# Patient Record
Sex: FEMALE | Race: WHITE | Employment: FULL TIME | URBAN - METROPOLITAN AREA
[De-identification: names, ages, dates, MRNs, and addresses within clinical notes are randomized per-mention and may not be internally consistent; named-entity substitution may affect disease eponyms.]

---

## 2017-01-04 DIAGNOSIS — K21.9 GASTROESOPHAGEAL REFLUX DISEASE WITHOUT ESOPHAGITIS: ICD-10-CM

## 2017-01-04 RX ORDER — LORATADINE 10 MG/1
TABLET ORAL
Qty: 30 TABLET | Refills: 4 | Status: SHIPPED | OUTPATIENT
Start: 2017-01-04 | End: 2017-06-30 | Stop reason: SDUPTHER

## 2017-01-04 RX ORDER — OMEPRAZOLE 20 MG/1
CAPSULE, DELAYED RELEASE ORAL
Qty: 30 CAPSULE | Refills: 11 | Status: SHIPPED | OUTPATIENT
Start: 2017-01-04 | End: 2018-01-23 | Stop reason: SDUPTHER

## 2017-01-19 RX ORDER — METOPROLOL TARTRATE 50 MG/1
TABLET, FILM COATED ORAL
Qty: 60 TABLET | Refills: 2 | Status: SHIPPED | OUTPATIENT
Start: 2017-01-19 | End: 2017-05-12 | Stop reason: SDUPTHER

## 2017-02-24 RX ORDER — TOPIRAMATE 25 MG/1
TABLET ORAL
Qty: 60 TABLET | Refills: 0 | Status: SHIPPED | OUTPATIENT
Start: 2017-02-24 | End: 2017-04-04 | Stop reason: SDUPTHER

## 2017-03-23 ENCOUNTER — OFFICE VISIT (OUTPATIENT)
Dept: NEUROLOGY | Age: 52
End: 2017-03-23
Payer: COMMERCIAL

## 2017-03-23 DIAGNOSIS — M54.12 CERVICAL RADICULOPATHY: Primary | ICD-10-CM

## 2017-03-23 PROCEDURE — 95910 NRV CNDJ TEST 7-8 STUDIES: CPT | Performed by: PSYCHIATRY & NEUROLOGY

## 2017-03-23 PROCEDURE — 95886 MUSC TEST DONE W/N TEST COMP: CPT | Performed by: PSYCHIATRY & NEUROLOGY

## 2017-03-23 RX ORDER — LORAZEPAM 1 MG/1
1 TABLET ORAL EVERY 6 HOURS PRN
OUTPATIENT
Start: 2017-03-23

## 2017-04-03 RX ORDER — LORAZEPAM 1 MG/1
1 TABLET ORAL EVERY 8 HOURS PRN
Qty: 20 TABLET | Refills: 0 | Status: SHIPPED | OUTPATIENT
Start: 2017-04-03 | End: 2017-09-29 | Stop reason: SDUPTHER

## 2017-04-04 RX ORDER — TOPIRAMATE 25 MG/1
TABLET ORAL
Qty: 60 TABLET | Refills: 0 | Status: SHIPPED | OUTPATIENT
Start: 2017-04-04 | End: 2017-05-12 | Stop reason: SDUPTHER

## 2017-04-10 ENCOUNTER — HOSPITAL ENCOUNTER (OUTPATIENT)
Dept: MRI IMAGING | Age: 52
Discharge: HOME | End: 2017-04-10
Payer: COMMERCIAL

## 2017-04-10 DIAGNOSIS — M54.12 CERVICAL RADICULOPATHY: ICD-10-CM

## 2017-04-10 LAB — POC CREATININE: 0.5 MG/DL (ref 0.6–1.4)

## 2017-04-10 PROCEDURE — A9579 GAD-BASE MR CONTRAST NOS,1ML: HCPCS | Performed by: PSYCHIATRY & NEUROLOGY

## 2017-04-10 PROCEDURE — 72156 MRI NECK SPINE W/O & W/DYE: CPT

## 2017-04-10 PROCEDURE — 82565 ASSAY OF CREATININE: CPT

## 2017-04-10 PROCEDURE — 6360000004 HC RX CONTRAST MEDICATION: Performed by: PSYCHIATRY & NEUROLOGY

## 2017-04-10 RX ORDER — SODIUM CHLORIDE 0.9 % (FLUSH) 0.9 %
10 SYRINGE (ML) INJECTION 2 TIMES DAILY
Status: DISCONTINUED | OUTPATIENT
Start: 2017-04-10 | End: 2017-04-13 | Stop reason: HOSPADM

## 2017-04-10 RX ADMIN — GADOPENTETATE DIMEGLUMINE 13 ML: 469.01 INJECTION INTRAVENOUS at 09:20

## 2017-04-17 ENCOUNTER — OFFICE VISIT (OUTPATIENT)
Dept: ORTHOPEDIC SURGERY | Age: 52
End: 2017-04-17
Payer: COMMERCIAL

## 2017-04-17 VITALS — BODY MASS INDEX: 28.3 KG/M2 | WEIGHT: 149.91 LBS | HEIGHT: 61 IN

## 2017-04-17 DIAGNOSIS — M65.311 TRIGGER FINGER OF RIGHT THUMB: Primary | ICD-10-CM

## 2017-04-17 DIAGNOSIS — M65.331 TRIGGER MIDDLE FINGER OF RIGHT HAND: ICD-10-CM

## 2017-04-17 PROCEDURE — 99213 OFFICE O/P EST LOW 20 MIN: CPT | Performed by: STUDENT IN AN ORGANIZED HEALTH CARE EDUCATION/TRAINING PROGRAM

## 2017-04-17 ASSESSMENT — ENCOUNTER SYMPTOMS
CHEST TIGHTNESS: 0
VOMITING: 0
SHORTNESS OF BREATH: 0
CONSTIPATION: 0
DIARRHEA: 0
NAUSEA: 0

## 2017-04-27 ENCOUNTER — OFFICE VISIT (OUTPATIENT)
Dept: INTERNAL MEDICINE | Age: 52
End: 2017-04-27
Payer: COMMERCIAL

## 2017-04-27 VITALS
BODY MASS INDEX: 27.38 KG/M2 | HEIGHT: 61 IN | SYSTOLIC BLOOD PRESSURE: 118 MMHG | WEIGHT: 145 LBS | HEART RATE: 67 BPM | OXYGEN SATURATION: 93 % | DIASTOLIC BLOOD PRESSURE: 80 MMHG

## 2017-04-27 DIAGNOSIS — Z72.0 TOBACCO ABUSE: ICD-10-CM

## 2017-04-27 DIAGNOSIS — M65.30 TRIGGER FINGER, UNSPECIFIED FINGER, UNSPECIFIED LATERALITY: Primary | ICD-10-CM

## 2017-04-27 DIAGNOSIS — R73.03 PREDIABETES: ICD-10-CM

## 2017-04-27 PROCEDURE — 99213 OFFICE O/P EST LOW 20 MIN: CPT | Performed by: INTERNAL MEDICINE

## 2017-04-27 ASSESSMENT — PATIENT HEALTH QUESTIONNAIRE - PHQ9
SUM OF ALL RESPONSES TO PHQ QUESTIONS 1-9: 0
1. LITTLE INTEREST OR PLEASURE IN DOING THINGS: 0
2. FEELING DOWN, DEPRESSED OR HOPELESS: 0
SUM OF ALL RESPONSES TO PHQ9 QUESTIONS 1 & 2: 0

## 2017-05-01 ENCOUNTER — ANESTHESIA EVENT (OUTPATIENT)
Dept: OPERATING ROOM | Age: 52
End: 2017-05-01
Payer: COMMERCIAL

## 2017-05-02 ENCOUNTER — ANESTHESIA (OUTPATIENT)
Dept: OPERATING ROOM | Age: 52
End: 2017-05-02
Payer: COMMERCIAL

## 2017-05-02 ENCOUNTER — HOSPITAL ENCOUNTER (OUTPATIENT)
Age: 52
Setting detail: OUTPATIENT SURGERY
Discharge: HOME | End: 2017-05-02
Attending: ORTHOPAEDIC SURGERY | Admitting: ORTHOPAEDIC SURGERY
Payer: COMMERCIAL

## 2017-05-02 VITALS — OXYGEN SATURATION: 100 % | SYSTOLIC BLOOD PRESSURE: 123 MMHG | DIASTOLIC BLOOD PRESSURE: 82 MMHG

## 2017-05-02 VITALS
BODY MASS INDEX: 26.97 KG/M2 | SYSTOLIC BLOOD PRESSURE: 155 MMHG | DIASTOLIC BLOOD PRESSURE: 79 MMHG | OXYGEN SATURATION: 97 % | HEART RATE: 63 BPM | RESPIRATION RATE: 14 BRPM | WEIGHT: 142.86 LBS | HEIGHT: 61 IN | TEMPERATURE: 97.7 F

## 2017-05-02 LAB
GLUCOSE BLD-MCNC: 105 MG/DL (ref 74–106)
POC BUN: 15 MG/DL (ref 6–20)
POC CREATININE: 0.6 MG/DL (ref 0.6–1.4)

## 2017-05-02 PROCEDURE — 3600000013 HC SURGERY LEVEL 3 ADDTL 15MIN: Performed by: ORTHOPAEDIC SURGERY

## 2017-05-02 PROCEDURE — 2580000003 HC RX 258: Performed by: ANESTHESIOLOGY

## 2017-05-02 PROCEDURE — 6360000002 HC RX W HCPCS: Performed by: STUDENT IN AN ORGANIZED HEALTH CARE EDUCATION/TRAINING PROGRAM

## 2017-05-02 PROCEDURE — 3700000000 HC ANESTHESIA ATTENDED CARE: Performed by: ORTHOPAEDIC SURGERY

## 2017-05-02 PROCEDURE — 7100000010 HC PHASE II RECOVERY - FIRST 15 MIN: Performed by: ORTHOPAEDIC SURGERY

## 2017-05-02 PROCEDURE — 3700000001 HC ADD 15 MINUTES (ANESTHESIA): Performed by: ORTHOPAEDIC SURGERY

## 2017-05-02 PROCEDURE — 26055 INCISE FINGER TENDON SHEATH: CPT | Performed by: ORTHOPAEDIC SURGERY

## 2017-05-02 PROCEDURE — 2580000003 HC RX 258: Performed by: ORTHOPAEDIC SURGERY

## 2017-05-02 PROCEDURE — 3600000003 HC SURGERY LEVEL 3 BASE: Performed by: ORTHOPAEDIC SURGERY

## 2017-05-02 PROCEDURE — 7100000011 HC PHASE II RECOVERY - ADDTL 15 MIN: Performed by: ORTHOPAEDIC SURGERY

## 2017-05-02 PROCEDURE — 82565 ASSAY OF CREATININE: CPT

## 2017-05-02 PROCEDURE — 82947 ASSAY GLUCOSE BLOOD QUANT: CPT

## 2017-05-02 PROCEDURE — 2500000003 HC RX 250 WO HCPCS: Performed by: SPECIALIST

## 2017-05-02 PROCEDURE — 84520 ASSAY OF UREA NITROGEN: CPT

## 2017-05-02 PROCEDURE — 2500000003 HC RX 250 WO HCPCS: Performed by: ORTHOPAEDIC SURGERY

## 2017-05-02 PROCEDURE — 93005 ELECTROCARDIOGRAM TRACING: CPT

## 2017-05-02 PROCEDURE — 6360000002 HC RX W HCPCS: Performed by: SPECIALIST

## 2017-05-02 RX ORDER — MAGNESIUM HYDROXIDE 1200 MG/15ML
LIQUID ORAL CONTINUOUS PRN
Status: DISCONTINUED | OUTPATIENT
Start: 2017-05-02 | End: 2017-05-02 | Stop reason: HOSPADM

## 2017-05-02 RX ORDER — SODIUM CHLORIDE, SODIUM LACTATE, POTASSIUM CHLORIDE, CALCIUM CHLORIDE 600; 310; 30; 20 MG/100ML; MG/100ML; MG/100ML; MG/100ML
INJECTION, SOLUTION INTRAVENOUS CONTINUOUS
Status: DISCONTINUED | OUTPATIENT
Start: 2017-05-02 | End: 2017-05-02 | Stop reason: HOSPADM

## 2017-05-02 RX ORDER — FENTANYL CITRATE 50 UG/ML
INJECTION, SOLUTION INTRAMUSCULAR; INTRAVENOUS PRN
Status: DISCONTINUED | OUTPATIENT
Start: 2017-05-02 | End: 2017-05-02 | Stop reason: SDUPTHER

## 2017-05-02 RX ORDER — HYDROCODONE BITARTRATE AND ACETAMINOPHEN 5; 325 MG/1; MG/1
1-2 TABLET ORAL EVERY 6 HOURS PRN
Qty: 30 TABLET | Refills: 0 | Status: SHIPPED | OUTPATIENT
Start: 2017-05-02 | End: 2017-05-24 | Stop reason: ALTCHOICE

## 2017-05-02 RX ORDER — LIDOCAINE HYDROCHLORIDE 10 MG/ML
INJECTION, SOLUTION EPIDURAL; INFILTRATION; INTRACAUDAL; PERINEURAL PRN
Status: DISCONTINUED | OUTPATIENT
Start: 2017-05-02 | End: 2017-05-02 | Stop reason: SDUPTHER

## 2017-05-02 RX ORDER — MIDAZOLAM HYDROCHLORIDE 1 MG/ML
INJECTION INTRAMUSCULAR; INTRAVENOUS PRN
Status: DISCONTINUED | OUTPATIENT
Start: 2017-05-02 | End: 2017-05-02 | Stop reason: SDUPTHER

## 2017-05-02 RX ORDER — PROPOFOL 10 MG/ML
INJECTION, EMULSION INTRAVENOUS CONTINUOUS PRN
Status: DISCONTINUED | OUTPATIENT
Start: 2017-05-02 | End: 2017-05-02 | Stop reason: SDUPTHER

## 2017-05-02 RX ADMIN — PROPOFOL 50 MCG/KG/MIN: 10 INJECTION, EMULSION INTRAVENOUS at 13:30

## 2017-05-02 RX ADMIN — Medication 2 G: at 13:35

## 2017-05-02 RX ADMIN — MIDAZOLAM HYDROCHLORIDE 1 MG: 1 INJECTION, SOLUTION INTRAMUSCULAR; INTRAVENOUS at 13:30

## 2017-05-02 RX ADMIN — SODIUM CHLORIDE, POTASSIUM CHLORIDE, SODIUM LACTATE AND CALCIUM CHLORIDE: 600; 310; 30; 20 INJECTION, SOLUTION INTRAVENOUS at 11:39

## 2017-05-02 RX ADMIN — FENTANYL CITRATE 50 MCG: 50 INJECTION, SOLUTION INTRAMUSCULAR; INTRAVENOUS at 13:30

## 2017-05-02 RX ADMIN — LIDOCAINE HYDROCHLORIDE 30 MG: 10 INJECTION, SOLUTION EPIDURAL; INFILTRATION; INTRACAUDAL; PERINEURAL at 13:30

## 2017-05-02 RX ADMIN — MIDAZOLAM HYDROCHLORIDE 0.5 MG: 1 INJECTION, SOLUTION INTRAMUSCULAR; INTRAVENOUS at 13:35

## 2017-05-02 ASSESSMENT — PAIN SCALES - GENERAL
PAINLEVEL_OUTOF10: 0

## 2017-05-02 ASSESSMENT — PAIN DESCRIPTION - DESCRIPTORS: DESCRIPTORS: ACHING

## 2017-05-02 ASSESSMENT — PAIN - FUNCTIONAL ASSESSMENT: PAIN_FUNCTIONAL_ASSESSMENT: 0-10

## 2017-05-05 LAB
EKG ATRIAL RATE: 60 BPM
EKG P AXIS: 66 DEGREES
EKG P-R INTERVAL: 118 MS
EKG Q-T INTERVAL: 418 MS
EKG QRS DURATION: 84 MS
EKG QTC CALCULATION (BAZETT): 418 MS
EKG R AXIS: 79 DEGREES
EKG T AXIS: 70 DEGREES
EKG VENTRICULAR RATE: 60 BPM

## 2017-05-08 ENCOUNTER — OFFICE VISIT (OUTPATIENT)
Dept: NEUROLOGY | Age: 52
End: 2017-05-08
Payer: COMMERCIAL

## 2017-05-08 VITALS
DIASTOLIC BLOOD PRESSURE: 83 MMHG | HEART RATE: 79 BPM | HEIGHT: 61 IN | WEIGHT: 143.6 LBS | SYSTOLIC BLOOD PRESSURE: 138 MMHG | BODY MASS INDEX: 27.11 KG/M2

## 2017-05-08 DIAGNOSIS — R51.9 HEADACHE DISORDER: Primary | ICD-10-CM

## 2017-05-08 PROCEDURE — 99214 OFFICE O/P EST MOD 30 MIN: CPT | Performed by: PSYCHIATRY & NEUROLOGY

## 2017-05-15 ENCOUNTER — OFFICE VISIT (OUTPATIENT)
Dept: ORTHOPEDIC SURGERY | Age: 52
End: 2017-05-15

## 2017-05-15 VITALS
SYSTOLIC BLOOD PRESSURE: 166 MMHG | HEIGHT: 61 IN | HEART RATE: 79 BPM | WEIGHT: 143.52 LBS | BODY MASS INDEX: 27.1 KG/M2 | DIASTOLIC BLOOD PRESSURE: 93 MMHG

## 2017-05-15 DIAGNOSIS — M65.331 TRIGGER MIDDLE FINGER OF RIGHT HAND: ICD-10-CM

## 2017-05-15 DIAGNOSIS — M65.311 TRIGGER FINGER OF RIGHT THUMB: Primary | ICD-10-CM

## 2017-05-15 PROCEDURE — 99024 POSTOP FOLLOW-UP VISIT: CPT | Performed by: STUDENT IN AN ORGANIZED HEALTH CARE EDUCATION/TRAINING PROGRAM

## 2017-05-15 RX ORDER — METOPROLOL TARTRATE 50 MG/1
TABLET, FILM COATED ORAL
Qty: 60 TABLET | Refills: 2 | Status: SHIPPED | OUTPATIENT
Start: 2017-05-15 | End: 2017-08-29 | Stop reason: SDUPTHER

## 2017-05-15 RX ORDER — TOPIRAMATE 25 MG/1
TABLET ORAL
Qty: 60 TABLET | Refills: 3 | Status: SHIPPED | OUTPATIENT
Start: 2017-05-15 | End: 2017-09-27 | Stop reason: SDUPTHER

## 2017-05-19 ENCOUNTER — HOSPITAL ENCOUNTER (OUTPATIENT)
Dept: OCCUPATIONAL THERAPY | Age: 52
Setting detail: THERAPIES SERIES
Discharge: HOME | End: 2017-05-19
Payer: COMMERCIAL

## 2017-05-19 PROCEDURE — 97165 OT EVAL LOW COMPLEX 30 MIN: CPT

## 2017-05-19 PROCEDURE — 97140 MANUAL THERAPY 1/> REGIONS: CPT

## 2017-05-19 PROCEDURE — 97110 THERAPEUTIC EXERCISES: CPT

## 2017-05-22 ENCOUNTER — HOSPITAL ENCOUNTER (OUTPATIENT)
Dept: OCCUPATIONAL THERAPY | Age: 52
Setting detail: THERAPIES SERIES
Discharge: HOME | End: 2017-05-22
Payer: COMMERCIAL

## 2017-05-22 PROCEDURE — 97110 THERAPEUTIC EXERCISES: CPT

## 2017-05-22 PROCEDURE — 97035 APP MDLTY 1+ULTRASOUND EA 15: CPT

## 2017-05-23 ENCOUNTER — HOSPITAL ENCOUNTER (OUTPATIENT)
Dept: OCCUPATIONAL THERAPY | Age: 52
Setting detail: THERAPIES SERIES
Discharge: HOME | End: 2017-05-23
Payer: COMMERCIAL

## 2017-05-24 ENCOUNTER — OFFICE VISIT (OUTPATIENT)
Dept: ORTHOPEDIC SURGERY | Age: 52
End: 2017-05-24

## 2017-05-24 VITALS — BODY MASS INDEX: 27.1 KG/M2 | HEIGHT: 61 IN | WEIGHT: 143.52 LBS

## 2017-05-24 DIAGNOSIS — M65.311 TRIGGER FINGER OF RIGHT THUMB: Primary | ICD-10-CM

## 2017-05-24 DIAGNOSIS — M65.331 TRIGGER MIDDLE FINGER OF RIGHT HAND: ICD-10-CM

## 2017-05-24 PROCEDURE — 99024 POSTOP FOLLOW-UP VISIT: CPT | Performed by: STUDENT IN AN ORGANIZED HEALTH CARE EDUCATION/TRAINING PROGRAM

## 2017-05-25 ENCOUNTER — HOSPITAL ENCOUNTER (OUTPATIENT)
Dept: OCCUPATIONAL THERAPY | Age: 52
Setting detail: THERAPIES SERIES
Discharge: HOME | End: 2017-05-25
Payer: COMMERCIAL

## 2017-05-25 PROCEDURE — 97110 THERAPEUTIC EXERCISES: CPT

## 2017-05-25 PROCEDURE — 97035 APP MDLTY 1+ULTRASOUND EA 15: CPT

## 2017-05-30 ENCOUNTER — HOSPITAL ENCOUNTER (OUTPATIENT)
Dept: OCCUPATIONAL THERAPY | Age: 52
Setting detail: THERAPIES SERIES
Discharge: HOME | End: 2017-05-30
Payer: COMMERCIAL

## 2017-05-30 PROCEDURE — 97035 APP MDLTY 1+ULTRASOUND EA 15: CPT

## 2017-05-30 PROCEDURE — 97110 THERAPEUTIC EXERCISES: CPT

## 2017-06-01 ENCOUNTER — HOSPITAL ENCOUNTER (OUTPATIENT)
Dept: OCCUPATIONAL THERAPY | Age: 52
Setting detail: THERAPIES SERIES
Discharge: HOME | End: 2017-06-01
Payer: COMMERCIAL

## 2017-06-01 PROCEDURE — 97035 APP MDLTY 1+ULTRASOUND EA 15: CPT

## 2017-06-01 PROCEDURE — 97110 THERAPEUTIC EXERCISES: CPT

## 2017-06-02 ENCOUNTER — HOSPITAL ENCOUNTER (OUTPATIENT)
Dept: OCCUPATIONAL THERAPY | Age: 52
Setting detail: THERAPIES SERIES
Discharge: HOME | End: 2017-06-02
Payer: COMMERCIAL

## 2017-06-06 ENCOUNTER — HOSPITAL ENCOUNTER (OUTPATIENT)
Dept: OCCUPATIONAL THERAPY | Age: 52
Setting detail: THERAPIES SERIES
Discharge: HOME | End: 2017-06-06
Payer: COMMERCIAL

## 2017-06-06 PROCEDURE — 97110 THERAPEUTIC EXERCISES: CPT

## 2017-06-06 PROCEDURE — 97035 APP MDLTY 1+ULTRASOUND EA 15: CPT

## 2017-06-06 PROCEDURE — 97140 MANUAL THERAPY 1/> REGIONS: CPT

## 2017-06-12 ENCOUNTER — OFFICE VISIT (OUTPATIENT)
Dept: ORTHOPEDIC SURGERY | Age: 52
End: 2017-06-12

## 2017-06-12 DIAGNOSIS — M65.331 TRIGGER MIDDLE FINGER OF RIGHT HAND: Primary | ICD-10-CM

## 2017-06-12 PROCEDURE — 99024 POSTOP FOLLOW-UP VISIT: CPT | Performed by: STUDENT IN AN ORGANIZED HEALTH CARE EDUCATION/TRAINING PROGRAM

## 2017-06-12 RX ORDER — METHYLPREDNISOLONE 4 MG/1
TABLET ORAL
Qty: 1 KIT | Refills: 0 | Status: SHIPPED | OUTPATIENT
Start: 2017-06-12 | End: 2017-06-18

## 2017-06-12 ASSESSMENT — ENCOUNTER SYMPTOMS
WHEEZING: 0
ABDOMINAL DISTENTION: 0
SHORTNESS OF BREATH: 0

## 2017-06-29 ENCOUNTER — OFFICE VISIT (OUTPATIENT)
Dept: INTERNAL MEDICINE | Age: 52
End: 2017-06-29
Payer: COMMERCIAL

## 2017-06-29 VITALS
DIASTOLIC BLOOD PRESSURE: 90 MMHG | WEIGHT: 145 LBS | HEIGHT: 61 IN | OXYGEN SATURATION: 96 % | SYSTOLIC BLOOD PRESSURE: 150 MMHG | HEART RATE: 66 BPM | BODY MASS INDEX: 27.38 KG/M2

## 2017-06-29 DIAGNOSIS — I10 ESSENTIAL HYPERTENSION: Chronic | ICD-10-CM

## 2017-06-29 DIAGNOSIS — Z00.00 WELL ADULT HEALTH CHECK: ICD-10-CM

## 2017-06-29 DIAGNOSIS — J40 BRONCHITIS: Primary | ICD-10-CM

## 2017-06-29 DIAGNOSIS — J44.9 CHRONIC OBSTRUCTIVE PULMONARY DISEASE, UNSPECIFIED COPD TYPE (HCC): Chronic | ICD-10-CM

## 2017-06-29 LAB — HBA1C MFR BLD: 5.9 %

## 2017-06-29 PROCEDURE — 83036 HEMOGLOBIN GLYCOSYLATED A1C: CPT | Performed by: INTERNAL MEDICINE

## 2017-06-29 PROCEDURE — 99213 OFFICE O/P EST LOW 20 MIN: CPT | Performed by: INTERNAL MEDICINE

## 2017-06-29 RX ORDER — AZITHROMYCIN 250 MG/1
TABLET, FILM COATED ORAL
Qty: 1 PACKET | Refills: 0 | Status: SHIPPED | OUTPATIENT
Start: 2017-06-29 | End: 2017-07-09

## 2017-06-29 ASSESSMENT — ENCOUNTER SYMPTOMS
SHORTNESS OF BREATH: 1
ABDOMINAL DISTENTION: 0
COUGH: 1
PHOTOPHOBIA: 0
ABDOMINAL PAIN: 0
SINUS PRESSURE: 0

## 2017-07-03 RX ORDER — LORATADINE 10 MG/1
TABLET ORAL
Qty: 30 TABLET | Refills: 4 | Status: SHIPPED | OUTPATIENT
Start: 2017-07-03 | End: 2017-12-04 | Stop reason: SDUPTHER

## 2017-07-14 ENCOUNTER — TELEPHONE (OUTPATIENT)
Dept: PHARMACY | Facility: CLINIC | Age: 52
End: 2017-07-14

## 2017-07-17 ENCOUNTER — OFFICE VISIT (OUTPATIENT)
Dept: ORTHOPEDIC SURGERY | Age: 52
End: 2017-07-17

## 2017-07-17 ENCOUNTER — OFFICE VISIT (OUTPATIENT)
Dept: INTERNAL MEDICINE | Age: 52
End: 2017-07-17
Payer: COMMERCIAL

## 2017-07-17 VITALS
HEIGHT: 61 IN | HEART RATE: 74 BPM | WEIGHT: 147 LBS | DIASTOLIC BLOOD PRESSURE: 81 MMHG | OXYGEN SATURATION: 95 % | SYSTOLIC BLOOD PRESSURE: 131 MMHG | BODY MASS INDEX: 27.75 KG/M2

## 2017-07-17 VITALS — WEIGHT: 147.2 LBS | HEIGHT: 61 IN | BODY MASS INDEX: 27.79 KG/M2

## 2017-07-17 DIAGNOSIS — M65.331 TRIGGER MIDDLE FINGER OF RIGHT HAND: Primary | ICD-10-CM

## 2017-07-17 DIAGNOSIS — I10 ESSENTIAL HYPERTENSION: Primary | Chronic | ICD-10-CM

## 2017-07-17 DIAGNOSIS — Z12.39 BREAST CANCER SCREENING: ICD-10-CM

## 2017-07-17 DIAGNOSIS — Z72.0 TOBACCO ABUSE: ICD-10-CM

## 2017-07-17 DIAGNOSIS — M65.311 TRIGGER FINGER OF RIGHT THUMB: ICD-10-CM

## 2017-07-17 PROCEDURE — 99024 POSTOP FOLLOW-UP VISIT: CPT | Performed by: STUDENT IN AN ORGANIZED HEALTH CARE EDUCATION/TRAINING PROGRAM

## 2017-07-17 PROCEDURE — 99214 OFFICE O/P EST MOD 30 MIN: CPT | Performed by: INTERNAL MEDICINE

## 2017-08-01 ENCOUNTER — HOSPITAL ENCOUNTER (OUTPATIENT)
Dept: WOMENS IMAGING | Age: 52
Discharge: HOME | End: 2017-08-01
Payer: COMMERCIAL

## 2017-08-01 DIAGNOSIS — Z12.39 BREAST CANCER SCREENING: ICD-10-CM

## 2017-08-01 PROCEDURE — 77063 BREAST TOMOSYNTHESIS BI: CPT

## 2017-08-29 RX ORDER — METOPROLOL TARTRATE 50 MG/1
TABLET, FILM COATED ORAL
Qty: 60 TABLET | Refills: 2 | Status: CANCELLED | OUTPATIENT
Start: 2017-08-29

## 2017-08-30 RX ORDER — METOPROLOL TARTRATE 50 MG/1
TABLET, FILM COATED ORAL
Qty: 60 TABLET | Refills: 2 | Status: SHIPPED | OUTPATIENT
Start: 2017-08-30 | End: 2017-12-01 | Stop reason: SDUPTHER

## 2017-09-13 DIAGNOSIS — R51.9 OCCIPITAL HEADACHE: ICD-10-CM

## 2017-09-13 DIAGNOSIS — R55 NEAR SYNCOPE: ICD-10-CM

## 2017-09-27 DIAGNOSIS — R55 NEAR SYNCOPE: ICD-10-CM

## 2017-09-28 RX ORDER — TOPIRAMATE 25 MG/1
TABLET ORAL
Qty: 60 TABLET | Refills: 2 | Status: ON HOLD | OUTPATIENT
Start: 2017-09-28 | End: 2018-12-30

## 2017-09-28 RX ORDER — IPRATROPIUM/ALBUTEROL SULFATE 20-100 MCG
MIST INHALER (GRAM) INHALATION
Qty: 1 INHALER | Refills: 5 | Status: SHIPPED | OUTPATIENT
Start: 2017-09-28 | End: 2018-02-20 | Stop reason: SDUPTHER

## 2017-09-29 NOTE — TELEPHONE ENCOUNTER
Health Maintenance   Topic Date Due    Colon cancer screen colonoscopy  08/25/2017    Flu vaccine (1) 09/01/2017    Cervical cancer screen  10/20/2017    Breast cancer screen  08/01/2019    Lipid screen  04/25/2021    DTaP/Tdap/Td vaccine (2 - Td) 10/20/2024    Pneumococcal med risk  Completed    HIV screen  Completed             (applicable per patient's age: Cancer Screenings, Depression Screening, Fall Risk Screening, Immunizations)    Hemoglobin A1C (%)   Date Value   06/29/2017 5.9   12/05/2016 6.0     LDL Cholesterol (mg/dL)   Date Value   04/25/2016 123     AST (U/L)   Date Value   12/12/2016 22     ALT (U/L)   Date Value   12/12/2016 16     BUN (mg/dL)   Date Value   12/12/2016 13      (goal A1C is < 7)   (goal LDL is <100) need 30-50% reduction from baseline     BP Readings from Last 3 Encounters:   07/17/17 131/81   06/29/17 (!) 150/90   05/15/17 (!) 166/93    (goal /80)      All Future Testing planned in [facility]:  Lab Frequency Next Occurrence   POCT activated clotting time         Next Visit Date:  Future Appointments  Date Time Provider [place]   10/10/2017 8:40 AM Arturo Huerta MD [facility]   1/18/2018 8:50 AM Chinedu Ortiz MD [id number]            Patient Active Problem List:     HTN (hypertension)     COPD (chronic obstructive pulmonary disease)     Hyperlipidemia     Near syncope     Anxiety     Tobacco abuse     Vesicular rash     Rash of hands     S/P LEEP     Encounter for routine gynecological examination     Gastroesophageal reflux disease without esophagitis     Trigger finger of right thumb     Trigger finger, right middle finger     Occipital headache

## 2017-10-02 RX ORDER — LORAZEPAM 1 MG/1
1 TABLET ORAL EVERY 8 HOURS PRN
Qty: 20 TABLET | Refills: 0 | Status: SHIPPED | OUTPATIENT
Start: 2017-10-02 | End: 2019-08-15 | Stop reason: SDUPTHER

## 2017-10-02 RX ORDER — FLUOXETINE HYDROCHLORIDE 20 MG/1
20 CAPSULE ORAL DAILY
Qty: 30 CAPSULE | Refills: 6 | Status: SHIPPED | OUTPATIENT
Start: 2017-10-02 | End: 2018-07-10 | Stop reason: SDUPTHER

## 2017-11-21 ENCOUNTER — TELEPHONE (OUTPATIENT)
Dept: INTERNAL MEDICINE | Age: 52
End: 2017-11-21

## 2017-12-04 RX ORDER — METOPROLOL TARTRATE 50 MG/1
TABLET, FILM COATED ORAL
Qty: 60 TABLET | Refills: 1 | Status: SHIPPED | OUTPATIENT
Start: 2017-12-04 | End: 2018-02-20 | Stop reason: SDUPTHER

## 2017-12-04 RX ORDER — LORATADINE 10 MG/1
TABLET ORAL
Qty: 30 TABLET | Refills: 4 | Status: SHIPPED | OUTPATIENT
Start: 2017-12-04 | End: 2018-08-13 | Stop reason: ALTCHOICE

## 2017-12-04 NOTE — TELEPHONE ENCOUNTER
Health Maintenance   Topic Date Due    Colon cancer screen colonoscopy  08/25/2017    Flu vaccine (1) 09/01/2017    Cervical cancer screen  10/20/2017    Breast cancer screen  08/01/2019    Lipid screen  04/25/2021    DTaP/Tdap/Td vaccine (2 - Td) 10/20/2024    Pneumococcal med risk  Completed    HIV screen  Completed             (applicable per patient's age: Cancer Screenings, Depression Screening, Fall Risk Screening, Immunizations)    Hemoglobin A1C (%)   Date Value   06/29/2017 5.9   12/05/2016 6.0     LDL Cholesterol (mg/dL)   Date Value   04/25/2016 123     AST (U/L)   Date Value   12/12/2016 22     ALT (U/L)   Date Value   12/12/2016 16     BUN (mg/dL)   Date Value   12/12/2016 13      (goal A1C is < 7)   (goal LDL is <100) need 30-50% reduction from baseline     BP Readings from Last 3 Encounters:   07/17/17 131/81   06/29/17 (!) 150/90   05/15/17 (!) 166/93    (goal /80)      All Future Testing planned in [facility]:  Lab Frequency Next Occurrence   POCT activated clotting time         Next Visit Date:  Future Appointments  Date Time Provider Naheed Barbosaisti   12/18/2017 11:30 AM Nakia Lopez MD [facility]   1/18/2018 8:50 AM Nakia Lopez MD [id number]            Patient Active Problem List:     HTN (hypertension)     COPD (chronic obstructive pulmonary disease)     Hyperlipidemia     Near syncope     Anxiety     Tobacco abuse     Vesicular rash     Rash of hands     S/P LEEP     Encounter for routine gynecological examination     Gastroesophageal reflux disease without esophagitis     Trigger finger of right thumb     Trigger finger, right middle finger     Occipital headache

## 2017-12-10 ENCOUNTER — APPOINTMENT (OUTPATIENT)
Dept: GENERAL RADIOLOGY | Age: 52
End: 2017-12-10
Payer: COMMERCIAL

## 2017-12-10 ENCOUNTER — HOSPITAL ENCOUNTER (EMERGENCY)
Age: 52
Discharge: HOME | End: 2017-12-10
Attending: EMERGENCY MEDICINE
Payer: COMMERCIAL

## 2017-12-10 VITALS
DIASTOLIC BLOOD PRESSURE: 86 MMHG | TEMPERATURE: 97 F | RESPIRATION RATE: 18 BRPM | SYSTOLIC BLOOD PRESSURE: 133 MMHG | BODY MASS INDEX: 27.38 KG/M2 | OXYGEN SATURATION: 96 % | HEART RATE: 81 BPM | WEIGHT: 145 LBS | HEIGHT: 61 IN

## 2017-12-10 DIAGNOSIS — B34.9 VIRAL ILLNESS: Primary | ICD-10-CM

## 2017-12-10 LAB
DIRECT EXAM: NORMAL
Lab: NORMAL
SPECIMEN DESCRIPTION: NORMAL
STATUS: NORMAL

## 2017-12-10 PROCEDURE — 71020 XR CHEST STANDARD TWO VW: CPT

## 2017-12-10 PROCEDURE — 87804 INFLUENZA ASSAY W/OPTIC: CPT

## 2017-12-10 PROCEDURE — 99285 EMERGENCY DEPT VISIT HI MDM: CPT

## 2017-12-10 RX ORDER — PREDNISONE 20 MG/1
40 TABLET ORAL DAILY
Qty: 10 TABLET | Refills: 0 | Status: ON HOLD | OUTPATIENT
Start: 2017-12-10 | End: 2018-12-30

## 2017-12-10 ASSESSMENT — ENCOUNTER SYMPTOMS
ABDOMINAL PAIN: 0
COUGH: 1
SHORTNESS OF BREATH: 1
VOMITING: 0
RHINORRHEA: 1
EYE PAIN: 0
DIARRHEA: 0
CONSTIPATION: 0
NAUSEA: 0

## 2017-12-10 ASSESSMENT — PAIN SCALES - GENERAL: PAINLEVEL_OUTOF10: 4

## 2017-12-10 NOTE — ED NOTES
Pt unable to urinate, given water, flu swab sent     [address] [place] [address], RN  12/10/17 [id number]

## 2017-12-10 NOTE — ED PROVIDER NOTES
Wears dentures; and Wears glasses. has a past surgical history that includes Hysterectomy (); Tonsillectomy;  section; LEEP (); Cardiac catheterization (); and incise finger tendon sheath (Right, 2017). Social History     Social History    Marital status:      Spouse name: N/A    Number of children: N/A    Years of education: N/A     Occupational History    Not on file. Social History Main Topics    Smoking status: Current Every Day Smoker     Packs/day: 1.00     Years: 30.00     Types: Cigarettes    Smokeless tobacco: Never Used    Alcohol use 0.0 oz/week      Comment: occ    Drug use: No    Sexual activity: Not on file     Other Topics Concern    Not on file     Social History Narrative    No narrative on file       Family History   Problem Relation Age of Onset    High Blood Pressure Mother     Depression Mother     High Blood Pressure Father        Allergies:  Adhesive tape    Home Medications:  Prior to Admission medications    Medication Sig Start Date End Date Taking?  Authorizing Provider   predniSONE (DELTASONE) 20 MG tablet Take 2 tablets by mouth daily 12/10/17  Yes Fer Dowd DO   Spacer/Aero-Holding Chambers (E-Z SPACER) RAJESH 1 Device by Does not apply route daily as needed (difficulty breathing) 12/10/17  Yes Fer Dowd DO   metoprolol tartrate (LOPRESSOR) 50 MG tablet TAKE ONE TABLET BY MOUTH TWICE A DAY 17   Marcelle Lama MD   loratadine (CLARITIN) 10 MG tablet TAKE ONE TABLET BY MOUTH DAILY 17   Marcelle Lama MD   FLUoxetine (PROZAC) 20 MG capsule Take 1 capsule by mouth daily 10/2/17   Marcelle Lama MD   LORazepam (ATIVAN) 1 MG tablet Take 1 tablet by mouth every 8 hours as needed for Anxiety 10/2/17   Marcelle Lama MD   COMBIVENT RESPIMAT  MCG/ACT AERS inhaler INHALE ONE INHALATION BY MOUTH EVERY 6 HOURS 17   Marcelle Lama MD   topiramate (TOPAMAX) 25 MG tablet TAKE ONE TABLET BY MOUTH TWICE A DAY 17 Bj Villatoro MD   omeprazole (PRILOSEC) 20 MG delayed release capsule TAKE ONE CAPSULE BY MOUTH DAILY 1/4/17   Mary Tom MD   Elastic Bandages & Supports (JOBST KNEE HIGH COMPRESSION SM) MISC 1 each by Does not apply route daily as needed (swelling) 9/21/16   Mary Tom MD   albuterol sulfate HFA (VENTOLIN HFA) 108 (90 BASE) MCG/ACT inhaler Inhale 2 puffs into the lungs every 6 hours as needed for Wheezing 9/14/16   Ramona, CNP   Handicap Placard MISC by Does not apply route 4/28/16   Mary Tom MD   aspirin  MG EC tablet Take 325 mg by mouth daily Stopped 4/24/2017    Historical Provider, MD       REVIEW OF SYSTEMS    (2-9 systems for level 4, 10 or more for level 5)      Review of Systems   Constitutional: Positive for chills, fatigue and fever. HENT: Positive for congestion and rhinorrhea. Eyes: Negative for pain and visual disturbance. Respiratory: Positive for cough and shortness of breath. Cardiovascular: Negative for chest pain and palpitations. Gastrointestinal: Negative for abdominal pain, constipation, diarrhea, nausea and vomiting. Genitourinary: Negative for difficulty urinating and dysuria. Musculoskeletal: Positive for myalgias. Negative for gait problem and neck pain. Skin: Negative for rash and wound. Neurological: Negative for weakness and numbness. PHYSICAL EXAM   (up to 7 for level 4, 8 or more for level 5)      INITIAL VITALS:   /86   Pulse 81   Temp 97 °F (36.1 °C) (Oral)   Resp 18   Ht 5' 1\" (1.549 m)   Wt 145 lb (65.8 kg)   SpO2 96%   BMI 27.40 kg/m²     Physical Exam   Constitutional: She is oriented to person, place, and time. She appears well-developed and well-nourished. No distress. HENT:   Head: Normocephalic and atraumatic. Mouth/Throat: Oropharynx is clear and moist.   Eyes: Conjunctivae and EOM are normal. Pupils are equal, round, and reactive to light. Neck: Neck supple.    Cardiovascular: Normal rate, regular rhythm, normal heart sounds and intact distal pulses. Exam reveals no gallop and no friction rub. No murmur heard. Pulmonary/Chest: Effort normal and breath sounds normal. No respiratory distress. She has no wheezes. She has no rales. Abdominal: Soft. She exhibits no distension. There is no tenderness. There is no rebound and no guarding. Musculoskeletal: She exhibits no edema, tenderness or deformity. Lymphadenopathy:     She has no cervical adenopathy. Neurological: She is alert and oriented to person, place, and time. Skin: Skin is warm and dry. No rash noted. She is not diaphoretic. DIFFERENTIAL  DIAGNOSIS     PLAN (LABS / IMAGING / EKG):  Orders Placed This Encounter   Procedures    RAPID INFLUENZA A/B ANTIGENS    XR CHEST STANDARD (2 VW)       MEDICATIONS ORDERED:  Orders Placed This Encounter   Medications    predniSONE (DELTASONE) 20 MG tablet     Sig: Take 2 tablets by mouth daily     Dispense:  10 tablet     Refill:  0    Spacer/Aero-Holding Chambers (E-Z SPACER) RAJESH     Si Device by Does not apply route daily as needed (difficulty breathing)     Dispense:  1 Device     Refill:  0       DDX: Viral illness, influenza, COPD, pneumonia    DIAGNOSTIC RESULTS / EMERGENCY DEPARTMENT COURSE / MDM     LABS:  Results for orders placed or performed during the hospital encounter of 12/10/17   RAPID INFLUENZA A/B ANTIGENS   Result Value Ref Range    Specimen Description . NASOPHARYNGEAL SWAB     Special Requests NOT REPORTED     Direct Exam PRESUMPTIVE NEGATIVE for Influenza A + B antigens. Direct Exam       PCR testing to confirm this result is available upon request.  Specimen will be    Direct Exam        saved in the laboratory for 7 days. Please call [phone] if PCR testing is    Direct Exam  indicated.      Direct Exam       [facility] [id number] [place] [address] [phone]    Status FINAL 12/10/2017        IMPRESSION: Patient evaluated by myself and attending

## 2018-01-23 DIAGNOSIS — K21.9 GASTROESOPHAGEAL REFLUX DISEASE WITHOUT ESOPHAGITIS: ICD-10-CM

## 2018-01-23 RX ORDER — OMEPRAZOLE 20 MG/1
CAPSULE, DELAYED RELEASE ORAL
Qty: 30 CAPSULE | Refills: 11 | Status: SHIPPED | OUTPATIENT
Start: 2018-01-23 | End: 2019-02-21 | Stop reason: SDUPTHER

## 2018-01-23 NOTE — TELEPHONE ENCOUNTER
Next Visit Date:  No future appointments.     Health Maintenance   Topic Date Due    Colon cancer screen colonoscopy  08/25/2017    Flu vaccine (1) 09/01/2017    Cervical cancer screen  10/20/2017    A1C test (Diabetic or Prediabetic)  06/29/2018    Breast cancer screen  08/01/2019    Lipid screen  04/25/2021    DTaP/Tdap/Td vaccine (2 - Td) 10/20/2024    Pneumococcal med risk  Completed    HIV screen  Completed       Hemoglobin A1C (%)   Date Value   06/29/2017 5.9   12/05/2016 6.0             ( goal A1C is < 7)   No results found for: LABMICR  LDL Cholesterol (mg/dL)   Date Value   04/25/2016 123       (goal LDL is <100)   AST (U/L)   Date Value   12/12/2016 22     ALT (U/L)   Date Value   12/12/2016 16     BUN (mg/dL)   Date Value   12/12/2016 13     BP Readings from Last 3 Encounters:   12/10/17 133/86   07/17/17 131/81   06/29/17 (!) 150/90          (goal 120/80)    All Future Testing planned in [facility]  Lab Frequency Next Occurrence   POCT activated clotting time                 Patient Active Problem List:     HTN (hypertension)     COPD (chronic obstructive pulmonary disease)     Hyperlipidemia     Near syncope     Anxiety     Tobacco abuse     Vesicular rash     Rash of hands     S/P LEEP     Encounter for routine gynecological examination     Gastroesophageal reflux disease without esophagitis     Trigger finger of right thumb     Trigger finger, right middle finger     Occipital headache

## 2018-02-20 NOTE — TELEPHONE ENCOUNTER
From: Rima Black  Sent: 2/20/2018 5:00 PM EST  Subject: Medication Renewal Request    Rima Black would like a refill of the following medications:  COMBIVENT RESPIMAT  MCG/ACT AERS inhaler Romero Ramirez MD]  metoprolol tartrate (LOPRESSOR) 50 MG tablet Romero Ramirez MD]    Preferred pharmacy: [place] [address] [phone] - F [phone]    Comment:  I also need a refill of my Ventolin inhaler

## 2018-02-20 NOTE — TELEPHONE ENCOUNTER
E-scribe request for Combivent inhaler and metoprolol 50 MG. Please review and e-scribe if applicable.      Last Visit Date: 07/17/17  Next Visit Date:  Visit date not found    Hemoglobin A1C (%)   Date Value   06/29/2017 5.9   12/05/2016 6.0             ( goal A1C is < 7)   No results found for: LABMICR  LDL Cholesterol (mg/dL)   Date Value   04/25/2016 123       (goal LDL is <100)   AST (U/L)   Date Value   12/12/2016 22     ALT (U/L)   Date Value   12/12/2016 16     BUN (mg/dL)   Date Value   12/12/2016 13     BP Readings from Last 3 Encounters:   12/10/17 133/86   07/17/17 131/81   06/29/17 (!) 150/90          (goal 120/80)        Patient Active Problem List:     HTN (hypertension)     COPD (chronic obstructive pulmonary disease)     Hyperlipidemia     Near syncope     Anxiety     Tobacco abuse     Vesicular rash     Rash of hands     S/P LEEP     Encounter for routine gynecological examination     Gastroesophageal reflux disease without esophagitis     Trigger finger of right thumb     Trigger finger, right middle finger     Occipital headache      MA

## 2018-02-22 RX ORDER — METOPROLOL TARTRATE 50 MG/1
50 TABLET, FILM COATED ORAL 2 TIMES DAILY
Qty: 60 TABLET | Refills: 1 | Status: SHIPPED | OUTPATIENT
Start: 2018-02-22 | End: 2018-05-14 | Stop reason: SDUPTHER

## 2018-04-14 ENCOUNTER — HOSPITAL ENCOUNTER (EMERGENCY)
Age: 53
Discharge: HOME | End: 2018-04-14
Attending: EMERGENCY MEDICINE
Payer: COMMERCIAL

## 2018-04-14 ENCOUNTER — APPOINTMENT (OUTPATIENT)
Dept: GENERAL RADIOLOGY | Age: 53
End: 2018-04-14
Payer: COMMERCIAL

## 2018-04-14 VITALS
RESPIRATION RATE: 16 BRPM | DIASTOLIC BLOOD PRESSURE: 79 MMHG | SYSTOLIC BLOOD PRESSURE: 158 MMHG | WEIGHT: 145 LBS | HEART RATE: 59 BPM | BODY MASS INDEX: 27.4 KG/M2 | OXYGEN SATURATION: 96 % | TEMPERATURE: 98.7 F

## 2018-04-14 DIAGNOSIS — J06.9 ACUTE UPPER RESPIRATORY INFECTION: Primary | ICD-10-CM

## 2018-04-14 LAB
ABSOLUTE EOS #: 0.15 K/UL (ref 0–0.44)
ABSOLUTE IMMATURE GRANULOCYTE: <0.03 K/UL (ref 0–0.3)
ABSOLUTE LYMPH #: 2.15 K/UL (ref 1.1–3.7)
ABSOLUTE MONO #: 0.62 K/UL (ref 0.1–1.2)
ANION GAP SERPL CALCULATED.3IONS-SCNC: 13 MMOL/L (ref 9–17)
BASOPHILS # BLD: 1 % (ref 0–2)
BASOPHILS ABSOLUTE: 0.06 K/UL (ref 0–0.2)
BNP INTERPRETATION: ABNORMAL
BUN BLDV-MCNC: 9 MG/DL (ref 6–20)
BUN/CREAT BLD: ABNORMAL (ref 9–20)
CALCIUM SERPL-MCNC: 9.5 MG/DL (ref 8.6–10.4)
CHLORIDE BLD-SCNC: 102 MMOL/L (ref 98–107)
CO2: 25 MMOL/L (ref 20–31)
CREAT SERPL-MCNC: 0.48 MG/DL (ref 0.5–0.9)
DIFFERENTIAL TYPE: ABNORMAL
EKG ATRIAL RATE: 67 BPM
EKG P AXIS: 112 DEGREES
EKG P-R INTERVAL: 108 MS
EKG Q-T INTERVAL: 420 MS
EKG QRS DURATION: 84 MS
EKG QTC CALCULATION (BAZETT): 443 MS
EKG R AXIS: 100 DEGREES
EKG T AXIS: 104 DEGREES
EKG VENTRICULAR RATE: 67 BPM
EOSINOPHILS RELATIVE PERCENT: 2 % (ref 1–4)
GFR AFRICAN AMERICAN: >60 ML/MIN
GFR NON-AFRICAN AMERICAN: >60 ML/MIN
GFR SERPL CREATININE-BSD FRML MDRD: ABNORMAL ML/MIN/{1.73_M2}
GFR SERPL CREATININE-BSD FRML MDRD: ABNORMAL ML/MIN/{1.73_M2}
GLUCOSE BLD-MCNC: 112 MG/DL (ref 70–99)
HCT VFR BLD CALC: 44.9 % (ref 36.3–47.1)
HEMOGLOBIN: 14.3 G/DL (ref 11.9–15.1)
IMMATURE GRANULOCYTES: 0 %
LYMPHOCYTES # BLD: 27 % (ref 24–43)
MCH RBC QN AUTO: 26.5 PG (ref 25.2–33.5)
MCHC RBC AUTO-ENTMCNC: 31.8 G/DL (ref 28.4–34.8)
MCV RBC AUTO: 83.1 FL (ref 82.6–102.9)
MONOCYTES # BLD: 8 % (ref 3–12)
NRBC AUTOMATED: 0 PER 100 WBC
PDW BLD-RTO: 13.8 % (ref 11.8–14.4)
PLATELET # BLD: 241 K/UL (ref 138–453)
PLATELET ESTIMATE: ABNORMAL
PMV BLD AUTO: 10.7 FL (ref 8.1–13.5)
POC TROPONIN I: 0 NG/ML (ref 0–0.1)
POC TROPONIN I: 0.04 NG/ML (ref 0–0.1)
POC TROPONIN INTERP: NORMAL
POC TROPONIN INTERP: NORMAL
POTASSIUM SERPL-SCNC: 4.1 MMOL/L (ref 3.7–5.3)
PRO-BNP: 400 PG/ML
RBC # BLD: 5.4 M/UL (ref 3.95–5.11)
RBC # BLD: ABNORMAL 10*6/UL
SEG NEUTROPHILS: 62 % (ref 36–65)
SEGMENTED NEUTROPHILS ABSOLUTE COUNT: 4.98 K/UL (ref 1.5–8.1)
SODIUM BLD-SCNC: 140 MMOL/L (ref 135–144)
WBC # BLD: 8 K/UL (ref 3.5–11.3)
WBC # BLD: ABNORMAL 10*3/UL

## 2018-04-14 PROCEDURE — 85025 COMPLETE CBC W/AUTO DIFF WBC: CPT

## 2018-04-14 PROCEDURE — 84484 ASSAY OF TROPONIN QUANT: CPT

## 2018-04-14 PROCEDURE — 6370000000 HC RX 637 (ALT 250 FOR IP): Performed by: EMERGENCY MEDICINE

## 2018-04-14 PROCEDURE — 83880 ASSAY OF NATRIURETIC PEPTIDE: CPT

## 2018-04-14 PROCEDURE — 93005 ELECTROCARDIOGRAM TRACING: CPT

## 2018-04-14 PROCEDURE — 99285 EMERGENCY DEPT VISIT HI MDM: CPT

## 2018-04-14 PROCEDURE — 80048 BASIC METABOLIC PNL TOTAL CA: CPT

## 2018-04-14 PROCEDURE — 71046 X-RAY EXAM CHEST 2 VIEWS: CPT

## 2018-04-14 RX ORDER — ASPIRIN 81 MG/1
324 TABLET, CHEWABLE ORAL ONCE
Status: COMPLETED | OUTPATIENT
Start: 2018-04-14 | End: 2018-04-14

## 2018-04-14 RX ORDER — BENZONATATE 100 MG/1
100 CAPSULE ORAL 3 TIMES DAILY PRN
Qty: 30 CAPSULE | Refills: 0 | Status: SHIPPED | OUTPATIENT
Start: 2018-04-14 | End: 2018-04-21

## 2018-04-14 RX ORDER — LORATADINE 10 MG/1
10 TABLET ORAL DAILY
Qty: 30 TABLET | Refills: 0 | Status: SHIPPED | OUTPATIENT
Start: 2018-04-14 | End: 2018-08-13 | Stop reason: SDUPTHER

## 2018-04-14 RX ORDER — ACETAMINOPHEN 325 MG/1
650 TABLET ORAL ONCE
Status: DISCONTINUED | OUTPATIENT
Start: 2018-04-14 | End: 2018-04-14 | Stop reason: HOSPADM

## 2018-04-14 RX ORDER — METOPROLOL TARTRATE 50 MG/1
50 TABLET, FILM COATED ORAL ONCE
Status: COMPLETED | OUTPATIENT
Start: 2018-04-14 | End: 2018-04-14

## 2018-04-14 RX ADMIN — METOPROLOL TARTRATE 50 MG: 50 TABLET, FILM COATED ORAL at 06:25

## 2018-04-14 RX ADMIN — ASPIRIN 81 MG 324 MG: 81 TABLET ORAL at 06:25

## 2018-04-14 ASSESSMENT — PAIN DESCRIPTION - LOCATION: LOCATION: BACK

## 2018-04-14 ASSESSMENT — PAIN DESCRIPTION - ORIENTATION: ORIENTATION: MID

## 2018-04-14 ASSESSMENT — PAIN DESCRIPTION - DESCRIPTORS: DESCRIPTORS: ACHING

## 2018-04-14 ASSESSMENT — PAIN SCALES - GENERAL: PAINLEVEL_OUTOF10: 4

## 2018-04-25 ENCOUNTER — HOSPITAL ENCOUNTER (EMERGENCY)
Age: 53
Discharge: HOME | End: 2018-04-25
Attending: EMERGENCY MEDICINE
Payer: COMMERCIAL

## 2018-04-25 ENCOUNTER — APPOINTMENT (OUTPATIENT)
Dept: CT IMAGING | Age: 53
End: 2018-04-25
Payer: COMMERCIAL

## 2018-04-25 VITALS
OXYGEN SATURATION: 97 % | WEIGHT: 145 LBS | DIASTOLIC BLOOD PRESSURE: 74 MMHG | BODY MASS INDEX: 27.38 KG/M2 | SYSTOLIC BLOOD PRESSURE: 151 MMHG | HEART RATE: 84 BPM | RESPIRATION RATE: 14 BRPM | TEMPERATURE: 97.2 F | HEIGHT: 61 IN

## 2018-04-25 DIAGNOSIS — F07.81 POST CONCUSSION SYNDROME: Primary | ICD-10-CM

## 2018-04-25 DIAGNOSIS — R51.9 NONINTRACTABLE EPISODIC HEADACHE, UNSPECIFIED HEADACHE TYPE: ICD-10-CM

## 2018-04-25 PROCEDURE — 96374 THER/PROPH/DIAG INJ IV PUSH: CPT

## 2018-04-25 PROCEDURE — 96375 TX/PRO/DX INJ NEW DRUG ADDON: CPT

## 2018-04-25 PROCEDURE — 70450 CT HEAD/BRAIN W/O DYE: CPT

## 2018-04-25 PROCEDURE — 99284 EMERGENCY DEPT VISIT MOD MDM: CPT

## 2018-04-25 PROCEDURE — 6370000000 HC RX 637 (ALT 250 FOR IP): Performed by: EMERGENCY MEDICINE

## 2018-04-25 PROCEDURE — 6360000002 HC RX W HCPCS: Performed by: EMERGENCY MEDICINE

## 2018-04-25 PROCEDURE — 72125 CT NECK SPINE W/O DYE: CPT

## 2018-04-25 RX ORDER — METOPROLOL TARTRATE 50 MG/1
50 TABLET, FILM COATED ORAL ONCE
Status: COMPLETED | OUTPATIENT
Start: 2018-04-25 | End: 2018-04-25

## 2018-04-25 RX ORDER — DIPHENHYDRAMINE HYDROCHLORIDE 50 MG/ML
25 INJECTION INTRAMUSCULAR; INTRAVENOUS ONCE
Status: COMPLETED | OUTPATIENT
Start: 2018-04-25 | End: 2018-04-25

## 2018-04-25 RX ORDER — DEXAMETHASONE SODIUM PHOSPHATE 10 MG/ML
10 INJECTION INTRAMUSCULAR; INTRAVENOUS ONCE
Status: COMPLETED | OUTPATIENT
Start: 2018-04-25 | End: 2018-04-25

## 2018-04-25 RX ADMIN — DIPHENHYDRAMINE HYDROCHLORIDE 25 MG: 50 INJECTION, SOLUTION INTRAMUSCULAR; INTRAVENOUS at 20:39

## 2018-04-25 RX ADMIN — PROCHLORPERAZINE EDISYLATE 10 MG: 5 INJECTION INTRAMUSCULAR; INTRAVENOUS at 20:39

## 2018-04-25 RX ADMIN — DEXAMETHASONE SODIUM PHOSPHATE 10 MG: 10 INJECTION INTRAMUSCULAR; INTRAVENOUS at 20:39

## 2018-04-25 RX ADMIN — METOPROLOL TARTRATE 50 MG: 50 TABLET, FILM COATED ORAL at 20:36

## 2018-04-25 ASSESSMENT — ENCOUNTER SYMPTOMS
EYE PAIN: 0
CONSTIPATION: 0
NAUSEA: 0
COUGH: 0
VOMITING: 0
ABDOMINAL PAIN: 0
EYE REDNESS: 0
SHORTNESS OF BREATH: 0
DIARRHEA: 0

## 2018-04-25 ASSESSMENT — PAIN DESCRIPTION - DESCRIPTORS: DESCRIPTORS: ACHING

## 2018-04-25 ASSESSMENT — PAIN SCALES - GENERAL: PAINLEVEL_OUTOF10: 6

## 2018-04-25 ASSESSMENT — PAIN DESCRIPTION - PAIN TYPE: TYPE: ACUTE PAIN

## 2018-04-25 ASSESSMENT — PAIN DESCRIPTION - LOCATION: LOCATION: HEAD

## 2018-05-14 RX ORDER — METOPROLOL TARTRATE 50 MG/1
50 TABLET, FILM COATED ORAL 2 TIMES DAILY
Qty: 60 TABLET | Refills: 3 | Status: SHIPPED | OUTPATIENT
Start: 2018-05-14 | End: 2018-10-26 | Stop reason: SDUPTHER

## 2018-07-10 RX ORDER — FLUOXETINE HYDROCHLORIDE 20 MG/1
20 CAPSULE ORAL DAILY
Qty: 30 CAPSULE | Refills: 6 | Status: SHIPPED | OUTPATIENT
Start: 2018-07-10 | End: 2019-02-21 | Stop reason: SDUPTHER

## 2018-07-10 NOTE — TELEPHONE ENCOUNTER
Next Visit Date:  No future appointments.     Health Maintenance   Topic Date Due    Shingles Vaccine (1 of 2 - 2 Dose Series) 08/25/2017    Colon cancer screen colonoscopy  08/25/2017    A1C test (Diabetic or Prediabetic)  06/29/2018    Cervical cancer screen  06/27/2019 (Originally 10/20/2017)    Flu vaccine (1) 09/01/2018    Breast cancer screen  08/01/2019    Lipid screen  04/25/2021    DTaP/Tdap/Td vaccine (2 - Td) 10/20/2024    Pneumococcal med risk  Completed    HIV screen  Completed       Hemoglobin A1C (%)   Date Value   06/29/2017 5.9   12/05/2016 6.0             ( goal A1C is < 7)   No results found for: LABMICR  LDL Cholesterol (mg/dL)   Date Value   04/25/2016 123   10/20/2014 112       (goal LDL is <100)   AST (U/L)   Date Value   12/12/2016 22     ALT (U/L)   Date Value   12/12/2016 16     BUN (mg/dL)   Date Value   04/14/2018 9     BP Readings from Last 3 Encounters:   04/25/18 (!) 151/74   04/14/18 (!) 158/79   12/10/17 133/86          (goal 120/80)    All Future Testing planned in [facility]  Lab Frequency Next Occurrence   POCT activated clotting time                 Patient Active Problem List:     HTN (hypertension)     COPD (chronic obstructive pulmonary disease)     Hyperlipidemia     Near syncope     Anxiety     Tobacco abuse     Vesicular rash     Rash of hands     S/P LEEP     Encounter for routine gynecological examination     Gastroesophageal reflux disease without esophagitis     Trigger finger of right thumb     Trigger finger, right middle finger     Occipital headache

## 2018-08-13 RX ORDER — LORATADINE 10 MG/1
10 TABLET ORAL DAILY
Qty: 30 TABLET | Refills: 5 | Status: SHIPPED | OUTPATIENT
Start: 2018-08-13 | End: 2019-02-21 | Stop reason: SDUPTHER

## 2018-10-24 ENCOUNTER — HOSPITAL ENCOUNTER (EMERGENCY)
Age: 53
Discharge: HOME | End: 2018-10-24
Attending: EMERGENCY MEDICINE
Payer: COMMERCIAL

## 2018-10-24 ENCOUNTER — APPOINTMENT (OUTPATIENT)
Dept: GENERAL RADIOLOGY | Age: 53
End: 2018-10-24
Payer: COMMERCIAL

## 2018-10-24 VITALS
OXYGEN SATURATION: 97 % | HEART RATE: 73 BPM | BODY MASS INDEX: 26.45 KG/M2 | TEMPERATURE: 97.5 F | DIASTOLIC BLOOD PRESSURE: 91 MMHG | WEIGHT: 140 LBS | SYSTOLIC BLOOD PRESSURE: 177 MMHG | RESPIRATION RATE: 16 BRPM

## 2018-10-24 DIAGNOSIS — S83.91XA SPRAIN OF RIGHT KNEE, UNSPECIFIED LIGAMENT, INITIAL ENCOUNTER: Primary | ICD-10-CM

## 2018-10-24 PROCEDURE — 73562 X-RAY EXAM OF KNEE 3: CPT

## 2018-10-24 PROCEDURE — 96374 THER/PROPH/DIAG INJ IV PUSH: CPT

## 2018-10-24 PROCEDURE — 6360000002 HC RX W HCPCS: Performed by: EMERGENCY MEDICINE

## 2018-10-24 PROCEDURE — 99282 EMERGENCY DEPT VISIT SF MDM: CPT

## 2018-10-24 RX ORDER — IBUPROFEN 800 MG/1
800 TABLET ORAL EVERY 8 HOURS PRN
Qty: 30 TABLET | Refills: 0 | Status: SHIPPED | OUTPATIENT
Start: 2018-10-24 | End: 2019-04-11

## 2018-10-24 RX ORDER — ASPIRIN 81 MG/1
81 TABLET ORAL DAILY
Qty: 30 TABLET | Refills: 0 | Status: SHIPPED | OUTPATIENT
Start: 2018-10-24 | End: 2021-07-29

## 2018-10-24 RX ORDER — KETOROLAC TROMETHAMINE 30 MG/ML
30 INJECTION, SOLUTION INTRAMUSCULAR; INTRAVENOUS ONCE
Status: COMPLETED | OUTPATIENT
Start: 2018-10-24 | End: 2018-10-24

## 2018-10-24 RX ADMIN — KETOROLAC TROMETHAMINE 30 MG: 30 INJECTION, SOLUTION INTRAMUSCULAR at 20:35

## 2018-10-24 ASSESSMENT — PAIN DESCRIPTION - DESCRIPTORS: DESCRIPTORS: ACHING

## 2018-10-24 ASSESSMENT — PAIN SCALES - GENERAL: PAINLEVEL_OUTOF10: 8

## 2018-10-24 ASSESSMENT — PAIN DESCRIPTION - LOCATION: LOCATION: KNEE

## 2018-10-24 ASSESSMENT — PAIN DESCRIPTION - PAIN TYPE: TYPE: ACUTE PAIN

## 2018-10-24 ASSESSMENT — PAIN DESCRIPTION - ORIENTATION: ORIENTATION: RIGHT

## 2018-10-25 NOTE — ED PROVIDER NOTES
[facility]  ED  Emergency Department Encounter  EmergencyMedicine Resident     Pt Name:Marcia Hernadez  MRN: [id number]  [place] 1965  Date of evaluation: 10/24/18  PCP:  Maya Ayoub MD    [address] [place] [address]       Chief Complaint   Patient presents with    Knee Pain     right knee, hit right knee on tv stand two days ago, continues to hurt       HISTORY OF PRESENT ILLNESS  (Location/Symptom, Timing/Onset, Context/Setting, Quality, Duration, Modifying Factors, Severity.)      Jun Victoria is a 48 y.o. female who presents For evaluation of her right knee after hitting it on a TV stand 2 days ago. Patient reports she was walking next to her right knee against a TV stand. She did not fall down. She has been able to walk after the injury, however the pain has been worsening and it is more difficult for her to bear weight on the right leg, so she is presenting for evaluation today. She has no fevers or chills, no nausea or vomiting. She is unable to bear weight, however it is difficult secondary to the pain. She has been taking over-the-counter pain medications without much relief. She reports she has been icing the knee, and the swelling has since gone down some. No additional injuries or complaints at this time. PAST MEDICAL / SURGICAL / SOCIAL / FAMILY HISTORY      has a past medical history of Anxiety; CAD (coronary artery disease); Cardiac arrest [facility]); COPD (chronic obstructive pulmonary disease) ([facility] [address] GERD (gastroesophageal reflux disease); Headaches due to old head injury; History of blood transfusion; Hypertension; Snores; Trigger finger; Wears dentures; and Wears glasses. has a past surgical history that includes Hysterectomy (); Tonsillectomy;  section; LEEP (); Cardiac catheterization (); and pr incise finger tendon sheath (Right, 2017). Social History     Social History    Marital status:       Spouse name: N/A    Number of children: N/A  Years of education: N/A     Occupational History    Not on file. Social History Main Topics    Smoking status: Current Every Day Smoker     Packs/day: 0.50     Years: 30.00     Types: Cigarettes    Smokeless tobacco: Never Used    Alcohol use 0.0 oz/week      Comment: occ    Drug use: No    Sexual activity: Not on file     Other Topics Concern    Not on file     Social History Narrative    No narrative on file       Family History   Problem Relation Age of Onset    High Blood Pressure Mother     Depression Mother     High Blood Pressure Father        Allergies:  Adhesive tape    Home Medications:  Prior to Admission medications    Medication Sig Start Date End Date Taking?  Authorizing Provider   ibuprofen (ADVIL;MOTRIN) 800 MG tablet Take 1 tablet by mouth every 8 hours as needed for Pain 10/24/18  Yes Odin Stafford MD   aspirin EC 81 MG EC tablet Take 1 tablet by mouth daily 10/24/18  Yes Odin Stafford MD   loratadine (CLARITIN) 10 MG tablet Take 1 tablet by mouth daily 8/13/18  Yes Dori Escalante MD   FLUoxetine (PROZAC) 20 MG capsule Take 1 capsule by mouth daily 7/10/18  Yes Dori Escalante MD   metoprolol tartrate (LOPRESSOR) 50 MG tablet Take 1 tablet by mouth 2 times daily 5/14/18  Yes Dori Escalante MD   albuterol-ipratropium (COMBIVENT RESPIMAT)  MCG/ACT AERS inhaler Inhale 1 puff into the lungs every 6 hours 2/22/18  Yes Dori Escalante MD   topiramate (TOPAMAX) 25 MG tablet TAKE ONE TABLET BY MOUTH TWICE A DAY 9/28/17  Yes Rui Bethea MD   albuterol sulfate HFA (VENTOLIN HFA) 108 (90 BASE) MCG/ACT inhaler Inhale 2 puffs into the lungs every 6 hours as needed for Wheezing 9/14/16  Yes IRENE Grimes - CNP   omeprazole (PRILOSEC) 20 MG delayed release capsule TAKE ONE CAPSULE BY MOUTH DAILY 1/23/18   Dori Escalante MD   predniSONE (DELTASONE) 20 MG tablet Take 2 tablets by mouth daily 12/10/17   Ronald Dowd DO   Spacer/Aero-Holding Chambers (E-Z SPACER) RAJESH 1 HISTORY: fall, pain and swelling FINDINGS: No fracture or dislocation or effusion is identified. No fracture detected. EKG  None    All EKG's are interpreted by the Emergency Department Physician who either signs or Co-signs this chart in the absence of a cardiologist.    EMERGENCY DEPARTMENT COURSE:  Patient seen and evaluated. She is resting on the bed comfortably. No distress. Ulceration of he Stable and ready for discharge home with appropriate follow-up.r right knee she does have range of motion, slightly decreased secondary to pain. She is able to ambulate and bear weight on the right leg. There is minimal residual swelling and she is tender to palpation on the medial side. X-ray ordered the right knee. Results showing no acute findings. Patient updated on findings. Ace wrap applied to the knee patient instructed on RICE therapy. Stable and ready for discharge home with appropriate follow-up. PROCEDURES:  None    CONSULTS:  None    CRITICAL CARE:  None    FINAL IMPRESSION      1.  Sprain of right knee, unspecified ligament, initial encounter          DISPOSITION / PLAN     DISPOSITION Decision To Discharge 10/24/2018 10:08:50 PM      PATIENT REFERRED TO:  Ayan Blue MD  [address] [place] [address] [address]  [phone]    Call in 2 days  As needed, If symptoms worsen      DISCHARGE MEDICATIONS:  Discharge Medication List as of 10/24/2018 10:16 PM      START taking these medications    Details   ibuprofen (ADVIL;MOTRIN) 800 MG tablet Take 1 tablet by mouth every 8 hours as needed for Pain, Disp-30 tablet, R-0Print             Pipo Perdomo MD  Emergency Medicine Resident    (Please note that portions of thisnote were completed with a voice recognition program.  Efforts were made to edit the dictations but occasionally words are mis-transcribed.)       Pipo Perdomo MD  Resident  10/29/18 [id number]

## 2018-10-29 RX ORDER — METOPROLOL TARTRATE 50 MG/1
50 TABLET, FILM COATED ORAL 2 TIMES DAILY
Qty: 60 TABLET | Refills: 3 | Status: SHIPPED | OUTPATIENT
Start: 2018-10-29 | End: 2019-03-20 | Stop reason: SDUPTHER

## 2018-10-29 ASSESSMENT — ENCOUNTER SYMPTOMS
DIARRHEA: 0
NAUSEA: 0
ABDOMINAL PAIN: 0
EYE PAIN: 0
SORE THROAT: 0
COUGH: 0
SHORTNESS OF BREATH: 0

## 2018-11-29 ENCOUNTER — OFFICE VISIT (OUTPATIENT)
Dept: INTERNAL MEDICINE | Age: 53
End: 2018-11-29
Payer: COMMERCIAL

## 2018-11-29 VITALS
HEIGHT: 61 IN | BODY MASS INDEX: 27 KG/M2 | SYSTOLIC BLOOD PRESSURE: 166 MMHG | WEIGHT: 143 LBS | OXYGEN SATURATION: 98 % | HEART RATE: 70 BPM | DIASTOLIC BLOOD PRESSURE: 108 MMHG

## 2018-11-29 DIAGNOSIS — J44.9 CHRONIC OBSTRUCTIVE PULMONARY DISEASE, UNSPECIFIED COPD TYPE (HCC): ICD-10-CM

## 2018-11-29 DIAGNOSIS — Z23 NEED FOR PROPHYLACTIC VACCINATION AND INOCULATION AGAINST VARICELLA: ICD-10-CM

## 2018-11-29 DIAGNOSIS — R73.03 PRE-DIABETES: ICD-10-CM

## 2018-11-29 DIAGNOSIS — I10 ESSENTIAL HYPERTENSION: Chronic | ICD-10-CM

## 2018-11-29 DIAGNOSIS — E78.5 HYPERLIPIDEMIA, UNSPECIFIED HYPERLIPIDEMIA TYPE: Chronic | ICD-10-CM

## 2018-11-29 DIAGNOSIS — Z12.11 SCREENING FOR COLON CANCER: Primary | ICD-10-CM

## 2018-11-29 DIAGNOSIS — Z23 NEED FOR INFLUENZA VACCINATION: ICD-10-CM

## 2018-11-29 LAB — HBA1C MFR BLD: 6.1 %

## 2018-11-29 PROCEDURE — 4004F PT TOBACCO SCREEN RCVD TLK: CPT | Performed by: INTERNAL MEDICINE

## 2018-11-29 PROCEDURE — G8419 CALC BMI OUT NRM PARAM NOF/U: HCPCS | Performed by: INTERNAL MEDICINE

## 2018-11-29 PROCEDURE — 99214 OFFICE O/P EST MOD 30 MIN: CPT | Performed by: INTERNAL MEDICINE

## 2018-11-29 PROCEDURE — 3017F COLORECTAL CA SCREEN DOC REV: CPT | Performed by: INTERNAL MEDICINE

## 2018-11-29 PROCEDURE — 83036 HEMOGLOBIN GLYCOSYLATED A1C: CPT | Performed by: INTERNAL MEDICINE

## 2018-11-29 PROCEDURE — 90686 IIV4 VACC NO PRSV 0.5 ML IM: CPT | Performed by: INTERNAL MEDICINE

## 2018-11-29 PROCEDURE — 3023F SPIROM DOC REV: CPT | Performed by: INTERNAL MEDICINE

## 2018-11-29 PROCEDURE — G8427 DOCREV CUR MEDS BY ELIG CLIN: HCPCS | Performed by: INTERNAL MEDICINE

## 2018-11-29 PROCEDURE — 99211 OFF/OP EST MAY X REQ PHY/QHP: CPT | Performed by: INTERNAL MEDICINE

## 2018-11-29 PROCEDURE — G8926 SPIRO NO PERF OR DOC: HCPCS | Performed by: INTERNAL MEDICINE

## 2018-11-29 PROCEDURE — G8482 FLU IMMUNIZE ORDER/ADMIN: HCPCS | Performed by: INTERNAL MEDICINE

## 2018-11-29 RX ORDER — LISINOPRIL 5 MG/1
5 TABLET ORAL DAILY
Qty: 30 TABLET | Refills: 3 | Status: SHIPPED | OUTPATIENT
Start: 2018-11-29 | End: 2018-12-13

## 2018-11-29 RX ORDER — NICOTINE 21 MG/24HR
1 PATCH, TRANSDERMAL 24 HOURS TRANSDERMAL EVERY 24 HOURS
Qty: 30 PATCH | Refills: 3 | Status: SHIPPED | OUTPATIENT
Start: 2018-11-29 | End: 2019-01-09 | Stop reason: SINTOL

## 2018-11-29 ASSESSMENT — PATIENT HEALTH QUESTIONNAIRE - PHQ9
1. LITTLE INTEREST OR PLEASURE IN DOING THINGS: 0
SUM OF ALL RESPONSES TO PHQ9 QUESTIONS 1 & 2: 0
2. FEELING DOWN, DEPRESSED OR HOPELESS: 0
SUM OF ALL RESPONSES TO PHQ QUESTIONS 1-9: 0
SUM OF ALL RESPONSES TO PHQ QUESTIONS 1-9: 0

## 2018-11-29 ASSESSMENT — ENCOUNTER SYMPTOMS
GASTROINTESTINAL NEGATIVE: 1
EYES NEGATIVE: 1
COUGH: 1

## 2018-11-29 NOTE — PROGRESS NOTES
Vaccine Information Sheet, \"Influenza - Inactivated\"  given to Eloise Galindo, or parent/legal guardian of  Eloise Galindo and verbalized understanding. Patient responses:    Have you ever had a reaction to a flu vaccine? No  Are you able to eat eggs without adverse effects? Yes  Do you have any current illness? No  Have you ever had Guillian Covington Syndrome? No    Flu vaccine given per order. Please see immunization tab.

## 2018-11-29 NOTE — PATIENT INSTRUCTIONS
Follow-up appointment scheduled for 12/13/18, AVS given to patient. Printed script for Shingrix signed and given to pt    OC-Light hemoccult collection kit given to patient and procedure explained.       Lashonda Mcpherson

## 2018-11-29 NOTE — PROGRESS NOTES
[facility]   Progress Note      Chronic Disease Visit Information    BP Readings from Last 3 Encounters:   10/24/18 (!) 177/91   04/25/18 (!) 151/74   04/14/18 (!) 158/79          Hemoglobin A1C (%)   Date Value   06/29/2017 5.9   12/05/2016 6.0     LDL Cholesterol (mg/dL)   Date Value   04/25/2016 123     HDL (mg/dL)   Date Value   04/25/2016 45     BUN (mg/dL)   Date Value   04/14/2018 9     CREATININE (mg/dL)   Date Value   04/14/2018 0.48 (L)     Glucose (mg/dL)   Date Value   04/14/2018 112 (H)   03/18/2012 107 (H)            Have you changed or started any medications since your last visit including any over-the-counter medicines, vitamins, or herbal medicines? no   Are you having any side effects from any of your medications? -  no  Have you stopped taking any of your medications? Is so, why? -  yes - Topamax    Have you seen any other physician or provider since your last visit? No  Have you had any other diagnostic tests since your last visit? Yes - Records Obtained  Have you been seen in the emergency room and/or had an admission to a hospital since we last saw you? Yes - Records Obtained  Have you had your annual diabetic retinal (eye) exam? No  Have you had your routine dental cleaning in the past 6 months? no    Have you activated your [employer] account? If not, what are your barriers?  Yes     Patient Care Team:  Herberth Bhakta MD as PCP - General  Herberth Bhakta MD as PCP - S Attributed Provider         Medical History Review  Past Medical, Family, and Social History reviewed and does not contribute to the patient presenting condition    Health Maintenance   Topic Date Due    Shingles Vaccine (1 of 2 - 2 Dose Series) 08/25/2017    Colon cancer screen colonoscopy  08/25/2017    A1C test (Diabetic or Prediabetic)  06/29/2018    Flu vaccine (1) 09/01/2018    Cervical cancer screen  06/27/2019 (Originally 10/20/2017)    Breast cancer screen  COPD (chronic obstructive pulmonary disease) (HCC)     GERD (gastroesophageal reflux disease)     Headaches due to old head injury     s/p fall down steps with unconsciousness    History of blood transfusion     Hypertension     Snores     Trigger finger     right thumb and right middle finger    Wears dentures     FULL UPPER    Wears glasses      Past Surgical History:   Procedure Laterality Date    CARDIAC CATHETERIZATION      minimal cad, no stents     SECTION      X's 3    HYSTERECTOMY  2000    LEEP  1993    NC INCISE FINGER TENDON SHEATH Right 2017     TRIGGER RELEASE THUMB,  MIDDLE FINGER performed by Taras Adames DO at [address] [facility] [address] History   Substance Use Topics    Smoking status: Current Every Day Smoker     Packs/day: 0.50     Years: 30.00     Types: Cigarettes    Smokeless tobacco: Never Used    Alcohol use 0.0 oz/week      Comment: destinee Kennedy  reports that she has been smoking Cigarettes. She has a 15.00 pack-year smoking history. She has never used smokeless tobacco.    FAMILY HISTORY:      Family History   Problem Relation Age of Onset    High Blood Pressure Mother     Depression Mother     High Blood Pressure Father        REVIEW OF SYSTEMS:    Review of Systems   Constitutional: Positive for fatigue. HENT: Negative. Eyes: Negative. Respiratory: Positive for cough. Cardiovascular: Negative. Gastrointestinal: Negative. Endocrine: Negative. Genitourinary: Negative. Musculoskeletal: Negative. Skin: Negative. Allergic/Immunologic: Positive for environmental allergies. Neurological: Negative. Hematological: Bruises/bleeds easily. Psychiatric/Behavioral: Negative.         PHYSICAL EXAM:      Vitals:    18 1319   BP: (!) 166/108   Pulse: 70   SpO2:      BP Readings from Last 3 Encounters:   18 (!) 166/108   10/24/18 (!) 177/91   18 (!) 151/74

## 2018-11-30 ENCOUNTER — TELEPHONE (OUTPATIENT)
Dept: INTERNAL MEDICINE | Age: 53
End: 2018-11-30

## 2018-12-13 ENCOUNTER — NURSE ONLY (OUTPATIENT)
Dept: INTERNAL MEDICINE | Age: 53
End: 2018-12-13
Payer: COMMERCIAL

## 2018-12-13 VITALS — DIASTOLIC BLOOD PRESSURE: 90 MMHG | SYSTOLIC BLOOD PRESSURE: 162 MMHG | HEART RATE: 62 BPM

## 2018-12-13 DIAGNOSIS — J44.9 CHRONIC OBSTRUCTIVE PULMONARY DISEASE, UNSPECIFIED COPD TYPE (HCC): Chronic | ICD-10-CM

## 2018-12-13 DIAGNOSIS — I10 ESSENTIAL HYPERTENSION: Primary | Chronic | ICD-10-CM

## 2018-12-13 PROCEDURE — 99211 OFF/OP EST MAY X REQ PHY/QHP: CPT | Performed by: INTERNAL MEDICINE

## 2018-12-13 RX ORDER — LISINOPRIL 10 MG/1
10 TABLET ORAL DAILY
Qty: 30 TABLET | Refills: 1 | Status: SHIPPED | OUTPATIENT
Start: 2018-12-13 | End: 2018-12-18 | Stop reason: ALTCHOICE

## 2018-12-18 ENCOUNTER — NURSE ONLY (OUTPATIENT)
Dept: INTERNAL MEDICINE | Age: 53
End: 2018-12-18
Payer: COMMERCIAL

## 2018-12-18 VITALS — HEART RATE: 69 BPM | DIASTOLIC BLOOD PRESSURE: 99 MMHG | SYSTOLIC BLOOD PRESSURE: 168 MMHG

## 2018-12-18 DIAGNOSIS — I10 ESSENTIAL HYPERTENSION: Primary | Chronic | ICD-10-CM

## 2018-12-18 PROCEDURE — 99211 OFF/OP EST MAY X REQ PHY/QHP: CPT | Performed by: INTERNAL MEDICINE

## 2018-12-18 RX ORDER — LISINOPRIL AND HYDROCHLOROTHIAZIDE 12.5; 1 MG/1; MG/1
1 TABLET ORAL DAILY
Qty: 90 TABLET | Refills: 1 | Status: SHIPPED | OUTPATIENT
Start: 2018-12-18 | End: 2019-06-29 | Stop reason: SDUPTHER

## 2018-12-18 NOTE — PROGRESS NOTES
S: pt presents at clinic today for blood pressure check. O: pt oriented times 4 , Skin warm and dry. Patient currently taking lisinopril 10 mg daily and lopressor 50 mg bid. Admits to compliance. Took morning medications around 9:00 am.    A:  Vitals as charted. Discussed with Dr. Mihaela Hernandez.  Patient to start lisinopril/HCTZ 10/12.5 mg daily. Continue lopressor 50 mg bid and discontinue lisinporil 10 mg.  Recheck in one week. P:  pt return to clinic 12/27/18 for blood pressure check.

## 2018-12-20 ENCOUNTER — HOSPITAL ENCOUNTER (OUTPATIENT)
Age: 53
Setting detail: SPECIMEN
Discharge: HOME | End: 2018-12-20
Payer: COMMERCIAL

## 2018-12-21 ENCOUNTER — TELEPHONE (OUTPATIENT)
Dept: INTERNAL MEDICINE | Age: 53
End: 2018-12-21

## 2018-12-21 DIAGNOSIS — Z12.11 SCREENING FOR COLON CANCER: ICD-10-CM

## 2018-12-21 DIAGNOSIS — R19.5 HEME POSITIVE STOOL: Primary | ICD-10-CM

## 2018-12-21 LAB
CONTROL: ABNORMAL
HEMOCCULT STL QL: POSITIVE

## 2018-12-24 NOTE — PROGRESS NOTES
Pc from pt stating she received phone call from this office, pt was informed of positive Ifit and given GI information.

## 2018-12-26 NOTE — TELEPHONE ENCOUNTER
PC to patient---She was contacted on 12/24/18 and is scheduled with [facility] on 1/29/19

## 2018-12-30 ENCOUNTER — APPOINTMENT (OUTPATIENT)
Dept: GENERAL RADIOLOGY | Age: 53
End: 2018-12-30
Payer: COMMERCIAL

## 2018-12-30 ENCOUNTER — HOSPITAL ENCOUNTER (OUTPATIENT)
Age: 53
Setting detail: OBSERVATION
Discharge: HOME | End: 2018-12-31
Attending: EMERGENCY MEDICINE | Admitting: EMERGENCY MEDICINE
Payer: COMMERCIAL

## 2018-12-30 DIAGNOSIS — R07.9 CHEST PAIN, UNSPECIFIED TYPE: Primary | ICD-10-CM

## 2018-12-30 LAB
ABSOLUTE EOS #: 0.11 K/UL (ref 0–0.44)
ABSOLUTE IMMATURE GRANULOCYTE: 0.03 K/UL (ref 0–0.3)
ABSOLUTE LYMPH #: 2.98 K/UL (ref 1.1–3.7)
ABSOLUTE MONO #: 0.66 K/UL (ref 0.1–1.2)
ANION GAP SERPL CALCULATED.3IONS-SCNC: 16 MMOL/L (ref 9–17)
BASOPHILS # BLD: 1 % (ref 0–2)
BASOPHILS ABSOLUTE: 0.08 K/UL (ref 0–0.2)
BUN BLDV-MCNC: 14 MG/DL (ref 6–20)
BUN/CREAT BLD: ABNORMAL (ref 9–20)
CALCIUM SERPL-MCNC: 9.6 MG/DL (ref 8.6–10.4)
CHLORIDE BLD-SCNC: 100 MMOL/L (ref 98–107)
CO2: 28 MMOL/L (ref 20–31)
CREAT SERPL-MCNC: 0.59 MG/DL (ref 0.5–0.9)
DIFFERENTIAL TYPE: ABNORMAL
EOSINOPHILS RELATIVE PERCENT: 1 % (ref 1–4)
GFR AFRICAN AMERICAN: >60 ML/MIN
GFR NON-AFRICAN AMERICAN: >60 ML/MIN
GFR SERPL CREATININE-BSD FRML MDRD: ABNORMAL ML/MIN/{1.73_M2}
GFR SERPL CREATININE-BSD FRML MDRD: ABNORMAL ML/MIN/{1.73_M2}
GLUCOSE BLD-MCNC: 136 MG/DL (ref 70–99)
HCT VFR BLD CALC: 49 % (ref 36.3–47.1)
HEMOGLOBIN: 16.2 G/DL (ref 11.9–15.1)
IMMATURE GRANULOCYTES: 0 %
LYMPHOCYTES # BLD: 30 % (ref 24–43)
MCH RBC QN AUTO: 27.6 PG (ref 25.2–33.5)
MCHC RBC AUTO-ENTMCNC: 33.1 G/DL (ref 28.4–34.8)
MCV RBC AUTO: 83.5 FL (ref 82.6–102.9)
MONOCYTES # BLD: 7 % (ref 3–12)
NRBC AUTOMATED: 0 PER 100 WBC
PDW BLD-RTO: 13.5 % (ref 11.8–14.4)
PLATELET # BLD: 310 K/UL (ref 138–453)
PLATELET ESTIMATE: ABNORMAL
PMV BLD AUTO: 10.1 FL (ref 8.1–13.5)
POTASSIUM SERPL-SCNC: 4.3 MMOL/L (ref 3.7–5.3)
RBC # BLD: 5.87 M/UL (ref 3.95–5.11)
RBC # BLD: ABNORMAL 10*6/UL
SEG NEUTROPHILS: 61 % (ref 36–65)
SEGMENTED NEUTROPHILS ABSOLUTE COUNT: 6.07 K/UL (ref 1.5–8.1)
SODIUM BLD-SCNC: 144 MMOL/L (ref 135–144)
TROPONIN INTERP: NORMAL
TROPONIN T: NORMAL NG/ML
TROPONIN, HIGH SENSITIVITY: <6 NG/L (ref 0–14)
WBC # BLD: 9.9 K/UL (ref 3.5–11.3)
WBC # BLD: ABNORMAL 10*3/UL

## 2018-12-30 PROCEDURE — G0378 HOSPITAL OBSERVATION PER HR: HCPCS

## 2018-12-30 PROCEDURE — 93005 ELECTROCARDIOGRAM TRACING: CPT

## 2018-12-30 PROCEDURE — 84484 ASSAY OF TROPONIN QUANT: CPT

## 2018-12-30 PROCEDURE — 94640 AIRWAY INHALATION TREATMENT: CPT

## 2018-12-30 PROCEDURE — 71046 X-RAY EXAM CHEST 2 VIEWS: CPT

## 2018-12-30 PROCEDURE — 85025 COMPLETE CBC W/AUTO DIFF WBC: CPT

## 2018-12-30 PROCEDURE — 99285 EMERGENCY DEPT VISIT HI MDM: CPT

## 2018-12-30 PROCEDURE — 6370000000 HC RX 637 (ALT 250 FOR IP): Performed by: EMERGENCY MEDICINE

## 2018-12-30 PROCEDURE — 80048 BASIC METABOLIC PNL TOTAL CA: CPT

## 2018-12-30 PROCEDURE — 2580000003 HC RX 258: Performed by: EMERGENCY MEDICINE

## 2018-12-30 RX ORDER — FLUOXETINE HYDROCHLORIDE 20 MG/1
20 CAPSULE ORAL DAILY
Status: DISCONTINUED | OUTPATIENT
Start: 2018-12-30 | End: 2018-12-30

## 2018-12-30 RX ORDER — ALBUTEROL SULFATE 90 UG/1
2 AEROSOL, METERED RESPIRATORY (INHALATION) EVERY 6 HOURS PRN
Status: DISCONTINUED | OUTPATIENT
Start: 2018-12-30 | End: 2018-12-31 | Stop reason: HOSPADM

## 2018-12-30 RX ORDER — METOPROLOL TARTRATE 50 MG/1
50 TABLET, FILM COATED ORAL 2 TIMES DAILY
Status: DISCONTINUED | OUTPATIENT
Start: 2018-12-30 | End: 2018-12-31 | Stop reason: HOSPADM

## 2018-12-30 RX ORDER — SODIUM CHLORIDE 0.9 % (FLUSH) 0.9 %
10 SYRINGE (ML) INJECTION EVERY 12 HOURS SCHEDULED
Status: DISCONTINUED | OUTPATIENT
Start: 2018-12-30 | End: 2018-12-31 | Stop reason: HOSPADM

## 2018-12-30 RX ORDER — ACETAMINOPHEN 325 MG/1
650 TABLET ORAL EVERY 4 HOURS PRN
Status: DISCONTINUED | OUTPATIENT
Start: 2018-12-30 | End: 2018-12-31 | Stop reason: HOSPADM

## 2018-12-30 RX ORDER — LISINOPRIL AND HYDROCHLOROTHIAZIDE 12.5; 1 MG/1; MG/1
1 TABLET ORAL DAILY
Status: DISCONTINUED | OUTPATIENT
Start: 2018-12-30 | End: 2018-12-31 | Stop reason: HOSPADM

## 2018-12-30 RX ORDER — SODIUM CHLORIDE 0.9 % (FLUSH) 0.9 %
10 SYRINGE (ML) INJECTION PRN
Status: DISCONTINUED | OUTPATIENT
Start: 2018-12-30 | End: 2018-12-31 | Stop reason: HOSPADM

## 2018-12-30 RX ORDER — ASPIRIN 81 MG/1
243 TABLET, CHEWABLE ORAL ONCE
Status: COMPLETED | OUTPATIENT
Start: 2018-12-30 | End: 2018-12-30

## 2018-12-30 RX ORDER — ONDANSETRON 4 MG/1
4 TABLET, ORALLY DISINTEGRATING ORAL EVERY 8 HOURS PRN
Status: DISCONTINUED | OUTPATIENT
Start: 2018-12-30 | End: 2018-12-31 | Stop reason: HOSPADM

## 2018-12-30 RX ORDER — FLUOXETINE HYDROCHLORIDE 20 MG/1
20 CAPSULE ORAL NIGHTLY
Status: DISCONTINUED | OUTPATIENT
Start: 2018-12-30 | End: 2018-12-31 | Stop reason: HOSPADM

## 2018-12-30 RX ORDER — IBUPROFEN 800 MG/1
800 TABLET ORAL EVERY 8 HOURS PRN
Status: DISCONTINUED | OUTPATIENT
Start: 2018-12-30 | End: 2018-12-31 | Stop reason: HOSPADM

## 2018-12-30 RX ORDER — NICOTINE 21 MG/24HR
1 PATCH, TRANSDERMAL 24 HOURS TRANSDERMAL EVERY 24 HOURS
Status: DISCONTINUED | OUTPATIENT
Start: 2018-12-30 | End: 2018-12-31 | Stop reason: HOSPADM

## 2018-12-30 RX ADMIN — ASPIRIN 81 MG 243 MG: 81 TABLET ORAL at 13:26

## 2018-12-30 RX ADMIN — ALBUTEROL SULFATE 2 PUFF: 90 AEROSOL, METERED RESPIRATORY (INHALATION) at 22:56

## 2018-12-30 RX ADMIN — Medication 10 ML: at 21:50

## 2018-12-30 RX ADMIN — FLUOXETINE HYDROCHLORIDE 20 MG: 20 CAPSULE ORAL at 21:50

## 2018-12-30 RX ADMIN — IBUPROFEN 800 MG: 800 TABLET ORAL at 17:22

## 2018-12-30 ASSESSMENT — PAIN SCALES - GENERAL
PAINLEVEL_OUTOF10: 6
PAINLEVEL_OUTOF10: 8
PAINLEVEL_OUTOF10: 5
PAINLEVEL_OUTOF10: 6

## 2018-12-30 ASSESSMENT — PAIN DESCRIPTION - LOCATION
LOCATION: CHEST

## 2018-12-30 ASSESSMENT — PAIN DESCRIPTION - DESCRIPTORS
DESCRIPTORS: ACHING
DESCRIPTORS: ACHING

## 2018-12-30 ASSESSMENT — PAIN DESCRIPTION - FREQUENCY: FREQUENCY: CONTINUOUS

## 2018-12-30 ASSESSMENT — PAIN DESCRIPTION - ORIENTATION
ORIENTATION: LEFT;MID
ORIENTATION: LEFT

## 2018-12-31 VITALS
HEIGHT: 61 IN | HEART RATE: 63 BPM | OXYGEN SATURATION: 92 % | DIASTOLIC BLOOD PRESSURE: 74 MMHG | RESPIRATION RATE: 16 BRPM | SYSTOLIC BLOOD PRESSURE: 133 MMHG | WEIGHT: 141.3 LBS | BODY MASS INDEX: 26.68 KG/M2 | TEMPERATURE: 98.5 F

## 2018-12-31 LAB
EKG ATRIAL RATE: 67 BPM
EKG ATRIAL RATE: 67 BPM
EKG ATRIAL RATE: 76 BPM
EKG P AXIS: 69 DEGREES
EKG P AXIS: 72 DEGREES
EKG P AXIS: 73 DEGREES
EKG P-R INTERVAL: 112 MS
EKG P-R INTERVAL: 120 MS
EKG P-R INTERVAL: 122 MS
EKG Q-T INTERVAL: 394 MS
EKG Q-T INTERVAL: 396 MS
EKG Q-T INTERVAL: 416 MS
EKG QRS DURATION: 80 MS
EKG QRS DURATION: 82 MS
EKG QRS DURATION: 84 MS
EKG QTC CALCULATION (BAZETT): 418 MS
EKG QTC CALCULATION (BAZETT): 439 MS
EKG QTC CALCULATION (BAZETT): 443 MS
EKG R AXIS: 79 DEGREES
EKG R AXIS: 79 DEGREES
EKG R AXIS: 83 DEGREES
EKG T AXIS: 66 DEGREES
EKG T AXIS: 72 DEGREES
EKG T AXIS: 74 DEGREES
EKG VENTRICULAR RATE: 67 BPM
EKG VENTRICULAR RATE: 67 BPM
EKG VENTRICULAR RATE: 76 BPM
TROPONIN INTERP: NORMAL
TROPONIN T: NORMAL NG/ML
TROPONIN, HIGH SENSITIVITY: <6 NG/L (ref 0–14)

## 2018-12-31 PROCEDURE — 6370000000 HC RX 637 (ALT 250 FOR IP): Performed by: EMERGENCY MEDICINE

## 2018-12-31 PROCEDURE — 36415 COLL VENOUS BLD VENIPUNCTURE: CPT

## 2018-12-31 PROCEDURE — G0378 HOSPITAL OBSERVATION PER HR: HCPCS

## 2018-12-31 PROCEDURE — 2580000003 HC RX 258: Performed by: EMERGENCY MEDICINE

## 2018-12-31 PROCEDURE — 93005 ELECTROCARDIOGRAM TRACING: CPT

## 2018-12-31 PROCEDURE — 84484 ASSAY OF TROPONIN QUANT: CPT

## 2018-12-31 RX ORDER — BENZONATATE 100 MG/1
100 CAPSULE ORAL 3 TIMES DAILY PRN
Qty: 30 CAPSULE | Refills: 0 | Status: ON HOLD | OUTPATIENT
Start: 2018-12-31 | End: 2019-01-04 | Stop reason: HOSPADM

## 2018-12-31 RX ADMIN — METOPROLOL TARTRATE 50 MG: 50 TABLET, FILM COATED ORAL at 09:03

## 2018-12-31 RX ADMIN — Medication 10 ML: at 09:03

## 2018-12-31 RX ADMIN — ACETAMINOPHEN 650 MG: 325 TABLET ORAL at 03:28

## 2018-12-31 RX ADMIN — LISINOPRIL AND HYDROCHLOROTHIAZIDE 1 TABLET: 12.5; 1 TABLET ORAL at 09:03

## 2018-12-31 ASSESSMENT — PAIN SCALES - GENERAL: PAINLEVEL_OUTOF10: 5

## 2019-01-02 ENCOUNTER — TELEPHONE (OUTPATIENT)
Dept: INTERNAL MEDICINE | Age: 54
End: 2019-01-02

## 2019-01-03 ENCOUNTER — HOSPITAL ENCOUNTER (OUTPATIENT)
Age: 54
Setting detail: OBSERVATION
Discharge: HOME | End: 2019-01-04
Attending: EMERGENCY MEDICINE | Admitting: EMERGENCY MEDICINE
Payer: COMMERCIAL

## 2019-01-03 ENCOUNTER — APPOINTMENT (OUTPATIENT)
Dept: GENERAL RADIOLOGY | Age: 54
End: 2019-01-03
Payer: COMMERCIAL

## 2019-01-03 ENCOUNTER — TELEPHONE (OUTPATIENT)
Dept: INTERNAL MEDICINE | Age: 54
End: 2019-01-03

## 2019-01-03 DIAGNOSIS — J44.9 CHRONIC OBSTRUCTIVE PULMONARY DISEASE, UNSPECIFIED COPD TYPE (HCC): Primary | Chronic | ICD-10-CM

## 2019-01-03 DIAGNOSIS — J44.1 COPD WITH ACUTE EXACERBATION (HCC): ICD-10-CM

## 2019-01-03 DIAGNOSIS — R07.9 CHEST PAIN, UNSPECIFIED TYPE: Primary | ICD-10-CM

## 2019-01-03 LAB
ABSOLUTE EOS #: 0.09 K/UL (ref 0–0.44)
ABSOLUTE IMMATURE GRANULOCYTE: 0.04 K/UL (ref 0–0.3)
ABSOLUTE LYMPH #: 2.71 K/UL (ref 1.1–3.7)
ABSOLUTE MONO #: 0.59 K/UL (ref 0.1–1.2)
ANION GAP SERPL CALCULATED.3IONS-SCNC: 17 MMOL/L (ref 9–17)
BASOPHILS # BLD: 1 % (ref 0–2)
BASOPHILS ABSOLUTE: 0.08 K/UL (ref 0–0.2)
BUN BLDV-MCNC: 15 MG/DL (ref 6–20)
BUN/CREAT BLD: ABNORMAL (ref 9–20)
CALCIUM SERPL-MCNC: 9.5 MG/DL (ref 8.6–10.4)
CHLORIDE BLD-SCNC: 99 MMOL/L (ref 98–107)
CO2: 22 MMOL/L (ref 20–31)
CREAT SERPL-MCNC: 0.63 MG/DL (ref 0.5–0.9)
DIFFERENTIAL TYPE: ABNORMAL
EOSINOPHILS RELATIVE PERCENT: 1 % (ref 1–4)
GFR AFRICAN AMERICAN: >60 ML/MIN
GFR NON-AFRICAN AMERICAN: >60 ML/MIN
GFR SERPL CREATININE-BSD FRML MDRD: ABNORMAL ML/MIN/{1.73_M2}
GFR SERPL CREATININE-BSD FRML MDRD: ABNORMAL ML/MIN/{1.73_M2}
GLUCOSE BLD-MCNC: 159 MG/DL (ref 70–99)
HCT VFR BLD CALC: 49.7 % (ref 36.3–47.1)
HEMOGLOBIN: 16.1 G/DL (ref 11.9–15.1)
IMMATURE GRANULOCYTES: 1 %
LYMPHOCYTES # BLD: 31 % (ref 24–43)
MCH RBC QN AUTO: 27 PG (ref 25.2–33.5)
MCHC RBC AUTO-ENTMCNC: 32.4 G/DL (ref 28.4–34.8)
MCV RBC AUTO: 83.4 FL (ref 82.6–102.9)
MONOCYTES # BLD: 7 % (ref 3–12)
NRBC AUTOMATED: 0 PER 100 WBC
PDW BLD-RTO: 13.5 % (ref 11.8–14.4)
PLATELET # BLD: 334 K/UL (ref 138–453)
PLATELET ESTIMATE: ABNORMAL
PMV BLD AUTO: 11.8 FL (ref 8.1–13.5)
POTASSIUM SERPL-SCNC: 3.9 MMOL/L (ref 3.7–5.3)
RBC # BLD: 5.96 M/UL (ref 3.95–5.11)
RBC # BLD: ABNORMAL 10*6/UL
SEG NEUTROPHILS: 59 % (ref 36–65)
SEGMENTED NEUTROPHILS ABSOLUTE COUNT: 5.15 K/UL (ref 1.5–8.1)
SODIUM BLD-SCNC: 138 MMOL/L (ref 135–144)
TROPONIN INTERP: NORMAL
TROPONIN T: NORMAL NG/ML
TROPONIN, HIGH SENSITIVITY: <6 NG/L (ref 0–14)
WBC # BLD: 8.7 K/UL (ref 3.5–11.3)
WBC # BLD: ABNORMAL 10*3/UL

## 2019-01-03 PROCEDURE — 94640 AIRWAY INHALATION TREATMENT: CPT

## 2019-01-03 PROCEDURE — 93005 ELECTROCARDIOGRAM TRACING: CPT

## 2019-01-03 PROCEDURE — G0378 HOSPITAL OBSERVATION PER HR: HCPCS

## 2019-01-03 PROCEDURE — 2580000003 HC RX 258: Performed by: EMERGENCY MEDICINE

## 2019-01-03 PROCEDURE — 85025 COMPLETE CBC W/AUTO DIFF WBC: CPT

## 2019-01-03 PROCEDURE — 6370000000 HC RX 637 (ALT 250 FOR IP): Performed by: STUDENT IN AN ORGANIZED HEALTH CARE EDUCATION/TRAINING PROGRAM

## 2019-01-03 PROCEDURE — 71046 X-RAY EXAM CHEST 2 VIEWS: CPT

## 2019-01-03 PROCEDURE — 6370000000 HC RX 637 (ALT 250 FOR IP): Performed by: EMERGENCY MEDICINE

## 2019-01-03 PROCEDURE — 36415 COLL VENOUS BLD VENIPUNCTURE: CPT

## 2019-01-03 PROCEDURE — 80048 BASIC METABOLIC PNL TOTAL CA: CPT

## 2019-01-03 PROCEDURE — 84484 ASSAY OF TROPONIN QUANT: CPT

## 2019-01-03 PROCEDURE — 99285 EMERGENCY DEPT VISIT HI MDM: CPT

## 2019-01-03 RX ORDER — FLUOXETINE HYDROCHLORIDE 20 MG/1
20 CAPSULE ORAL DAILY
Status: DISCONTINUED | OUTPATIENT
Start: 2019-01-03 | End: 2019-01-04 | Stop reason: HOSPADM

## 2019-01-03 RX ORDER — PANTOPRAZOLE SODIUM 40 MG/1
40 TABLET, DELAYED RELEASE ORAL
Status: DISCONTINUED | OUTPATIENT
Start: 2019-01-04 | End: 2019-01-04 | Stop reason: HOSPADM

## 2019-01-03 RX ORDER — LORAZEPAM 1 MG/1
1 TABLET ORAL EVERY 8 HOURS PRN
Status: DISCONTINUED | OUTPATIENT
Start: 2019-01-03 | End: 2019-01-04 | Stop reason: HOSPADM

## 2019-01-03 RX ORDER — SODIUM CHLORIDE 0.9 % (FLUSH) 0.9 %
10 SYRINGE (ML) INJECTION EVERY 12 HOURS SCHEDULED
Status: DISCONTINUED | OUTPATIENT
Start: 2019-01-03 | End: 2019-01-04 | Stop reason: HOSPADM

## 2019-01-03 RX ORDER — ASPIRIN 81 MG/1
81 TABLET ORAL DAILY
Status: DISCONTINUED | OUTPATIENT
Start: 2019-01-03 | End: 2019-01-04 | Stop reason: HOSPADM

## 2019-01-03 RX ORDER — CETIRIZINE HYDROCHLORIDE 10 MG/1
5 TABLET ORAL DAILY
Status: DISCONTINUED | OUTPATIENT
Start: 2019-01-03 | End: 2019-01-04 | Stop reason: HOSPADM

## 2019-01-03 RX ORDER — NICOTINE 21 MG/24HR
1 PATCH, TRANSDERMAL 24 HOURS TRANSDERMAL EVERY 24 HOURS
Status: DISCONTINUED | OUTPATIENT
Start: 2019-01-03 | End: 2019-01-04 | Stop reason: HOSPADM

## 2019-01-03 RX ORDER — SODIUM CHLORIDE 0.9 % (FLUSH) 0.9 %
10 SYRINGE (ML) INJECTION PRN
Status: DISCONTINUED | OUTPATIENT
Start: 2019-01-03 | End: 2019-01-04 | Stop reason: HOSPADM

## 2019-01-03 RX ORDER — LISINOPRIL AND HYDROCHLOROTHIAZIDE 12.5; 1 MG/1; MG/1
1 TABLET ORAL DAILY
Status: DISCONTINUED | OUTPATIENT
Start: 2019-01-03 | End: 2019-01-04 | Stop reason: HOSPADM

## 2019-01-03 RX ORDER — HYDROCODONE BITARTRATE AND ACETAMINOPHEN 5; 325 MG/1; MG/1
1 TABLET ORAL ONCE
Status: COMPLETED | OUTPATIENT
Start: 2019-01-03 | End: 2019-01-03

## 2019-01-03 RX ORDER — FLUTICASONE PROPIONATE 110 UG/1
2 AEROSOL, METERED RESPIRATORY (INHALATION) 2 TIMES DAILY
Qty: 1 INHALER | Refills: 3 | Status: ON HOLD | OUTPATIENT
Start: 2019-01-03 | End: 2019-01-04 | Stop reason: HOSPADM

## 2019-01-03 RX ORDER — BENZONATATE 100 MG/1
100 CAPSULE ORAL 3 TIMES DAILY PRN
Status: DISCONTINUED | OUTPATIENT
Start: 2019-01-03 | End: 2019-01-04 | Stop reason: HOSPADM

## 2019-01-03 RX ORDER — METOPROLOL TARTRATE 50 MG/1
50 TABLET, FILM COATED ORAL 2 TIMES DAILY
Status: DISCONTINUED | OUTPATIENT
Start: 2019-01-03 | End: 2019-01-04 | Stop reason: HOSPADM

## 2019-01-03 RX ORDER — ALBUTEROL SULFATE 90 UG/1
2 AEROSOL, METERED RESPIRATORY (INHALATION) EVERY 6 HOURS PRN
Status: DISCONTINUED | OUTPATIENT
Start: 2019-01-03 | End: 2019-01-04 | Stop reason: HOSPADM

## 2019-01-03 RX ORDER — FLUTICASONE PROPIONATE 110 UG/1
2 AEROSOL, METERED RESPIRATORY (INHALATION) 2 TIMES DAILY
Status: DISCONTINUED | OUTPATIENT
Start: 2019-01-03 | End: 2019-01-04 | Stop reason: HOSPADM

## 2019-01-03 RX ORDER — HYDROCODONE BITARTRATE AND ACETAMINOPHEN 5; 325 MG/1; MG/1
2 TABLET ORAL ONCE
Status: COMPLETED | OUTPATIENT
Start: 2019-01-03 | End: 2019-01-03

## 2019-01-03 RX ORDER — ACETAMINOPHEN 325 MG/1
650 TABLET ORAL EVERY 4 HOURS PRN
Status: DISCONTINUED | OUTPATIENT
Start: 2019-01-03 | End: 2019-01-04 | Stop reason: HOSPADM

## 2019-01-03 RX ADMIN — Medication 10 ML: at 21:04

## 2019-01-03 RX ADMIN — BENZONATATE 100 MG: 100 CAPSULE ORAL at 18:14

## 2019-01-03 RX ADMIN — HYDROCODONE BITARTRATE AND ACETAMINOPHEN 1 TABLET: 5; 325 TABLET ORAL at 21:26

## 2019-01-03 RX ADMIN — FLUTICASONE PROPIONATE 2 PUFF: 110 AEROSOL, METERED RESPIRATORY (INHALATION) at 21:20

## 2019-01-03 RX ADMIN — FLUOXETINE HYDROCHLORIDE 20 MG: 20 CAPSULE ORAL at 21:03

## 2019-01-03 RX ADMIN — HYDROCODONE BITARTRATE AND ACETAMINOPHEN 2 TABLET: 5; 325 TABLET ORAL at 17:11

## 2019-01-03 ASSESSMENT — PAIN DESCRIPTION - PAIN TYPE
TYPE: ACUTE PAIN

## 2019-01-03 ASSESSMENT — PAIN SCALES - GENERAL
PAINLEVEL_OUTOF10: 8
PAINLEVEL_OUTOF10: 7
PAINLEVEL_OUTOF10: 6
PAINLEVEL_OUTOF10: 7
PAINLEVEL_OUTOF10: 4
PAINLEVEL_OUTOF10: 8

## 2019-01-03 ASSESSMENT — ENCOUNTER SYMPTOMS
GASTROINTESTINAL NEGATIVE: 1
ALLERGIC/IMMUNOLOGIC NEGATIVE: 1
SHORTNESS OF BREATH: 1
COUGH: 1
EYES NEGATIVE: 1

## 2019-01-03 ASSESSMENT — PAIN DESCRIPTION - PROGRESSION
CLINICAL_PROGRESSION: NOT CHANGED
CLINICAL_PROGRESSION: NOT CHANGED

## 2019-01-03 ASSESSMENT — PAIN DESCRIPTION - FREQUENCY
FREQUENCY: CONTINUOUS
FREQUENCY: CONTINUOUS

## 2019-01-03 ASSESSMENT — PAIN DESCRIPTION - DESCRIPTORS
DESCRIPTORS: CONSTANT
DESCRIPTORS: ACHING;CONSTANT;DISCOMFORT

## 2019-01-03 ASSESSMENT — PAIN DESCRIPTION - ONSET
ONSET: ON-GOING
ONSET: ON-GOING

## 2019-01-03 ASSESSMENT — PAIN DESCRIPTION - LOCATION
LOCATION: CHEST;BACK;BREAST
LOCATION: CHEST

## 2019-01-03 ASSESSMENT — PAIN DESCRIPTION - ORIENTATION: ORIENTATION: LEFT

## 2019-01-04 VITALS
TEMPERATURE: 97.3 F | HEART RATE: 65 BPM | WEIGHT: 138.7 LBS | OXYGEN SATURATION: 96 % | BODY MASS INDEX: 23.11 KG/M2 | SYSTOLIC BLOOD PRESSURE: 122 MMHG | DIASTOLIC BLOOD PRESSURE: 73 MMHG | RESPIRATION RATE: 16 BRPM | HEIGHT: 65 IN

## 2019-01-04 LAB
EKG ATRIAL RATE: 63 BPM
EKG ATRIAL RATE: 74 BPM
EKG P AXIS: 69 DEGREES
EKG P AXIS: 70 DEGREES
EKG P-R INTERVAL: 120 MS
EKG P-R INTERVAL: 128 MS
EKG Q-T INTERVAL: 370 MS
EKG Q-T INTERVAL: 398 MS
EKG QRS DURATION: 78 MS
EKG QRS DURATION: 84 MS
EKG QTC CALCULATION (BAZETT): 407 MS
EKG QTC CALCULATION (BAZETT): 410 MS
EKG R AXIS: 76 DEGREES
EKG R AXIS: 77 DEGREES
EKG T AXIS: 37 DEGREES
EKG T AXIS: 72 DEGREES
EKG VENTRICULAR RATE: 63 BPM
EKG VENTRICULAR RATE: 74 BPM
LV EF: 58 %
LVEF MODALITY: NORMAL
TROPONIN INTERP: NORMAL
TROPONIN T: NORMAL NG/ML
TROPONIN, HIGH SENSITIVITY: <6 NG/L (ref 0–14)

## 2019-01-04 PROCEDURE — 6370000000 HC RX 637 (ALT 250 FOR IP): Performed by: STUDENT IN AN ORGANIZED HEALTH CARE EDUCATION/TRAINING PROGRAM

## 2019-01-04 PROCEDURE — 6370000000 HC RX 637 (ALT 250 FOR IP): Performed by: EMERGENCY MEDICINE

## 2019-01-04 PROCEDURE — G0378 HOSPITAL OBSERVATION PER HR: HCPCS

## 2019-01-04 PROCEDURE — 2580000003 HC RX 258: Performed by: EMERGENCY MEDICINE

## 2019-01-04 PROCEDURE — 93306 TTE W/DOPPLER COMPLETE: CPT

## 2019-01-04 PROCEDURE — 84484 ASSAY OF TROPONIN QUANT: CPT

## 2019-01-04 PROCEDURE — 94640 AIRWAY INHALATION TREATMENT: CPT

## 2019-01-04 PROCEDURE — 93005 ELECTROCARDIOGRAM TRACING: CPT

## 2019-01-04 PROCEDURE — 36415 COLL VENOUS BLD VENIPUNCTURE: CPT

## 2019-01-04 RX ORDER — AZITHROMYCIN 250 MG/1
500 TABLET, FILM COATED ORAL ONCE
Status: COMPLETED | OUTPATIENT
Start: 2019-01-04 | End: 2019-01-04

## 2019-01-04 RX ORDER — PREDNISONE 10 MG/1
TABLET ORAL
Qty: 20 TABLET | Refills: 0 | Status: SHIPPED | OUTPATIENT
Start: 2019-01-05 | End: 2019-01-09

## 2019-01-04 RX ORDER — HYDROCODONE BITARTRATE AND ACETAMINOPHEN 5; 325 MG/1; MG/1
1 TABLET ORAL ONCE
Status: COMPLETED | OUTPATIENT
Start: 2019-01-04 | End: 2019-01-04

## 2019-01-04 RX ORDER — AZITHROMYCIN 250 MG/1
250 TABLET, FILM COATED ORAL DAILY
Qty: 4 TABLET | Refills: 0 | Status: SHIPPED | OUTPATIENT
Start: 2019-01-05 | End: 2019-01-09

## 2019-01-04 RX ORDER — FLUTICASONE PROPIONATE 110 UG/1
2 AEROSOL, METERED RESPIRATORY (INHALATION) 2 TIMES DAILY
Qty: 1 INHALER | Refills: 3 | Status: ON HOLD | OUTPATIENT
Start: 2019-01-04 | End: 2019-09-18

## 2019-01-04 RX ORDER — GUAIFENESIN AND CODEINE PHOSPHATE 100; 10 MG/5ML; MG/5ML
5 SOLUTION ORAL 2 TIMES DAILY PRN
Qty: 118 ML | Refills: 0 | Status: SHIPPED | OUTPATIENT
Start: 2019-01-04 | End: 2019-01-11

## 2019-01-04 RX ORDER — BENZONATATE 100 MG/1
100 CAPSULE ORAL 3 TIMES DAILY PRN
Qty: 30 CAPSULE | Refills: 0 | Status: SHIPPED | OUTPATIENT
Start: 2019-01-04 | End: 2019-01-11

## 2019-01-04 RX ORDER — HYDROCODONE BITARTRATE AND ACETAMINOPHEN 5; 325 MG/1; MG/1
1 TABLET ORAL EVERY 6 HOURS PRN
Status: DISCONTINUED | OUTPATIENT
Start: 2019-01-04 | End: 2019-01-04 | Stop reason: HOSPADM

## 2019-01-04 RX ADMIN — CETIRIZINE HYDROCHLORIDE 5 MG: 10 TABLET ORAL at 12:06

## 2019-01-04 RX ADMIN — Medication 10 ML: at 12:08

## 2019-01-04 RX ADMIN — PANTOPRAZOLE SODIUM 40 MG: 40 TABLET, DELAYED RELEASE ORAL at 06:37

## 2019-01-04 RX ADMIN — HYDROCODONE BITARTRATE AND ACETAMINOPHEN 1 TABLET: 5; 325 TABLET ORAL at 07:11

## 2019-01-04 RX ADMIN — TIOTROPIUM BROMIDE 18 MCG: 18 CAPSULE ORAL; RESPIRATORY (INHALATION) at 08:44

## 2019-01-04 RX ADMIN — PREDNISONE 60 MG: 50 TABLET ORAL at 13:38

## 2019-01-04 RX ADMIN — BENZONATATE 100 MG: 100 CAPSULE ORAL at 01:26

## 2019-01-04 RX ADMIN — ASPIRIN 81 MG: 81 TABLET ORAL at 12:07

## 2019-01-04 RX ADMIN — HYDROCODONE BITARTRATE AND ACETAMINOPHEN 1 TABLET: 5; 325 TABLET ORAL at 12:07

## 2019-01-04 RX ADMIN — LISINOPRIL AND HYDROCHLOROTHIAZIDE 1 TABLET: 12.5; 1 TABLET ORAL at 12:07

## 2019-01-04 RX ADMIN — BENZONATATE 100 MG: 100 CAPSULE ORAL at 08:34

## 2019-01-04 RX ADMIN — AZITHROMYCIN 500 MG: 250 TABLET, FILM COATED ORAL at 13:35

## 2019-01-04 RX ADMIN — METOPROLOL TARTRATE 50 MG: 50 TABLET, FILM COATED ORAL at 12:07

## 2019-01-04 RX ADMIN — HYDROCODONE BITARTRATE AND ACETAMINOPHEN 1 TABLET: 5; 325 TABLET ORAL at 01:26

## 2019-01-04 RX ADMIN — FLUTICASONE PROPIONATE 2 PUFF: 110 AEROSOL, METERED RESPIRATORY (INHALATION) at 08:44

## 2019-01-04 ASSESSMENT — PAIN SCALES - GENERAL
PAINLEVEL_OUTOF10: 6
PAINLEVEL_OUTOF10: 4
PAINLEVEL_OUTOF10: 9
PAINLEVEL_OUTOF10: 7
PAINLEVEL_OUTOF10: 9

## 2019-01-09 ENCOUNTER — OFFICE VISIT (OUTPATIENT)
Dept: INTERNAL MEDICINE | Age: 54
End: 2019-01-09
Payer: COMMERCIAL

## 2019-01-09 VITALS
BODY MASS INDEX: 23.63 KG/M2 | HEART RATE: 84 BPM | SYSTOLIC BLOOD PRESSURE: 169 MMHG | DIASTOLIC BLOOD PRESSURE: 88 MMHG | WEIGHT: 142 LBS

## 2019-01-09 DIAGNOSIS — Z72.0 TOBACCO ABUSE: ICD-10-CM

## 2019-01-09 DIAGNOSIS — I10 ESSENTIAL HYPERTENSION: Chronic | ICD-10-CM

## 2019-01-09 DIAGNOSIS — R07.89 CHEST PAIN, MUSCULOSKELETAL: Primary | ICD-10-CM

## 2019-01-09 DIAGNOSIS — J44.9 CHRONIC OBSTRUCTIVE PULMONARY DISEASE, UNSPECIFIED COPD TYPE (HCC): Chronic | ICD-10-CM

## 2019-01-09 PROCEDURE — 1111F DSCHRG MED/CURRENT MED MERGE: CPT | Performed by: STUDENT IN AN ORGANIZED HEALTH CARE EDUCATION/TRAINING PROGRAM

## 2019-01-09 PROCEDURE — 99211 OFF/OP EST MAY X REQ PHY/QHP: CPT | Performed by: INTERNAL MEDICINE

## 2019-01-09 PROCEDURE — 99495 TRANSJ CARE MGMT MOD F2F 14D: CPT | Performed by: STUDENT IN AN ORGANIZED HEALTH CARE EDUCATION/TRAINING PROGRAM

## 2019-01-09 RX ORDER — IBUPROFEN 400 MG/1
400 TABLET ORAL EVERY 8 HOURS PRN
Qty: 20 TABLET | Refills: 0 | Status: SHIPPED | OUTPATIENT
Start: 2019-01-09 | End: 2019-01-13

## 2019-01-09 RX ORDER — LIDOCAINE 50 MG/G
1 PATCH TOPICAL DAILY
Qty: 10 PATCH | Refills: 0 | Status: CANCELLED | OUTPATIENT
Start: 2019-01-09 | End: 2019-02-08

## 2019-01-09 RX ORDER — LIDOCAINE 50 MG/G
OINTMENT TOPICAL
Qty: 1 TUBE | Refills: 0 | Status: SHIPPED | OUTPATIENT
Start: 2019-01-09 | End: 2019-07-25 | Stop reason: ALTCHOICE

## 2019-01-29 ENCOUNTER — OFFICE VISIT (OUTPATIENT)
Dept: GASTROENTEROLOGY | Age: 54
End: 2019-01-29
Payer: COMMERCIAL

## 2019-01-29 VITALS
HEART RATE: 77 BPM | BODY MASS INDEX: 23.53 KG/M2 | SYSTOLIC BLOOD PRESSURE: 133 MMHG | WEIGHT: 141.4 LBS | DIASTOLIC BLOOD PRESSURE: 85 MMHG

## 2019-01-29 DIAGNOSIS — R19.5 POSITIVE FIT (FECAL IMMUNOCHEMICAL TEST): Primary | ICD-10-CM

## 2019-01-29 PROCEDURE — 99203 OFFICE O/P NEW LOW 30 MIN: CPT | Performed by: INTERNAL MEDICINE

## 2019-01-29 ASSESSMENT — ENCOUNTER SYMPTOMS
HEMATOCHEZIA: 1
CONSTIPATION: 1
BLOOD IN STOOL: 1
VOMITING: 0
ABDOMINAL DISTENTION: 1
RECTAL PAIN: 0
ANAL BLEEDING: 0
ABDOMINAL PAIN: 0
ALLERGIC/IMMUNOLOGIC NEGATIVE: 1
EYES NEGATIVE: 1
RESPIRATORY NEGATIVE: 1
DIARRHEA: 1
NAUSEA: 1

## 2019-01-31 ENCOUNTER — TELEPHONE (OUTPATIENT)
Dept: GASTROENTEROLOGY | Age: 54
End: 2019-01-31

## 2019-02-05 ENCOUNTER — TELEPHONE (OUTPATIENT)
Dept: GASTROENTEROLOGY | Age: 54
End: 2019-02-05

## 2019-02-21 ENCOUNTER — TELEPHONE (OUTPATIENT)
Dept: GASTROENTEROLOGY | Age: 54
End: 2019-02-21

## 2019-02-21 DIAGNOSIS — K21.9 GASTROESOPHAGEAL REFLUX DISEASE WITHOUT ESOPHAGITIS: ICD-10-CM

## 2019-02-22 RX ORDER — LORATADINE 10 MG/1
TABLET ORAL
Qty: 30 TABLET | Refills: 4 | Status: SHIPPED | OUTPATIENT
Start: 2019-02-22 | End: 2019-08-03 | Stop reason: SDUPTHER

## 2019-02-22 RX ORDER — FLUOXETINE HYDROCHLORIDE 20 MG/1
CAPSULE ORAL
Qty: 30 CAPSULE | Refills: 5 | Status: SHIPPED | OUTPATIENT
Start: 2019-02-22 | End: 2019-08-15

## 2019-02-22 RX ORDER — OMEPRAZOLE 20 MG/1
CAPSULE, DELAYED RELEASE ORAL
Qty: 30 CAPSULE | Refills: 10 | Status: SHIPPED | OUTPATIENT
Start: 2019-02-22 | End: 2019-08-15

## 2019-02-25 ENCOUNTER — ANESTHESIA EVENT (OUTPATIENT)
Dept: OPERATING ROOM | Age: 54
End: 2019-02-25
Payer: COMMERCIAL

## 2019-02-26 ENCOUNTER — HOSPITAL ENCOUNTER (OUTPATIENT)
Age: 54
Setting detail: OUTPATIENT SURGERY
Discharge: HOME | End: 2019-02-26
Attending: INTERNAL MEDICINE | Admitting: INTERNAL MEDICINE
Payer: COMMERCIAL

## 2019-02-26 ENCOUNTER — ANESTHESIA (OUTPATIENT)
Dept: OPERATING ROOM | Age: 54
End: 2019-02-26
Payer: COMMERCIAL

## 2019-02-26 VITALS
BODY MASS INDEX: 25.85 KG/M2 | OXYGEN SATURATION: 96 % | SYSTOLIC BLOOD PRESSURE: 116 MMHG | HEIGHT: 61 IN | DIASTOLIC BLOOD PRESSURE: 60 MMHG | HEART RATE: 65 BPM | TEMPERATURE: 97.3 F | RESPIRATION RATE: 15 BRPM | WEIGHT: 136.91 LBS

## 2019-02-26 VITALS
OXYGEN SATURATION: 98 % | SYSTOLIC BLOOD PRESSURE: 100 MMHG | RESPIRATION RATE: 18 BRPM | DIASTOLIC BLOOD PRESSURE: 59 MMHG

## 2019-02-26 PROCEDURE — 6360000002 HC RX W HCPCS: Performed by: NURSE ANESTHETIST, CERTIFIED REGISTERED

## 2019-02-26 PROCEDURE — 3700000000 HC ANESTHESIA ATTENDED CARE: Performed by: INTERNAL MEDICINE

## 2019-02-26 PROCEDURE — 7100000011 HC PHASE II RECOVERY - ADDTL 15 MIN: Performed by: INTERNAL MEDICINE

## 2019-02-26 PROCEDURE — 3700000001 HC ADD 15 MINUTES (ANESTHESIA): Performed by: INTERNAL MEDICINE

## 2019-02-26 PROCEDURE — C1773 RET DEV, INSERTABLE: HCPCS | Performed by: INTERNAL MEDICINE

## 2019-02-26 PROCEDURE — 45384 COLONOSCOPY W/LESION REMOVAL: CPT | Performed by: INTERNAL MEDICINE

## 2019-02-26 PROCEDURE — 2709999900 HC NON-CHARGEABLE SUPPLY: Performed by: INTERNAL MEDICINE

## 2019-02-26 PROCEDURE — 3609010700 HC COLONOSCOPY POLYPECTOMY REMOVAL SNARE/STOMA: Performed by: INTERNAL MEDICINE

## 2019-02-26 PROCEDURE — 7100000010 HC PHASE II RECOVERY - FIRST 15 MIN: Performed by: INTERNAL MEDICINE

## 2019-02-26 PROCEDURE — 2580000003 HC RX 258: Performed by: ANESTHESIOLOGY

## 2019-02-26 PROCEDURE — 2500000003 HC RX 250 WO HCPCS: Performed by: NURSE ANESTHETIST, CERTIFIED REGISTERED

## 2019-02-26 PROCEDURE — 88305 TISSUE EXAM BY PATHOLOGIST: CPT

## 2019-02-26 RX ORDER — SODIUM CHLORIDE 0.9 % (FLUSH) 0.9 %
10 SYRINGE (ML) INJECTION PRN
Status: DISCONTINUED | OUTPATIENT
Start: 2019-02-26 | End: 2019-02-26 | Stop reason: HOSPADM

## 2019-02-26 RX ORDER — FENTANYL CITRATE 50 UG/ML
25 INJECTION, SOLUTION INTRAMUSCULAR; INTRAVENOUS EVERY 5 MIN PRN
Status: DISCONTINUED | OUTPATIENT
Start: 2019-02-26 | End: 2019-02-26 | Stop reason: HOSPADM

## 2019-02-26 RX ORDER — FENTANYL CITRATE 50 UG/ML
50 INJECTION, SOLUTION INTRAMUSCULAR; INTRAVENOUS EVERY 5 MIN PRN
Status: DISCONTINUED | OUTPATIENT
Start: 2019-02-26 | End: 2019-02-26 | Stop reason: HOSPADM

## 2019-02-26 RX ORDER — PROPOFOL 10 MG/ML
INJECTION, EMULSION INTRAVENOUS PRN
Status: DISCONTINUED | OUTPATIENT
Start: 2019-02-26 | End: 2019-02-26 | Stop reason: SDUPTHER

## 2019-02-26 RX ORDER — LIDOCAINE HYDROCHLORIDE 10 MG/ML
INJECTION, SOLUTION EPIDURAL; INFILTRATION; INTRACAUDAL; PERINEURAL PRN
Status: DISCONTINUED | OUTPATIENT
Start: 2019-02-26 | End: 2019-02-26 | Stop reason: SDUPTHER

## 2019-02-26 RX ORDER — SODIUM CHLORIDE, SODIUM LACTATE, POTASSIUM CHLORIDE, CALCIUM CHLORIDE 600; 310; 30; 20 MG/100ML; MG/100ML; MG/100ML; MG/100ML
INJECTION, SOLUTION INTRAVENOUS CONTINUOUS
Status: DISCONTINUED | OUTPATIENT
Start: 2019-02-26 | End: 2019-02-26 | Stop reason: HOSPADM

## 2019-02-26 RX ORDER — SODIUM CHLORIDE 0.9 % (FLUSH) 0.9 %
10 SYRINGE (ML) INJECTION EVERY 12 HOURS SCHEDULED
Status: DISCONTINUED | OUTPATIENT
Start: 2019-02-26 | End: 2019-02-26 | Stop reason: HOSPADM

## 2019-02-26 RX ORDER — LIDOCAINE HYDROCHLORIDE 10 MG/ML
1 INJECTION, SOLUTION EPIDURAL; INFILTRATION; INTRACAUDAL; PERINEURAL
Status: DISCONTINUED | OUTPATIENT
Start: 2019-02-26 | End: 2019-02-26 | Stop reason: HOSPADM

## 2019-02-26 RX ORDER — SODIUM CHLORIDE 9 MG/ML
INJECTION, SOLUTION INTRAVENOUS CONTINUOUS
Status: DISCONTINUED | OUTPATIENT
Start: 2019-02-26 | End: 2019-02-26

## 2019-02-26 RX ORDER — ONDANSETRON 2 MG/ML
4 INJECTION INTRAMUSCULAR; INTRAVENOUS
Status: DISCONTINUED | OUTPATIENT
Start: 2019-02-26 | End: 2019-02-26 | Stop reason: HOSPADM

## 2019-02-26 RX ADMIN — PROPOFOL 20 MG: 10 INJECTION, EMULSION INTRAVENOUS at 12:49

## 2019-02-26 RX ADMIN — PROPOFOL 20 MG: 10 INJECTION, EMULSION INTRAVENOUS at 12:55

## 2019-02-26 RX ADMIN — PROPOFOL 20 MG: 10 INJECTION, EMULSION INTRAVENOUS at 12:45

## 2019-02-26 RX ADMIN — PROPOFOL 30 MG: 10 INJECTION, EMULSION INTRAVENOUS at 12:27

## 2019-02-26 RX ADMIN — PROPOFOL 20 MG: 10 INJECTION, EMULSION INTRAVENOUS at 12:53

## 2019-02-26 RX ADMIN — PROPOFOL 20 MG: 10 INJECTION, EMULSION INTRAVENOUS at 12:51

## 2019-02-26 RX ADMIN — SODIUM CHLORIDE, POTASSIUM CHLORIDE, SODIUM LACTATE AND CALCIUM CHLORIDE: 600; 310; 30; 20 INJECTION, SOLUTION INTRAVENOUS at 12:23

## 2019-02-26 RX ADMIN — SODIUM CHLORIDE, POTASSIUM CHLORIDE, SODIUM LACTATE AND CALCIUM CHLORIDE: 600; 310; 30; 20 INJECTION, SOLUTION INTRAVENOUS at 13:00

## 2019-02-26 RX ADMIN — PROPOFOL 40 MG: 10 INJECTION, EMULSION INTRAVENOUS at 12:33

## 2019-02-26 RX ADMIN — PROPOFOL 20 MG: 10 INJECTION, EMULSION INTRAVENOUS at 12:31

## 2019-02-26 RX ADMIN — PROPOFOL 20 MG: 10 INJECTION, EMULSION INTRAVENOUS at 12:39

## 2019-02-26 RX ADMIN — PROPOFOL 40 MG: 10 INJECTION, EMULSION INTRAVENOUS at 12:37

## 2019-02-26 RX ADMIN — PROPOFOL 20 MG: 10 INJECTION, EMULSION INTRAVENOUS at 12:59

## 2019-02-26 RX ADMIN — PROPOFOL 20 MG: 10 INJECTION, EMULSION INTRAVENOUS at 12:41

## 2019-02-26 RX ADMIN — PROPOFOL 20 MG: 10 INJECTION, EMULSION INTRAVENOUS at 12:43

## 2019-02-26 RX ADMIN — PROPOFOL 50 MG: 10 INJECTION, EMULSION INTRAVENOUS at 12:26

## 2019-02-26 RX ADMIN — PROPOFOL 30 MG: 10 INJECTION, EMULSION INTRAVENOUS at 12:47

## 2019-02-26 RX ADMIN — SODIUM CHLORIDE, POTASSIUM CHLORIDE, SODIUM LACTATE AND CALCIUM CHLORIDE: 600; 310; 30; 20 INJECTION, SOLUTION INTRAVENOUS at 08:58

## 2019-02-26 RX ADMIN — PROPOFOL 20 MG: 10 INJECTION, EMULSION INTRAVENOUS at 12:57

## 2019-02-26 RX ADMIN — PROPOFOL 20 MG: 10 INJECTION, EMULSION INTRAVENOUS at 12:29

## 2019-02-26 RX ADMIN — LIDOCAINE HYDROCHLORIDE 40 MG: 10 INJECTION, SOLUTION EPIDURAL; INFILTRATION; INTRACAUDAL; PERINEURAL at 12:23

## 2019-02-26 RX ADMIN — PROPOFOL 20 MG: 10 INJECTION, EMULSION INTRAVENOUS at 12:35

## 2019-02-26 ASSESSMENT — PULMONARY FUNCTION TESTS
PIF_VALUE: 1

## 2019-02-26 ASSESSMENT — PAIN SCALES - GENERAL
PAINLEVEL_OUTOF10: 0

## 2019-02-26 ASSESSMENT — PAIN - FUNCTIONAL ASSESSMENT: PAIN_FUNCTIONAL_ASSESSMENT: 0-10

## 2019-02-26 ASSESSMENT — LIFESTYLE VARIABLES: SMOKING_STATUS: 1

## 2019-02-28 LAB — SURGICAL PATHOLOGY REPORT: NORMAL

## 2019-03-25 ENCOUNTER — TELEPHONE (OUTPATIENT)
Dept: GASTROENTEROLOGY | Age: 54
End: 2019-03-25

## 2019-03-26 ENCOUNTER — OFFICE VISIT (OUTPATIENT)
Dept: GASTROENTEROLOGY | Age: 54
End: 2019-03-26
Payer: COMMERCIAL

## 2019-03-26 VITALS
DIASTOLIC BLOOD PRESSURE: 82 MMHG | WEIGHT: 139.6 LBS | BODY MASS INDEX: 26.38 KG/M2 | HEART RATE: 109 BPM | SYSTOLIC BLOOD PRESSURE: 138 MMHG

## 2019-03-26 DIAGNOSIS — D36.9 ADENOMATOUS POLYPS: Primary | ICD-10-CM

## 2019-03-26 PROCEDURE — 99213 OFFICE O/P EST LOW 20 MIN: CPT | Performed by: INTERNAL MEDICINE

## 2019-03-26 RX ORDER — METOPROLOL TARTRATE 50 MG/1
50 TABLET, FILM COATED ORAL 2 TIMES DAILY
Qty: 60 TABLET | Refills: 3 | Status: SHIPPED | OUTPATIENT
Start: 2019-03-26 | End: 2019-03-26 | Stop reason: ALTCHOICE

## 2019-03-26 RX ORDER — AMOXICILLIN 250 MG
2 CAPSULE ORAL DAILY PRN
Qty: 30 TABLET | Refills: 1 | Status: SHIPPED | OUTPATIENT
Start: 2019-03-26 | End: 2019-07-25 | Stop reason: ALTCHOICE

## 2019-03-26 RX ORDER — METOPROLOL TARTRATE 50 MG/1
50 TABLET, FILM COATED ORAL 2 TIMES DAILY
Qty: 60 TABLET | Refills: 3 | Status: SHIPPED | OUTPATIENT
Start: 2019-03-26 | End: 2019-06-29 | Stop reason: SDUPTHER

## 2019-03-26 RX ORDER — METOPROLOL TARTRATE 50 MG/1
TABLET, FILM COATED ORAL
Qty: 60 TABLET | Refills: 2 | Status: SHIPPED | OUTPATIENT
Start: 2019-03-26 | End: 2019-03-26 | Stop reason: ALTCHOICE

## 2019-03-26 ASSESSMENT — ENCOUNTER SYMPTOMS
ABDOMINAL PAIN: 0
SORE THROAT: 0
BACK PAIN: 0
CONSTIPATION: 1
DIARRHEA: 0
TROUBLE SWALLOWING: 0
CHOKING: 0
BLOOD IN STOOL: 0
RECTAL PAIN: 0
WHEEZING: 0
ABDOMINAL DISTENTION: 0
SINUS PRESSURE: 0
VOICE CHANGE: 0
COUGH: 1
ANAL BLEEDING: 0
VOMITING: 0
NAUSEA: 0

## 2019-04-11 ENCOUNTER — HOSPITAL ENCOUNTER (EMERGENCY)
Age: 54
Discharge: HOME | End: 2019-04-11
Attending: EMERGENCY MEDICINE
Payer: COMMERCIAL

## 2019-04-11 ENCOUNTER — APPOINTMENT (OUTPATIENT)
Dept: GENERAL RADIOLOGY | Age: 54
End: 2019-04-11
Payer: COMMERCIAL

## 2019-04-11 ENCOUNTER — APPOINTMENT (OUTPATIENT)
Dept: CT IMAGING | Age: 54
End: 2019-04-11
Payer: COMMERCIAL

## 2019-04-11 VITALS
SYSTOLIC BLOOD PRESSURE: 118 MMHG | DIASTOLIC BLOOD PRESSURE: 71 MMHG | BODY MASS INDEX: 25.3 KG/M2 | TEMPERATURE: 98.6 F | WEIGHT: 134 LBS | HEART RATE: 77 BPM | OXYGEN SATURATION: 99 % | HEIGHT: 61 IN | RESPIRATION RATE: 19 BRPM

## 2019-04-11 DIAGNOSIS — K52.9 COLITIS: Primary | ICD-10-CM

## 2019-04-11 LAB
-: NORMAL
ABSOLUTE EOS #: 0.12 K/UL (ref 0–0.44)
ABSOLUTE IMMATURE GRANULOCYTE: 0.04 K/UL (ref 0–0.3)
ABSOLUTE LYMPH #: 3.08 K/UL (ref 1.1–3.7)
ABSOLUTE MONO #: 1.22 K/UL (ref 0.1–1.2)
ALBUMIN SERPL-MCNC: 4.4 G/DL (ref 3.5–5.2)
ALBUMIN/GLOBULIN RATIO: 1.4 (ref 1–2.5)
ALP BLD-CCNC: 117 U/L (ref 35–104)
ALT SERPL-CCNC: 14 U/L (ref 5–33)
AMORPHOUS: NORMAL
ANION GAP SERPL CALCULATED.3IONS-SCNC: 12 MMOL/L (ref 9–17)
AST SERPL-CCNC: 20 U/L
BACTERIA: NORMAL
BASOPHILS # BLD: 0 % (ref 0–2)
BASOPHILS ABSOLUTE: 0.06 K/UL (ref 0–0.2)
BILIRUB SERPL-MCNC: 0.18 MG/DL (ref 0.3–1.2)
BILIRUBIN DIRECT: <0.08 MG/DL
BILIRUBIN URINE: NEGATIVE
BILIRUBIN, INDIRECT: ABNORMAL MG/DL (ref 0–1)
BUN BLDV-MCNC: 21 MG/DL (ref 6–20)
BUN/CREAT BLD: ABNORMAL (ref 9–20)
CALCIUM SERPL-MCNC: 9.3 MG/DL (ref 8.6–10.4)
CASTS UA: NORMAL /LPF (ref 0–8)
CHLORIDE BLD-SCNC: 100 MMOL/L (ref 98–107)
CO2: 27 MMOL/L (ref 20–31)
COLOR: YELLOW
COMMENT UA: ABNORMAL
CREAT SERPL-MCNC: 0.87 MG/DL (ref 0.5–0.9)
CRYSTALS, UA: NORMAL /HPF
DIFFERENTIAL TYPE: ABNORMAL
EOSINOPHILS RELATIVE PERCENT: 1 % (ref 1–4)
EPITHELIAL CELLS UA: NORMAL /HPF (ref 0–5)
GFR AFRICAN AMERICAN: >60 ML/MIN
GFR NON-AFRICAN AMERICAN: >60 ML/MIN
GFR SERPL CREATININE-BSD FRML MDRD: ABNORMAL ML/MIN/{1.73_M2}
GFR SERPL CREATININE-BSD FRML MDRD: ABNORMAL ML/MIN/{1.73_M2}
GLOBULIN: ABNORMAL G/DL (ref 1.5–3.8)
GLUCOSE BLD-MCNC: 103 MG/DL (ref 70–99)
GLUCOSE URINE: NEGATIVE
HCT VFR BLD CALC: 45.9 % (ref 36.3–47.1)
HEMOGLOBIN: 14.7 G/DL (ref 11.9–15.1)
IMMATURE GRANULOCYTES: 0 %
KETONES, URINE: ABNORMAL
LEUKOCYTE ESTERASE, URINE: ABNORMAL
LIPASE: 86 U/L (ref 13–60)
LYMPHOCYTES # BLD: 21 % (ref 24–43)
MCH RBC QN AUTO: 27.4 PG (ref 25.2–33.5)
MCHC RBC AUTO-ENTMCNC: 32 G/DL (ref 28.4–34.8)
MCV RBC AUTO: 85.6 FL (ref 82.6–102.9)
MONOCYTES # BLD: 9 % (ref 3–12)
MUCUS: NORMAL
NITRITE, URINE: NEGATIVE
NRBC AUTOMATED: 0 PER 100 WBC
OTHER OBSERVATIONS UA: NORMAL
PDW BLD-RTO: 14 % (ref 11.8–14.4)
PH UA: 5 (ref 5–8)
PLATELET # BLD: 294 K/UL (ref 138–453)
PLATELET ESTIMATE: ABNORMAL
PMV BLD AUTO: 10.7 FL (ref 8.1–13.5)
POTASSIUM SERPL-SCNC: 4 MMOL/L (ref 3.7–5.3)
PROTEIN UA: NEGATIVE
RBC # BLD: 5.36 M/UL (ref 3.95–5.11)
RBC # BLD: ABNORMAL 10*6/UL
RBC UA: NORMAL /HPF (ref 0–4)
RENAL EPITHELIAL, UA: NORMAL /HPF
SEG NEUTROPHILS: 69 % (ref 36–65)
SEGMENTED NEUTROPHILS ABSOLUTE COUNT: 9.85 K/UL (ref 1.5–8.1)
SODIUM BLD-SCNC: 139 MMOL/L (ref 135–144)
SPECIFIC GRAVITY UA: 1.02 (ref 1–1.03)
TOTAL PROTEIN: 7.5 G/DL (ref 6.4–8.3)
TRICHOMONAS: NORMAL
TURBIDITY: CLEAR
URINE HGB: NEGATIVE
UROBILINOGEN, URINE: NORMAL
WBC # BLD: 14.4 K/UL (ref 3.5–11.3)
WBC # BLD: ABNORMAL 10*3/UL
WBC UA: NORMAL /HPF (ref 0–5)
YEAST: NORMAL

## 2019-04-11 PROCEDURE — 85025 COMPLETE CBC W/AUTO DIFF WBC: CPT

## 2019-04-11 PROCEDURE — 73030 X-RAY EXAM OF SHOULDER: CPT

## 2019-04-11 PROCEDURE — 74177 CT ABD & PELVIS W/CONTRAST: CPT

## 2019-04-11 PROCEDURE — 80076 HEPATIC FUNCTION PANEL: CPT

## 2019-04-11 PROCEDURE — 87086 URINE CULTURE/COLONY COUNT: CPT

## 2019-04-11 PROCEDURE — 81001 URINALYSIS AUTO W/SCOPE: CPT

## 2019-04-11 PROCEDURE — 96374 THER/PROPH/DIAG INJ IV PUSH: CPT

## 2019-04-11 PROCEDURE — 6360000002 HC RX W HCPCS: Performed by: EMERGENCY MEDICINE

## 2019-04-11 PROCEDURE — 80048 BASIC METABOLIC PNL TOTAL CA: CPT

## 2019-04-11 PROCEDURE — 99284 EMERGENCY DEPT VISIT MOD MDM: CPT

## 2019-04-11 PROCEDURE — 6360000004 HC RX CONTRAST MEDICATION: Performed by: EMERGENCY MEDICINE

## 2019-04-11 PROCEDURE — 83690 ASSAY OF LIPASE: CPT

## 2019-04-11 RX ORDER — KETOROLAC TROMETHAMINE 10 MG/1
10 TABLET, FILM COATED ORAL EVERY 6 HOURS PRN
Qty: 30 TABLET | Refills: 0 | Status: SHIPPED | OUTPATIENT
Start: 2019-04-11 | End: 2019-07-25 | Stop reason: ALTCHOICE

## 2019-04-11 RX ORDER — METRONIDAZOLE 500 MG/1
500 TABLET ORAL 2 TIMES DAILY
Qty: 28 TABLET | Refills: 0 | Status: SHIPPED | OUTPATIENT
Start: 2019-04-11 | End: 2019-04-25

## 2019-04-11 RX ORDER — CIPROFLOXACIN 500 MG/1
500 TABLET, FILM COATED ORAL 2 TIMES DAILY
Qty: 28 TABLET | Refills: 0 | Status: SHIPPED | OUTPATIENT
Start: 2019-04-11 | End: 2019-04-25

## 2019-04-11 RX ORDER — KETOROLAC TROMETHAMINE 15 MG/ML
15 INJECTION, SOLUTION INTRAMUSCULAR; INTRAVENOUS ONCE
Status: COMPLETED | OUTPATIENT
Start: 2019-04-11 | End: 2019-04-11

## 2019-04-11 RX ORDER — DICYCLOMINE HYDROCHLORIDE 10 MG/1
10 CAPSULE ORAL EVERY 6 HOURS PRN
Qty: 20 CAPSULE | Refills: 0 | Status: SHIPPED | OUTPATIENT
Start: 2019-04-11 | End: 2019-07-25 | Stop reason: ALTCHOICE

## 2019-04-11 RX ADMIN — KETOROLAC TROMETHAMINE 15 MG: 15 INJECTION, SOLUTION INTRAMUSCULAR; INTRAVENOUS at 17:55

## 2019-04-11 RX ADMIN — IOHEXOL 75 ML: 350 INJECTION, SOLUTION INTRAVENOUS at 17:22

## 2019-04-11 ASSESSMENT — PAIN SCALES - GENERAL
PAINLEVEL_OUTOF10: 9
PAINLEVEL_OUTOF10: 5
PAINLEVEL_OUTOF10: 10

## 2019-04-11 ASSESSMENT — PAIN DESCRIPTION - FREQUENCY: FREQUENCY: CONTINUOUS

## 2019-04-11 ASSESSMENT — PAIN DESCRIPTION - LOCATION: LOCATION: ABDOMEN

## 2019-04-11 ASSESSMENT — PAIN DESCRIPTION - PAIN TYPE: TYPE: ACUTE PAIN

## 2019-04-11 ASSESSMENT — ENCOUNTER SYMPTOMS
SHORTNESS OF BREATH: 0
NAUSEA: 0
ABDOMINAL PAIN: 1
RHINORRHEA: 0
VOMITING: 0

## 2019-04-11 ASSESSMENT — PAIN DESCRIPTION - ORIENTATION: ORIENTATION: MID;RIGHT

## 2019-04-11 ASSESSMENT — PAIN DESCRIPTION - DESCRIPTORS: DESCRIPTORS: SHOOTING;SHARP

## 2019-04-11 ASSESSMENT — PAIN DESCRIPTION - ONSET: ONSET: GRADUAL

## 2019-04-11 ASSESSMENT — PAIN DESCRIPTION - PROGRESSION: CLINICAL_PROGRESSION: NOT CHANGED

## 2019-04-11 NOTE — ED TRIAGE NOTES
Pt arrived to ed via self, pt walked to bed. C/O: abdominal pain, pt states it started yesterday and has progressively gotten worse and worse, pt states it started when she was siting down, pt states no N/V/D. Pain is located at the right lower quadrant.

## 2019-04-11 NOTE — ED PROVIDER NOTES
[facility] ED  Emergency Department Encounter  Emergency Medicine Resident     Pt Name: Kitty Malone  MRN: [id number]  [place] 1965  Date ofevaluation: 19  PCP:  Raul Donnelly MD    CHIEF COMPLAINT       Chief Complaint   Patient presents with    Abdominal Pain     since last night       HISTORY OF PRESENT ILLNESS  (Location/Symptom, Timing/Onset, Context/Setting, Quality, Duration, Modifying Factors, Severity, Associated signs/symptoms)     Kitty Malone is a 48 y.o. female who presents with complaints of right lower quadrant abdominal pain and cramping that has been intermittent for the past few days. Denies any nausea or vomiting associated with her symptoms. Denies any vaginal complaints associated with her symptoms. No changes in urination or bowel movement. 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 Patient wanted to make sure she didn't have an appendicitis or anything acute or a possible kidney stone. Denies any history of kidney stone. Does have history of CAD, COPD, high blood pressure. PAST MEDICAL / SURGICAL / SOCIAL / FAMILY HISTORY      has a past medical history of Anxiety, CAD (coronary artery disease), Cardiac arrest ([facility] [address] COPD (chronic obstructive pulmonary disease) ([facility] [address] GERD (gastroesophageal reflux disease), Headaches due to old head injury, History of blood transfusion, Hypertension, Snores, Trigger finger, Wears dentures, and Wears glasses. has a past surgical history that includes Hysterectomy (); Tonsillectomy;  section; LEEP (); Cardiac catheterization (); pr incise finger tendon sheath (Right, 2017); and Colonoscopy (2019). Social History     Socioeconomic History    Marital status:       Spouse name: Not on file    Number of children: Not on file    Years of education: Not on file    Highest education level: Not on file Occupational History    Not on file   Social Needs    Financial resource strain: Not on file    Food insecurity:     Worry: Not on file     Inability: Not on file    Transportation needs:     Medical: Not on file     Non-medical: Not on file   Tobacco Use    Smoking status: Current Every Day Smoker     Packs/day: 0.50     Years: 30.00     Pack years: 15.00     Types: Cigarettes    Smokeless tobacco: Never Used   Substance and Sexual Activity    Alcohol use: Yes     Alcohol/week: 0.0 oz     Comment: occ    Drug use: No    Sexual activity: Not on file   Lifestyle    Physical activity:     Days per week: Not on file     Minutes per session: Not on file    Stress: Not on file   Relationships    Social connections:     Talks on phone: Not on file     Gets together: Not on file     Attends Scientologist service: Not on file     Active member of club or organization: Not on file     Attends meetings of clubs or organizations: Not on file     Relationship status: Not on file    Intimate partner violence:     Fear of current or ex partner: Not on file     Emotionally abused: Not on file     Physically abused: Not on file     Forced sexual activity: Not on file   Other Topics Concern    Not on file   Social History Narrative    Not on file       Family History   Problem Relation Age of Onset    High Blood Pressure Mother     Depression Mother     High Blood Pressure Father        Allergies:  Adhesive tape    Home Medications:  Prior to Admission medications    Medication Sig Start Date End Date Taking? Authorizing Provider   ciprofloxacin (CIPRO) 500 MG tablet Take 1 tablet by mouth 2 times daily for 14 days 4/11/19 4/25/19 Yes Hamzah Akers MD   metroNIDAZOLE (FLAGYL) 500 MG tablet Take 1 tablet by mouth 2 times daily for 14 days Take with Food. Do NOT drink alcohol.  4/11/19 4/25/19 Yes Hamzah Akers MD   ketorolac (TORADOL) 10 MG tablet Take 1 tablet by mouth every 6 hours as needed for Pain 4/11/19 Yes oJb Lugo MD   dicyclomine (BENTYL) 10 MG capsule Take 1 capsule by mouth every 6 hours as needed (cramps) 4/11/19  Yes Job Lugo MD   metoprolol tartrate (LOPRESSOR) 50 MG tablet Take 1 tablet by mouth 2 times daily 3/26/19   Sangeetha Nixon MD   senna-docusate (PERICOLACE) 8.6-50 MG per tablet Take 2 tablets by mouth daily as needed for Constipation 3/26/19   Chapin Randle MD   omeprazole (PRILOSEC) 20 MG delayed release capsule TAKE ONE CAPSULE BY MOUTH DAILY 2/22/19   Sangeetha Nixon MD   loratadine (CLARITIN) 10 MG tablet TAKE ONE TABLET BY MOUTH DAILY 2/22/19   Sangeetha Nixon MD   FLUoxetine (PROZAC) 20 MG capsule TAKE ONE CAPSULE BY MOUTH DAILY 2/22/19   Sangeetha Nixon MD   ibuprofen (ADVIL;MOTRIN) 400 MG tablet Take 1 tablet by mouth every 8 hours as needed for Pain 1/9/19 1/13/19  Rao Oglesby MD   lidocaine (XYLOCAINE) 5 % ointment Apply topically as needed.  1/9/19   Rao Oglesby MD   nicotine polacrilex (NICORETTE) 2 MG gum Take 1 each by mouth every 2 hours as needed for Smoking cessation 1/9/19   Rao Oglesby MD   tiotropium (Carolynne Croissant) 18 MCG inhalation capsule Inhale 1 capsule into the lungs daily 1/5/19   Lin Montiel DO   fluticasone (FLOVENT HFA) 110 MCG/ACT inhaler Inhale 2 puffs into the lungs 2 times daily 1/4/19   Lin Montiel DO   lisinopril-hydrochlorothiazide (PRINZIDE;ZESTORETIC) 10-12.5 MG per tablet Take 1 tablet by mouth daily 12/18/18   Sangeetha Nixon MD   aspirin EC 81 MG EC tablet Take 1 tablet by mouth daily 10/24/18   Femi Mckee MD   Spacer/Aero-Holding Chambers (E-Z SPACER) RAJESH 1 Device by Does not apply route daily as needed (difficulty breathing) 12/10/17   Fer Dowd,    LORazepam (ATIVAN) 1 MG tablet Take 1 tablet by mouth every 8 hours as needed for Anxiety 10/2/17   Sangeetha Nixon MD   albuterol sulfate HFA (VENTOLIN HFA) 108 (90 BASE) MCG/ACT inhaler Inhale 2 puffs into the lungs every 6 hours as needed for Lymph # 3.08 1.10 - 3.70 k/uL    Absolute Mono # 1.22 (H) 0.10 - 1.20 k/uL    Absolute Eos # 0.12 0.00 - 0.44 k/uL    Basophils # 0.06 0.00 - 0.20 k/uL    Absolute Immature Granulocyte 0.04 0.00 - 0.30 k/uL    WBC Morphology NOT REPORTED     RBC Morphology NOT REPORTED     Platelet Estimate NOT REPORTED    Basic Metabolic Panel   Result Value Ref Range    Glucose 103 (H) 70 - 99 mg/dL    BUN 21 (H) 6 - 20 mg/dL    CREATININE 0.87 0.50 - 0.90 mg/dL    Bun/Cre Ratio NOT REPORTED 9 - 20    Calcium 9.3 8.6 - 10.4 mg/dL    Sodium 139 135 - 144 mmol/L    Potassium 4.0 3.7 - 5.3 mmol/L    Chloride 100 98 - 107 mmol/L    CO2 27 20 - 31 mmol/L    Anion Gap 12 9 - 17 mmol/L    GFR Non-African American >60 >60 mL/min    GFR African American >60 >60 mL/min    GFR Comment          GFR Staging NOT REPORTED    Hepatic Function Panel   Result Value Ref Range    Alb 4.4 3.5 - 5.2 g/dL    Alkaline Phosphatase 117 (H) 35 - 104 U/L    ALT 14 5 - 33 U/L    AST 20 <32 U/L    Total Bilirubin 0.18 (L) 0.3 - 1.2 mg/dL    Bilirubin, Direct <0.08 <0.31 mg/dL    Bilirubin, Indirect CANNOT BE CALCULATED 0.00 - 1.00 mg/dL    Total Protein 7.5 6.4 - 8.3 g/dL    Globulin NOT REPORTED 1.5 - 3.8 g/dL    Albumin/Globulin Ratio 1.4 1.0 - 2.5   Lipase   Result Value Ref Range    Lipase 86 (H) 13 - 60 U/L   Urinalysis Reflex to Culture   Result Value Ref Range    Color, UA YELLOW YELLOW    Turbidity UA CLEAR CLEAR    Glucose, Ur NEGATIVE NEGATIVE    Bilirubin Urine NEGATIVE NEGATIVE    Ketones, Urine TRACE (A) NEGATIVE    Specific Gravity, UA 1.023 1.005 - 1.030    Urine Hgb NEGATIVE NEGATIVE    pH, UA 5.0 5.0 - 8.0    Protein, UA NEGATIVE NEGATIVE    Urobilinogen, Urine Normal Normal    Nitrite, Urine NEGATIVE NEGATIVE    Leukocyte Esterase, Urine TRACE (A) NEGATIVE    Urinalysis Comments NOT REPORTED    Microscopic Urinalysis   Result Value Ref Range    -          WBC, UA 2 TO 5 0 - 5 /HPF    RBC, UA 0 TO 2 0 - 4 /HPF    Casts UA  0 - 8 /LPF     0 TO 2 HYALINE Reference range defined for non-centrifuged specimen. Crystals UA NOT REPORTED None /HPF    Epithelial Cells UA 2 TO 5 0 - 5 /HPF    Renal Epithelial, Urine NOT REPORTED 0 /HPF    Bacteria, UA NOT REPORTED None    Mucus, UA NOT REPORTED None    Trichomonas, UA NOT REPORTED None    Amorphous, UA NOT REPORTED None    Other Observations UA NOT REPORTED NOT REQ. Yeast, UA NOT REPORTED None       RADIOLOGY:  Ct Abdomen Pelvis W Iv Contrast Additional Contrast? None    Result Date: 4/11/2019  EXAMINATION: CT OF THE ABDOMEN AND PELVIS WITH CONTRAST 4/11/2019 5:13 pm TECHNIQUE: CT of the abdomen and pelvis was performed with the administration of intravenous contrast. Multiplanar reformatted images are provided for review. Dose modulation, iterative reconstruction, and/or weight based adjustment of the mA/kV was utilized to reduce the radiation dose to as low as reasonably achievable. COMPARISON: None. HISTORY: ORDERING SYSTEM PROVIDED HISTORY: kian PITTMAN abd pain TECHNOLOGIST PROVIDED HISTORY: FINDINGS: Lower Chest: Lung bases are clear. Organs: No liver, splenic or pancreatic lesion is noted. No gallstones are noted. Slight fullness of the inferior left adrenal gland is noted without discrete lesion. No hydronephrosis or renal mass noted. GI/Bowel: Wall thickening in the cecum and proximal ascending colon is noted with inflammatory change in the adjacent fat. Minimal multifocal small-bowel distention is noted. There is no pneumatosis Pelvis: Incomplete distention of the sigmoid colon is noted. Wall thickening in the cecum and proximal ascending colon is noted with stranding in the adjacent fat. Appendix is normal.  Small lymph nodes near the cecum are noted. A few fluid-filled mildly distended small bowel loops in the pelvis are noted. Multiple small pelvic lymph nodes are noted without enlargement Peritoneum/Retroperitoneum: Vascular calcifications are noted. There is no aneurysm of the aorta. Several small lymph nodes are noted. There is no pathologic enlargement. Bones/Soft Tissues: No destructive bone lesions are noted. Canal detail     Wall thickening and inflammatory change are noted involving the proximal ascending colon and superior margin of the cecum. This is likely due to colitis. No acute abnormality otherwise     Xr Shoulder Left (min 2 Views)    Result Date: 4/11/2019  EXAMINATION: 3 XRAY VIEWS OF THE LEFT SHOULDER 4/11/2019 5:36 pm COMPARISON: None. HISTORY: ORDERING SYSTEM PROVIDED HISTORY: pain s/p shoulder strain TECHNOLOGIST PROVIDED HISTORY: pain s/p shoulder strain FINDINGS: There is no acute fracture or dislocation of the left shoulder. There is question of high-riding of the left humeral head, may be related to rotator cuff injury. The left glenohumeral joint and left AC joint are otherwise intact. The visualized left hemithorax is clear. The soft tissue is within normal limits. No radiopaque foreign body. No acute fracture or dislocation of the left shoulder. Question of high-riding of the left humeral head, may be related to rotator cuff injury. EMERGENCY DEPARTMENT COURSE:    MDM: Patient found to have colitis. Stated that she started feeling onset of fever yesterday. Advised to start antibiotics if symptoms do not improve. Discharged on anti-inflammatories. Advise follow-up with primary care physician within a week. Advised return if symptoms worsen. We'll also give follow-up for acute on chronic left shoulder pain. Patient is agreeable discharge plan. PROCEDURES:  None    CONSULTS:  None    FINAL IMPRESSION      1.  Colitis          DISPOSITION / PLAN     DISPOSITION  discharged      PATIENT REFERRED TO:  [facility]  [address] [address]  [phone]    If symptoms worsen    Phylicia Ferro MD  [address] [place] [address] 909 [phone]    Schedule an appointment as soon as possible for a visit in 1 day  For after emergency department follow up. Maurice Dobbins MD  [address] [place], [place] [address] [address]  [phone]    Schedule an appointment as soon as possible for a visit in 1 day  With orthopedic surgeon for follow up of shoulder. DISCHARGE MEDICATIONS:  Discharge Medication List as of 4/11/2019  6:25 PM      START taking these medications    Details   ciprofloxacin (CIPRO) 500 MG tablet Take 1 tablet by mouth 2 times daily for 14 days, Disp-28 tablet, R-0Print      metroNIDAZOLE (FLAGYL) 500 MG tablet Take 1 tablet by mouth 2 times daily for 14 days Take with Food.  Do NOT drink alcohol., Disp-28 tablet, R-0Print      ketorolac (TORADOL) 10 MG tablet Take 1 tablet by mouth every 6 hours as needed for Pain, Disp-30 tablet, R-0Print             Cristy Broussard MD  Emergency Medicine Resident  [facility]    (Please note that portions of this note were completed with a voice recognition program.  Efforts were made to edit thedictations but occasionally words are mis-transcribed.)       Cristy Broussard MD  Resident  04/11/19 [id number]

## 2019-04-11 NOTE — ED NOTES
Discharge to home. Med given for shoulder pain and unchanged at an 9.   Scripts to patient for bentyl, cipro, flagyl and toradol     Tania Curling, RN  04/11/19 [id number]

## 2019-04-12 ENCOUNTER — CARE COORDINATION (OUTPATIENT)
Dept: CARE COORDINATION | Age: 54
End: 2019-04-12

## 2019-04-12 LAB
CULTURE: NORMAL
Lab: NORMAL
SPECIMEN DESCRIPTION: NORMAL

## 2019-04-12 NOTE — CARE COORDINATION
Ambulatory Care Coordination ED Follow up Call       Reason for ED Visit:  colitis  Care Management Risk Score: CMRS 6  How are you feeling? :     not changed  Patient Reports the following:  Pt states she has abdominal pain. She is taking pain medication, and was instructed by ER staff to take antibiotic if she was to get a fever and or diarrhea. Pt was advised to follow up with PCP if not better. Contact [facility] regarding any worsening symptoms from above. Did you call your PCP prior to going to the ED? No          Post Discharge Status:  What health concerns since you left the Emergency Room?  n/a    Do you have wounds that you are caring for at home? No    Do you have a follow up appt scheduled? no     Review of Instructions:                                 Do you have any questions regarding your discharge instructions?:  No  Medications:    What questions do you have about your medications? No  Are you taking your medications as directed? If not - why? Yes   Can you afford your medications? yes  ADLS:  Do you need assistance of any kind at home? N/A   What assistance is needed?  n/a      FU appts/Provider:    No future appointments. Health Maintenance Due   Topic Date Due    Shingles Vaccine (1 of 2) 08/25/2017     Patient advised to contact PCP office to have HM items/records faxed to PCP Office directly?   N/A

## 2019-04-12 NOTE — ED PROVIDER NOTES
[facility]  Emergency Department  Faculty Attestation     I performed a history and physical examination of the patient and discussed management with the resident. I reviewed the residents note and agree with the documented findings and plan of care. Any areas of disagreement are noted on the chart. I was personally present for the key portions of any procedures. I have documented in the chart those procedures where I was not present during the key portions. I have reviewed the emergency nurses triage note. I agree with the chief complaint, past medical history, past surgical history, allergies, medications, social and family history as documented unless otherwise noted below. For Physician Assistant/ Nurse Practitioner cases/documentation I have personally evaluated this patient and have completed at least one if not all key elements of the E/M (history, physical exam, and MDM). Additional findings are as noted.       Primary Care Physician:  Itzel Carmona MD    Screenings:  [unfilled]    CHIEF COMPLAINT       Chief Complaint   Patient presents with    Abdominal Pain     since last night       RECENT VITALS:   Temp: 98.6 °F (37 °C),  Pulse: 77, Resp: 19, BP: 118/71    LABS:  Labs Reviewed   CBC WITH AUTO DIFFERENTIAL - Abnormal; Notable for the following components:       Result Value    WBC 14.4 (*)     RBC 5.36 (*)     Seg Neutrophils 69 (*)     Lymphocytes 21 (*)     Segs Absolute 9.85 (*)     Absolute Mono # 1.22 (*)     All other components within normal limits   BASIC METABOLIC PANEL - Abnormal; Notable for the following components:    Glucose 103 (*)     BUN 21 (*)     All other components within normal limits   HEPATIC FUNCTION PANEL - Abnormal; Notable for the following components:    Alkaline Phosphatase 117 (*)     Total Bilirubin 0.18 (*)     All other components within normal limits   LIPASE - Abnormal; Notable for the following components:    Lipase 86 (*) All other components within normal limits   URINE RT REFLEX TO CULTURE - Abnormal; Notable for the following components:    Ketones, Urine TRACE (*)     Leukocyte Esterase, Urine TRACE (*)     All other components within normal limits   URINE CULTURE CLEAN CATCH   MICROSCOPIC URINALYSIS       Radiology  XR SHOULDER LEFT (MIN 2 VIEWS)   Final Result   No acute fracture or dislocation of the left shoulder. Question of high-riding of the left humeral head, may be related to rotator   cuff injury. CT ABDOMEN PELVIS W IV CONTRAST Additional Contrast? None   Final Result   Wall thickening and inflammatory change are noted involving the proximal   ascending colon and superior margin of the cecum. This is likely due to   colitis. No acute abnormality otherwise             Attending Physician Additional  Notes    RLQ pain, low grade fever few days, no nausea, anorexia. No UTI symptoms   Had colonoscopy with polyp biopsy last month, no travel or antibiotics, no IBD. Afebrile, negative SIRS vitals, RLQ tender and guarding. WBC elevated. CT shows colitis. Plan is cipro, flagyl, bentyl, tylenol, early followup. Jay Abdullahi.  Nichole Estrada MD, [facility]  Attending Emergency  Physician                Agnieszka Perez MD  04/11/19 2036

## 2019-06-29 DIAGNOSIS — I10 ESSENTIAL HYPERTENSION: Chronic | ICD-10-CM

## 2019-07-03 RX ORDER — LORAZEPAM 1 MG/1
1 TABLET ORAL EVERY 8 HOURS PRN
Qty: 20 TABLET | Refills: 0 | OUTPATIENT
Start: 2019-07-03

## 2019-07-03 RX ORDER — METOPROLOL TARTRATE 50 MG/1
50 TABLET, FILM COATED ORAL 2 TIMES DAILY
Qty: 60 TABLET | Refills: 3 | Status: SHIPPED | OUTPATIENT
Start: 2019-07-03 | End: 2020-01-07 | Stop reason: SDUPTHER

## 2019-07-03 RX ORDER — METOPROLOL TARTRATE 50 MG/1
50 TABLET, FILM COATED ORAL 2 TIMES DAILY
Qty: 60 TABLET | Refills: 3 | Status: SHIPPED | OUTPATIENT
Start: 2019-07-03 | End: 2019-07-03 | Stop reason: ALTCHOICE

## 2019-07-03 RX ORDER — LISINOPRIL AND HYDROCHLOROTHIAZIDE 12.5; 1 MG/1; MG/1
TABLET ORAL
Qty: 30 TABLET | Refills: 0 | Status: SHIPPED | OUTPATIENT
Start: 2019-07-03 | End: 2019-07-03

## 2019-07-03 RX ORDER — LISINOPRIL AND HYDROCHLOROTHIAZIDE 12.5; 1 MG/1; MG/1
1 TABLET ORAL DAILY
Qty: 90 TABLET | Refills: 1 | Status: SHIPPED | OUTPATIENT
Start: 2019-07-03 | End: 2020-01-06 | Stop reason: SDUPTHER

## 2019-07-25 ENCOUNTER — OFFICE VISIT (OUTPATIENT)
Dept: INTERNAL MEDICINE | Age: 54
End: 2019-07-25
Payer: COMMERCIAL

## 2019-07-25 ENCOUNTER — HOSPITAL ENCOUNTER (OUTPATIENT)
Age: 54
Setting detail: SPECIMEN
Discharge: HOME | End: 2019-07-25
Payer: COMMERCIAL

## 2019-07-25 VITALS
WEIGHT: 134 LBS | DIASTOLIC BLOOD PRESSURE: 72 MMHG | SYSTOLIC BLOOD PRESSURE: 109 MMHG | BODY MASS INDEX: 25.32 KG/M2 | HEART RATE: 67 BPM

## 2019-07-25 DIAGNOSIS — R20.2 NUMBNESS AND TINGLING IN BOTH HANDS: ICD-10-CM

## 2019-07-25 DIAGNOSIS — M79.641 PAIN IN BOTH HANDS: ICD-10-CM

## 2019-07-25 DIAGNOSIS — M79.642 PAIN IN BOTH HANDS: ICD-10-CM

## 2019-07-25 DIAGNOSIS — R73.02 IGT (IMPAIRED GLUCOSE TOLERANCE): ICD-10-CM

## 2019-07-25 DIAGNOSIS — R20.0 NUMBNESS AND TINGLING IN BOTH HANDS: ICD-10-CM

## 2019-07-25 DIAGNOSIS — M79.642 PAIN IN BOTH HANDS: Primary | ICD-10-CM

## 2019-07-25 DIAGNOSIS — M79.641 PAIN IN BOTH HANDS: Primary | ICD-10-CM

## 2019-07-25 LAB
ABSOLUTE EOS #: 0.15 K/UL (ref 0–0.44)
ABSOLUTE IMMATURE GRANULOCYTE: 0.03 K/UL (ref 0–0.3)
ABSOLUTE LYMPH #: 2.84 K/UL (ref 1.1–3.7)
ABSOLUTE MONO #: 0.78 K/UL (ref 0.1–1.2)
BASOPHILS # BLD: 1 % (ref 0–2)
BASOPHILS ABSOLUTE: 0.07 K/UL (ref 0–0.2)
DIFFERENTIAL TYPE: ABNORMAL
EOSINOPHILS RELATIVE PERCENT: 2 % (ref 1–4)
ESTIMATED AVERAGE GLUCOSE: 137 MG/DL
FOLATE: 6.6 NG/ML
HBA1C MFR BLD: 6.4 % (ref 4–6)
HCT VFR BLD CALC: 47.7 % (ref 36.3–47.1)
HEMOGLOBIN: 15.2 G/DL (ref 11.9–15.1)
IMMATURE GRANULOCYTES: 0 %
LYMPHOCYTES # BLD: 33 % (ref 24–43)
MCH RBC QN AUTO: 26.8 PG (ref 25.2–33.5)
MCHC RBC AUTO-ENTMCNC: 31.9 G/DL (ref 28.4–34.8)
MCV RBC AUTO: 84 FL (ref 82.6–102.9)
MONOCYTES # BLD: 9 % (ref 3–12)
NRBC AUTOMATED: 0 PER 100 WBC
PDW BLD-RTO: 13.7 % (ref 11.8–14.4)
PLATELET # BLD: 345 K/UL (ref 138–453)
PLATELET ESTIMATE: ABNORMAL
PMV BLD AUTO: 10.9 FL (ref 8.1–13.5)
RBC # BLD: 5.68 M/UL (ref 3.95–5.11)
RBC # BLD: ABNORMAL 10*6/UL
RHEUMATOID FACTOR: <10 IU/ML
SEG NEUTROPHILS: 55 % (ref 36–65)
SEGMENTED NEUTROPHILS ABSOLUTE COUNT: 4.88 K/UL (ref 1.5–8.1)
TSH SERPL DL<=0.05 MIU/L-ACNC: 4.28 MIU/L (ref 0.3–5)
VITAMIN B-12: 525 PG/ML (ref 232–1245)
WBC # BLD: 8.8 K/UL (ref 3.5–11.3)
WBC # BLD: ABNORMAL 10*3/UL

## 2019-07-25 PROCEDURE — 83036 HEMOGLOBIN GLYCOSYLATED A1C: CPT

## 2019-07-25 PROCEDURE — 82607 VITAMIN B-12: CPT

## 2019-07-25 PROCEDURE — 84443 ASSAY THYROID STIM HORMONE: CPT

## 2019-07-25 PROCEDURE — 36415 COLL VENOUS BLD VENIPUNCTURE: CPT

## 2019-07-25 PROCEDURE — 85025 COMPLETE CBC W/AUTO DIFF WBC: CPT

## 2019-07-25 PROCEDURE — 99213 OFFICE O/P EST LOW 20 MIN: CPT | Performed by: INTERNAL MEDICINE

## 2019-07-25 PROCEDURE — 86431 RHEUMATOID FACTOR QUANT: CPT

## 2019-07-25 PROCEDURE — 82746 ASSAY OF FOLIC ACID SERUM: CPT

## 2019-07-25 PROCEDURE — 99211 OFF/OP EST MAY X REQ PHY/QHP: CPT | Performed by: INTERNAL MEDICINE

## 2019-07-25 RX ORDER — GABAPENTIN 100 MG/1
100 CAPSULE ORAL 2 TIMES DAILY
Qty: 60 CAPSULE | Refills: 1 | Status: SHIPPED | OUTPATIENT
Start: 2019-07-25 | End: 2019-07-25 | Stop reason: SDUPTHER

## 2019-07-25 RX ORDER — GABAPENTIN 100 MG/1
100 CAPSULE ORAL 2 TIMES DAILY
Qty: 60 CAPSULE | Refills: 1 | Status: ON HOLD | OUTPATIENT
Start: 2019-07-25 | End: 2019-09-18 | Stop reason: SINTOL

## 2019-07-25 RX ORDER — MELOXICAM 15 MG/1
15 TABLET ORAL DAILY
Qty: 30 TABLET | Refills: 3 | Status: SHIPPED | OUTPATIENT
Start: 2019-07-25 | End: 2019-08-15

## 2019-07-25 RX ORDER — MELOXICAM 15 MG/1
15 TABLET ORAL DAILY
Qty: 30 TABLET | Refills: 3 | Status: SHIPPED | OUTPATIENT
Start: 2019-07-25 | End: 2019-07-25 | Stop reason: SDUPTHER

## 2019-07-25 ASSESSMENT — ENCOUNTER SYMPTOMS
COLOR CHANGE: 0
WHEEZING: 0
SHORTNESS OF BREATH: 0
COUGH: 0
PHOTOPHOBIA: 0

## 2019-07-25 NOTE — LETTER
[address]  [address] [address]  Phone: [phone]  Fax: [phone]    Petrona Dyer MD        July 25, 2019     Patient: Iliana Barr   YOB: 1965   Date of Visit: 7/25/2019       To Whom it May Concern:    Iliana Barr was seen in my clinic on 7/25/2019. She may return to work on 07/25/2019. She missed work yesterday. If you have any questions or concerns, please don't hesitate to call.     Sincerely,         Petrona Dyer MD

## 2019-08-05 RX ORDER — LORATADINE 10 MG/1
TABLET ORAL
Qty: 30 TABLET | Refills: 5 | Status: SHIPPED | OUTPATIENT
Start: 2019-08-05 | End: 2019-11-27

## 2019-08-05 NOTE — TELEPHONE ENCOUNTER
Refill request for Loratidine. If appropriate please send medication(s) to patients pharmacy.     Next appt: 01/27/2020    Health Maintenance   Topic Date Due    Shingles Vaccine (1 of 2) 08/25/2017    Cervical cancer screen  10/20/2017    Breast cancer screen  08/01/2019    Flu vaccine (1) 09/01/2019    Potassium monitoring  04/11/2020    Creatinine monitoring  04/11/2020    A1C test (Diabetic or Prediabetic)  07/25/2020    Lipid screen  04/25/2021    DTaP/Tdap/Td vaccine (2 - Td) 10/20/2024    Colon cancer screen colonoscopy  02/26/2029    Pneumococcal 0-64 years Vaccine  Completed    HIV screen  Completed       Hemoglobin A1C (%)   Date Value   07/25/2019 6.4 (H)   11/29/2018 6.1   06/29/2017 5.9             ( goal A1C is < 7)   No results found for: LABMICR  LDL Cholesterol (mg/dL)   Date Value   04/25/2016 123       (goal LDL is <100)   AST (U/L)   Date Value   04/11/2019 20     ALT (U/L)   Date Value   04/11/2019 14     BUN (mg/dL)   Date Value   04/11/2019 21 (H)     BP Readings from Last 3 Encounters:   07/25/19 109/72   04/11/19 118/71   03/26/19 138/82          (goal 120/80)          Patient Active Problem List:     HTN (hypertension)     COPD (chronic obstructive pulmonary disease)     Hyperlipidemia     Near syncope     Anxiety     Tobacco abuse     Vesicular rash     Rash of hands     S/P LEEP     Gastroesophageal reflux disease without esophagitis     Trigger finger of right thumb     Trigger finger, right middle finger     Occipital headache     Pre-diabetes     Chest pain     Positive FIT (fecal immunochemical test)

## 2019-08-13 ENCOUNTER — PATIENT MESSAGE (OUTPATIENT)
Dept: INTERNAL MEDICINE | Age: 54
End: 2019-08-13

## 2019-08-14 NOTE — TELEPHONE ENCOUNTER
PC from pt . Patient requesting appt . Patient scheduled for 08/15/2019- with Dr Terrance Contreras.      Health Maintenance   Topic Date Due    Shingles Vaccine (1 of 2) 08/25/2017    Cervical cancer screen  10/20/2017    Breast cancer screen  08/01/2019    Flu vaccine (1) 09/01/2019    Potassium monitoring  04/11/2020    Creatinine monitoring  04/11/2020    A1C test (Diabetic or Prediabetic)  07/25/2020    Lipid screen  04/25/2021    DTaP/Tdap/Td vaccine (2 - Td) 10/20/2024    Colon cancer screen colonoscopy  02/26/2029    Pneumococcal 0-64 years Vaccine  Completed    HIV screen  Completed             (applicable per patient's age: Cancer Screenings, Depression Screening, Fall Risk Screening, Immunizations)    Hemoglobin A1C (%)   Date Value   07/25/2019 6.4 (H)   11/29/2018 6.1   06/29/2017 5.9     LDL Cholesterol (mg/dL)   Date Value   04/25/2016 123     AST (U/L)   Date Value   04/11/2019 20     ALT (U/L)   Date Value   04/11/2019 14     BUN (mg/dL)   Date Value   04/11/2019 21 (H)      (goal A1C is < 7)   (goal LDL is <100) need 30-50% reduction from baseline     BP Readings from Last 3 Encounters:   07/25/19 109/72   04/11/19 118/71   03/26/19 138/82    (goal /80)      All Future Testing planned in [facility]:  Lab Frequency Next Occurrence   EMG Once 09/30/2019   POCT activated clotting time         Next Visit Date:  Future Appointments   Date Time Provider Naheed Patel   8/15/2019 11:20 AM Sal Sexton MD [facility]   1/27/2020 11:00 AM Gillina Vázquez MD [facility]            Patient Active Problem List:     HTN (hypertension)     COPD (chronic obstructive pulmonary disease)     Hyperlipidemia     Near syncope     Anxiety     Tobacco abuse     Vesicular rash     Rash of hands     S/P LEEP     Gastroesophageal reflux disease without esophagitis     Trigger finger of right thumb     Trigger finger, right middle finger     Occipital headache     Pre-diabetes     Chest pain     Positive FIT

## 2019-08-15 ENCOUNTER — OFFICE VISIT (OUTPATIENT)
Dept: INTERNAL MEDICINE | Age: 54
End: 2019-08-15
Payer: COMMERCIAL

## 2019-08-15 VITALS
HEIGHT: 61 IN | WEIGHT: 135.4 LBS | BODY MASS INDEX: 25.57 KG/M2 | DIASTOLIC BLOOD PRESSURE: 73 MMHG | HEART RATE: 66 BPM | SYSTOLIC BLOOD PRESSURE: 124 MMHG

## 2019-08-15 DIAGNOSIS — F32.2 CURRENT SEVERE EPISODE OF MAJOR DEPRESSIVE DISORDER WITHOUT PSYCHOTIC FEATURES WITHOUT PRIOR EPISODE (HCC): ICD-10-CM

## 2019-08-15 DIAGNOSIS — Z12.31 ENCOUNTER FOR SCREENING MAMMOGRAM FOR BREAST CANCER: Primary | ICD-10-CM

## 2019-08-15 PROCEDURE — 99211 OFF/OP EST MAY X REQ PHY/QHP: CPT | Performed by: INTERNAL MEDICINE

## 2019-08-15 PROCEDURE — G0444 DEPRESSION SCREEN ANNUAL: HCPCS | Performed by: INTERNAL MEDICINE

## 2019-08-15 PROCEDURE — 99214 OFFICE O/P EST MOD 30 MIN: CPT | Performed by: INTERNAL MEDICINE

## 2019-08-15 RX ORDER — FLUOXETINE HYDROCHLORIDE 20 MG/1
CAPSULE ORAL
Qty: 30 CAPSULE | Refills: 5 | Status: SHIPPED | OUTPATIENT
Start: 2019-08-15 | End: 2019-09-04 | Stop reason: SDUPTHER

## 2019-08-15 RX ORDER — LORAZEPAM 1 MG/1
1 TABLET ORAL EVERY 8 HOURS PRN
Qty: 40 TABLET | Refills: 0 | Status: SHIPPED | OUTPATIENT
Start: 2019-08-15 | End: 2019-09-14

## 2019-08-15 ASSESSMENT — PATIENT HEALTH QUESTIONNAIRE - PHQ9
7. TROUBLE CONCENTRATING ON THINGS, SUCH AS READING THE NEWSPAPER OR WATCHING TELEVISION: 3
3. TROUBLE FALLING OR STAYING ASLEEP: 3
SUM OF ALL RESPONSES TO PHQ QUESTIONS 1-9: 23
1. LITTLE INTEREST OR PLEASURE IN DOING THINGS: 3
8. MOVING OR SPEAKING SO SLOWLY THAT OTHER PEOPLE COULD HAVE NOTICED. OR THE OPPOSITE, BEING SO FIGETY OR RESTLESS THAT YOU HAVE BEEN MOVING AROUND A LOT MORE THAN USUAL: 2
SUM OF ALL RESPONSES TO PHQ9 QUESTIONS 1 & 2: 6
4. FEELING TIRED OR HAVING LITTLE ENERGY: 3
5. POOR APPETITE OR OVEREATING: 3
10. IF YOU CHECKED OFF ANY PROBLEMS, HOW DIFFICULT HAVE THESE PROBLEMS MADE IT FOR YOU TO DO YOUR WORK, TAKE CARE OF THINGS AT HOME, OR GET ALONG WITH OTHER PEOPLE: 2
9. THOUGHTS THAT YOU WOULD BE BETTER OFF DEAD, OR OF HURTING YOURSELF: 0
SUM OF ALL RESPONSES TO PHQ QUESTIONS 1-9: 23
2. FEELING DOWN, DEPRESSED OR HOPELESS: 3
6. FEELING BAD ABOUT YOURSELF - OR THAT YOU ARE A FAILURE OR HAVE LET YOURSELF OR YOUR FAMILY DOWN: 3

## 2019-08-15 NOTE — PROGRESS NOTES
day for 1 week then increase to 40 mg oral bid. Dispense: 30 capsule; Refill: 5  - Rachel Pascual Ed.D., Psychology, [place]    Psych referral     behavioral therapy         Follow up Instructions:    1. No follow-ups on file. 2. Reviewed prior labs and health maintenance. 3. Discussed use, benefit, and side effects of prescribed medications. Barriers to medication compliance addressed. All patient questions answered. Pt voiced understanding.      4. Patient given educational materials - see patient instructions      Quyen Hancock MD  Attending Physician, [address] [place] [address], Internal Medicine Residency Program  [address] [place] [address]  8/15/2019, 4:02 PM

## 2019-09-04 DIAGNOSIS — F32.2 CURRENT SEVERE EPISODE OF MAJOR DEPRESSIVE DISORDER WITHOUT PSYCHOTIC FEATURES WITHOUT PRIOR EPISODE (HCC): ICD-10-CM

## 2019-09-06 NOTE — TELEPHONE ENCOUNTER
Refill request for Fluoxetine. If appropriate please send medication(s) to patients pharmacy.     Next appt: 01/27/2020    Health Maintenance   Topic Date Due    Shingles Vaccine (1 of 2) 08/25/2017    Cervical cancer screen  10/20/2017    Breast cancer screen  08/01/2019    Flu vaccine (1) 09/01/2019    Potassium monitoring  04/11/2020    Creatinine monitoring  04/11/2020    A1C test (Diabetic or Prediabetic)  07/25/2020    Lipid screen  04/25/2021    DTaP/Tdap/Td vaccine (2 - Td) 10/20/2024    Colon cancer screen colonoscopy  02/26/2029    Pneumococcal 0-64 years Vaccine  Completed    HIV screen  Completed       Hemoglobin A1C (%)   Date Value   07/25/2019 6.4 (H)   11/29/2018 6.1   06/29/2017 5.9             ( goal A1C is < 7)   No results found for: LABMICR  LDL Cholesterol (mg/dL)   Date Value   04/25/2016 123       (goal LDL is <100)   AST (U/L)   Date Value   04/11/2019 20     ALT (U/L)   Date Value   04/11/2019 14     BUN (mg/dL)   Date Value   04/11/2019 21 (H)     BP Readings from Last 3 Encounters:   08/15/19 124/73   07/25/19 109/72   04/11/19 118/71          (goal 120/80)          Patient Active Problem List:     HTN (hypertension)     COPD (chronic obstructive pulmonary disease)     Hyperlipidemia     Near syncope     Anxiety     Tobacco abuse     Vesicular rash     Rash of hands     S/P LEEP     Gastroesophageal reflux disease without esophagitis     Trigger finger of right thumb     Trigger finger, right middle finger     Occipital headache     Pre-diabetes     Chest pain     Positive FIT (fecal immunochemical test)

## 2019-09-09 RX ORDER — FLUOXETINE HYDROCHLORIDE 20 MG/1
CAPSULE ORAL
Qty: 30 CAPSULE | Refills: 5 | Status: SHIPPED | OUTPATIENT
Start: 2019-09-09 | End: 2019-11-07 | Stop reason: SDUPTHER

## 2019-09-18 ENCOUNTER — APPOINTMENT (OUTPATIENT)
Dept: ULTRASOUND IMAGING | Age: 54
DRG: 418 | End: 2019-09-18
Payer: COMMERCIAL

## 2019-09-18 ENCOUNTER — HOSPITAL ENCOUNTER (INPATIENT)
Age: 54
LOS: 6 days | Discharge: HOME | DRG: 418 | End: 2019-09-24
Attending: EMERGENCY MEDICINE | Admitting: EMERGENCY MEDICINE
Payer: COMMERCIAL

## 2019-09-18 ENCOUNTER — APPOINTMENT (OUTPATIENT)
Dept: CT IMAGING | Age: 54
DRG: 418 | End: 2019-09-18
Payer: COMMERCIAL

## 2019-09-18 DIAGNOSIS — G89.18 POST-OP PAIN: ICD-10-CM

## 2019-09-18 DIAGNOSIS — K85.90 ACUTE PANCREATITIS WITHOUT INFECTION OR NECROSIS, UNSPECIFIED PANCREATITIS TYPE: Primary | ICD-10-CM

## 2019-09-18 LAB
-: NORMAL
ABSOLUTE EOS #: 0.13 K/UL (ref 0–0.44)
ABSOLUTE IMMATURE GRANULOCYTE: 0.05 K/UL (ref 0–0.3)
ABSOLUTE LYMPH #: 3.55 K/UL (ref 1.1–3.7)
ABSOLUTE MONO #: 0.86 K/UL (ref 0.1–1.2)
ALBUMIN SERPL-MCNC: 4.4 G/DL (ref 3.5–5.2)
ALBUMIN/GLOBULIN RATIO: 1.3 (ref 1–2.5)
ALP BLD-CCNC: 117 U/L (ref 35–104)
ALT SERPL-CCNC: 19 U/L (ref 5–33)
AMORPHOUS: NORMAL
ANION GAP SERPL CALCULATED.3IONS-SCNC: 17 MMOL/L (ref 9–17)
AST SERPL-CCNC: 23 U/L
BACTERIA: NORMAL
BASOPHILS # BLD: 1 % (ref 0–2)
BASOPHILS ABSOLUTE: 0.07 K/UL (ref 0–0.2)
BILIRUB SERPL-MCNC: 0.24 MG/DL (ref 0.3–1.2)
BILIRUBIN DIRECT: <0.08 MG/DL
BILIRUBIN URINE: NEGATIVE
BILIRUBIN, INDIRECT: ABNORMAL MG/DL (ref 0–1)
BUN BLDV-MCNC: 29 MG/DL (ref 6–20)
BUN/CREAT BLD: ABNORMAL (ref 9–20)
CALCIUM SERPL-MCNC: 9.4 MG/DL (ref 8.6–10.4)
CASTS UA: NORMAL /LPF (ref 0–8)
CHLORIDE BLD-SCNC: 99 MMOL/L (ref 98–107)
CO2: 22 MMOL/L (ref 20–31)
COLOR: YELLOW
COMMENT UA: ABNORMAL
CREAT SERPL-MCNC: 1.37 MG/DL (ref 0.5–0.9)
CRYSTALS, UA: NORMAL /HPF
DIFFERENTIAL TYPE: ABNORMAL
EOSINOPHILS RELATIVE PERCENT: 1 % (ref 1–4)
EPITHELIAL CELLS UA: NORMAL /HPF (ref 0–5)
GFR AFRICAN AMERICAN: 49 ML/MIN
GFR NON-AFRICAN AMERICAN: 40 ML/MIN
GFR SERPL CREATININE-BSD FRML MDRD: ABNORMAL ML/MIN/{1.73_M2}
GFR SERPL CREATININE-BSD FRML MDRD: ABNORMAL ML/MIN/{1.73_M2}
GLOBULIN: ABNORMAL G/DL (ref 1.5–3.8)
GLUCOSE BLD-MCNC: 113 MG/DL (ref 70–99)
GLUCOSE URINE: NEGATIVE
HCT VFR BLD CALC: 40.1 % (ref 36.3–47.1)
HCT VFR BLD CALC: 46.2 % (ref 36.3–47.1)
HEMOGLOBIN: 13.2 G/DL (ref 11.9–15.1)
HEMOGLOBIN: 15.2 G/DL (ref 11.9–15.1)
IMMATURE GRANULOCYTES: 0 %
KETONES, URINE: NEGATIVE
LEUKOCYTE ESTERASE, URINE: ABNORMAL
LIPASE: 190 U/L (ref 13–60)
LYMPHOCYTES # BLD: 28 % (ref 24–43)
MCH RBC QN AUTO: 27.1 PG (ref 25.2–33.5)
MCHC RBC AUTO-ENTMCNC: 32.9 G/DL (ref 28.4–34.8)
MCV RBC AUTO: 82.5 FL (ref 82.6–102.9)
MONOCYTES # BLD: 7 % (ref 3–12)
MUCUS: NORMAL
NITRITE, URINE: NEGATIVE
NRBC AUTOMATED: 0 PER 100 WBC
OTHER OBSERVATIONS UA: NORMAL
PDW BLD-RTO: 13.5 % (ref 11.8–14.4)
PH UA: 5 (ref 5–8)
PLATELET # BLD: 321 K/UL (ref 138–453)
PLATELET ESTIMATE: ABNORMAL
PMV BLD AUTO: 10.7 FL (ref 8.1–13.5)
POTASSIUM SERPL-SCNC: 3.9 MMOL/L (ref 3.7–5.3)
PROTEIN UA: NEGATIVE
RBC # BLD: 5.6 M/UL (ref 3.95–5.11)
RBC # BLD: ABNORMAL 10*6/UL
RBC UA: NORMAL /HPF (ref 0–4)
RENAL EPITHELIAL, UA: NORMAL /HPF
SEG NEUTROPHILS: 63 % (ref 36–65)
SEGMENTED NEUTROPHILS ABSOLUTE COUNT: 7.84 K/UL (ref 1.5–8.1)
SODIUM BLD-SCNC: 138 MMOL/L (ref 135–144)
SPECIFIC GRAVITY UA: 1.01 (ref 1–1.03)
TOTAL PROTEIN: 7.9 G/DL (ref 6.4–8.3)
TRICHOMONAS: NORMAL
TURBIDITY: CLEAR
URINE HGB: NEGATIVE
UROBILINOGEN, URINE: NORMAL
WBC # BLD: 12.5 K/UL (ref 3.5–11.3)
WBC # BLD: ABNORMAL 10*3/UL
WBC UA: NORMAL /HPF (ref 0–5)
YEAST: NORMAL

## 2019-09-18 PROCEDURE — 83690 ASSAY OF LIPASE: CPT

## 2019-09-18 PROCEDURE — 6360000002 HC RX W HCPCS: Performed by: EMERGENCY MEDICINE

## 2019-09-18 PROCEDURE — 2580000003 HC RX 258: Performed by: STUDENT IN AN ORGANIZED HEALTH CARE EDUCATION/TRAINING PROGRAM

## 2019-09-18 PROCEDURE — 2580000003 HC RX 258: Performed by: EMERGENCY MEDICINE

## 2019-09-18 PROCEDURE — 74177 CT ABD & PELVIS W/CONTRAST: CPT

## 2019-09-18 PROCEDURE — 6370000000 HC RX 637 (ALT 250 FOR IP): Performed by: EMERGENCY MEDICINE

## 2019-09-18 PROCEDURE — 96375 TX/PRO/DX INJ NEW DRUG ADDON: CPT

## 2019-09-18 PROCEDURE — 85014 HEMATOCRIT: CPT

## 2019-09-18 PROCEDURE — 80076 HEPATIC FUNCTION PANEL: CPT

## 2019-09-18 PROCEDURE — 99285 EMERGENCY DEPT VISIT HI MDM: CPT

## 2019-09-18 PROCEDURE — 85018 HEMOGLOBIN: CPT

## 2019-09-18 PROCEDURE — 96374 THER/PROPH/DIAG INJ IV PUSH: CPT

## 2019-09-18 PROCEDURE — 36415 COLL VENOUS BLD VENIPUNCTURE: CPT

## 2019-09-18 PROCEDURE — 85025 COMPLETE CBC W/AUTO DIFF WBC: CPT

## 2019-09-18 PROCEDURE — 76705 ECHO EXAM OF ABDOMEN: CPT

## 2019-09-18 PROCEDURE — 1200000000 HC SEMI PRIVATE

## 2019-09-18 PROCEDURE — 81001 URINALYSIS AUTO W/SCOPE: CPT

## 2019-09-18 PROCEDURE — 6360000004 HC RX CONTRAST MEDICATION: Performed by: EMERGENCY MEDICINE

## 2019-09-18 PROCEDURE — 80048 BASIC METABOLIC PNL TOTAL CA: CPT

## 2019-09-18 RX ORDER — ONDANSETRON 2 MG/ML
4 INJECTION INTRAMUSCULAR; INTRAVENOUS EVERY 6 HOURS PRN
Status: DISCONTINUED | OUTPATIENT
Start: 2019-09-18 | End: 2019-09-24 | Stop reason: HOSPADM

## 2019-09-18 RX ORDER — FAMOTIDINE 20 MG/1
20 TABLET, FILM COATED ORAL DAILY
Status: DISCONTINUED | OUTPATIENT
Start: 2019-09-18 | End: 2019-09-24 | Stop reason: HOSPADM

## 2019-09-18 RX ORDER — MORPHINE SULFATE 2 MG/ML
2 INJECTION, SOLUTION INTRAMUSCULAR; INTRAVENOUS
Status: DISCONTINUED | OUTPATIENT
Start: 2019-09-18 | End: 2019-09-24

## 2019-09-18 RX ORDER — ONDANSETRON 2 MG/ML
4 INJECTION INTRAMUSCULAR; INTRAVENOUS ONCE
Status: COMPLETED | OUTPATIENT
Start: 2019-09-18 | End: 2019-09-18

## 2019-09-18 RX ORDER — SODIUM CHLORIDE 9 MG/ML
INJECTION, SOLUTION INTRAVENOUS CONTINUOUS
Status: DISCONTINUED | OUTPATIENT
Start: 2019-09-18 | End: 2019-09-24 | Stop reason: HOSPADM

## 2019-09-18 RX ORDER — ASPIRIN 81 MG/1
81 TABLET ORAL DAILY
Status: DISCONTINUED | OUTPATIENT
Start: 2019-09-18 | End: 2019-09-24 | Stop reason: HOSPADM

## 2019-09-18 RX ORDER — SODIUM CHLORIDE 9 MG/ML
INJECTION, SOLUTION INTRAVENOUS CONTINUOUS
Status: DISCONTINUED | OUTPATIENT
Start: 2019-09-18 | End: 2019-09-18

## 2019-09-18 RX ORDER — MORPHINE SULFATE 4 MG/ML
4 INJECTION, SOLUTION INTRAMUSCULAR; INTRAVENOUS ONCE
Status: COMPLETED | OUTPATIENT
Start: 2019-09-18 | End: 2019-09-18

## 2019-09-18 RX ORDER — LISINOPRIL AND HYDROCHLOROTHIAZIDE 12.5; 1 MG/1; MG/1
1 TABLET ORAL DAILY
Status: DISCONTINUED | OUTPATIENT
Start: 2019-09-18 | End: 2019-09-24 | Stop reason: HOSPADM

## 2019-09-18 RX ORDER — SODIUM CHLORIDE 0.9 % (FLUSH) 0.9 %
10 SYRINGE (ML) INJECTION PRN
Status: DISCONTINUED | OUTPATIENT
Start: 2019-09-18 | End: 2019-09-24 | Stop reason: HOSPADM

## 2019-09-18 RX ORDER — SODIUM CHLORIDE 0.9 % (FLUSH) 0.9 %
10 SYRINGE (ML) INJECTION EVERY 12 HOURS SCHEDULED
Status: DISCONTINUED | OUTPATIENT
Start: 2019-09-18 | End: 2019-09-24 | Stop reason: HOSPADM

## 2019-09-18 RX ORDER — 0.9 % SODIUM CHLORIDE 0.9 %
1000 INTRAVENOUS SOLUTION INTRAVENOUS ONCE
Status: COMPLETED | OUTPATIENT
Start: 2019-09-18 | End: 2019-09-18

## 2019-09-18 RX ORDER — FLUOXETINE HYDROCHLORIDE 20 MG/1
20 CAPSULE ORAL DAILY
Status: DISCONTINUED | OUTPATIENT
Start: 2019-09-18 | End: 2019-09-24 | Stop reason: HOSPADM

## 2019-09-18 RX ORDER — METOPROLOL TARTRATE 50 MG/1
50 TABLET, FILM COATED ORAL 2 TIMES DAILY
Status: DISCONTINUED | OUTPATIENT
Start: 2019-09-18 | End: 2019-09-24 | Stop reason: HOSPADM

## 2019-09-18 RX ORDER — CETIRIZINE HYDROCHLORIDE 10 MG/1
5 TABLET ORAL DAILY
Status: DISCONTINUED | OUTPATIENT
Start: 2019-09-18 | End: 2019-09-24 | Stop reason: HOSPADM

## 2019-09-18 RX ADMIN — SODIUM CHLORIDE 1000 ML: 9 INJECTION, SOLUTION INTRAVENOUS at 09:11

## 2019-09-18 RX ADMIN — IOVERSOL 75 ML: 741 INJECTION INTRA-ARTERIAL; INTRAVENOUS at 10:22

## 2019-09-18 RX ADMIN — ONDANSETRON 4 MG: 2 INJECTION INTRAMUSCULAR; INTRAVENOUS at 10:58

## 2019-09-18 RX ADMIN — MORPHINE SULFATE 2 MG: 2 INJECTION, SOLUTION INTRAMUSCULAR; INTRAVENOUS at 16:10

## 2019-09-18 RX ADMIN — FLUOXETINE HYDROCHLORIDE 20 MG: 20 CAPSULE ORAL at 17:49

## 2019-09-18 RX ADMIN — ONDANSETRON 4 MG: 2 INJECTION INTRAMUSCULAR; INTRAVENOUS at 09:11

## 2019-09-18 RX ADMIN — FAMOTIDINE 20 MG: 20 TABLET, FILM COATED ORAL at 17:51

## 2019-09-18 RX ADMIN — MORPHINE SULFATE 2 MG: 2 INJECTION, SOLUTION INTRAMUSCULAR; INTRAVENOUS at 12:48

## 2019-09-18 RX ADMIN — LISINOPRIL AND HYDROCHLOROTHIAZIDE 1 TABLET: 12.5; 1 TABLET ORAL at 17:48

## 2019-09-18 RX ADMIN — MORPHINE SULFATE 4 MG: 4 INJECTION INTRAVENOUS at 10:58

## 2019-09-18 RX ADMIN — ONDANSETRON 4 MG: 2 INJECTION INTRAMUSCULAR; INTRAVENOUS at 13:06

## 2019-09-18 RX ADMIN — SODIUM CHLORIDE: 9 INJECTION, SOLUTION INTRAVENOUS at 18:24

## 2019-09-18 RX ADMIN — Medication 81 MG: at 17:49

## 2019-09-18 RX ADMIN — MORPHINE SULFATE 2 MG: 2 INJECTION, SOLUTION INTRAMUSCULAR; INTRAVENOUS at 19:46

## 2019-09-18 RX ADMIN — MORPHINE SULFATE 2 MG: 2 INJECTION, SOLUTION INTRAMUSCULAR; INTRAVENOUS at 23:19

## 2019-09-18 RX ADMIN — MORPHINE SULFATE 4 MG: 4 INJECTION INTRAVENOUS at 09:11

## 2019-09-18 RX ADMIN — SODIUM CHLORIDE 1000 ML: 9 INJECTION, SOLUTION INTRAVENOUS at 20:36

## 2019-09-18 ASSESSMENT — ENCOUNTER SYMPTOMS
ABDOMINAL PAIN: 1
RHINORRHEA: 0
EYE PAIN: 0
SHORTNESS OF BREATH: 0
BACK PAIN: 0
COUGH: 0
VOMITING: 1
COLOR CHANGE: 0
EYE DISCHARGE: 0
NAUSEA: 1
SORE THROAT: 0
DIARRHEA: 0

## 2019-09-18 ASSESSMENT — PAIN DESCRIPTION - ORIENTATION
ORIENTATION: UPPER;MID
ORIENTATION: UPPER;MID

## 2019-09-18 ASSESSMENT — PAIN SCALES - GENERAL
PAINLEVEL_OUTOF10: 3
PAINLEVEL_OUTOF10: 4
PAINLEVEL_OUTOF10: 5
PAINLEVEL_OUTOF10: 4
PAINLEVEL_OUTOF10: 5

## 2019-09-18 ASSESSMENT — PAIN DESCRIPTION - DESCRIPTORS
DESCRIPTORS: BURNING;ACHING
DESCRIPTORS: BURNING

## 2019-09-18 ASSESSMENT — PAIN DESCRIPTION - PAIN TYPE: TYPE: ACUTE PAIN

## 2019-09-18 ASSESSMENT — PAIN DESCRIPTION - FREQUENCY
FREQUENCY: CONTINUOUS
FREQUENCY: CONTINUOUS

## 2019-09-18 ASSESSMENT — PAIN DESCRIPTION - LOCATION
LOCATION: ABDOMEN
LOCATION: ABDOMEN

## 2019-09-18 NOTE — H&P
No rash     (PQRS) Advance directives on face sheet per hospital policy. No change unless specifically mentioned in chart    PAST MEDICAL HISTORY    has a past medical history of Anxiety, CAD (coronary artery disease), Cardiac arrest ([facility] [address] COPD (chronic obstructive pulmonary disease) ([facility] [address] GERD (gastroesophageal reflux disease), Headaches due to old head injury, History of blood transfusion, Hypertension, Snores, Trigger finger, Wears dentures, and Wears glasses. I have reviewed the past medical history with the patient and it is  pertinent to this complaint. SURGICAL HISTORY      has a past surgical history that includes Hysterectomy (); Tonsillectomy;  section; LEEP (); Cardiac catheterization (); pr incise finger tendon sheath (Right, 2017); and Colonoscopy (2019). I have reviewed and agree with Surgical History entered and it is  pertinent to this complaint. CURRENT MEDICATIONS       aspirin EC tablet 81 mg Daily   FLUoxetine (PROZAC) capsule 20 mg Daily   lisinopril-hydrochlorothiazide (PRINZIDE;ZESTORETIC) 10-12.5 MG per tablet 1 tablet Daily   cetirizine (ZYRTEC) tablet 5 mg Daily   metoprolol tartrate (LOPRESSOR) tablet 50 mg BID   sodium chloride flush 0.9 % injection 10 mL 2 times per day   sodium chloride flush 0.9 % injection 10 mL PRN   magnesium hydroxide (MILK OF MAGNESIA) 400 MG/5ML suspension 30 mL Daily PRN   ondansetron (ZOFRAN) injection 4 mg Q6H PRN   morphine (PF) injection 2 mg Q2H PRN   [START ON 2019] influenza quadrivalent split vaccine (FLUZONE;FLUARIX;FLULAVAL;AFLURIA) injection 0.5 mL Once   famotidine (PEPCID) tablet 20 mg Daily   0.9 % sodium chloride infusion Continuous       All medication charted and reviewed. ALLERGIES     is allergic to adhesive tape. FAMILY HISTORY     She indicated that her mother is . She indicated that her father is alive.      family history includes Depression in her mother; High Blood Pressure in her father and mother. The patient denies any pertinent family history. I have reviewed and agree with the family history entered. I have reviewed the Family History and it is not significant to the case    SOCIAL HISTORY      reports that she has been smoking cigarettes. She has a 15.00 pack-year smoking history. She has never used smokeless tobacco. She reports that she drinks alcohol. She reports that she does not use drugs. I have reviewed and agree with all Social.  There are no  concerns for substance abuse/use. PHYSICAL EXAM     INITIAL VITALS:  height is 5' 1\" (1.549 m) and weight is 133 lb 12.8 oz (60.7 kg). Her oral temperature is 98.2 °F (36.8 °C). Her blood pressure is 111/67 and her pulse is 67. Her respiration is 15 and oxygen saturation is 90%. CONSTITUTIONAL: AOx4, no apparent distress, patient more comfortable on arrival to the observation unit after analgesia being given in the ED   HEAD: normocephalic, atraumatic   EYES: PERRLA, EOMI    ENT: moist mucous membranes, uvula midline   NECK: supple, symmetric   BACK: symmetric   LUNGS: clear to auscultation bilaterally   CARDIOVASCULAR: regular rate and rhythm, no murmurs, rubs or gallops   ABDOMEN:  Mild tenderness. Nonsurgical exam.   NEUROLOGIC:  MAEx4, no focal sensory or motor deficits   MUSCULOSKELETAL: no clubbing, cyanosis or edema   SKIN: no rash or wounds       DIFFERENTIAL DIAGNOSIS/MDM:   Acute pancreatitis secondary to gallbladder dysfunction resulting from mass and gallbladder versus sludge versus gallstone. Ultrasound has been performed.   MRCP recommended as a result of ultrasound findings of mass on exam.    DIAGNOSTIC RESULTS   RADIOLOGY:   I directly visualized the following  images and reviewed the radiologist interpretations:    Ct Abdomen Pelvis W Iv Contrast    Result Date: 9/18/2019  EXAMINATION: CT OF THE ABDOMEN AND PELVIS WITH CONTRAST 9/18/2019 10:09 am TECHNIQUE: CT of the abdomen and pelvis was performed with the administration of intravenous contrast. Multiplanar reformatted images are provided for review. Dose modulation, iterative reconstruction, and/or weight based adjustment of the mA/kV was utilized to reduce the radiation dose to as low as reasonably achievable. COMPARISON: April 11, 2019 HISTORY: ORDERING SYSTEM PROVIDED HISTORY: abd pain TECHNOLOGIST PROVIDED HISTORY: IV Only Contrast Reason for Exam: abdomen pain FINDINGS: Lower Chest: No acute findings. Organs: Focal fat deposition along the falciform ligament. The gallbladder, pancreas, spleen, adrenals and kidneys reveal no acute findings. GI/Bowel: There is no bowel dilatation or wall thickening identified. Previously seen wall thickening in the ascending colon has resolved. Normal appendix. Pelvis: No acute abnormality. The uterus and ovaries are not visualized. Peritoneum/Retroperitoneum: No free air or free fluid. The aorta is normal in caliber. The visceral branches are patent. Calcified atheromatous plaque is present. No lymphadenopathy. Bones/Soft Tissues: No acute abnormality identified. No acute abnormality identified. Normal appendix. Us Gallbladder Ruq    Result Date: 9/18/2019  EXAMINATION: RIGHT UPPER QUADRANT ULTRASOUND 9/18/2019 2:58 pm COMPARISON: None. HISTORY: ORDERING SYSTEM PROVIDED HISTORY: pancreatitis FINDINGS: LIVER: Liver is of coarse echotexture suggesting fatty infiltration without focal masses or ductal dilatation. Normal hepatopetal flow in the portal vein is noted. BILIARY SYSTEM:  Gallbladder contains a nonmobile, echogenic focus contiguous with the gallbladder wall with increased vascular flow of concern for gallbladder neoplasia. Clear-cut differentiation between this mass and the gallbladder wall cannot be discerned although some shadowing is noted. Layering out of sludge is noted in the gallbladder. No positive sonographic Nena Good sign is noted.   Other mobile echogenicities within the gallbladder are Abnormal; Notable for the following components:    Leukocyte Esterase, Urine TRACE (*)     All other components within normal limits   MICROSCOPIC URINALYSIS         DU IMPRESSION / PLAN      Marcia Matthews is a 48 y.o. female who presents with acute onset of abdominal pain secondary to pancreatitis secondary to mass found near gallbladder with plan for MRCP to be done tomorrow to further delineate cause of mass. · Acute pancreatitis secondary to gallbladder dysfunction. Gallbladder dysfunction was further evaluated with an ultrasound which showed possible mass. See report above. Due to this a malignancy was suspected. MRCP has been ordered for tomorrow. Further evaluation after MRCP either by surgery or gastroenterology. · Continue home medications and pain control  · Monitor vitals, labs, and imaging  · DISPO: pending consults and clinical improvement    CONSULTS:    None    PROCEDURES:  Not indicated        PATIENT REFERRED TO:    Tawanna Lopez MD  [address] [place] [id number] [facility], [facility] [address] [facility] [id number] [phone]            --  Rhunette Nissen, MD   Emergency Medicine Attending    This dictation was generated by voice recognition computer software. Although all attempts are made to edit the dictation for accuracy, there may be errors in the transcription that are not intended.

## 2019-09-18 NOTE — ED NOTES
Pt presented to ed via self c/o upper abd pain starting at 2am while at work. Pt is A&Ox4 with even non labored breaths. Pt ambulated to room with steady gait. Pt states the pain started out of nowhere. Pt states nausea but denies vomiting. Pt reports normal BM yesterday.       Clari Balderas RN  09/18/19 [id number]

## 2019-09-18 NOTE — ED PROVIDER NOTES
[address]  ED  eMERGENCY dEPARTMENT eNCOUnter      Pt Name: Nupur Rowell  MRN: [id number]  [place] 1965  Date of evaluation: 9/18/19      CHIEF COMPLAINT       Chief Complaint   Patient presents with    Abdominal Pain     diffused upper abd pain since 2 am, denies vomiting, nausea on arrival          Britney Lynn is a 48 y.o. female who presents with epigastric abdominal pain, nausea and vomiting that started around 2:00 this morning. She says that she was at work when the pain started. She says that since this started she has not had an appetite. She denies fever, chills, chest pain, shortness of breath, diarrhea, constipation, dysuria, vaginal bleeding or discharge. Location/Symptom: abd pain, n/v  Timing/Onset: several hours ago  Context/Setting: no similar hx  Quality: achy  Duration: several hours  Modifying Factors: none  Severity: moderate      REVIEW OF SYSTEMS       Review of Systems   Constitutional: Positive for appetite change and chills. Negative for fever. HENT: Negative for ear pain, rhinorrhea and sore throat. Eyes: Negative for pain and discharge. Respiratory: Negative for cough and shortness of breath. Cardiovascular: Negative for chest pain and leg swelling. Gastrointestinal: Positive for abdominal pain, nausea and vomiting. Negative for diarrhea. Genitourinary: Negative for dysuria, flank pain, frequency, vaginal bleeding and vaginal discharge. Musculoskeletal: Negative for back pain, myalgias and neck pain. Skin: Negative for color change and rash. Neurological: Negative for dizziness and headaches. Psychiatric/Behavioral: Negative for suicidal ideas. The patient is not nervous/anxious.         PAST MEDICAL HISTORY    has a past medical history of Anxiety, CAD (coronary artery disease), Cardiac arrest ([facility] [address] COPD (chronic obstructive pulmonary disease) ([facility] [address] GERD (gastroesophageal reflux disease), Headaches due smokeless tobacco. She reports that she drinks alcohol. She reports that she does not use drugs. PHYSICAL EXAM     INITIAL VITALS:  height is 5' 1\" (1.549 m) and weight is 132 lb (59.9 kg). Her oral temperature is 97.2 °F (36.2 °C). Her blood pressure is 149/88 (abnormal) and her pulse is 71. Her respiration is 17 and oxygen saturation is 97%. Physical Exam   Constitutional: She is oriented to person, place, and time. She appears well-developed and well-nourished. Patient is lying in the bed and appears mildly uncomfortable   HENT:   Head: Normocephalic and atraumatic. Eyes: Conjunctivae and EOM are normal.   Cardiovascular: Normal rate, regular rhythm and normal heart sounds. Pulmonary/Chest: Effort normal and breath sounds normal. No respiratory distress. Abdominal: Soft. Bowel sounds are normal. She exhibits no distension. There is tenderness (Moderate epigastric tenderness). There is no rebound and no guarding. Musculoskeletal: She exhibits no edema or tenderness. Neurological: She is alert and oriented to person, place, and time. Skin: Skin is warm and dry. Psychiatric: She has a normal mood and affect. Her behavior is normal. Judgment and thought content normal.   Nursing note and vitals reviewed. DIFFERENTIAL DIAGNOSIS/ MDM:     We will get labs and treat patient's pain and nausea and reassess. Lipase is elevated and patient continues to have pain. Will obtain a CT scan of the abdomen. CT is unremarkable. Will plan to admit patient to the ETU for continued pain and nausea control and reassessment in the morning.     DIAGNOSTIC RESULTS     EKG: All EKG's are interpreted by the Emergency Department Physician who either signs or Co-signs this chart in the absence of a cardiologist.    Not clinically indicated    RADIOLOGY:   I directly visualized the following  images and reviewed the radiologist interpretations:   Ct Abdomen Pelvis W Iv Contrast    Result Date: 9/18/2019  EXAMINATION: CT OF THE ABDOMEN AND PELVIS WITH CONTRAST 9/18/2019 10:09 am TECHNIQUE: CT of the abdomen and pelvis was performed with the administration of intravenous contrast. Multiplanar reformatted images are provided for review. Dose modulation, iterative reconstruction, and/or weight based adjustment of the mA/kV was utilized to reduce the radiation dose to as low as reasonably achievable. COMPARISON: April 11, 2019 HISTORY: ORDERING SYSTEM PROVIDED HISTORY: abd pain TECHNOLOGIST PROVIDED HISTORY: IV Only Contrast Reason for Exam: abdomen pain FINDINGS: Lower Chest: No acute findings. Organs: Focal fat deposition along the falciform ligament. The gallbladder, pancreas, spleen, adrenals and kidneys reveal no acute findings. GI/Bowel: There is no bowel dilatation or wall thickening identified. Previously seen wall thickening in the ascending colon has resolved. Normal appendix. Pelvis: No acute abnormality. The uterus and ovaries are not visualized. Peritoneum/Retroperitoneum: No free air or free fluid. The aorta is normal in caliber. The visceral branches are patent. Calcified atheromatous plaque is present. No lymphadenopathy. Bones/Soft Tissues: No acute abnormality identified. No acute abnormality identified. Normal appendix.        ED BEDSIDE ULTRASOUND:   Not clinically indicated    LABS:  Labs Reviewed   BASIC METABOLIC PANEL - Abnormal; Notable for the following components:       Result Value    Glucose 113 (*)     BUN 29 (*)     CREATININE 1.37 (*)     GFR Non- 40 (*)     GFR  49 (*)     All other components within normal limits   CBC WITH AUTO DIFFERENTIAL - Abnormal; Notable for the following components:    WBC 12.5 (*)     RBC 5.60 (*)     Hemoglobin 15.2 (*)     MCV 82.5 (*)     All other components within normal limits   HEPATIC FUNCTION PANEL - Abnormal; Notable for the following components:    Alkaline Phosphatase 117 (*)     Total Bilirubin 0.24 (*)     All other components within normal limits   LIPASE - Abnormal; Notable for the following components:    Lipase 190 (*)     All other components within normal limits   URINALYSIS - Abnormal; Notable for the following components:    Leukocyte Esterase, Urine TRACE (*)     All other components within normal limits   MICROSCOPIC URINALYSIS       Evaded lipase and white blood cell count    EMERGENCY DEPARTMENT COURSE:   Vitals:    Vitals:    09/18/19 0850 09/18/19 0854 09/18/19 0901   BP:  (!) 149/88    Pulse: 71     Resp: 17     Temp: 97.2 °F (36.2 °C)     TempSrc: Oral     SpO2: 98% 96% 97%   Weight: 132 lb (59.9 kg)     Height: 5' 1\" (1.549 m)       -------------------------  BP: (!) 149/88, Temp: 97.2 °F (36.2 °C), Pulse: 71, Resp: 17    Within normal limits      FINAL IMPRESSION      1.  Acute pancreatitis without infection or necrosis, unspecified pancreatitis type          DISPOSITION/PLAN     Admit to ETU    PATIENT REFERRED TO:  Suzan Sim MD  [address] [facility] [address]  [address] [facility], [address] [facility] [address] [facility] [address] [place] [address] [phone]            DISCHARGE MEDICATIONS:  New Prescriptions    No medications on file       (Please note that portions of this note were completed with a voice recognition program.  Efforts were made to edit the dictations but occasionally words are mis-transcribed.)    Andi Keller MD  Attending Emergency Physician                            Andi Keller MD  09/18/19 1597

## 2019-09-19 ENCOUNTER — APPOINTMENT (OUTPATIENT)
Dept: MRI IMAGING | Age: 54
DRG: 418 | End: 2019-09-19
Payer: COMMERCIAL

## 2019-09-19 LAB
ANION GAP SERPL CALCULATED.3IONS-SCNC: 9 MMOL/L (ref 9–17)
BUN BLDV-MCNC: 15 MG/DL (ref 6–20)
BUN/CREAT BLD: ABNORMAL (ref 9–20)
CALCIUM SERPL-MCNC: 8 MG/DL (ref 8.6–10.4)
CHLORIDE BLD-SCNC: 108 MMOL/L (ref 98–107)
CO2: 23 MMOL/L (ref 20–31)
CREAT SERPL-MCNC: 0.77 MG/DL (ref 0.5–0.9)
GFR AFRICAN AMERICAN: >60 ML/MIN
GFR NON-AFRICAN AMERICAN: >60 ML/MIN
GFR SERPL CREATININE-BSD FRML MDRD: ABNORMAL ML/MIN/{1.73_M2}
GFR SERPL CREATININE-BSD FRML MDRD: ABNORMAL ML/MIN/{1.73_M2}
GLUCOSE BLD-MCNC: 88 MG/DL (ref 70–99)
POTASSIUM SERPL-SCNC: 4.1 MMOL/L (ref 3.7–5.3)
SODIUM BLD-SCNC: 140 MMOL/L (ref 135–144)

## 2019-09-19 PROCEDURE — 94640 AIRWAY INHALATION TREATMENT: CPT

## 2019-09-19 PROCEDURE — 6360000002 HC RX W HCPCS: Performed by: EMERGENCY MEDICINE

## 2019-09-19 PROCEDURE — A9576 INJ PROHANCE MULTIPACK: HCPCS | Performed by: STUDENT IN AN ORGANIZED HEALTH CARE EDUCATION/TRAINING PROGRAM

## 2019-09-19 PROCEDURE — 36415 COLL VENOUS BLD VENIPUNCTURE: CPT

## 2019-09-19 PROCEDURE — 1200000000 HC SEMI PRIVATE

## 2019-09-19 PROCEDURE — 80048 BASIC METABOLIC PNL TOTAL CA: CPT

## 2019-09-19 PROCEDURE — 2580000003 HC RX 258: Performed by: STUDENT IN AN ORGANIZED HEALTH CARE EDUCATION/TRAINING PROGRAM

## 2019-09-19 PROCEDURE — 74183 MRI ABD W/O CNTR FLWD CNTR: CPT

## 2019-09-19 PROCEDURE — 6360000004 HC RX CONTRAST MEDICATION: Performed by: STUDENT IN AN ORGANIZED HEALTH CARE EDUCATION/TRAINING PROGRAM

## 2019-09-19 PROCEDURE — 94664 DEMO&/EVAL PT USE INHALER: CPT

## 2019-09-19 RX ORDER — 0.9 % SODIUM CHLORIDE 0.9 %
1000 INTRAVENOUS SOLUTION INTRAVENOUS ONCE
Status: COMPLETED | OUTPATIENT
Start: 2019-09-19 | End: 2019-09-19

## 2019-09-19 RX ORDER — ALBUTEROL SULFATE 2.5 MG/3ML
2.5 SOLUTION RESPIRATORY (INHALATION)
Status: DISCONTINUED | OUTPATIENT
Start: 2019-09-19 | End: 2019-09-24 | Stop reason: HOSPADM

## 2019-09-19 RX ORDER — ACETAMINOPHEN 325 MG/1
650 TABLET ORAL EVERY 6 HOURS PRN
Status: DISCONTINUED | OUTPATIENT
Start: 2019-09-19 | End: 2019-09-24 | Stop reason: HOSPADM

## 2019-09-19 RX ORDER — SODIUM CHLORIDE 0.9 % (FLUSH) 0.9 %
10 SYRINGE (ML) INJECTION PRN
Status: DISCONTINUED | OUTPATIENT
Start: 2019-09-19 | End: 2019-09-24 | Stop reason: HOSPADM

## 2019-09-19 RX ORDER — ALBUTEROL SULFATE 2.5 MG/3ML
2.5 SOLUTION RESPIRATORY (INHALATION) 3 TIMES DAILY
Status: DISCONTINUED | OUTPATIENT
Start: 2019-09-19 | End: 2019-09-23

## 2019-09-19 RX ORDER — 0.9 % SODIUM CHLORIDE 0.9 %
20 INTRAVENOUS SOLUTION INTRAVENOUS ONCE
Status: DISCONTINUED | OUTPATIENT
Start: 2019-09-19 | End: 2019-09-24 | Stop reason: HOSPADM

## 2019-09-19 RX ADMIN — SODIUM CHLORIDE 1000 ML: 9 INJECTION, SOLUTION INTRAVENOUS at 09:14

## 2019-09-19 RX ADMIN — MORPHINE SULFATE 2 MG: 2 INJECTION, SOLUTION INTRAMUSCULAR; INTRAVENOUS at 21:24

## 2019-09-19 RX ADMIN — ALBUTEROL SULFATE 2.5 MG: 2.5 SOLUTION RESPIRATORY (INHALATION) at 13:47

## 2019-09-19 RX ADMIN — MORPHINE SULFATE 2 MG: 2 INJECTION, SOLUTION INTRAMUSCULAR; INTRAVENOUS at 23:31

## 2019-09-19 RX ADMIN — GADOTERIDOL 12 ML: 279.3 INJECTION, SOLUTION INTRAVENOUS at 19:19

## 2019-09-19 RX ADMIN — SODIUM CHLORIDE: 9 INJECTION, SOLUTION INTRAVENOUS at 04:46

## 2019-09-19 ASSESSMENT — PAIN SCALES - GENERAL
PAINLEVEL_OUTOF10: 5
PAINLEVEL_OUTOF10: 5
PAINLEVEL_OUTOF10: 10

## 2019-09-19 NOTE — PROGRESS NOTES
Patient transferred to the unit from [facility]. Blood pressure 86/46 and heart rate 59 upon arrival. Patient asymptomatic. EVERARDO Lugo Served primary which was emergency medicine and got ahold of Dr. Alena Hunt. He then ordered a fluid bolus and told RN to call a Rapid Response which was done. Patient's blood pressure after bolus was complete is 121/98. Will continue to monitor.

## 2019-09-19 NOTE — PROGRESS NOTES
than 20 []  Less than 20 []  20-25 []  Greater than 25 []  Greater than 25   Peak flow % of Pred or PB []  NA   []  Greater than 90%  []  81-90% []  71-80% []  Less than or equal to 70%  or unable to perform []  Unable due to Respiratory Distress   Dyspnea re []  Patient Baseline []  No SOB [x]  No SOB []  SOB on exertion []  SOB min activity []  At rest/acute   e FEV% Predicted       []  NA []  Above 69%  []  Unable []  Above 60-69%  [x]  Unable []  Above 50-59%  []  Unable []  Above 35-49%  []  Unable []  Less than 35%  []  Unable                 []  Hyperinflation Assessment  Score 1 2 3   CXR and Breath Sounds   []  Clear []  No atelectasis  Basilar aeration []  Atelectasis or absent basilar breath sounds   Incentive Spirometry Volume  (Per IBW)   []  Greater than or equal to 15ml/Kg []  less than 15ml/Kg []  less than 15ml/Kg   Surgery within last 2 weeks []  None or general   []  Abdominal or thoracic surgery  []  Abdominal or thoracic   Chronic Pulmonary Historyre []  No []  Yes []  Yes     []  Secretion Management Assessment  Score 1 2 3   Bilateral Breath Sounds   []  Occasional Rhonchi []  Scattered Rhonchi []  Course Rhonchi and/or poor aeration   Sputum    []  Small amount of thin secretions []  Moderate amount of viscous secretions []  Copius, Viscious Yellow/ Secretions   CXR as reported by physician []  clear  []  Unavailable []  Infiltrates and/or consolidation  []  Unavailable []  Mucus Plugging and or lobar consolidation  []  Unavailable   Cough []  Strong, productive cough []  Weak productive cough []  No cough or weak non-productive cough   Marcia Wiley  11:55 AM                            FEMALE                                  MALE                            FEV1 Predicted Normal Values                        FEV1 Predicted Normal Values          Age                                     Height in Feet and Inches       Age                                     Height in Feet and Inches       4' 11\" 5' 1\" 5' 3\" 5' 5\" 5' 7\" 5' 9\" 5' 11\" 6' 1\"  4' 11\" 5' 1\" 5' 3\" 5' 5\" 5' 7\" 5' 9\" 5' 11\" 6' 1\"   42 - 45 2.49 2.66 2.84 3.03 3.22 3.42 3.62 3.83 42 - 45 2.82 3.03 3.26 3.49 3.72 3.96 4.22 4.47   46 - 49 2.40 2.57 2.76 2.94 3.14 3.33 3.54 3.75 46 - 49 2.70 2.92 3.14 3.37 3.61 3.85 4.10 4.36   50 - 53 2.31 2.48 2.66 2.85 3.04 3.24 3.45 3.66 50 - 53 2.58 2.80 3.02 3.25 3.49 3.73 3.98 4.24   54 - 57 2.21 2.38 2.57 2.75 2.95 3.14 3.35 3.56 54 - 57 2.46 2.67 2.89 3.12 3.36 3.60 3.85 4.11   58 - 61 2.10 2.28 2.46 2.65 2.84 3.04 3.24 3.45 58 - 61 2.32 2.54 2.76 2.99 3.23 3.47 3.72 3.98   62 - 65 1.99 2.17 2.35 2.54 2.73 2.93 3.13 3.34 62 - 65 2.19 2.40 2.62 2.85 3.09 3.33 3.58 3.84   66 - 69 1.88 2.05 2.23 2.42 2.61 2.81 3.02 3.23 66 - 69 2.04 2.26 2.48 2.71 2.95 3.19 3.44 3.70   70+ 1.82 1.99 2.17 2.36 2.55 2.75 2.95 3.16 70+ 1.97 2.19 2.41 2.64 2.87 3.12 3.37 3.62             Predicted Peak Expiratory Flow Rate                                       Height (in)  Female       Height (in) Male           Age 64 62 64 63 57 71 78 74 Age            00 013 706 826 176 643 313 591 954  12 76 55 18 53 36 67 96 66   25 337 352 366 381 396 411 426 441 25 447 476 [phone] [phone]   30 329 344 359 374 389 404 419 434 30 437 466 494 523 552 [phone] 667   35 322 337 351 366 381 396 411 426 35 426 455 484 512 541 [phone]   40 314 329 344 359 374 389 404 419 40 416 445 473 [phone] 647   45 307 322 336 351 366 381 396 411 45 405 434 463 491 520 549 577 606 636   50 299 314 329 344 359 374 389 404 50 395 424 452 481 510 538 567 596 625   55 292 307 321 336 351 366 381 396 55 384 413 442 470 499 528 556 585 615   60 284 299 314 329 344 359 374 389 60 374 403 431 460 489 517 546 575 605   65 277 292 306 321 336 351 366 381 65 363 392 421 449 478 507 535 564 594   70 269 284 299 314 329 344 359 374 70 353 382 410 439 468 [phone] 583   75 261 274 289 305 319 334 348 364 75 344 372 400 429 458 [phone]

## 2019-09-19 NOTE — PROGRESS NOTES
CDU Daily Progress Note  Attending Physician       Pt Name: Maryann Carvajal  MRN: [id number]  [place] 1965  Date of evaluation: 9/19/19    I performed a history and physical examination of the patient and discussed management with the resident. I reviewed the residents note and agree with the documented findings and plan of care. Any areas of disagreement are noted on the chart. I was personally present for the key portions of any procedures. I have documented in the chart those procedures where I was not present during the key portions. I have reviewed the emergency nurses triage note. I agree with the chief complaint, past medical history, past surgical history, allergies, medications, social and family history as documented unless otherwise noted below. Documentation of the HPI, Physical Exam and Medical Decision Making performed by medical students or scribes is based on my personal performance of the HPI, PE and MDM. For Physician Assistant/ Nurse Practitioner cases/documentation I have personally evaluated this patient and have completed at least one if not all key elements of the E/M (history, physical exam, and MDM). Additional findings are as noted. The Family History, Social History and Review of Systems are unchanged from the previous day. No significant events overnight. Not diabetic. Patient smokes 0.5 ppd of cigarettes per day for 30 years. Smoking cessation counseling for 3 minutes. Discussed the effects of smoking in regards to . I explained how this negatively effects their condition, will contribute to increased recovery time, and possible lead to further health problems in the future. MRCP today.     Jorge Alonso MD  Attending Physician  Critical Decision Unit

## 2019-09-20 LAB
ABSOLUTE EOS #: 0.15 K/UL (ref 0–0.44)
ABSOLUTE IMMATURE GRANULOCYTE: 0.03 K/UL (ref 0–0.3)
ABSOLUTE LYMPH #: 2.51 K/UL (ref 1.1–3.7)
ABSOLUTE MONO #: 0.51 K/UL (ref 0.1–1.2)
ALBUMIN SERPL-MCNC: 3 G/DL (ref 3.5–5.2)
ALBUMIN/GLOBULIN RATIO: 1.3 (ref 1–2.5)
ALP BLD-CCNC: 84 U/L (ref 35–104)
ALT SERPL-CCNC: 15 U/L (ref 5–33)
AMYLASE: 110 U/L (ref 28–100)
AST SERPL-CCNC: 18 U/L
BASOPHILS # BLD: 0 % (ref 0–2)
BASOPHILS ABSOLUTE: 0.03 K/UL (ref 0–0.2)
BILIRUB SERPL-MCNC: 0.17 MG/DL (ref 0.3–1.2)
BILIRUBIN DIRECT: <0.08 MG/DL
BILIRUBIN, INDIRECT: ABNORMAL MG/DL (ref 0–1)
DIFFERENTIAL TYPE: ABNORMAL
EOSINOPHILS RELATIVE PERCENT: 2 % (ref 1–4)
GLOBULIN: ABNORMAL G/DL (ref 1.5–3.8)
HCT VFR BLD CALC: 37.1 % (ref 36.3–47.1)
HEMOGLOBIN: 11.2 G/DL (ref 11.9–15.1)
IMMATURE GRANULOCYTES: 0 %
LIPASE: 265 U/L (ref 13–60)
LYMPHOCYTES # BLD: 37 % (ref 24–43)
MCH RBC QN AUTO: 27.1 PG (ref 25.2–33.5)
MCHC RBC AUTO-ENTMCNC: 30.2 G/DL (ref 28.4–34.8)
MCV RBC AUTO: 89.6 FL (ref 82.6–102.9)
MONOCYTES # BLD: 8 % (ref 3–12)
NRBC AUTOMATED: 0 PER 100 WBC
PDW BLD-RTO: 14.2 % (ref 11.8–14.4)
PLATELET # BLD: 200 K/UL (ref 138–453)
PLATELET ESTIMATE: ABNORMAL
PMV BLD AUTO: 10.3 FL (ref 8.1–13.5)
RBC # BLD: 4.14 M/UL (ref 3.95–5.11)
RBC # BLD: ABNORMAL 10*6/UL
SEG NEUTROPHILS: 52 % (ref 36–65)
SEGMENTED NEUTROPHILS ABSOLUTE COUNT: 3.51 K/UL (ref 1.5–8.1)
TOTAL PROTEIN: 5.4 G/DL (ref 6.4–8.3)
WBC # BLD: 6.7 K/UL (ref 3.5–11.3)
WBC # BLD: ABNORMAL 10*3/UL

## 2019-09-20 PROCEDURE — 6370000000 HC RX 637 (ALT 250 FOR IP): Performed by: EMERGENCY MEDICINE

## 2019-09-20 PROCEDURE — 85025 COMPLETE CBC W/AUTO DIFF WBC: CPT

## 2019-09-20 PROCEDURE — 83690 ASSAY OF LIPASE: CPT

## 2019-09-20 PROCEDURE — 82150 ASSAY OF AMYLASE: CPT

## 2019-09-20 PROCEDURE — 6360000002 HC RX W HCPCS: Performed by: EMERGENCY MEDICINE

## 2019-09-20 PROCEDURE — 1200000000 HC SEMI PRIVATE

## 2019-09-20 PROCEDURE — 36415 COLL VENOUS BLD VENIPUNCTURE: CPT

## 2019-09-20 PROCEDURE — 80076 HEPATIC FUNCTION PANEL: CPT

## 2019-09-20 RX ADMIN — MORPHINE SULFATE 2 MG: 2 INJECTION, SOLUTION INTRAMUSCULAR; INTRAVENOUS at 13:07

## 2019-09-20 RX ADMIN — FAMOTIDINE 20 MG: 20 TABLET, FILM COATED ORAL at 09:13

## 2019-09-20 RX ADMIN — CETIRIZINE HYDROCHLORIDE 5 MG: 10 TABLET ORAL at 09:13

## 2019-09-20 RX ADMIN — ONDANSETRON 4 MG: 2 INJECTION INTRAMUSCULAR; INTRAVENOUS at 19:48

## 2019-09-20 RX ADMIN — MORPHINE SULFATE 2 MG: 2 INJECTION, SOLUTION INTRAMUSCULAR; INTRAVENOUS at 03:58

## 2019-09-20 RX ADMIN — MORPHINE SULFATE 2 MG: 2 INJECTION, SOLUTION INTRAMUSCULAR; INTRAVENOUS at 09:13

## 2019-09-20 RX ADMIN — MORPHINE SULFATE 2 MG: 2 INJECTION, SOLUTION INTRAMUSCULAR; INTRAVENOUS at 21:58

## 2019-09-20 RX ADMIN — MORPHINE SULFATE 2 MG: 2 INJECTION, SOLUTION INTRAMUSCULAR; INTRAVENOUS at 19:28

## 2019-09-20 RX ADMIN — FLUOXETINE HYDROCHLORIDE 20 MG: 20 CAPSULE ORAL at 09:12

## 2019-09-20 ASSESSMENT — PAIN SCALES - GENERAL
PAINLEVEL_OUTOF10: 4
PAINLEVEL_OUTOF10: 6
PAINLEVEL_OUTOF10: 7
PAINLEVEL_OUTOF10: 7
PAINLEVEL_OUTOF10: 5
PAINLEVEL_OUTOF10: 8
PAINLEVEL_OUTOF10: 3
PAINLEVEL_OUTOF10: 8
PAINLEVEL_OUTOF10: 6

## 2019-09-20 ASSESSMENT — PAIN DESCRIPTION - DESCRIPTORS: DESCRIPTORS: ACHING

## 2019-09-20 ASSESSMENT — PAIN DESCRIPTION - ORIENTATION: ORIENTATION: MID

## 2019-09-20 ASSESSMENT — PAIN DESCRIPTION - LOCATION: LOCATION: BACK;ABDOMEN

## 2019-09-20 ASSESSMENT — PAIN DESCRIPTION - FREQUENCY: FREQUENCY: CONTINUOUS

## 2019-09-20 ASSESSMENT — PAIN DESCRIPTION - PAIN TYPE: TYPE: ACUTE PAIN

## 2019-09-20 NOTE — CONSULTS
General Surgery   Consultation        PATIENT NAME: Peggi Fleischer   YOB: 1965    ADMISSION DATE: 9/18/2019  8:43 AM     Admitting Provider: William Domingo    Consulted Physician: Kel Canada     TODAY'S DATE: 9/20/2019    Consult Regarding:  Acute cholecystitis    HISTORY OF PRESENT ILLNESS:  The patient is a 48 y.o. female  who presents with abdominal pain. Patient was admitted and being treated for pancreatitis. Acute pancreatitis was thought to be due to cholecystitis, as pancreas did not appear inflamed on initial CT, but lipase was elevated. A gallbladder ultrasound showed sludge and a nonmobile, shadowing mass with increased vascularity that was concerning for neoplasia. An MRCP done yesterday 9/19 did not show a mass, but did show gallbladder wall thickening up to 6mm, and T1 hyperintense material that was seen as likely being sludge. Patient today is complaining of epigastric abdominal pain, and back pain in the thoracic region right of midline, near the right costovertebral angle, however patient is not complaining of any urinary problems. Patient has not had a bowel movement in a couple of days, but also has not had much to eat. Her last few bowel movements have been greasy. She denies fevers, nausea, vomiting. Patient states she bruises easily, but does not have any cancer history. PMH includes hypertension, COPD. Past Medical History:        Diagnosis Date    Anxiety     CAD (coronary artery disease) 04/29/2013    Cath 04/29/13: Minimal CAD.  No stents    Cardiac arrest ([facility] [address]     cardiac arrest at age 25 months/s/p fall from bed with unconscious episode    COPD (chronic obstructive pulmonary disease) (HCC)     GERD (gastroesophageal reflux disease)     Headaches due to old head injury 2010    s/p fall down steps with unconsciousness    History of blood transfusion     Hypertension     Snores     Trigger finger     right thumb and right middle finger    Wears dentures     FULL Tobacco Use    Smoking status: Current Every Day Smoker     Packs/day: 0.50     Years: 30.00     Pack years: 15.00     Types: Cigarettes    Smokeless tobacco: Never Used   Substance and Sexual Activity    Alcohol use: Yes     Alcohol/week: 0.0 standard drinks     Comment: occ    Drug use: No    Sexual activity: Not on file   Lifestyle    Physical activity:     Days per week: Not on file     Minutes per session: Not on file    Stress: Not on file   Relationships    Social connections:     Talks on phone: Not on file     Gets together: Not on file     Attends Sabianism service: Not on file     Active member of club or organization: Not on file     Attends meetings of clubs or organizations: Not on file     Relationship status: Not on file    Intimate partner violence:     Fear of current or ex partner: Not on file     Emotionally abused: Not on file     Physically abused: Not on file     Forced sexual activity: Not on file   Other Topics Concern    Not on file   Social History Narrative    Not on file       Family History:       Problem Relation Age of Onset    High Blood Pressure Mother     Depression Mother     High Blood Pressure Father        REVIEW OF SYSTEMS:    CONSTITUTIONAL:  No recent weight gain/loss. Energy level normal for pt. HEENT:  negative  CARDIOVASCULAR:  No chest pain  GASTROINTESTINAL:  positive for abdominal pain  GENITOURINARY:  No dysuria  HEMATOLOGIC/LYMPHATIC:  positive for easy bruising  ENDOCRINE: negative  Review of systems negative unless above. PHYSICAL EXAM:    VITALS:  /62   Pulse 60   Temp 98 °F (36.7 °C) (Oral)   Resp 17   Ht 5' 1\" (1.549 m)   Wt 132 lb 15 oz (60.3 kg)   SpO2 98%   BMI 25.12 kg/m²   INTAKE/OUTPUT: .     Intake/Output Summary (Last 24 hours) at 9/20/2019 0815  Last data filed at 9/19/2019 1720  Gross per 24 hour   Intake 4034 ml   Output --   Net 4034 ml       CONSTITUTIONAL:  awake, alert, not distressed and normal weight  ENT:

## 2019-09-21 LAB — LIPASE: 223 U/L (ref 13–60)

## 2019-09-21 PROCEDURE — 6370000000 HC RX 637 (ALT 250 FOR IP): Performed by: EMERGENCY MEDICINE

## 2019-09-21 PROCEDURE — 6370000000 HC RX 637 (ALT 250 FOR IP): Performed by: STUDENT IN AN ORGANIZED HEALTH CARE EDUCATION/TRAINING PROGRAM

## 2019-09-21 PROCEDURE — 1200000000 HC SEMI PRIVATE

## 2019-09-21 PROCEDURE — 94640 AIRWAY INHALATION TREATMENT: CPT

## 2019-09-21 PROCEDURE — 36415 COLL VENOUS BLD VENIPUNCTURE: CPT

## 2019-09-21 PROCEDURE — 2580000003 HC RX 258: Performed by: STUDENT IN AN ORGANIZED HEALTH CARE EDUCATION/TRAINING PROGRAM

## 2019-09-21 PROCEDURE — 6360000002 HC RX W HCPCS: Performed by: EMERGENCY MEDICINE

## 2019-09-21 PROCEDURE — 83690 ASSAY OF LIPASE: CPT

## 2019-09-21 RX ORDER — SENNA PLUS 8.6 MG/1
1 TABLET ORAL DAILY PRN
Status: DISCONTINUED | OUTPATIENT
Start: 2019-09-21 | End: 2019-09-24 | Stop reason: HOSPADM

## 2019-09-21 RX ORDER — DOCUSATE SODIUM 100 MG/1
100 CAPSULE, LIQUID FILLED ORAL DAILY PRN
Status: DISCONTINUED | OUTPATIENT
Start: 2019-09-21 | End: 2019-09-24 | Stop reason: HOSPADM

## 2019-09-21 RX ORDER — BUTALBITAL, ACETAMINOPHEN AND CAFFEINE 50; 325; 40 MG/1; MG/1; MG/1
1 TABLET ORAL EVERY 4 HOURS PRN
Status: DISCONTINUED | OUTPATIENT
Start: 2019-09-21 | End: 2019-09-24 | Stop reason: HOSPADM

## 2019-09-21 RX ADMIN — ALBUTEROL SULFATE 2.5 MG: 2.5 SOLUTION RESPIRATORY (INHALATION) at 21:06

## 2019-09-21 RX ADMIN — MORPHINE SULFATE 2 MG: 2 INJECTION, SOLUTION INTRAMUSCULAR; INTRAVENOUS at 20:32

## 2019-09-21 RX ADMIN — MORPHINE SULFATE 2 MG: 2 INJECTION, SOLUTION INTRAMUSCULAR; INTRAVENOUS at 23:58

## 2019-09-21 RX ADMIN — FAMOTIDINE 20 MG: 20 TABLET, FILM COATED ORAL at 07:43

## 2019-09-21 RX ADMIN — SODIUM CHLORIDE: 9 INJECTION, SOLUTION INTRAVENOUS at 23:58

## 2019-09-21 RX ADMIN — CETIRIZINE HYDROCHLORIDE 5 MG: 10 TABLET ORAL at 07:43

## 2019-09-21 RX ADMIN — MORPHINE SULFATE 2 MG: 2 INJECTION, SOLUTION INTRAMUSCULAR; INTRAVENOUS at 12:03

## 2019-09-21 RX ADMIN — BUTALBITAL, ACETAMINOPHEN AND CAFFEINE 1 TABLET: 50; 325; 40 TABLET ORAL at 17:28

## 2019-09-21 RX ADMIN — MORPHINE SULFATE 2 MG: 2 INJECTION, SOLUTION INTRAMUSCULAR; INTRAVENOUS at 03:17

## 2019-09-21 RX ADMIN — FLUOXETINE HYDROCHLORIDE 20 MG: 20 CAPSULE ORAL at 07:44

## 2019-09-21 RX ADMIN — MORPHINE SULFATE 2 MG: 2 INJECTION, SOLUTION INTRAMUSCULAR; INTRAVENOUS at 07:44

## 2019-09-21 RX ADMIN — MORPHINE SULFATE 2 MG: 2 INJECTION, SOLUTION INTRAMUSCULAR; INTRAVENOUS at 15:41

## 2019-09-21 RX ADMIN — DOCUSATE SODIUM 100 MG: 100 CAPSULE, LIQUID FILLED ORAL at 03:17

## 2019-09-21 RX ADMIN — Medication 81 MG: at 07:43

## 2019-09-21 RX ADMIN — MORPHINE SULFATE 2 MG: 2 INJECTION, SOLUTION INTRAMUSCULAR; INTRAVENOUS at 00:46

## 2019-09-21 ASSESSMENT — PAIN SCALES - GENERAL
PAINLEVEL_OUTOF10: 5
PAINLEVEL_OUTOF10: 6
PAINLEVEL_OUTOF10: 4
PAINLEVEL_OUTOF10: 6
PAINLEVEL_OUTOF10: 7
PAINLEVEL_OUTOF10: 3
PAINLEVEL_OUTOF10: 7
PAINLEVEL_OUTOF10: 5
PAINLEVEL_OUTOF10: 7
PAINLEVEL_OUTOF10: 7
PAINLEVEL_OUTOF10: 2
PAINLEVEL_OUTOF10: 8
PAINLEVEL_OUTOF10: 8

## 2019-09-21 ASSESSMENT — PAIN DESCRIPTION - ORIENTATION
ORIENTATION: MID

## 2019-09-21 ASSESSMENT — PAIN DESCRIPTION - LOCATION
LOCATION: ABDOMEN

## 2019-09-21 ASSESSMENT — PAIN DESCRIPTION - PAIN TYPE
TYPE: ACUTE PAIN

## 2019-09-21 NOTE — PROGRESS NOTES
Surgery Progress Note            PATIENT NAME: Daniel Aguilar     TODAY'S DATE: 9/21/2019, 8:03 AM    SUBJECTIVE:    Pt seen and examined. The patient continues to have epigastric abdominal pain. She attempted a regular diet yesterday and experienced nausea. Pancreatic enzymes show she still has active pancreatitis. OBJECTIVE:   VITALS:  BP 97/61   Pulse 66   Temp 98.1 °F (36.7 °C) (Oral)   Resp 16   Ht 5' 1\" (1.549 m)   Wt 133 lb 2.5 oz (60.4 kg)   SpO2 95%   BMI 25.16 kg/m²      INTAKE/OUTPUT:      Intake/Output Summary (Last 24 hours) at 9/21/2019 0803  Last data filed at 9/20/2019 1600  Gross per 24 hour   Intake 840 ml   Output --   Net 840 ml       PHYSICAL EXAM  General Appearance:  awake, alert, oriented, in no acute distress  Lungs:  Normal expansion. Clear to auscultation. No rales, rhonchi, or wheezing. Heart:  Heart regular rate and rhythm  Abdomen: Soft, nondistended, epigastric tenderness with voluntary guarding, no peritoneal signs  Extremities: Extremities warm to touch, pink, with no edema. Musculoskeletal:  Range of motion normal in hips, knees, shoulders, and spine.          Data:  CBC with Differential:    Lab Results   Component Value Date    WBC 6.7 09/20/2019    RBC 4.14 09/20/2019    RBC 5.06 03/18/2012    HGB 11.2 09/20/2019    HCT 37.1 09/20/2019     09/20/2019     03/18/2012    MCV 89.6 09/20/2019    MCH 27.1 09/20/2019    MCHC 30.2 09/20/2019    RDW 14.2 09/20/2019    LYMPHOPCT 37 09/20/2019    MONOPCT 8 09/20/2019    BASOPCT 0 09/20/2019    MONOSABS 0.51 09/20/2019    LYMPHSABS 2.51 09/20/2019    EOSABS 0.15 09/20/2019    BASOSABS 0.03 09/20/2019    DIFFTYPE NOT REPORTED 09/20/2019     BMP:    Lab Results   Component Value Date     09/19/2019    K 4.1 09/19/2019     09/19/2019    CO2 23 09/19/2019    BUN 15 09/19/2019    LABALBU 3.0 09/20/2019    CREATININE 0.77 09/19/2019    CALCIUM 8.0 09/19/2019    GFRAA >60 09/19/2019    LABGLOM >60 09/19/2019    GLUCOSE 88 09/19/2019    GLUCOSE 107 03/18/2012     Hepatic Function Panel:    Lab Results   Component Value Date    ALKPHOS 84 09/20/2019    ALT 15 09/20/2019    AST 18 09/20/2019    PROT 5.4 09/20/2019    BILITOT 0.17 09/20/2019    BILIDIR <0.08 09/20/2019    IBILI CANNOT BE CALCULATED 09/20/2019    LABALBU 3.0 09/20/2019       Radiology Review:      ASSESSMENT   1. Gallstone pancreatitis    Plan  1. The patient continues to have epigastric abdominal pain which demonstrates she is still having active inflammation of her pancreas. 2. Recommend medical management until the resolution of her pancreatitis  3. When her pancreatitis has completely resolved we will proceed with cholecystectomy. We initially planned on that being tomorrow, Sunday, but due to the patient continuing to have abdominal pain we feel it will likely be delayed until she has complete resolution of her symptoms. Electronically signed by Mleissa Aguilar DO  on 9/21/2019 at 8:03 AM     I have reviewed the resident's note and I either performed the key elements of the medical history and physical exam or was present with the resident when the key elements of the medical history and physical exam were performed. I have discussed the findings, established the care plan and recommendations with Resident.     Electronically signed by Jessy Gifford IV, DO on 9/21/19 at 1:15 PM

## 2019-09-21 NOTE — PLAN OF CARE
Problem: Pain:  Goal: Pain level will decrease  Description  Pain level will decrease  9/21/2019 1549 by Ariane Fagan RN  Outcome: Ongoing  Pain assessment ongoing. Patient's pain being managed with medication. Problem: Pain:  Goal: Control of acute pain  Description  Control of acute pain  9/21/2019 1549 by Ariane Fagan RN  Outcome: Ongoing     Problem: Pain:  Goal: Control of chronic pain  Description  Control of chronic pain  9/21/2019 1549 by Ariane Fagan RN  Outcome: Ongoing     Problem: Falls - Risk of:  Goal: Will remain free from falls  Description  Will remain free from falls  9/21/2019 1549 by Ariane Fagan RN  Outcome: Ongoing  Patient remained free of falls. Bed in lowest position, non skid socks on, call light within reach and used appropriately, room decluttered.        Problem: Falls - Risk of:  Goal: Absence of physical injury  Description  Absence of physical injury  9/21/2019 1549 by Ariane Fagan RN  Outcome: Ongoing

## 2019-09-22 LAB
ABSOLUTE EOS #: 0.14 K/UL (ref 0–0.44)
ABSOLUTE IMMATURE GRANULOCYTE: <0.03 K/UL (ref 0–0.3)
ABSOLUTE LYMPH #: 2.18 K/UL (ref 1.1–3.7)
ABSOLUTE MONO #: 0.48 K/UL (ref 0.1–1.2)
ALBUMIN SERPL-MCNC: 2.9 G/DL (ref 3.5–5.2)
ALBUMIN/GLOBULIN RATIO: 1.3 (ref 1–2.5)
ALP BLD-CCNC: 78 U/L (ref 35–104)
ALT SERPL-CCNC: 12 U/L (ref 5–33)
AMYLASE: 49 U/L (ref 28–100)
AST SERPL-CCNC: 15 U/L
BASOPHILS # BLD: 1 % (ref 0–2)
BASOPHILS ABSOLUTE: 0.03 K/UL (ref 0–0.2)
BILIRUB SERPL-MCNC: 0.23 MG/DL (ref 0.3–1.2)
BILIRUBIN DIRECT: <0.08 MG/DL
BILIRUBIN, INDIRECT: ABNORMAL MG/DL (ref 0–1)
DIFFERENTIAL TYPE: ABNORMAL
EOSINOPHILS RELATIVE PERCENT: 2 % (ref 1–4)
GLOBULIN: ABNORMAL G/DL (ref 1.5–3.8)
HCT VFR BLD CALC: 34.7 % (ref 36.3–47.1)
HEMOGLOBIN: 10.6 G/DL (ref 11.9–15.1)
IMMATURE GRANULOCYTES: 0 %
LIPASE: 61 U/L (ref 13–60)
LYMPHOCYTES # BLD: 34 % (ref 24–43)
MCH RBC QN AUTO: 26.5 PG (ref 25.2–33.5)
MCHC RBC AUTO-ENTMCNC: 30.5 G/DL (ref 28.4–34.8)
MCV RBC AUTO: 86.8 FL (ref 82.6–102.9)
MONOCYTES # BLD: 8 % (ref 3–12)
NRBC AUTOMATED: 0 PER 100 WBC
PDW BLD-RTO: 14.1 % (ref 11.8–14.4)
PLATELET # BLD: 212 K/UL (ref 138–453)
PLATELET ESTIMATE: ABNORMAL
PMV BLD AUTO: 10.3 FL (ref 8.1–13.5)
RBC # BLD: 4 M/UL (ref 3.95–5.11)
RBC # BLD: ABNORMAL 10*6/UL
SEG NEUTROPHILS: 55 % (ref 36–65)
SEGMENTED NEUTROPHILS ABSOLUTE COUNT: 3.52 K/UL (ref 1.5–8.1)
TOTAL PROTEIN: 5.2 G/DL (ref 6.4–8.3)
WBC # BLD: 6.4 K/UL (ref 3.5–11.3)
WBC # BLD: ABNORMAL 10*3/UL

## 2019-09-22 PROCEDURE — 6360000002 HC RX W HCPCS: Performed by: EMERGENCY MEDICINE

## 2019-09-22 PROCEDURE — 6370000000 HC RX 637 (ALT 250 FOR IP): Performed by: EMERGENCY MEDICINE

## 2019-09-22 PROCEDURE — 80076 HEPATIC FUNCTION PANEL: CPT

## 2019-09-22 PROCEDURE — 2580000003 HC RX 258: Performed by: STUDENT IN AN ORGANIZED HEALTH CARE EDUCATION/TRAINING PROGRAM

## 2019-09-22 PROCEDURE — 85025 COMPLETE CBC W/AUTO DIFF WBC: CPT

## 2019-09-22 PROCEDURE — 83690 ASSAY OF LIPASE: CPT

## 2019-09-22 PROCEDURE — 82150 ASSAY OF AMYLASE: CPT

## 2019-09-22 PROCEDURE — 1200000000 HC SEMI PRIVATE

## 2019-09-22 PROCEDURE — 36415 COLL VENOUS BLD VENIPUNCTURE: CPT

## 2019-09-22 PROCEDURE — 6370000000 HC RX 637 (ALT 250 FOR IP): Performed by: STUDENT IN AN ORGANIZED HEALTH CARE EDUCATION/TRAINING PROGRAM

## 2019-09-22 RX ADMIN — MORPHINE SULFATE 2 MG: 2 INJECTION, SOLUTION INTRAMUSCULAR; INTRAVENOUS at 14:35

## 2019-09-22 RX ADMIN — MORPHINE SULFATE 2 MG: 2 INJECTION, SOLUTION INTRAMUSCULAR; INTRAVENOUS at 18:02

## 2019-09-22 RX ADMIN — SODIUM CHLORIDE: 9 INJECTION, SOLUTION INTRAVENOUS at 22:23

## 2019-09-22 RX ADMIN — FLUOXETINE HYDROCHLORIDE 20 MG: 20 CAPSULE ORAL at 09:47

## 2019-09-22 RX ADMIN — CETIRIZINE HYDROCHLORIDE 5 MG: 10 TABLET ORAL at 09:47

## 2019-09-22 RX ADMIN — MORPHINE SULFATE 2 MG: 2 INJECTION, SOLUTION INTRAMUSCULAR; INTRAVENOUS at 22:21

## 2019-09-22 RX ADMIN — BUTALBITAL, ACETAMINOPHEN AND CAFFEINE 1 TABLET: 50; 325; 40 TABLET ORAL at 11:20

## 2019-09-22 RX ADMIN — MORPHINE SULFATE 2 MG: 2 INJECTION, SOLUTION INTRAMUSCULAR; INTRAVENOUS at 12:30

## 2019-09-22 RX ADMIN — MORPHINE SULFATE 2 MG: 2 INJECTION, SOLUTION INTRAMUSCULAR; INTRAVENOUS at 03:02

## 2019-09-22 RX ADMIN — MORPHINE SULFATE 2 MG: 2 INJECTION, SOLUTION INTRAMUSCULAR; INTRAVENOUS at 05:52

## 2019-09-22 RX ADMIN — MORPHINE SULFATE 2 MG: 2 INJECTION, SOLUTION INTRAMUSCULAR; INTRAVENOUS at 09:47

## 2019-09-22 ASSESSMENT — PAIN DESCRIPTION - LOCATION
LOCATION: ABDOMEN

## 2019-09-22 ASSESSMENT — PAIN DESCRIPTION - ORIENTATION
ORIENTATION: MID

## 2019-09-22 ASSESSMENT — PAIN SCALES - GENERAL
PAINLEVEL_OUTOF10: 4
PAINLEVEL_OUTOF10: 4
PAINLEVEL_OUTOF10: 7
PAINLEVEL_OUTOF10: 5
PAINLEVEL_OUTOF10: 7
PAINLEVEL_OUTOF10: 5
PAINLEVEL_OUTOF10: 6
PAINLEVEL_OUTOF10: 2
PAINLEVEL_OUTOF10: 4
PAINLEVEL_OUTOF10: 5
PAINLEVEL_OUTOF10: 5
PAINLEVEL_OUTOF10: 4
PAINLEVEL_OUTOF10: 5
PAINLEVEL_OUTOF10: 7
PAINLEVEL_OUTOF10: 4
PAINLEVEL_OUTOF10: 4
PAINLEVEL_OUTOF10: 3
PAINLEVEL_OUTOF10: 4

## 2019-09-22 ASSESSMENT — PAIN DESCRIPTION - PAIN TYPE
TYPE: ACUTE PAIN

## 2019-09-22 ASSESSMENT — PAIN DESCRIPTION - DESCRIPTORS: DESCRIPTORS: ACHING

## 2019-09-22 ASSESSMENT — PAIN DESCRIPTION - FREQUENCY: FREQUENCY: CONTINUOUS

## 2019-09-22 NOTE — PROGRESS NOTES
OBS/CDU   RESIDENT NOTE      Patients PCP is:  Mahendra Steiner MD        SUBJECTIVE      Patient was tentatively scheduled for the OR today for cholecystectomy, however patient had episodes of increased abdominal pain and nausea yesterday, according to surgery latest note they plan to delay cholecystectomy until symptoms have resolved of pancreatitis, lipase trending down, currently 223 from 265. Patient states feeling much better today, tolerating p.o. intake denies any nausea vomiting. PHYSICAL EXAM      General: NAD, AO X 3  Heent: EMOI, PERRL  Neck: SUPPLE, NO JVD  Cardiovascular: RRR, S1S2  Pulmonary: CTAB, NO SOB  Abdomen: SOFT, mild tender to palpation, most noticeably in the epigastric region, no guarding. Extremities: +2/4 PULSES DISTAL, NO SWELLING  Neuro / Psych: NO NUMBNESS OR TINGLING, MENTATION AT BASELINE    PERTINENT TEST /EXAMS      I have reviewed all available laboratory results.     MEDICATIONS CURRENT     docusate sodium (COLACE) capsule 100 mg Daily PRN   senna (SENOKOT) tablet 8.6 mg Daily PRN   butalbital-acetaminophen-caffeine (FIORICET, ESGIC) per tablet 1 tablet Q4H PRN   albuterol (PROVENTIL) nebulizer solution 2.5 mg As Directed RT PRN   acetaminophen (TYLENOL) tablet 650 mg Q6H PRN   albuterol (PROVENTIL) nebulizer solution 2.5 mg TID   sodium chloride flush 0.9 % injection 10 mL PRN   0.9 % sodium chloride bolus Once   aspirin EC tablet 81 mg Daily   FLUoxetine (PROZAC) capsule 20 mg Daily   [Held by provider] lisinopril-hydrochlorothiazide (PRINZIDE;ZESTORETIC) 10-12.5 MG per tablet 1 tablet Daily   cetirizine (ZYRTEC) tablet 5 mg Daily   [Held by provider] metoprolol tartrate (LOPRESSOR) tablet 50 mg BID   sodium chloride flush 0.9 % injection 10 mL 2 times per day   sodium chloride flush 0.9 % injection 10 mL PRN   magnesium hydroxide (MILK OF MAGNESIA) 400 MG/5ML suspension 30 mL Daily PRN   ondansetron (ZOFRAN) injection 4 mg Q6H PRN   morphine (PF) injection 2 mg Q2H PRN

## 2019-09-22 NOTE — PROGRESS NOTES
[facility]  CDU / OBSERVATION eNCOUnter  Attending NOte       I performed a history and physical examination of the patient and discussed management with the resident. I reviewed the residents note and agree with the documented findings and plan of care. Any areas of disagreement are noted on the chart. I was personally present for the key portions of any procedures. I have documented in the chart those procedures where I was not present during the key portions. I have reviewed the nurses notes. I agree with the chief complaint, past medical history, past surgical history, allergies, medications, social and family history as documented unless otherwise noted below. The Family history, social history, and ROS are effectively unchanged since admission unless noted elsewhere in the chart. To OR tomorrow for cholecystectomy. Patient without medical complaint today.     Danisha Starr MD  Attending Emergency  Physician

## 2019-09-23 ENCOUNTER — ANESTHESIA (OUTPATIENT)
Dept: OPERATING ROOM | Age: 54
DRG: 418 | End: 2019-09-23
Payer: COMMERCIAL

## 2019-09-23 ENCOUNTER — ANESTHESIA EVENT (OUTPATIENT)
Dept: OPERATING ROOM | Age: 54
DRG: 418 | End: 2019-09-23
Payer: COMMERCIAL

## 2019-09-23 VITALS
SYSTOLIC BLOOD PRESSURE: 153 MMHG | TEMPERATURE: 97 F | DIASTOLIC BLOOD PRESSURE: 87 MMHG | OXYGEN SATURATION: 97 % | RESPIRATION RATE: 9 BRPM

## 2019-09-23 PROCEDURE — 0FT44ZZ RESECTION OF GALLBLADDER, PERCUTANEOUS ENDOSCOPIC APPROACH: ICD-10-PCS | Performed by: SURGERY

## 2019-09-23 PROCEDURE — 2500000003 HC RX 250 WO HCPCS: Performed by: STUDENT IN AN ORGANIZED HEALTH CARE EDUCATION/TRAINING PROGRAM

## 2019-09-23 PROCEDURE — 6360000002 HC RX W HCPCS: Performed by: STUDENT IN AN ORGANIZED HEALTH CARE EDUCATION/TRAINING PROGRAM

## 2019-09-23 PROCEDURE — 3600000019 HC SURGERY ROBOT ADDTL 15MIN: Performed by: SURGERY

## 2019-09-23 PROCEDURE — 3700000000 HC ANESTHESIA ATTENDED CARE: Performed by: SURGERY

## 2019-09-23 PROCEDURE — 6360000002 HC RX W HCPCS: Performed by: EMERGENCY MEDICINE

## 2019-09-23 PROCEDURE — 7100000001 HC PACU RECOVERY - ADDTL 15 MIN: Performed by: SURGERY

## 2019-09-23 PROCEDURE — 6360000002 HC RX W HCPCS: Performed by: ANESTHESIOLOGY

## 2019-09-23 PROCEDURE — 6360000002 HC RX W HCPCS

## 2019-09-23 PROCEDURE — 6370000000 HC RX 637 (ALT 250 FOR IP): Performed by: NURSE ANESTHETIST, CERTIFIED REGISTERED

## 2019-09-23 PROCEDURE — 6370000000 HC RX 637 (ALT 250 FOR IP): Performed by: STUDENT IN AN ORGANIZED HEALTH CARE EDUCATION/TRAINING PROGRAM

## 2019-09-23 PROCEDURE — 2580000003 HC RX 258: Performed by: STUDENT IN AN ORGANIZED HEALTH CARE EDUCATION/TRAINING PROGRAM

## 2019-09-23 PROCEDURE — 6360000002 HC RX W HCPCS: Performed by: NURSE ANESTHETIST, CERTIFIED REGISTERED

## 2019-09-23 PROCEDURE — 2500000003 HC RX 250 WO HCPCS: Performed by: NURSE ANESTHETIST, CERTIFIED REGISTERED

## 2019-09-23 PROCEDURE — 0DNW4ZZ RELEASE PERITONEUM, PERCUTANEOUS ENDOSCOPIC APPROACH: ICD-10-PCS | Performed by: SURGERY

## 2019-09-23 PROCEDURE — 2709999900 HC NON-CHARGEABLE SUPPLY: Performed by: SURGERY

## 2019-09-23 PROCEDURE — 3600000009 HC SURGERY ROBOT BASE: Performed by: SURGERY

## 2019-09-23 PROCEDURE — 64488 TAP BLOCK BI INJECTION: CPT | Performed by: ANESTHESIOLOGY

## 2019-09-23 PROCEDURE — 7100000000 HC PACU RECOVERY - FIRST 15 MIN: Performed by: SURGERY

## 2019-09-23 PROCEDURE — 2780000010 HC IMPLANT OTHER: Performed by: SURGERY

## 2019-09-23 PROCEDURE — 2500000003 HC RX 250 WO HCPCS: Performed by: ANESTHESIOLOGY

## 2019-09-23 PROCEDURE — 1200000000 HC SEMI PRIVATE

## 2019-09-23 PROCEDURE — 8E0W4CZ ROBOTIC ASSISTED PROCEDURE OF TRUNK REGION, PERCUTANEOUS ENDOSCOPIC APPROACH: ICD-10-PCS | Performed by: SURGERY

## 2019-09-23 PROCEDURE — S2900 ROBOTIC SURGICAL SYSTEM: HCPCS | Performed by: SURGERY

## 2019-09-23 PROCEDURE — 2580000003 HC RX 258: Performed by: SURGERY

## 2019-09-23 PROCEDURE — 2720000010 HC SURG SUPPLY STERILE: Performed by: SURGERY

## 2019-09-23 PROCEDURE — 3700000001 HC ADD 15 MINUTES (ANESTHESIA): Performed by: SURGERY

## 2019-09-23 PROCEDURE — 88304 TISSUE EXAM BY PATHOLOGIST: CPT

## 2019-09-23 DEVICE — Z DUP USE 2641840 CLIP INT L POLYMER LOK LIG HEM O LOK: Type: IMPLANTABLE DEVICE | Status: FUNCTIONAL

## 2019-09-23 RX ORDER — LIDOCAINE HYDROCHLORIDE 10 MG/ML
INJECTION, SOLUTION EPIDURAL; INFILTRATION; INTRACAUDAL; PERINEURAL PRN
Status: DISCONTINUED | OUTPATIENT
Start: 2019-09-23 | End: 2019-09-23 | Stop reason: SDUPTHER

## 2019-09-23 RX ORDER — FENTANYL CITRATE 50 UG/ML
INJECTION, SOLUTION INTRAMUSCULAR; INTRAVENOUS PRN
Status: DISCONTINUED | OUTPATIENT
Start: 2019-09-23 | End: 2019-09-23 | Stop reason: SDUPTHER

## 2019-09-23 RX ORDER — ONDANSETRON 2 MG/ML
INJECTION INTRAMUSCULAR; INTRAVENOUS PRN
Status: DISCONTINUED | OUTPATIENT
Start: 2019-09-23 | End: 2019-09-23 | Stop reason: SDUPTHER

## 2019-09-23 RX ORDER — ONDANSETRON 4 MG/1
4 TABLET, ORALLY DISINTEGRATING ORAL EVERY 8 HOURS PRN
Qty: 30 TABLET | Refills: 0 | Status: SHIPPED | OUTPATIENT
Start: 2019-09-23 | End: 2019-10-03

## 2019-09-23 RX ORDER — BUPIVACAINE HYDROCHLORIDE 5 MG/ML
INJECTION, SOLUTION EPIDURAL; INTRACAUDAL
Status: COMPLETED | OUTPATIENT
Start: 2019-09-23 | End: 2019-09-23

## 2019-09-23 RX ORDER — FENTANYL CITRATE 50 UG/ML
INJECTION, SOLUTION INTRAMUSCULAR; INTRAVENOUS
Status: COMPLETED
Start: 2019-09-23 | End: 2019-09-23

## 2019-09-23 RX ORDER — MIDAZOLAM HYDROCHLORIDE 1 MG/ML
INJECTION INTRAMUSCULAR; INTRAVENOUS
Status: COMPLETED
Start: 2019-09-23 | End: 2019-09-23

## 2019-09-23 RX ORDER — FENTANYL CITRATE 50 UG/ML
100 INJECTION, SOLUTION INTRAMUSCULAR; INTRAVENOUS ONCE
Status: COMPLETED | OUTPATIENT
Start: 2019-09-23 | End: 2019-09-23

## 2019-09-23 RX ORDER — PROPOFOL 10 MG/ML
INJECTION, EMULSION INTRAVENOUS PRN
Status: DISCONTINUED | OUTPATIENT
Start: 2019-09-23 | End: 2019-09-23 | Stop reason: SDUPTHER

## 2019-09-23 RX ORDER — KETOROLAC TROMETHAMINE 30 MG/ML
INJECTION, SOLUTION INTRAMUSCULAR; INTRAVENOUS PRN
Status: DISCONTINUED | OUTPATIENT
Start: 2019-09-23 | End: 2019-09-23 | Stop reason: SDUPTHER

## 2019-09-23 RX ORDER — PSEUDOEPHEDRINE HCL 30 MG
100 TABLET ORAL DAILY
Qty: 30 CAPSULE | Refills: 0 | Status: SHIPPED | OUTPATIENT
Start: 2019-09-23 | End: 2019-10-23

## 2019-09-23 RX ORDER — NEOSTIGMINE METHYLSULFATE 5 MG/5 ML
SYRINGE (ML) INTRAVENOUS PRN
Status: DISCONTINUED | OUTPATIENT
Start: 2019-09-23 | End: 2019-09-23 | Stop reason: SDUPTHER

## 2019-09-23 RX ORDER — OXYCODONE HYDROCHLORIDE 5 MG/1
5 TABLET ORAL EVERY 6 HOURS PRN
Qty: 28 TABLET | Refills: 0 | Status: SHIPPED | OUTPATIENT
Start: 2019-09-23 | End: 2019-09-30

## 2019-09-23 RX ORDER — GLYCOPYRROLATE 1 MG/5 ML
SYRINGE (ML) INTRAVENOUS PRN
Status: DISCONTINUED | OUTPATIENT
Start: 2019-09-23 | End: 2019-09-23 | Stop reason: SDUPTHER

## 2019-09-23 RX ORDER — BUPIVACAINE HYDROCHLORIDE 5 MG/ML
30 INJECTION, SOLUTION EPIDURAL; INTRACAUDAL ONCE
Status: COMPLETED | OUTPATIENT
Start: 2019-09-23 | End: 2019-09-23

## 2019-09-23 RX ORDER — INDOCYANINE GREEN AND WATER 25 MG
5 KIT INJECTION
Status: DISCONTINUED | OUTPATIENT
Start: 2019-09-23 | End: 2019-09-23

## 2019-09-23 RX ORDER — ALBUTEROL SULFATE 2.5 MG/3ML
2.5 SOLUTION RESPIRATORY (INHALATION) EVERY 6 HOURS PRN
Status: DISCONTINUED | OUTPATIENT
Start: 2019-09-23 | End: 2019-09-24 | Stop reason: HOSPADM

## 2019-09-23 RX ORDER — MAGNESIUM HYDROXIDE 1200 MG/15ML
LIQUID ORAL CONTINUOUS PRN
Status: COMPLETED | OUTPATIENT
Start: 2019-09-23 | End: 2019-09-23

## 2019-09-23 RX ORDER — ROCURONIUM BROMIDE 10 MG/ML
INJECTION, SOLUTION INTRAVENOUS PRN
Status: DISCONTINUED | OUTPATIENT
Start: 2019-09-23 | End: 2019-09-23 | Stop reason: SDUPTHER

## 2019-09-23 RX ORDER — MIDAZOLAM HYDROCHLORIDE 1 MG/ML
2 INJECTION INTRAMUSCULAR; INTRAVENOUS ONCE
Status: COMPLETED | OUTPATIENT
Start: 2019-09-23 | End: 2019-09-23

## 2019-09-23 RX ORDER — ALBUTEROL SULFATE 90 UG/1
AEROSOL, METERED RESPIRATORY (INHALATION) PRN
Status: DISCONTINUED | OUTPATIENT
Start: 2019-09-23 | End: 2019-09-23 | Stop reason: SDUPTHER

## 2019-09-23 RX ADMIN — FENTANYL CITRATE 100 MCG: 50 INJECTION, SOLUTION INTRAMUSCULAR; INTRAVENOUS at 10:43

## 2019-09-23 RX ADMIN — MORPHINE SULFATE 2 MG: 2 INJECTION, SOLUTION INTRAMUSCULAR; INTRAVENOUS at 06:07

## 2019-09-23 RX ADMIN — HYDROMORPHONE HYDROCHLORIDE 0.5 MG: 1 INJECTION, SOLUTION INTRAMUSCULAR; INTRAVENOUS; SUBCUTANEOUS at 13:50

## 2019-09-23 RX ADMIN — ALBUTEROL SULFATE 3 PUFF: 90 AEROSOL, METERED RESPIRATORY (INHALATION) at 13:35

## 2019-09-23 RX ADMIN — BUTALBITAL, ACETAMINOPHEN AND CAFFEINE 1 TABLET: 50; 325; 40 TABLET ORAL at 23:01

## 2019-09-23 RX ADMIN — INDOCYANINE GREEN AND WATER 5 MG: KIT at 10:54

## 2019-09-23 RX ADMIN — LIDOCAINE HYDROCHLORIDE 50 MG: 10 INJECTION, SOLUTION EPIDURAL; INFILTRATION; INTRACAUDAL; PERINEURAL at 11:55

## 2019-09-23 RX ADMIN — PROPOFOL 150 MG: 10 INJECTION, EMULSION INTRAVENOUS at 11:55

## 2019-09-23 RX ADMIN — MORPHINE SULFATE 2 MG: 2 INJECTION, SOLUTION INTRAMUSCULAR; INTRAVENOUS at 20:04

## 2019-09-23 RX ADMIN — MIDAZOLAM HYDROCHLORIDE 2 MG: 1 INJECTION, SOLUTION INTRAMUSCULAR; INTRAVENOUS at 10:43

## 2019-09-23 RX ADMIN — MORPHINE SULFATE 2 MG: 2 INJECTION, SOLUTION INTRAMUSCULAR; INTRAVENOUS at 22:01

## 2019-09-23 RX ADMIN — FENTANYL CITRATE 100 MCG: 50 INJECTION INTRAMUSCULAR; INTRAVENOUS at 11:55

## 2019-09-23 RX ADMIN — PHENYLEPHRINE HYDROCHLORIDE 100 MCG: 10 INJECTION INTRAVENOUS at 12:38

## 2019-09-23 RX ADMIN — ONDANSETRON 4 MG: 2 INJECTION, SOLUTION INTRAMUSCULAR; INTRAVENOUS at 13:24

## 2019-09-23 RX ADMIN — MORPHINE SULFATE 2 MG: 2 INJECTION, SOLUTION INTRAMUSCULAR; INTRAVENOUS at 18:04

## 2019-09-23 RX ADMIN — Medication 40 ML: at 10:47

## 2019-09-23 RX ADMIN — Medication 0.6 MG: at 13:36

## 2019-09-23 RX ADMIN — ALBUTEROL SULFATE 2.5 MG: 2.5 SOLUTION RESPIRATORY (INHALATION) at 08:25

## 2019-09-23 RX ADMIN — MORPHINE SULFATE 2 MG: 2 INJECTION, SOLUTION INTRAMUSCULAR; INTRAVENOUS at 03:24

## 2019-09-23 RX ADMIN — Medication 2 G: at 12:10

## 2019-09-23 RX ADMIN — ROCURONIUM BROMIDE 50 MG: 10 INJECTION INTRAVENOUS at 11:55

## 2019-09-23 RX ADMIN — BUPIVACAINE HYDROCHLORIDE 40 ML: 5 INJECTION, SOLUTION EPIDURAL; INTRACAUDAL; PERINEURAL at 10:48

## 2019-09-23 RX ADMIN — MORPHINE SULFATE 2 MG: 2 INJECTION, SOLUTION INTRAMUSCULAR; INTRAVENOUS at 14:50

## 2019-09-23 RX ADMIN — FENTANYL CITRATE 100 MCG: 50 INJECTION INTRAMUSCULAR; INTRAVENOUS at 10:43

## 2019-09-23 RX ADMIN — KETOROLAC TROMETHAMINE 30 MG: 30 INJECTION, SOLUTION INTRAMUSCULAR; INTRAVENOUS at 13:24

## 2019-09-23 RX ADMIN — PHENYLEPHRINE HYDROCHLORIDE 100 MCG: 10 INJECTION INTRAVENOUS at 12:15

## 2019-09-23 RX ADMIN — MIDAZOLAM HYDROCHLORIDE 2 MG: 1 INJECTION INTRAMUSCULAR; INTRAVENOUS at 10:43

## 2019-09-23 RX ADMIN — SODIUM CHLORIDE: 9 INJECTION, SOLUTION INTRAVENOUS at 13:07

## 2019-09-23 RX ADMIN — Medication 3 MG: at 13:36

## 2019-09-23 RX ADMIN — HYDROMORPHONE HYDROCHLORIDE 0.5 MG: 1 INJECTION, SOLUTION INTRAMUSCULAR; INTRAVENOUS; SUBCUTANEOUS at 13:55

## 2019-09-23 ASSESSMENT — PULMONARY FUNCTION TESTS
PIF_VALUE: 25
PIF_VALUE: 19
PIF_VALUE: 14
PIF_VALUE: 26
PIF_VALUE: 25
PIF_VALUE: 27
PIF_VALUE: 26
PIF_VALUE: 23
PIF_VALUE: 24
PIF_VALUE: 22
PIF_VALUE: 23
PIF_VALUE: 26
PIF_VALUE: 0
PIF_VALUE: 25
PIF_VALUE: 33
PIF_VALUE: 23
PIF_VALUE: 22
PIF_VALUE: 25
PIF_VALUE: 26
PIF_VALUE: 34
PIF_VALUE: 0
PIF_VALUE: 26
PIF_VALUE: 20
PIF_VALUE: 29
PIF_VALUE: 25
PIF_VALUE: 27
PIF_VALUE: 24
PIF_VALUE: 25
PIF_VALUE: 25
PIF_VALUE: 33
PIF_VALUE: 20
PIF_VALUE: 21
PIF_VALUE: 30
PIF_VALUE: 25
PIF_VALUE: 21
PIF_VALUE: 2
PIF_VALUE: 32
PIF_VALUE: 27
PIF_VALUE: 23
PIF_VALUE: 20
PIF_VALUE: 26
PIF_VALUE: 23
PIF_VALUE: 26
PIF_VALUE: 25
PIF_VALUE: 2
PIF_VALUE: 32
PIF_VALUE: 32
PIF_VALUE: 28
PIF_VALUE: 25
PIF_VALUE: 26
PIF_VALUE: 26
PIF_VALUE: 25
PIF_VALUE: 25
PIF_VALUE: 24
PIF_VALUE: 25
PIF_VALUE: 26
PIF_VALUE: 5
PIF_VALUE: 26
PIF_VALUE: 20
PIF_VALUE: 32
PIF_VALUE: 26
PIF_VALUE: 21
PIF_VALUE: 25
PIF_VALUE: 32
PIF_VALUE: 25
PIF_VALUE: 35
PIF_VALUE: 20
PIF_VALUE: 20
PIF_VALUE: 25
PIF_VALUE: 25
PIF_VALUE: 26
PIF_VALUE: 0
PIF_VALUE: 25
PIF_VALUE: 25
PIF_VALUE: 23
PIF_VALUE: 22
PIF_VALUE: 33
PIF_VALUE: 25
PIF_VALUE: 26
PIF_VALUE: 25
PIF_VALUE: 21
PIF_VALUE: 27
PIF_VALUE: 28
PIF_VALUE: 23
PIF_VALUE: 27
PIF_VALUE: 20
PIF_VALUE: 25
PIF_VALUE: 25
PIF_VALUE: 3
PIF_VALUE: 25
PIF_VALUE: 23
PIF_VALUE: 20
PIF_VALUE: 23
PIF_VALUE: 24
PIF_VALUE: 3
PIF_VALUE: 3
PIF_VALUE: 25
PIF_VALUE: 25
PIF_VALUE: 26
PIF_VALUE: 3
PIF_VALUE: 25
PIF_VALUE: 25
PIF_VALUE: 1
PIF_VALUE: 19
PIF_VALUE: 25
PIF_VALUE: 28
PIF_VALUE: 25
PIF_VALUE: 21
PIF_VALUE: 26
PIF_VALUE: 25
PIF_VALUE: 27

## 2019-09-23 ASSESSMENT — PAIN DESCRIPTION - PAIN TYPE
TYPE: SURGICAL PAIN
TYPE: SURGICAL PAIN
TYPE: ACUTE PAIN

## 2019-09-23 ASSESSMENT — PAIN SCALES - GENERAL
PAINLEVEL_OUTOF10: 0
PAINLEVEL_OUTOF10: 5
PAINLEVEL_OUTOF10: 9
PAINLEVEL_OUTOF10: 3
PAINLEVEL_OUTOF10: 0
PAINLEVEL_OUTOF10: 3
PAINLEVEL_OUTOF10: 6
PAINLEVEL_OUTOF10: 4
PAINLEVEL_OUTOF10: 9
PAINLEVEL_OUTOF10: 9
PAINLEVEL_OUTOF10: 6
PAINLEVEL_OUTOF10: 9
PAINLEVEL_OUTOF10: 8
PAINLEVEL_OUTOF10: 5
PAINLEVEL_OUTOF10: 8
PAINLEVEL_OUTOF10: 5

## 2019-09-23 ASSESSMENT — PAIN DESCRIPTION - LOCATION
LOCATION: HEAD
LOCATION: ABDOMEN

## 2019-09-23 ASSESSMENT — PAIN - FUNCTIONAL ASSESSMENT: PAIN_FUNCTIONAL_ASSESSMENT: 0-10

## 2019-09-23 NOTE — PROGRESS NOTES
Surgery Progress Note            PATIENT NAME: Kwasi Madera     TODAY'S DATE: 9/23/2019, 7:56 AM    SUBJECTIVE:    Pt seen and examined. Clinically improving. Abd tenderness improved. Panc enzymes normal.     OBJECTIVE:   VITALS:  BP (!) 154/86   Pulse 65   Temp 98.1 °F (36.7 °C) (Oral)   Resp 18   Ht 5' 1\" (1.549 m)   Wt 133 lb 2.5 oz (60.4 kg)   SpO2 96%   BMI 25.16 kg/m²      INTAKE/OUTPUT:      Intake/Output Summary (Last 24 hours) at 9/23/2019 0756  Last data filed at 9/23/2019 0601  Gross per 24 hour   Intake 2883 ml   Output --   Net 2883 ml       PHYSICAL EXAM  General Appearance:  awake, alert, oriented, in no acute distress  Lungs:  Normal expansion. Clear to auscultation. No rales, rhonchi, or wheezing. Heart:  Heart regular rate and rhythm  Abdomen: Soft, nondistended, improved epigastric tenderness without guarding, no peritoneal signs  Extremities: Extremities warm to touch, pink, with no edema. Musculoskeletal:  Range of motion normal in hips, knees, shoulders, and spine.          Data:  CBC with Differential:    Lab Results   Component Value Date    WBC 6.4 09/22/2019    RBC 4.00 09/22/2019    RBC 5.06 03/18/2012    HGB 10.6 09/22/2019    HCT 34.7 09/22/2019     09/22/2019     03/18/2012    MCV 86.8 09/22/2019    MCH 26.5 09/22/2019    MCHC 30.5 09/22/2019    RDW 14.1 09/22/2019    LYMPHOPCT 34 09/22/2019    MONOPCT 8 09/22/2019    BASOPCT 1 09/22/2019    MONOSABS 0.48 09/22/2019    LYMPHSABS 2.18 09/22/2019    EOSABS 0.14 09/22/2019    BASOSABS 0.03 09/22/2019    DIFFTYPE NOT REPORTED 09/22/2019     BMP:    Lab Results   Component Value Date     09/19/2019    K 4.1 09/19/2019     09/19/2019    CO2 23 09/19/2019    BUN 15 09/19/2019    LABALBU 2.9 09/22/2019    CREATININE 0.77 09/19/2019    CALCIUM 8.0 09/19/2019    GFRAA >60 09/19/2019    LABGLOM >60 09/19/2019    GLUCOSE 88 09/19/2019    GLUCOSE 107 03/18/2012     Hepatic Function Panel:    Lab Results Component Value Date    ALKPHOS 78 09/22/2019    ALT 12 09/22/2019    AST 15 09/22/2019    PROT 5.2 09/22/2019    BILITOT 0.23 09/22/2019    BILIDIR <0.08 09/22/2019    IBILI CANNOT BE CALCULATED 09/22/2019    LABALBU 2.9 09/22/2019       Radiology Review:      ASSESSMENT   1. Gallstone pancreatitis    Plan  1. OR today for lap kerry  2. Consent obtained  3. Will order pre op ICG. Please don't give until pre op. Electronically signed by Melissa Aguilar DO  on 9/23/2019 at 7:56 AM     I have reviewed the resident's note and I either performed the key elements of the medical history and physical exam or was present with the resident when the key elements of the medical history and physical exam were performed. I have discussed the findings, established the care plan and recommendations with Resident.     Electronically signed by Ellin Gottron, DO on 9/24/19 at 2:02 PM

## 2019-09-23 NOTE — PROGRESS NOTES
[facility], [facility] Assessment complete. Pancreatitis, unspecified pancreatitis type [K85.90]  Pancreatitis, unspecified pancreatitis type [K85.90] . Vitals:    09/23/19 0810   BP: (!) 170/84   Pulse: 65   Resp: 16   Temp: 98.2 °F (36.8 °C)   SpO2: 93%   . Patients home meds are   Prior to Admission medications    Medication Sig Start Date End Date Taking?  Authorizing Provider   FLUoxetine (PROZAC) 20 MG capsule TAKE ONE CAPSULE BY MOUTH DAILY  Patient taking differently: Take 20 mg by mouth 2 times daily TAKE ONE CAPSULE BY MOUTH DAILY 9/9/19  Yes Raul Hernandez MD   loratadine (CLARITIN) 10 MG tablet TAKE ONE TABLET BY MOUTH DAILY 8/5/19  Yes Raul Hernandez MD   lisinopril-hydrochlorothiazide (PRINZIDE;ZESTORETIC) 10-12.5 MG per tablet Take 1 tablet by mouth daily 7/3/19  Yes Raul Hernandez MD   metoprolol tartrate (LOPRESSOR) 50 MG tablet Take 1 tablet by mouth 2 times daily 7/3/19  Yes Raul Hernandez MD   aspirin EC 81 MG EC tablet Take 1 tablet by mouth daily 10/24/18  Yes Malena Kasper MD   albuterol sulfate HFA (VENTOLIN HFA) 108 (90 BASE) MCG/ACT inhaler Inhale 2 puffs into the lungs every 6 hours as needed for Wheezing 9/14/16  Yes IRENE Abdi - CNP   Elastic Bandages & Supports (WRIST SPLINT/COCK-UP/RIGHT M) MISC Use daily 7/25/19   Jailene Villanueva MD   Elastic Bandages & Supports (WRIST SPLINT/ELASTIC LEFT MED) MISC Use daily 7/25/19   Jailene Villanueva MD   .  Recent Surgical History: None = 0     Assessment     Peak Flow (asthma only)    Predicted: na  Personal Best: na  PEF na  % Predicted na  Peak Flow : not applicable = 0    ZLX3/SVV    FEV1 Predicted na      FEV1 na    FEV1 % Predicted na  FVC na  IS volume na  IBW na    RR 16  Breath Sounds: clear      · Bronchodilator assessment at level  0  · Hyperinflation assessment at level   · Secretion Management assessment at level    ·   · []    Bronchodilator Assessment  BRONCHODILATOR ASSESSMENT SCORE  Score 0 1 2 3 4 5   Breath

## 2019-09-23 NOTE — OP NOTE
the abdomen was inspected to identify any injuries during placement of the umbilicus port. No injuries were noted. The 8 mm port was then placed in the right midclavicular line under direct observation. Laparoscopic lysis of adhesions were then completed due to the extensive nature of the patient's intra-abdominal adhesions. The adhesions were taken down around the umbilical area where the umbilical port would be placed. Once there was adequate lysis of adhesions to open up the area for the umbilical port a 12 mm port was placed just superior to the umbilicus under direct observation. The patient was placed in reverse Trendelenburg position with the right side up. The [employer] machine was then docked and a camera placed without complication. A monopolar hook was then placed in the right arm and a fenestrated bipolar grasper was placed in the left arm. The gallbladder was distended with an edematous wall. Filmy adhesions between the gallbladder and the omentum were lysed carefully with electrocautery. The gallbladder was very large and edematous and was difficult to grasp. An aspiration needle was placed through the right sided assistance port into the gallbladder and 50 mL of bile was aspirated from the gallbladder. This made the gallbladder soft enough to be able to grasp it. The fundus of the gallbladder was grasped and directed towards the patient's right shoulder. The infundibulum of the gallbladder was grasped with a bipolar grasper. The gallbladder was positioned so that the cystic duct was at a right angle to the common bile duct in order to expose Calot's triangle. The gallbladder was edematous and the patient had a chronic cholecystitis. There were multiple adhesions surrounding the gallbladder which were all taken down with meticulous dissection using hook cautery. The peritoneum between the gallbladder and the liver was taken down by electrocautery to further mobilize the triangle.

## 2019-09-23 NOTE — ANESTHESIA PRE PROCEDURE
Department of Anesthesiology  Preprocedure Note       Name:  Riya Truong   Age:  48 y.o.  :  1965                                          MRN:  [id number]         Date:  2019      Surgeon: Brianda Domingo):  Jacques Gifford IV, DO    Procedure: *ADD-ON JACQUI Haile LAPAROSCOPIC ROBOTIC (N/A )    Medications prior to admission:   Prior to Admission medications    Medication Sig Start Date End Date Taking?  Authorizing Provider   FLUoxetine (PROZAC) 20 MG capsule TAKE ONE CAPSULE BY MOUTH DAILY  Patient taking differently: Take 20 mg by mouth 2 times daily TAKE ONE CAPSULE BY MOUTH DAILY 19  Yes Sheba Peace MD   loratadine (CLARITIN) 10 MG tablet TAKE ONE TABLET BY MOUTH DAILY 19  Yes Sheba Peace MD   lisinopril-hydrochlorothiazide (PRINZIDE;ZESTORETIC) 10-12.5 MG per tablet Take 1 tablet by mouth daily 7/3/19  Yes Sheba Peace MD   metoprolol tartrate (LOPRESSOR) 50 MG tablet Take 1 tablet by mouth 2 times daily 7/3/19  Yes Sheba Peace MD   aspirin EC 81 MG EC tablet Take 1 tablet by mouth daily 10/24/18  Yes Lakisha Porter MD   albuterol sulfate HFA (VENTOLIN HFA) 108 (90 BASE) MCG/ACT inhaler Inhale 2 puffs into the lungs every 6 hours as needed for Wheezing 16  Yes IRENE Ceballos - CNP   Elastic Bandages & Supports (WRIST SPLINT/COCK-UP/RIGHT M) MISC Use daily 19   Nakia Mondragon MD   Elastic Bandages & Supports (WRIST SPLINT/ELASTIC LEFT MED) MISC Use daily 19   Nakia Mondragon MD       Current medications:    Current Facility-Administered Medications   Medication Dose Route Frequency Provider Last Rate Last Dose    [MAR Hold] indocyanine green (IC-GREEN) syringe 5 mg  5 mg Intravenous 27 Min Pre-Op Misael Carrion.,         [facility] ceFAZolin (ANCEF) 2 g in dextrose 5 % 50 mL IVPB  2 g Intravenous On Call to [address] [place] [address]., DO        [MAR Hold] docusate sodium (COLACE) capsule 100 mg  100 mg Oral Daily PRN Nestor Moscoso DO

## 2019-09-23 NOTE — PROGRESS NOTES
[id number] Dr Kelsie Keller to the bedside, time out performed, Pt monitored, 02, Piyush Tap nerve block completed using Bupivacaine, 0.5% 20 ml to each side,  pt tolerated procedure well,  Site CDI, (see charting) Versed Given: 2 mg  Fentanyl  100 mcg, pt denies co pain or discomfort, family back to the bedside, vss.

## 2019-09-23 NOTE — ANESTHESIA PROCEDURE NOTES
Peripheral Block    Patient location during procedure: pre-op  Start time: 9/23/2019 10:43 AM  End time: 9/23/2019 10:47 AM  Staffing  Anesthesiologist: Renetta Higuera MD  Performed: anesthesiologist   Preanesthetic Checklist  Completed: patient identified, site marked, surgical consent, pre-op evaluation, timeout performed, IV checked, risks and benefits discussed, monitors and equipment checked, anesthesia consent given, oxygen available and patient being monitored  Peripheral Block  Patient position: supine  Prep: ChloraPrep  Patient monitoring: cardiac monitor, continuous pulse ox, frequent blood pressure checks and IV access  Block type: TAP  Laterality: bilateral  Injection technique: single-shot  Procedures: ultrasound guided  Local infiltration: lidocaine  Infiltration strength: 1 %  Dose: 3 mL  Provider prep: mask and sterile gloves  Local infiltration: lidocaine  Needle  Needle gauge: 21 G  Needle length: 10 cm  Needle localization: ultrasound guidance  Test dose: negative  Assessment  Injection assessment: negative aspiration for heme, no paresthesia on injection and local visualized surrounding nerve on ultrasound  Paresthesia pain: none  Slow fractionated injection: yes  Hemodynamics: stable  Additional Notes  U/S [id number].  (1) Under ultrasound guidance, a 21 gauge needle was inserted and placed in close proximity to the TAP nerves.  (2) Ultrasound was also used to visualize the spread of the anesthetic in close proximity to the nerve being blocked. (3) The nerve appeared anatomically normal, and (4 there were no apparent abnormal pathological findings on the image that were readily visible and related to the nerve being blocked. (5) A permanent ultrasound image was saved in the patient's record.           Reason for block: post-op pain management and at surgeon's request

## 2019-09-24 VITALS
BODY MASS INDEX: 25.14 KG/M2 | SYSTOLIC BLOOD PRESSURE: 132 MMHG | RESPIRATION RATE: 17 BRPM | TEMPERATURE: 97.8 F | OXYGEN SATURATION: 96 % | HEIGHT: 61 IN | DIASTOLIC BLOOD PRESSURE: 73 MMHG | HEART RATE: 68 BPM | WEIGHT: 133.16 LBS

## 2019-09-24 LAB
ABSOLUTE EOS #: 0.28 K/UL (ref 0–0.44)
ABSOLUTE IMMATURE GRANULOCYTE: <0.03 K/UL (ref 0–0.3)
ABSOLUTE LYMPH #: 1.79 K/UL (ref 1.1–3.7)
ABSOLUTE MONO #: 0.6 K/UL (ref 0.1–1.2)
ALBUMIN SERPL-MCNC: 3.3 G/DL (ref 3.5–5.2)
ALBUMIN/GLOBULIN RATIO: 1.2 (ref 1–2.5)
ALP BLD-CCNC: 101 U/L (ref 35–104)
ALT SERPL-CCNC: 19 U/L (ref 5–33)
ANION GAP SERPL CALCULATED.3IONS-SCNC: 9 MMOL/L (ref 9–17)
AST SERPL-CCNC: 35 U/L
BASOPHILS # BLD: 0 % (ref 0–2)
BASOPHILS ABSOLUTE: 0.03 K/UL (ref 0–0.2)
BILIRUB SERPL-MCNC: 0.28 MG/DL (ref 0.3–1.2)
BILIRUBIN DIRECT: 0.08 MG/DL
BILIRUBIN, INDIRECT: 0.2 MG/DL (ref 0–1)
BUN BLDV-MCNC: 4 MG/DL (ref 6–20)
BUN/CREAT BLD: ABNORMAL (ref 9–20)
CALCIUM SERPL-MCNC: 8.4 MG/DL (ref 8.6–10.4)
CHLORIDE BLD-SCNC: 108 MMOL/L (ref 98–107)
CO2: 23 MMOL/L (ref 20–31)
CREAT SERPL-MCNC: 0.66 MG/DL (ref 0.5–0.9)
DIFFERENTIAL TYPE: ABNORMAL
EOSINOPHILS RELATIVE PERCENT: 4 % (ref 1–4)
GFR AFRICAN AMERICAN: >60 ML/MIN
GFR NON-AFRICAN AMERICAN: >60 ML/MIN
GFR SERPL CREATININE-BSD FRML MDRD: ABNORMAL ML/MIN/{1.73_M2}
GFR SERPL CREATININE-BSD FRML MDRD: ABNORMAL ML/MIN/{1.73_M2}
GLOBULIN: ABNORMAL G/DL (ref 1.5–3.8)
GLUCOSE BLD-MCNC: 96 MG/DL (ref 70–99)
HCT VFR BLD CALC: 37 % (ref 36.3–47.1)
HEMOGLOBIN: 11.8 G/DL (ref 11.9–15.1)
IMMATURE GRANULOCYTES: 0 %
LIPASE: 42 U/L (ref 13–60)
LYMPHOCYTES # BLD: 22 % (ref 24–43)
MCH RBC QN AUTO: 27.5 PG (ref 25.2–33.5)
MCHC RBC AUTO-ENTMCNC: 31.9 G/DL (ref 28.4–34.8)
MCV RBC AUTO: 86.2 FL (ref 82.6–102.9)
MONOCYTES # BLD: 8 % (ref 3–12)
NRBC AUTOMATED: 0 PER 100 WBC
PDW BLD-RTO: 14.1 % (ref 11.8–14.4)
PLATELET # BLD: 224 K/UL (ref 138–453)
PLATELET ESTIMATE: ABNORMAL
PMV BLD AUTO: 10.3 FL (ref 8.1–13.5)
POTASSIUM SERPL-SCNC: 4.4 MMOL/L (ref 3.7–5.3)
RBC # BLD: 4.29 M/UL (ref 3.95–5.11)
RBC # BLD: ABNORMAL 10*6/UL
SEG NEUTROPHILS: 66 % (ref 36–65)
SEGMENTED NEUTROPHILS ABSOLUTE COUNT: 5.29 K/UL (ref 1.5–8.1)
SODIUM BLD-SCNC: 140 MMOL/L (ref 135–144)
SURGICAL PATHOLOGY REPORT: NORMAL
TOTAL PROTEIN: 6.1 G/DL (ref 6.4–8.3)
WBC # BLD: 8 K/UL (ref 3.5–11.3)
WBC # BLD: ABNORMAL 10*3/UL

## 2019-09-24 PROCEDURE — 6370000000 HC RX 637 (ALT 250 FOR IP): Performed by: GENERAL PRACTICE

## 2019-09-24 PROCEDURE — 80048 BASIC METABOLIC PNL TOTAL CA: CPT

## 2019-09-24 PROCEDURE — 6360000002 HC RX W HCPCS: Performed by: STUDENT IN AN ORGANIZED HEALTH CARE EDUCATION/TRAINING PROGRAM

## 2019-09-24 PROCEDURE — 83690 ASSAY OF LIPASE: CPT

## 2019-09-24 PROCEDURE — 2700000000 HC OXYGEN THERAPY PER DAY

## 2019-09-24 PROCEDURE — 6370000000 HC RX 637 (ALT 250 FOR IP): Performed by: EMERGENCY MEDICINE

## 2019-09-24 PROCEDURE — 36415 COLL VENOUS BLD VENIPUNCTURE: CPT

## 2019-09-24 PROCEDURE — 80076 HEPATIC FUNCTION PANEL: CPT

## 2019-09-24 PROCEDURE — 94761 N-INVAS EAR/PLS OXIMETRY MLT: CPT

## 2019-09-24 PROCEDURE — 6370000000 HC RX 637 (ALT 250 FOR IP): Performed by: STUDENT IN AN ORGANIZED HEALTH CARE EDUCATION/TRAINING PROGRAM

## 2019-09-24 PROCEDURE — 85025 COMPLETE CBC W/AUTO DIFF WBC: CPT

## 2019-09-24 PROCEDURE — 94618 PULMONARY STRESS TESTING: CPT

## 2019-09-24 RX ORDER — HYDROCODONE BITARTRATE AND ACETAMINOPHEN 5; 325 MG/1; MG/1
2 TABLET ORAL EVERY 4 HOURS PRN
Status: DISCONTINUED | OUTPATIENT
Start: 2019-09-24 | End: 2019-09-24 | Stop reason: HOSPADM

## 2019-09-24 RX ORDER — HYDRALAZINE HYDROCHLORIDE 20 MG/ML
5 INJECTION INTRAMUSCULAR; INTRAVENOUS ONCE
Status: COMPLETED | OUTPATIENT
Start: 2019-09-24 | End: 2019-09-24

## 2019-09-24 RX ORDER — HYDROCODONE BITARTRATE AND ACETAMINOPHEN 5; 325 MG/1; MG/1
1 TABLET ORAL EVERY 4 HOURS PRN
Status: DISCONTINUED | OUTPATIENT
Start: 2019-09-24 | End: 2019-09-24 | Stop reason: HOSPADM

## 2019-09-24 RX ADMIN — HYDRALAZINE HYDROCHLORIDE 5 MG: 20 INJECTION INTRAMUSCULAR; INTRAVENOUS at 05:44

## 2019-09-24 RX ADMIN — SENNOSIDES 8.6 MG: 8.6 TABLET, FILM COATED ORAL at 08:02

## 2019-09-24 RX ADMIN — FLUOXETINE HYDROCHLORIDE 20 MG: 20 CAPSULE ORAL at 08:02

## 2019-09-24 RX ADMIN — HYDROCODONE BITARTRATE AND ACETAMINOPHEN 2 TABLET: 5; 325 TABLET ORAL at 08:02

## 2019-09-24 RX ADMIN — BUTALBITAL, ACETAMINOPHEN AND CAFFEINE 1 TABLET: 50; 325; 40 TABLET ORAL at 05:02

## 2019-09-24 RX ADMIN — HYDROCODONE BITARTRATE AND ACETAMINOPHEN 2 TABLET: 5; 325 TABLET ORAL at 12:05

## 2019-09-24 RX ADMIN — HYDROCODONE BITARTRATE AND ACETAMINOPHEN 2 TABLET: 5; 325 TABLET ORAL at 16:20

## 2019-09-24 RX ADMIN — MORPHINE SULFATE 2 MG: 2 INJECTION, SOLUTION INTRAMUSCULAR; INTRAVENOUS at 02:16

## 2019-09-24 RX ADMIN — MORPHINE SULFATE 2 MG: 2 INJECTION, SOLUTION INTRAMUSCULAR; INTRAVENOUS at 06:24

## 2019-09-24 RX ADMIN — MORPHINE SULFATE 2 MG: 2 INJECTION, SOLUTION INTRAMUSCULAR; INTRAVENOUS at 00:20

## 2019-09-24 RX ADMIN — CETIRIZINE HYDROCHLORIDE 5 MG: 10 TABLET ORAL at 08:02

## 2019-09-24 RX ADMIN — FAMOTIDINE 20 MG: 20 TABLET, FILM COATED ORAL at 08:49

## 2019-09-24 RX ADMIN — MORPHINE SULFATE 2 MG: 2 INJECTION, SOLUTION INTRAMUSCULAR; INTRAVENOUS at 04:26

## 2019-09-24 RX ADMIN — Medication 81 MG: at 08:02

## 2019-09-24 RX ADMIN — LISINOPRIL AND HYDROCHLOROTHIAZIDE 1 TABLET: 12.5; 1 TABLET ORAL at 08:50

## 2019-09-24 RX ADMIN — METOPROLOL TARTRATE 50 MG: 50 TABLET, FILM COATED ORAL at 08:49

## 2019-09-24 ASSESSMENT — PAIN DESCRIPTION - PAIN TYPE: TYPE: ACUTE PAIN

## 2019-09-24 ASSESSMENT — PAIN SCALES - GENERAL
PAINLEVEL_OUTOF10: 8
PAINLEVEL_OUTOF10: 10
PAINLEVEL_OUTOF10: 8
PAINLEVEL_OUTOF10: 8
PAINLEVEL_OUTOF10: 5
PAINLEVEL_OUTOF10: 9
PAINLEVEL_OUTOF10: 8

## 2019-09-24 ASSESSMENT — PAIN DESCRIPTION - LOCATION: LOCATION: ABDOMEN

## 2019-09-24 ASSESSMENT — PAIN DESCRIPTION - ORIENTATION: ORIENTATION: MID

## 2019-09-24 NOTE — CARE COORDINATION
Transitional planning. Spoke with pt and she will be going home with daughter at least for the first week. Denies needing any home care or otherwise. Face to face, home O2 order, MAR and facesheet faxed to [place].  Left message with Karrie Holman    [address] Karrie oHlman here for DME order and other paperwork, including Home O2 eval. Will bring O2 to bedside

## 2019-09-24 NOTE — DISCHARGE SUMMARY
REPORTED 9 - 20    Calcium 8.0 (L) 8.6 - 10.4 mg/dL    Sodium 140 135 - 144 mmol/L    Potassium 4.1 3.7 - 5.3 mmol/L    Chloride 108 (H) 98 - 107 mmol/L    CO2 23 20 - 31 mmol/L    Anion Gap 9 9 - 17 mmol/L    GFR Non-African American >60 >60 mL/min    GFR African American >60 >60 mL/min    GFR Comment          GFR Staging NOT REPORTED    HEMOGLOBIN AND HEMATOCRIT, BLOOD   Result Value Ref Range    Hemoglobin 13.2 11.9 - 15.1 g/dL    Hematocrit 40.1 36.3 - 47.1 %   CBC WITH AUTO DIFFERENTIAL   Result Value Ref Range    WBC 6.7 3.5 - 11.3 k/uL    RBC 4.14 3.95 - 5.11 m/uL    Hemoglobin 11.2 (L) 11.9 - 15.1 g/dL    Hematocrit 37.1 36.3 - 47.1 %    MCV 89.6 82.6 - 102.9 fL    MCH 27.1 25.2 - 33.5 pg    MCHC 30.2 28.4 - 34.8 g/dL    RDW 14.2 11.8 - 14.4 %    Platelets 065 442 - 391 k/uL    MPV 10.3 8.1 - 13.5 fL    NRBC Automated 0.0 0.0 per 100 WBC    Differential Type NOT REPORTED     WBC Morphology NOT REPORTED     RBC Morphology NOT REPORTED     Platelet Estimate NOT REPORTED     Seg Neutrophils 52 36 - 65 %    Lymphocytes 37 24 - 43 %    Monocytes 8 3 - 12 %    Eosinophils % 2 1 - 4 %    Basophils 0 0 - 2 %    Immature Granulocytes 0 0 %    Segs Absolute 3.51 1.50 - 8.10 k/uL    Absolute Lymph # 2.51 1.10 - 3.70 k/uL    Absolute Mono # 0.51 0.10 - 1.20 k/uL    Absolute Eos # 0.15 0.00 - 0.44 k/uL    Basophils Absolute 0.03 0.00 - 0.20 k/uL    Absolute Immature Granulocyte 0.03 0.00 - 0.30 k/uL   HEPATIC FUNCTION PANEL   Result Value Ref Range    Alb 3.0 (L) 3.5 - 5.2 g/dL    Alkaline Phosphatase 84 35 - 104 U/L    ALT 15 5 - 33 U/L    AST 18 <32 U/L    Total Bilirubin 0.17 (L) 0.3 - 1.2 mg/dL    Bilirubin, Direct <0.08 <0.31 mg/dL    Bilirubin, Indirect CANNOT BE CALCULATED 0.00 - 1.00 mg/dL    Total Protein 5.4 (L) 6.4 - 8.3 g/dL    Globulin NOT REPORTED 1.5 - 3.8 g/dL    Albumin/Globulin Ratio 1.3 1.0 - 2.5   AMYLASE   Result Value Ref Range    Amylase 110 (H) 28 - 100 U/L   Lipase   Result Value Ref Range

## 2019-09-24 NOTE — PROGRESS NOTES
flush 0.9 % injection 10 mL 2 times per day   sodium chloride flush 0.9 % injection 10 mL PRN   magnesium hydroxide (MILK OF MAGNESIA) 400 MG/5ML suspension 30 mL Daily PRN   ondansetron (ZOFRAN) injection 4 mg Q6H PRN   morphine (PF) injection 2 mg Q2H PRN   influenza quadrivalent split vaccine (FLUZONE;FLUARIX;FLULAVAL;AFLURIA) injection 0.5 mL Once   famotidine (PEPCID) tablet 20 mg Daily   0.9 % sodium chloride infusion Continuous       All medication charted and reviewed. CONSULTS      IP CONSULT TO GENERAL SURGERY    ASSESSMENT/PLAN       Deep Haddad is a 48 y.o. female who presents with     1.) Acute pancreatitis secondary to cholelithiasis /cholecystitis  · Right upper quadrant abdominal ultrasound and MRCP suggestive of cholecystitis. Patient had cholecystectomy completed on 9/23/2019 IV Zofran, p.o. Tylenol/p.o. Norco, and IV morphine for symptom management       2.) Acute asymptomatic hypotension secondary to suspected fluid shift  · Now resolved.      Plan for discharge today  · Continue home medications and pain control  · Monitor vitals, labs, and imaging  · DISPO: pending consults and clinical improvement    --  Janine Reyes,   Emergency Medicine Resident Physician     This dictation was generated by voice recognition computer software. Although all attempts are made to edit the dictation for accuracy, there may be errors in the transcription that are not intended.

## 2019-09-24 NOTE — PROGRESS NOTES
Home Oxygen Evaluation    Home Oxygen Evaluation completed. Patient is on 2 liters per minute via [place]. Resting SpO2 = 97%  Resting SpO2 on room air = 96%    SpO2 on room air with exercise = 84%  SpO2 on oxygen as above with exercise = 90%    Nocturnal Oximetry with patient on room air is recommended is SpO2 is between 89% and 95% (requires additional order).     DRU MYLES  12:55 PM

## 2019-10-02 ENCOUNTER — OFFICE VISIT (OUTPATIENT)
Dept: SURGERY | Age: 54
End: 2019-10-02
Payer: COMMERCIAL

## 2019-10-02 VITALS
HEART RATE: 67 BPM | HEIGHT: 61 IN | SYSTOLIC BLOOD PRESSURE: 150 MMHG | TEMPERATURE: 96.1 F | WEIGHT: 129 LBS | BODY MASS INDEX: 24.35 KG/M2 | DIASTOLIC BLOOD PRESSURE: 77 MMHG

## 2019-10-02 DIAGNOSIS — Z48.89 ENCOUNTER FOR POSTOPERATIVE CARE: Primary | ICD-10-CM

## 2019-10-02 PROCEDURE — 99212 OFFICE O/P EST SF 10 MIN: CPT | Performed by: SURGERY

## 2019-10-11 ENCOUNTER — TELEPHONE (OUTPATIENT)
Dept: GASTROENTEROLOGY | Age: 54
End: 2019-10-11

## 2019-10-22 ENCOUNTER — HOSPITAL ENCOUNTER (OUTPATIENT)
Dept: WOMENS IMAGING | Age: 54
Discharge: HOME | End: 2019-10-24
Payer: COMMERCIAL

## 2019-10-22 DIAGNOSIS — Z12.31 ENCOUNTER FOR SCREENING MAMMOGRAM FOR BREAST CANCER: ICD-10-CM

## 2019-10-22 PROCEDURE — 77063 BREAST TOMOSYNTHESIS BI: CPT

## 2019-10-23 ENCOUNTER — TELEPHONE (OUTPATIENT)
Dept: SURGERY | Age: 54
End: 2019-10-23

## 2019-11-06 ENCOUNTER — OFFICE VISIT (OUTPATIENT)
Dept: SURGERY | Age: 54
End: 2019-11-06
Payer: COMMERCIAL

## 2019-11-06 VITALS
HEART RATE: 78 BPM | WEIGHT: 133.4 LBS | SYSTOLIC BLOOD PRESSURE: 121 MMHG | DIASTOLIC BLOOD PRESSURE: 77 MMHG | BODY MASS INDEX: 25.19 KG/M2 | HEIGHT: 61 IN

## 2019-11-06 DIAGNOSIS — Z48.89 ENCOUNTER FOR POSTOPERATIVE CARE: Primary | ICD-10-CM

## 2019-11-06 PROCEDURE — 99024 POSTOP FOLLOW-UP VISIT: CPT | Performed by: SURGERY

## 2019-11-07 ENCOUNTER — OFFICE VISIT (OUTPATIENT)
Dept: INTERNAL MEDICINE | Age: 54
End: 2019-11-07
Payer: COMMERCIAL

## 2019-11-07 VITALS
HEIGHT: 61 IN | DIASTOLIC BLOOD PRESSURE: 70 MMHG | SYSTOLIC BLOOD PRESSURE: 100 MMHG | HEART RATE: 74 BPM | BODY MASS INDEX: 24.88 KG/M2 | WEIGHT: 131.8 LBS

## 2019-11-07 DIAGNOSIS — Z23 NEEDS FLU SHOT: Primary | ICD-10-CM

## 2019-11-07 DIAGNOSIS — Z00.00 ENCOUNTER FOR PREVENTIVE HEALTH EXAMINATION: ICD-10-CM

## 2019-11-07 DIAGNOSIS — F32.2 CURRENT SEVERE EPISODE OF MAJOR DEPRESSIVE DISORDER WITHOUT PSYCHOTIC FEATURES WITHOUT PRIOR EPISODE (HCC): ICD-10-CM

## 2019-11-07 DIAGNOSIS — Z13.1 ENCOUNTER FOR SCREENING EXAMINATION FOR IMPAIRED GLUCOSE REGULATION AND DIABETES MELLITUS: ICD-10-CM

## 2019-11-07 DIAGNOSIS — Z12.31 ENCOUNTER FOR SCREENING MAMMOGRAM FOR BREAST CANCER: ICD-10-CM

## 2019-11-07 PROCEDURE — 99211 OFF/OP EST MAY X REQ PHY/QHP: CPT | Performed by: INTERNAL MEDICINE

## 2019-11-07 PROCEDURE — 99213 OFFICE O/P EST LOW 20 MIN: CPT | Performed by: INTERNAL MEDICINE

## 2019-11-07 PROCEDURE — 90688 IIV4 VACCINE SPLT 0.5 ML IM: CPT | Performed by: INTERNAL MEDICINE

## 2019-11-07 RX ORDER — FLUOXETINE HYDROCHLORIDE 20 MG/1
CAPSULE ORAL
Qty: 60 CAPSULE | Refills: 11 | Status: SHIPPED | OUTPATIENT
Start: 2019-11-07 | End: 2020-11-24

## 2019-11-27 ENCOUNTER — HOSPITAL ENCOUNTER (OUTPATIENT)
Age: 54
Setting detail: SPECIMEN
Discharge: HOME | End: 2019-11-27
Payer: COMMERCIAL

## 2019-11-27 ENCOUNTER — OFFICE VISIT (OUTPATIENT)
Dept: OBGYN | Age: 54
End: 2019-11-27
Payer: COMMERCIAL

## 2019-11-27 VITALS
HEIGHT: 61 IN | HEART RATE: 69 BPM | SYSTOLIC BLOOD PRESSURE: 94 MMHG | BODY MASS INDEX: 24.59 KG/M2 | WEIGHT: 130.25 LBS | DIASTOLIC BLOOD PRESSURE: 69 MMHG

## 2019-11-27 DIAGNOSIS — Z01.419 WOMEN'S ANNUAL ROUTINE GYNECOLOGICAL EXAMINATION: Primary | ICD-10-CM

## 2019-11-27 DIAGNOSIS — Z00.00 ENCOUNTER FOR PREVENTIVE HEALTH EXAMINATION: ICD-10-CM

## 2019-11-27 DIAGNOSIS — Z01.419 WOMEN'S ANNUAL ROUTINE GYNECOLOGICAL EXAMINATION: ICD-10-CM

## 2019-11-27 DIAGNOSIS — Z13.1 ENCOUNTER FOR SCREENING EXAMINATION FOR IMPAIRED GLUCOSE REGULATION AND DIABETES MELLITUS: ICD-10-CM

## 2019-11-27 LAB
ANION GAP SERPL CALCULATED.3IONS-SCNC: 16 MMOL/L (ref 9–17)
BUN BLDV-MCNC: 20 MG/DL (ref 6–20)
BUN/CREAT BLD: NORMAL (ref 9–20)
CALCIUM SERPL-MCNC: 9.4 MG/DL (ref 8.6–10.4)
CHLORIDE BLD-SCNC: 100 MMOL/L (ref 98–107)
CO2: 25 MMOL/L (ref 20–31)
CREAT SERPL-MCNC: 0.82 MG/DL (ref 0.5–0.9)
DIRECT EXAM: NORMAL
ESTIMATED AVERAGE GLUCOSE: 126 MG/DL
GFR AFRICAN AMERICAN: >60 ML/MIN
GFR NON-AFRICAN AMERICAN: >60 ML/MIN
GFR SERPL CREATININE-BSD FRML MDRD: NORMAL ML/MIN/{1.73_M2}
GFR SERPL CREATININE-BSD FRML MDRD: NORMAL ML/MIN/{1.73_M2}
GLUCOSE BLD-MCNC: 92 MG/DL (ref 70–99)
HBA1C MFR BLD: 6 % (ref 4–6)
Lab: NORMAL
POTASSIUM SERPL-SCNC: 3.9 MMOL/L (ref 3.7–5.3)
SODIUM BLD-SCNC: 141 MMOL/L (ref 135–144)
SPECIMEN DESCRIPTION: NORMAL

## 2019-11-27 PROCEDURE — 83036 HEMOGLOBIN GLYCOSYLATED A1C: CPT

## 2019-11-27 PROCEDURE — 36415 COLL VENOUS BLD VENIPUNCTURE: CPT

## 2019-11-27 PROCEDURE — 80048 BASIC METABOLIC PNL TOTAL CA: CPT

## 2019-11-27 PROCEDURE — 99386 PREV VISIT NEW AGE 40-64: CPT | Performed by: OBSTETRICS & GYNECOLOGY

## 2019-11-29 LAB
C TRACH DNA GENITAL QL NAA+PROBE: NEGATIVE
N. GONORRHOEAE DNA: NEGATIVE
SPECIMEN DESCRIPTION: NORMAL

## 2020-01-06 NOTE — TELEPHONE ENCOUNTER
Request for Lisinopril-HCTZ.       Next Visit Date:  Future Appointments   Date Time Provider Naheed Trejoi   3/19/2020 11:00 AM Eveline Dubose MD [facility] IM [address] Maintenance   Topic Date Due    Shingles Vaccine (1 of 2) 08/25/2017    A1C test (Diabetic or Prediabetic)  11/27/2020    Potassium monitoring  11/27/2020    Creatinine monitoring  11/27/2020    Lipid screen  04/25/2021    Breast cancer screen  10/22/2021    DTaP/Tdap/Td vaccine (2 - Td) 10/20/2024    Colon cancer screen colonoscopy  02/26/2029    Flu vaccine  Completed    Pneumococcal 0-64 years Vaccine  Completed    HIV screen  Completed       Hemoglobin A1C (%)   Date Value   11/27/2019 6.0   07/25/2019 6.4 (H)   11/29/2018 6.1             ( goal A1C is < 7)   No results found for: LABMICR  LDL Cholesterol (mg/dL)   Date Value   04/25/2016 123       (goal LDL is <100)   AST (U/L)   Date Value   09/24/2019 35 (H)     ALT (U/L)   Date Value   09/24/2019 19     BUN (mg/dL)   Date Value   11/27/2019 20     BP Readings from Last 3 Encounters:   11/27/19 94/69   11/07/19 100/70   11/06/19 121/77          (goal 120/80)    All Future Testing planned in [facility]  Lab Frequency Next Occurrence   EMG Once 01/07/2020   POCT activated clotting time           Patient Active Problem List:     HTN (hypertension)     COPD (chronic obstructive pulmonary disease)     Hyperlipidemia     Near syncope     Anxiety     Tobacco abuse     Vesicular rash     Rash of hands     S/P LEEP     Gastroesophageal reflux disease without esophagitis     Trigger finger of right thumb     Trigger finger, right middle finger     Occipital headache     Pre-diabetes     Chest pain     Positive FIT (fecal immunochemical test)     Pancreatitis, unspecified pancreatitis type

## 2020-01-07 RX ORDER — METOPROLOL TARTRATE 50 MG/1
50 TABLET, FILM COATED ORAL 2 TIMES DAILY
Qty: 60 TABLET | Refills: 11 | Status: SHIPPED | OUTPATIENT
Start: 2020-01-07 | End: 2021-03-24

## 2020-01-07 RX ORDER — LISINOPRIL AND HYDROCHLOROTHIAZIDE 12.5; 1 MG/1; MG/1
1 TABLET ORAL DAILY
Qty: 90 TABLET | Refills: 4 | Status: SHIPPED | OUTPATIENT
Start: 2020-01-07 | End: 2021-03-24

## 2020-01-13 ENCOUNTER — APPOINTMENT (OUTPATIENT)
Dept: GENERAL RADIOLOGY | Age: 55
End: 2020-01-13
Payer: COMMERCIAL

## 2020-01-13 ENCOUNTER — TELEPHONE (OUTPATIENT)
Dept: INTERNAL MEDICINE | Age: 55
End: 2020-01-13

## 2020-01-13 ENCOUNTER — HOSPITAL ENCOUNTER (EMERGENCY)
Age: 55
Discharge: HOME | End: 2020-01-14
Attending: EMERGENCY MEDICINE
Payer: COMMERCIAL

## 2020-01-13 VITALS
OXYGEN SATURATION: 97 % | RESPIRATION RATE: 16 BRPM | TEMPERATURE: 97.8 F | WEIGHT: 128 LBS | BODY MASS INDEX: 24.17 KG/M2 | HEART RATE: 72 BPM | HEIGHT: 61 IN | SYSTOLIC BLOOD PRESSURE: 129 MMHG | DIASTOLIC BLOOD PRESSURE: 84 MMHG

## 2020-01-13 LAB
DIRECT EXAM: NORMAL
Lab: NORMAL
SPECIMEN DESCRIPTION: NORMAL

## 2020-01-13 PROCEDURE — 93005 ELECTROCARDIOGRAM TRACING: CPT | Performed by: EMERGENCY MEDICINE

## 2020-01-13 PROCEDURE — 99283 EMERGENCY DEPT VISIT LOW MDM: CPT

## 2020-01-13 PROCEDURE — 96372 THER/PROPH/DIAG INJ SC/IM: CPT

## 2020-01-13 PROCEDURE — 71046 X-RAY EXAM CHEST 2 VIEWS: CPT

## 2020-01-13 PROCEDURE — 87804 INFLUENZA ASSAY W/OPTIC: CPT

## 2020-01-13 PROCEDURE — 94640 AIRWAY INHALATION TREATMENT: CPT

## 2020-01-13 PROCEDURE — 6370000000 HC RX 637 (ALT 250 FOR IP): Performed by: EMERGENCY MEDICINE

## 2020-01-13 PROCEDURE — 6360000002 HC RX W HCPCS: Performed by: EMERGENCY MEDICINE

## 2020-01-13 RX ORDER — PREDNISONE 20 MG/1
60 TABLET ORAL ONCE
Status: COMPLETED | OUTPATIENT
Start: 2020-01-13 | End: 2020-01-13

## 2020-01-13 RX ORDER — KETOROLAC TROMETHAMINE 30 MG/ML
30 INJECTION, SOLUTION INTRAMUSCULAR; INTRAVENOUS ONCE
Status: COMPLETED | OUTPATIENT
Start: 2020-01-13 | End: 2020-01-13

## 2020-01-13 RX ORDER — IPRATROPIUM BROMIDE AND ALBUTEROL SULFATE 2.5; .5 MG/3ML; MG/3ML
1 SOLUTION RESPIRATORY (INHALATION) ONCE
Status: COMPLETED | OUTPATIENT
Start: 2020-01-13 | End: 2020-01-13

## 2020-01-13 RX ORDER — KETOROLAC TROMETHAMINE 30 MG/ML
30 INJECTION, SOLUTION INTRAMUSCULAR; INTRAVENOUS ONCE
Status: DISCONTINUED | OUTPATIENT
Start: 2020-01-13 | End: 2020-01-13

## 2020-01-13 RX ADMIN — KETOROLAC TROMETHAMINE 30 MG: 30 INJECTION, SOLUTION INTRAMUSCULAR at 23:37

## 2020-01-13 RX ADMIN — PREDNISONE 60 MG: 20 TABLET ORAL at 23:37

## 2020-01-13 RX ADMIN — IPRATROPIUM BROMIDE AND ALBUTEROL SULFATE 1 AMPULE: .5; 3 SOLUTION RESPIRATORY (INHALATION) at 23:22

## 2020-01-13 ASSESSMENT — PAIN SCALES - GENERAL
PAINLEVEL_OUTOF10: 4
PAINLEVEL_OUTOF10: 4

## 2020-01-13 ASSESSMENT — ENCOUNTER SYMPTOMS
SHORTNESS OF BREATH: 1
COUGH: 1
VOMITING: 0
ABDOMINAL PAIN: 0
BACK PAIN: 0
DIARRHEA: 0

## 2020-01-13 NOTE — LETTER
[address] [place] [address] Drive [address]  Phone: [phone]  Fax: [phone]    Reji Cedillo MD        January 13, 2020    [address] [address]      Dear Sapphire Nunez: We are sending this letter because your PCP ordered EMG for you to have done at your last visit here and they have not yet been completed. If you can please come to our office on the 2nd floor to  your orders to have them compelted. If you do not have a follow-up appointment scheduled you can either contact the office to make an appointment with us or you can make one when you come in to pick-up your orders. If you have any questions or concerns, please don't hesitate to call.     Sincerely,        Reji Cedillo MD

## 2020-01-14 LAB
EKG ATRIAL RATE: 69 BPM
EKG P AXIS: 72 DEGREES
EKG P-R INTERVAL: 114 MS
EKG Q-T INTERVAL: 402 MS
EKG QRS DURATION: 80 MS
EKG QTC CALCULATION (BAZETT): 430 MS
EKG R AXIS: 82 DEGREES
EKG T AXIS: 71 DEGREES
EKG VENTRICULAR RATE: 69 BPM

## 2020-01-14 RX ORDER — PREDNISONE 20 MG/1
20 TABLET ORAL DAILY
Qty: 5 TABLET | Refills: 0 | Status: SHIPPED | OUTPATIENT
Start: 2020-01-14 | End: 2020-01-19

## 2020-01-14 RX ORDER — ALBUTEROL SULFATE 90 UG/1
2 AEROSOL, METERED RESPIRATORY (INHALATION) 4 TIMES DAILY PRN
Qty: 1 INHALER | Refills: 0 | Status: SHIPPED | OUTPATIENT
Start: 2020-01-14 | End: 2021-07-29

## 2020-01-14 NOTE — ED PROVIDER NOTES
EMERGENCY DEPARTMENT ENCOUNTER    Pt Name: Dale Burnett  MRN: [id number]  [place] 1965  Date of evaluation: 1/13/20  CHIEF COMPLAINT       Chief Complaint   Patient presents with    Pharyngitis    Cough    Headache     HISTORY OF PRESENT ILLNESS   HPI     This is a 28-year-old female with a history of hypertension and COPD active smoker who comes in today with cough this is been going on for the past 2 days she endorses sore throat intermittent congestion and rhinorrhea as well as headache. The patient tried Coricidin cold and flu but did not have any improvement of her symptoms. The patient endorses ear pain as well as a scratchy throat. She states that she has midsternal chest soreness from the coughing but no shortness of breath no abdominal pain nausea vomiting diarrhea. States that she works at [employer] and everyone at work is sick. REVIEW OF SYSTEMS     Review of Systems   Constitutional: Negative for fever. HENT: Positive for congestion. Respiratory: Positive for cough and shortness of breath. Cardiovascular: Positive for chest pain. Gastrointestinal: Negative for abdominal pain, diarrhea and vomiting. Genitourinary: Negative for dysuria. Musculoskeletal: Negative for back pain. Skin: Negative for rash. Neurological: Positive for headaches. All other systems reviewed and are negative. PASTMEDICAL HISTORY     Past Medical History:   Diagnosis Date    Anxiety     CAD (coronary artery disease) 04/29/2013    Cath 04/29/13: Minimal CAD.  No stents    Cardiac arrest ([facility] [address]     cardiac arrest at age 25 months/s/p fall from bed with unconscious episode    COPD (chronic obstructive pulmonary disease) (HCC)     GERD (gastroesophageal reflux disease)     Headaches due to old head injury 2010    s/p fall down steps with unconsciousness    History of blood transfusion     Hypertension     Snores     Trigger finger     right thumb and right middle finger    Wears dentures FULL UPPER    Wears glasses      SURGICAL HISTORY       Past Surgical History:   Procedure Laterality Date    CARDIAC CATHETERIZATION      minimal cad, no stents     SECTION      X's 3    CHOLECYSTECTOMY  2019    robotic laprascopic    CHOLECYSTECTOMY, LAPAROSCOPIC N/A 2019    *ADD-ON JACQUI Forbes Said LAPAROSCOPIC ROBOTIC performed by Noni Gifford IV, DO at [facility]  2019    with polypectomy     LEEP      DC INCISE FINGER TENDON SHEATH Right 2017     TRIGGER RELEASE THUMB,  MIDDLE FINGER performed by Elsy Lozano DO at [facility]    TONSILLECTOMY       CURRENT MEDICATIONS       Previous Medications    ASPIRIN EC 81 MG EC TABLET    Take 1 tablet by mouth daily    FLUOXETINE (PROZAC) 20 MG CAPSULE    TAKE ONE CAPSULE BY MOUTH twice a day    LISINOPRIL-HYDROCHLOROTHIAZIDE (PRINZIDE;ZESTORETIC) 10-12.5 MG PER TABLET    Take 1 tablet by mouth daily    METOPROLOL TARTRATE (LOPRESSOR) 50 MG TABLET    Take 1 tablet by mouth 2 times daily     ALLERGIES     is allergic to adhesive tape. FAMILY HISTORY     She indicated that her mother is . She indicated that her father is alive. SOCIAL HISTORY       Social History     Tobacco Use    Smoking status: Current Every Day Smoker     Packs/day: 0.50     Years: 30.00     Pack years: 15.00     Types: Cigarettes    Smokeless tobacco: Never Used   Substance Use Topics    Alcohol use: Yes     Alcohol/week: 0.0 standard drinks     Comment: occ    Drug use: No     PHYSICAL EXAM     INITIAL VITALS: /84   Pulse 72   Temp 97.8 °F (36.6 °C) (Oral)   Resp 16   Ht 5' 1\" (1.549 m)   Wt 128 lb (58.1 kg)   SpO2 97%   BMI 24.19 kg/m²    Physical Exam  Vitals signs and nursing note reviewed. Constitutional:       Appearance: She is well-developed.       Comments: Nontoxic in appearing but appears that she does not feel well   HENT:      Head: Normocephalic and atraumatic. Right Ear: Tympanic membrane normal.      Left Ear: Tympanic membrane normal.      Nose: Congestion present. Eyes:      Conjunctiva/sclera: Conjunctivae normal.   Neck:      Musculoskeletal: Neck supple. Cardiovascular:      Rate and Rhythm: Normal rate and regular rhythm. Heart sounds: No murmur. No friction rub. Pulmonary:      Comments: Clear lungs however bronchospastic cough no tachypnea  Abdominal:      Palpations: Abdomen is soft. Tenderness: There is no tenderness. Musculoskeletal:      Comments: No lower extremity swelling   Skin:     General: Skin is warm and dry. Capillary Refill: Capillary refill takes less than 2 seconds. Neurological:      Mental Status: She is alert. DIAGNOSTIC RESULTS   RADIOLOGY:All plain film, CT, MRI, and formal ultrasound images (except ED bedside ultrasound) are read by the radiologist, see reports below, unless otherwisenoted in MDM or here. XR CHEST STANDARD (2 VW)   Final Result   No acute abnormality. LABS: All lab results were reviewed by myself, and all abnormals are listed below. Labs Reviewed   RAPID INFLUENZA A/B ANTIGENS       EMERGENCY DEPARTMENTCOURSE:   Differential diagnosis includes influenza, reactive airway disease, pneumonia, viral syndrome. 12:06 AM  Influenza is negative. Chest x-ray is negative for pneumonia or pneumothorax. The patient feels better after Toradol and albuterol breathing treatment. Suspect viral syndrome leading to reactive airway disease exacerbation the patient will be discharged with albuterol inhaler and prednisone. EKG: All EKG's are interpreted by the Emergency Department Physician who either signs or Co-signs this chart in the absence of a cardiologist.  Normal sinus rhythm with a rate of 69 bpm no prolonged intervals T wave inversion in V1 V2 and V3 similar to previous EKG    Vitals:    Vitals:    01/13/20 2253 01/13/20 2324   BP: 129/84    Pulse: 72    Resp: 18 16   Temp: 97.8 °F (36.6 °C)    TempSrc: Oral    SpO2: 96% 97%   Weight: 128 lb (58.1 kg)    Height: 5' 1\" (1.549 m)        The patient was given the following medications while in the emergency department:  Orders Placed This Encounter   Medications    DISCONTD: ketorolac (TORADOL) injection 30 mg    predniSONE (DELTASONE) tablet 60 mg    ipratropium-albuterol (DUONEB) nebulizer solution 1 ampule    ketorolac (TORADOL) injection 30 mg    predniSONE (DELTASONE) 20 MG tablet     Sig: Take 1 tablet by mouth daily for 5 days     Dispense:  5 tablet     Refill:  0    albuterol sulfate  (90 Base) MCG/ACT inhaler     Sig: Inhale 2 puffs into the lungs 4 times daily as needed for Wheezing     Dispense:  1 Inhaler     Refill:  0         FINAL IMPRESSION      1.  Cough         DISPOSITION/PLAN   DISPOSITION Decision To Discharge 01/14/2020 12:06:25 AM      PATIENT REFERRED TO:  Sujatha Cooper MD  [address] [place] [address]  [phone]    Schedule an appointment as soon as possible for a visit       DISCHARGE MEDICATIONS:  New Prescriptions    ALBUTEROL SULFATE  (90 BASE) MCG/ACT INHALER    Inhale 2 puffs into the lungs 4 times daily as needed for Wheezing    PREDNISONE (DELTASONE) 20 MG TABLET    Take 1 tablet by mouth daily for 5 days     Ольга Fierro MD  Attending Emergency Physician    This charting supersedes any ED resident or staff charting and was written using speech recognition Carlie Ferro MD  01/14/20 5717

## 2020-01-27 ENCOUNTER — OFFICE VISIT (OUTPATIENT)
Dept: INTERNAL MEDICINE | Age: 55
End: 2020-01-27
Payer: COMMERCIAL

## 2020-01-27 VITALS
HEIGHT: 61 IN | DIASTOLIC BLOOD PRESSURE: 80 MMHG | BODY MASS INDEX: 24.55 KG/M2 | SYSTOLIC BLOOD PRESSURE: 137 MMHG | WEIGHT: 130 LBS | OXYGEN SATURATION: 96 % | HEART RATE: 73 BPM

## 2020-01-27 PROCEDURE — 99214 OFFICE O/P EST MOD 30 MIN: CPT | Performed by: INTERNAL MEDICINE

## 2020-01-27 PROCEDURE — 99211 OFF/OP EST MAY X REQ PHY/QHP: CPT | Performed by: INTERNAL MEDICINE

## 2020-01-27 ASSESSMENT — PATIENT HEALTH QUESTIONNAIRE - PHQ9
2. FEELING DOWN, DEPRESSED OR HOPELESS: 0
SUM OF ALL RESPONSES TO PHQ9 QUESTIONS 1 & 2: 0
SUM OF ALL RESPONSES TO PHQ QUESTIONS 1-9: 0
SUM OF ALL RESPONSES TO PHQ QUESTIONS 1-9: 0
1. LITTLE INTEREST OR PLEASURE IN DOING THINGS: 0

## 2020-01-27 NOTE — PROGRESS NOTES
[facility]      Date of Patient's Visit: 2020    Progress note    Patient Care Team:  Dontrell Henriquez MD as PCP - Ashlee Zamarripa MD as PCP - [facility] Empaneled Provider      CHIEF COMPLAINT  Chief Complaint   Patient presents with    COPD       SUBJECTIVE  Marcia Roman is a 48 y.o. female who presents for follow up for depression  She was seen 2 months ago with acute severe depression crisis. She was prescribed fluoxetine 20 mg and psychology referral .   [employer] to get 2 days off from work her health got complicated with an acute episode of kerry cystitis secondary to gallstones. Is postop cholecystectomy   1 month ago   She is back to work but still working night shifts   Currently feels mild depression   She gets sat and  off   She smokes half pack / day cig.          ROS  All other review of systems negative, except for those noted. Review of Systems    Past Medical History:   Diagnosis Date    Anxiety     CAD (coronary artery disease) 2013    Cath 13: Minimal CAD.  No stents    Cardiac arrest ([facility] [address]     cardiac arrest at age 25 months/s/p fall from bed with unconscious episode    COPD (chronic obstructive pulmonary disease) (HCC)     GERD (gastroesophageal reflux disease)     Headaches due to old head injury     s/p fall down steps with unconsciousness    History of blood transfusion     Hypertension     Snores     Trigger finger     right thumb and right middle finger    Wears dentures     FULL UPPER    Wears glasses        Past Surgical History:   Procedure Laterality Date    CARDIAC CATHETERIZATION      minimal cad, no stents     SECTION      X's 3    CHOLECYSTECTOMY  2019    robotic laprascopic    CHOLECYSTECTOMY, LAPAROSCOPIC N/A 2019    *ADD-ON JACQUI Ya LAPAROSCOPIC ROBOTIC performed by Araseli Gifford IV, DO at [address] [place] [address]  2019    with polypectomy     LEEP  1993    MI INCISE FINGER TENDON SHEATH Right 5/2/2017     TRIGGER RELEASE THUMB,  MIDDLE FINGER performed by Bee Bruno DO at [facility]    TONSILLECTOMY         Family History   Problem Relation Age of Onset    High Blood Pressure Mother     Depression Mother     High Blood Pressure Father        Social History     Socioeconomic History    Marital status:      Spouse name: None    Number of children: None    Years of education: None    Highest education level: None   Occupational History    None   Social Needs    Financial resource strain: None    Food insecurity:     Worry: None     Inability: None    Transportation needs:     Medical: None     Non-medical: None   Tobacco Use    Smoking status: Current Every Day Smoker     Packs/day: 0.50     Years: 30.00     Pack years: 15.00     Types: Cigarettes    Smokeless tobacco: Never Used   Substance and Sexual Activity    Alcohol use:  Yes     Alcohol/week: 0.0 standard drinks     Comment: occ    Drug use: No    Sexual activity: None   Lifestyle    Physical activity:     Days per week: None     Minutes per session: None    Stress: None   Relationships    Social connections:     Talks on phone: None     Gets together: None     Attends Denominational service: None     Active member of club or organization: None     Attends meetings of clubs or organizations: None     Relationship status: None    Intimate partner violence:     Fear of current or ex partner: None     Emotionally abused: None     Physically abused: None     Forced sexual activity: None   Other Topics Concern    None   Social History Narrative    None       Current Outpatient Medications   Medication Sig Dispense Refill    albuterol sulfate  (90 Base) MCG/ACT inhaler Inhale 2 puffs into the lungs 4 times daily as needed for Wheezing 1 Inhaler 0    lisinopril-hydrochlorothiazide (PRINZIDE;ZESTORETIC) 10-12.5 MG per tablet Take 1 tablet by mouth daily 90 tablet 4    metoprolol tartrate (LOPRESSOR) 50 MG tablet Take 1 tablet by mouth 2 times daily 60 tablet 11    FLUoxetine (PROZAC) 20 MG capsule TAKE ONE CAPSULE BY MOUTH twice a day 60 capsule 11    aspirin EC 81 MG EC tablet Take 1 tablet by mouth daily 30 tablet 0     No current facility-administered medications for this visit. Allergies   Allergen Reactions    Adhesive Tape Rash       PHYSICAL EXAM:   Vital Signs:   /80 (Site: Right Upper Arm, Position: Sitting, Cuff Size: Medium Adult)   Pulse 73   Ht 5' 1\" (1.549 m)   Wt 130 lb (59 kg)   SpO2 96%   BMI 24.56 kg/m²     BP Readings from Last 3 Encounters:   01/27/20 137/80   01/13/20 129/84   11/27/19 94/69        Wt Readings from Last 3 Encounters:   01/27/20 130 lb (59 kg)   01/13/20 128 lb (58.1 kg)   11/27/19 130 lb 4 oz (59.1 kg)       Physical Exam  HENT:      Right Ear: Tympanic membrane normal.      Nose: Nose normal.      Mouth/Throat:      Mouth: Mucous membranes are moist.   Eyes:      Pupils: Pupils are equal, round, and reactive to light. Cardiovascular:      Rate and Rhythm: Normal rate and regular rhythm. Pulses: Normal pulses. Heart sounds: Normal heart sounds. Pulmonary:      Effort: Pulmonary effort is normal.   Abdominal:      General: Abdomen is flat. Musculoskeletal: Normal range of motion. Skin:     General: Skin is warm and dry. Capillary Refill: Capillary refill takes less than 2 seconds. Neurological:      General: No focal deficit present. Mental Status: She is alert and oriented to person, place, and time. Psychiatric:         Mood and Affect: Mood normal.         Behavior: Behavior normal.         Body mass index is 24.56 kg/m².     RESULTS    Lab Findings    CBC:   Lab Results   Component Value Date    WBC 8.0 09/24/2019    HGB 11.8 09/24/2019     09/24/2019     03/18/2012     BMP:   Lab Results   Component Value Date

## 2020-01-27 NOTE — PROGRESS NOTES
Visit Information    Have you changed or started any medications since your last visit including any over-the-counter medicines, vitamins, or herbal medicines? no   Have you stopped taking any of your medications? Is so, why? -  no  Are you having any side effects from any of your medications? - no    Have you seen any other physician or provider since your last visit? yes - er   Have you had any other diagnostic tests since your last visit?  no   Have you been seen in the emergency room and/or had an admission in a hospital since we last saw you?  yes - er   Have you had your routine dental cleaning in the past 6 months?  no     Do you have an active MyChart account? If no, what is the barrier?   No:     Patient Care Team:  Aimee Ormond, MD as PCP - General  Aimee Ormond, MD as PCP - [facility]    Medical History Review  Past Medical, Family, and Social History reviewed and does not contribute to the patient presenting condition    Health Maintenance   Topic Date Due    Shingles Vaccine (1 of 2) 08/25/2017    A1C test (Diabetic or Prediabetic)  11/27/2020    Potassium monitoring  11/27/2020    Creatinine monitoring  11/27/2020    Lipid screen  04/25/2021    Breast cancer screen  10/22/2021    DTaP/Tdap/Td vaccine (2 - Td) 10/20/2024    Colon cancer screen colonoscopy  02/26/2029    Flu vaccine  Completed    Pneumococcal 0-64 years Vaccine  Completed    HIV screen  Completed

## 2020-03-09 ENCOUNTER — APPOINTMENT (OUTPATIENT)
Dept: GENERAL RADIOLOGY | Age: 55
End: 2020-03-09
Payer: COMMERCIAL

## 2020-03-09 ENCOUNTER — HOSPITAL ENCOUNTER (OUTPATIENT)
Age: 55
Setting detail: OBSERVATION
Discharge: HOME | End: 2020-03-10
Attending: EMERGENCY MEDICINE | Admitting: EMERGENCY MEDICINE
Payer: COMMERCIAL

## 2020-03-09 LAB
ABSOLUTE EOS #: 0.18 K/UL (ref 0–0.44)
ABSOLUTE IMMATURE GRANULOCYTE: 0.04 K/UL (ref 0–0.3)
ABSOLUTE LYMPH #: 3.6 K/UL (ref 1.1–3.7)
ABSOLUTE MONO #: 0.9 K/UL (ref 0.1–1.2)
ALBUMIN SERPL-MCNC: 3.9 G/DL (ref 3.5–5.2)
ALBUMIN/GLOBULIN RATIO: 1.2 (ref 1–2.5)
ALP BLD-CCNC: 110 U/L (ref 35–104)
ALT SERPL-CCNC: 26 U/L (ref 5–33)
ANION GAP SERPL CALCULATED.3IONS-SCNC: 13 MMOL/L (ref 9–17)
AST SERPL-CCNC: 22 U/L
BASOPHILS # BLD: 1 % (ref 0–2)
BASOPHILS ABSOLUTE: 0.09 K/UL (ref 0–0.2)
BILIRUB SERPL-MCNC: 0.16 MG/DL (ref 0.3–1.2)
BUN BLDV-MCNC: 17 MG/DL (ref 6–20)
BUN/CREAT BLD: ABNORMAL (ref 9–20)
CALCIUM SERPL-MCNC: 9.1 MG/DL (ref 8.6–10.4)
CHLORIDE BLD-SCNC: 101 MMOL/L (ref 98–107)
CO2: 24 MMOL/L (ref 20–31)
CREAT SERPL-MCNC: 0.8 MG/DL (ref 0.5–0.9)
DIFFERENTIAL TYPE: ABNORMAL
EOSINOPHILS RELATIVE PERCENT: 2 % (ref 1–4)
GFR AFRICAN AMERICAN: >60 ML/MIN
GFR NON-AFRICAN AMERICAN: >60 ML/MIN
GFR SERPL CREATININE-BSD FRML MDRD: ABNORMAL ML/MIN/{1.73_M2}
GFR SERPL CREATININE-BSD FRML MDRD: ABNORMAL ML/MIN/{1.73_M2}
GLUCOSE BLD-MCNC: 89 MG/DL (ref 70–99)
HCT VFR BLD CALC: 43.5 % (ref 36.3–47.1)
HEMOGLOBIN: 14.1 G/DL (ref 11.9–15.1)
IMMATURE GRANULOCYTES: 0 %
LYMPHOCYTES # BLD: 33 % (ref 24–43)
MCH RBC QN AUTO: 27.5 PG (ref 25.2–33.5)
MCHC RBC AUTO-ENTMCNC: 32.4 G/DL (ref 28.4–34.8)
MCV RBC AUTO: 84.8 FL (ref 82.6–102.9)
MONOCYTES # BLD: 8 % (ref 3–12)
NRBC AUTOMATED: 0 PER 100 WBC
PDW BLD-RTO: 14 % (ref 11.8–14.4)
PLATELET # BLD: 310 K/UL (ref 138–453)
PLATELET ESTIMATE: ABNORMAL
PMV BLD AUTO: 10.8 FL (ref 8.1–13.5)
POTASSIUM SERPL-SCNC: 3.8 MMOL/L (ref 3.7–5.3)
RBC # BLD: 5.13 M/UL (ref 3.95–5.11)
RBC # BLD: ABNORMAL 10*6/UL
SEG NEUTROPHILS: 56 % (ref 36–65)
SEGMENTED NEUTROPHILS ABSOLUTE COUNT: 6.07 K/UL (ref 1.5–8.1)
SODIUM BLD-SCNC: 138 MMOL/L (ref 135–144)
TOTAL PROTEIN: 7.1 G/DL (ref 6.4–8.3)
TROPONIN INTERP: NORMAL
TROPONIN INTERP: NORMAL
TROPONIN T: NORMAL NG/ML
TROPONIN T: NORMAL NG/ML
TROPONIN, HIGH SENSITIVITY: <6 NG/L (ref 0–14)
TROPONIN, HIGH SENSITIVITY: <6 NG/L (ref 0–14)
WBC # BLD: 10.9 K/UL (ref 3.5–11.3)
WBC # BLD: ABNORMAL 10*3/UL

## 2020-03-09 PROCEDURE — 93005 ELECTROCARDIOGRAM TRACING: CPT | Performed by: PHYSICIAN ASSISTANT

## 2020-03-09 PROCEDURE — 6360000002 HC RX W HCPCS

## 2020-03-09 PROCEDURE — 71046 X-RAY EXAM CHEST 2 VIEWS: CPT

## 2020-03-09 PROCEDURE — 85025 COMPLETE CBC W/AUTO DIFF WBC: CPT

## 2020-03-09 PROCEDURE — G0378 HOSPITAL OBSERVATION PER HR: HCPCS

## 2020-03-09 PROCEDURE — 80053 COMPREHEN METABOLIC PANEL: CPT

## 2020-03-09 PROCEDURE — 94640 AIRWAY INHALATION TREATMENT: CPT

## 2020-03-09 PROCEDURE — 6370000000 HC RX 637 (ALT 250 FOR IP): Performed by: PHYSICIAN ASSISTANT

## 2020-03-09 PROCEDURE — 84484 ASSAY OF TROPONIN QUANT: CPT

## 2020-03-09 PROCEDURE — 6360000002 HC RX W HCPCS: Performed by: PHYSICIAN ASSISTANT

## 2020-03-09 PROCEDURE — 99285 EMERGENCY DEPT VISIT HI MDM: CPT

## 2020-03-09 PROCEDURE — 96374 THER/PROPH/DIAG INJ IV PUSH: CPT

## 2020-03-09 RX ORDER — ONDANSETRON 2 MG/ML
INJECTION INTRAMUSCULAR; INTRAVENOUS
Status: COMPLETED
Start: 2020-03-09 | End: 2020-03-09

## 2020-03-09 RX ORDER — ONDANSETRON 2 MG/ML
4 INJECTION INTRAMUSCULAR; INTRAVENOUS ONCE
Status: COMPLETED | OUTPATIENT
Start: 2020-03-09 | End: 2020-03-09

## 2020-03-09 RX ORDER — FLUOXETINE HYDROCHLORIDE 20 MG/1
20 CAPSULE ORAL 2 TIMES DAILY
Status: DISCONTINUED | OUTPATIENT
Start: 2020-03-09 | End: 2020-03-10 | Stop reason: HOSPADM

## 2020-03-09 RX ORDER — LISINOPRIL AND HYDROCHLOROTHIAZIDE 12.5; 1 MG/1; MG/1
1 TABLET ORAL DAILY
Status: DISCONTINUED | OUTPATIENT
Start: 2020-03-10 | End: 2020-03-10 | Stop reason: HOSPADM

## 2020-03-09 RX ORDER — PREDNISONE 20 MG/1
60 TABLET ORAL ONCE
Status: COMPLETED | OUTPATIENT
Start: 2020-03-09 | End: 2020-03-09

## 2020-03-09 RX ORDER — ACETAMINOPHEN 325 MG/1
650 TABLET ORAL EVERY 4 HOURS PRN
Status: DISCONTINUED | OUTPATIENT
Start: 2020-03-09 | End: 2020-03-10 | Stop reason: HOSPADM

## 2020-03-09 RX ORDER — ALBUTEROL SULFATE 90 UG/1
2 AEROSOL, METERED RESPIRATORY (INHALATION) 4 TIMES DAILY PRN
Status: DISCONTINUED | OUTPATIENT
Start: 2020-03-09 | End: 2020-03-10 | Stop reason: HOSPADM

## 2020-03-09 RX ORDER — ONDANSETRON 2 MG/ML
4 INJECTION INTRAMUSCULAR; INTRAVENOUS EVERY 8 HOURS PRN
Status: DISCONTINUED | OUTPATIENT
Start: 2020-03-09 | End: 2020-03-10 | Stop reason: HOSPADM

## 2020-03-09 RX ORDER — SODIUM CHLORIDE 0.9 % (FLUSH) 0.9 %
10 SYRINGE (ML) INJECTION EVERY 12 HOURS SCHEDULED
Status: DISCONTINUED | OUTPATIENT
Start: 2020-03-09 | End: 2020-03-10 | Stop reason: HOSPADM

## 2020-03-09 RX ORDER — METOPROLOL TARTRATE 50 MG/1
50 TABLET, FILM COATED ORAL 2 TIMES DAILY
Status: DISCONTINUED | OUTPATIENT
Start: 2020-03-09 | End: 2020-03-10 | Stop reason: HOSPADM

## 2020-03-09 RX ORDER — ALBUTEROL SULFATE 2.5 MG/3ML
2.5 SOLUTION RESPIRATORY (INHALATION)
Status: DISCONTINUED | OUTPATIENT
Start: 2020-03-09 | End: 2020-03-10 | Stop reason: HOSPADM

## 2020-03-09 RX ORDER — AZITHROMYCIN 250 MG/1
500 TABLET, FILM COATED ORAL ONCE
Status: COMPLETED | OUTPATIENT
Start: 2020-03-09 | End: 2020-03-09

## 2020-03-09 RX ORDER — SODIUM CHLORIDE 0.9 % (FLUSH) 0.9 %
10 SYRINGE (ML) INJECTION PRN
Status: DISCONTINUED | OUTPATIENT
Start: 2020-03-09 | End: 2020-03-10 | Stop reason: HOSPADM

## 2020-03-09 RX ORDER — ASPIRIN 81 MG/1
243 TABLET, CHEWABLE ORAL ONCE
Status: DISCONTINUED | OUTPATIENT
Start: 2020-03-09 | End: 2020-03-10 | Stop reason: HOSPADM

## 2020-03-09 RX ORDER — ASPIRIN 81 MG/1
81 TABLET ORAL DAILY
Status: DISCONTINUED | OUTPATIENT
Start: 2020-03-10 | End: 2020-03-10 | Stop reason: HOSPADM

## 2020-03-09 RX ADMIN — ONDANSETRON 4 MG: 2 INJECTION, SOLUTION INTRAMUSCULAR; INTRAVENOUS at 21:37

## 2020-03-09 RX ADMIN — AZITHROMYCIN 500 MG: 250 TABLET, FILM COATED ORAL at 22:34

## 2020-03-09 RX ADMIN — ALBUTEROL SULFATE 2.5 MG: 2.5 SOLUTION RESPIRATORY (INHALATION) at 23:04

## 2020-03-09 RX ADMIN — ONDANSETRON 4 MG: 2 INJECTION INTRAMUSCULAR; INTRAVENOUS at 21:37

## 2020-03-09 RX ADMIN — PREDNISONE 60 MG: 20 TABLET ORAL at 22:34

## 2020-03-09 RX ADMIN — IPRATROPIUM BROMIDE 0.5 MG: 0.5 SOLUTION RESPIRATORY (INHALATION) at 23:04

## 2020-03-09 ASSESSMENT — PAIN DESCRIPTION - LOCATION: LOCATION: CHEST;BACK

## 2020-03-09 ASSESSMENT — ENCOUNTER SYMPTOMS
BACK PAIN: 0
COLOR CHANGE: 0
ABDOMINAL PAIN: 0
NAUSEA: 0
DIARRHEA: 0
COUGH: 1
VOMITING: 0
SORE THROAT: 0
SHORTNESS OF BREATH: 1

## 2020-03-09 ASSESSMENT — PAIN DESCRIPTION - ORIENTATION: ORIENTATION: MID

## 2020-03-09 ASSESSMENT — PAIN DESCRIPTION - DESCRIPTORS: DESCRIPTORS: SHARP;STABBING

## 2020-03-09 ASSESSMENT — PAIN DESCRIPTION - FREQUENCY: FREQUENCY: CONTINUOUS

## 2020-03-09 ASSESSMENT — PAIN SCALES - GENERAL
PAINLEVEL_OUTOF10: 7
PAINLEVEL_OUTOF10: 7

## 2020-03-09 ASSESSMENT — PAIN DESCRIPTION - PAIN TYPE: TYPE: ACUTE PAIN

## 2020-03-10 VITALS
TEMPERATURE: 97.7 F | DIASTOLIC BLOOD PRESSURE: 76 MMHG | BODY MASS INDEX: 24.35 KG/M2 | HEIGHT: 61 IN | HEART RATE: 68 BPM | OXYGEN SATURATION: 93 % | SYSTOLIC BLOOD PRESSURE: 120 MMHG | WEIGHT: 129 LBS | RESPIRATION RATE: 18 BRPM

## 2020-03-10 LAB
EKG ATRIAL RATE: 65 BPM
EKG ATRIAL RATE: 72 BPM
EKG P AXIS: 70 DEGREES
EKG P AXIS: 71 DEGREES
EKG P-R INTERVAL: 114 MS
EKG P-R INTERVAL: 120 MS
EKG Q-T INTERVAL: 422 MS
EKG Q-T INTERVAL: 426 MS
EKG QRS DURATION: 84 MS
EKG QRS DURATION: 90 MS
EKG QTC CALCULATION (BAZETT): 443 MS
EKG QTC CALCULATION (BAZETT): 462 MS
EKG R AXIS: 80 DEGREES
EKG R AXIS: 82 DEGREES
EKG T AXIS: 64 DEGREES
EKG T AXIS: 71 DEGREES
EKG VENTRICULAR RATE: 65 BPM
EKG VENTRICULAR RATE: 72 BPM
LIPASE: 58 U/L (ref 13–60)
TROPONIN INTERP: NORMAL
TROPONIN T: NORMAL NG/ML
TROPONIN, HIGH SENSITIVITY: <6 NG/L (ref 0–14)

## 2020-03-10 PROCEDURE — 2580000003 HC RX 258: Performed by: PHYSICIAN ASSISTANT

## 2020-03-10 PROCEDURE — 83690 ASSAY OF LIPASE: CPT

## 2020-03-10 PROCEDURE — G0378 HOSPITAL OBSERVATION PER HR: HCPCS

## 2020-03-10 PROCEDURE — 93005 ELECTROCARDIOGRAM TRACING: CPT | Performed by: EMERGENCY MEDICINE

## 2020-03-10 PROCEDURE — 84484 ASSAY OF TROPONIN QUANT: CPT

## 2020-03-10 PROCEDURE — 36415 COLL VENOUS BLD VENIPUNCTURE: CPT

## 2020-03-10 PROCEDURE — 6370000000 HC RX 637 (ALT 250 FOR IP): Performed by: PHYSICIAN ASSISTANT

## 2020-03-10 RX ORDER — PREDNISONE 50 MG/1
50 TABLET ORAL DAILY
Qty: 4 TABLET | Refills: 0 | Status: SHIPPED | OUTPATIENT
Start: 2020-03-10 | End: 2020-03-19

## 2020-03-10 RX ORDER — AZITHROMYCIN 250 MG/1
250 TABLET, FILM COATED ORAL SEE ADMIN INSTRUCTIONS
Qty: 6 TABLET | Refills: 0 | Status: SHIPPED | OUTPATIENT
Start: 2020-03-10 | End: 2020-03-15

## 2020-03-10 RX ORDER — ONDANSETRON 4 MG/1
4 TABLET, ORALLY DISINTEGRATING ORAL EVERY 8 HOURS PRN
Qty: 20 TABLET | Refills: 0 | Status: SHIPPED | OUTPATIENT
Start: 2020-03-10 | End: 2021-01-21

## 2020-03-10 RX ADMIN — ACETAMINOPHEN 650 MG: 325 TABLET ORAL at 00:04

## 2020-03-10 RX ADMIN — SODIUM CHLORIDE, PRESERVATIVE FREE 10 ML: 5 INJECTION INTRAVENOUS at 01:11

## 2020-03-10 RX ADMIN — FLUOXETINE HYDROCHLORIDE 20 MG: 20 CAPSULE ORAL at 00:10

## 2020-03-10 RX ADMIN — METOPROLOL TARTRATE 50 MG: 50 TABLET, FILM COATED ORAL at 00:10

## 2020-03-10 ASSESSMENT — PAIN DESCRIPTION - ONSET: ONSET: ON-GOING

## 2020-03-10 ASSESSMENT — PAIN DESCRIPTION - ORIENTATION: ORIENTATION: MID

## 2020-03-10 ASSESSMENT — PAIN SCALES - GENERAL
PAINLEVEL_OUTOF10: 5
PAINLEVEL_OUTOF10: 7

## 2020-03-10 ASSESSMENT — PAIN - FUNCTIONAL ASSESSMENT: PAIN_FUNCTIONAL_ASSESSMENT: ACTIVITIES ARE NOT PREVENTED

## 2020-03-10 ASSESSMENT — PAIN DESCRIPTION - DESCRIPTORS: DESCRIPTORS: ACHING

## 2020-03-10 ASSESSMENT — PAIN DESCRIPTION - PAIN TYPE: TYPE: ACUTE PAIN

## 2020-03-10 ASSESSMENT — PAIN DESCRIPTION - PROGRESSION: CLINICAL_PROGRESSION: NOT CHANGED

## 2020-03-10 ASSESSMENT — PAIN DESCRIPTION - LOCATION: LOCATION: ABDOMEN

## 2020-03-10 ASSESSMENT — PAIN DESCRIPTION - FREQUENCY: FREQUENCY: INTERMITTENT

## 2020-03-10 NOTE — H&P
[address] [facility]  CDU / OBSERVATION ENCOUNTER  RESIDENT NOTE     Pt Name: Francois Monte  MRN: [id number]  [place] 1965  Date of evaluation: 3/10/20  Patient's PCP is :  Kamar Heller MD    [address] [place] [address]       Chief Complaint   Patient presents with    Chest Pain     midsternal through upper back midshoulder blade pain starting with sudden onset         Britney 55 is a 48 y.o. female who presents with acute midsternal chest pain and productive cough with purulent sputum. Patient reports chest pain began yesterday suddenly while sitting down at home. She also reports that for the past 3 days she has had a cough with increasing sputum production.       Denies any associated syncope, headache, fever, chills, abdominal pain, nausea, vomiting, diarrhea, bloody stools, bloody emesis, confusion, palpitations, diaphoresis, back pain    Location/Symptom: Chest pain and productive cough  Timing/Onset: Acute, chest pain over past 1 day, cough over 3 days  Provocation: Unknown  Quality: Pressure-like chest pain, productive cough  Radiation: Chest pain with some radiation to the back  Severity: Moderate  Timing/Duration: Chest pain over 1 day, cough over 3 days    Modifying Factors: Cough worse at night, no specific aggravating or alleviating factors of chest pain    REVIEW OF SYSTEMS       General ROS - No fevers, No malaise  Psychological ROS - No Headache, No suicidal thoughts  Ophthalmic ROS - No discharge, No changes in vision  ENT ROS -  No sore throat, No rhinorrhea,   Respiratory ROS -reports productive cough, No shortness of breath, or wheezing  Cardiovascular ROS -reports some chest pain, no dyspnea on exertion  Gastrointestinal ROS - No abdominal pain, change in bowel habits, or black or bloody stools  Genito-Urinary ROS - No dysuria, trouble voiding, or hematuria  Musculoskeletal ROS - No myalgias, No arthalgias  Neurological ROS - No focal weakness or loss of sensation  Dermatological ROS - No lesions, No rash    (PQRS) Advance directives on face sheet per hospital policy. No change unless specifically mentioned in chart    PAST MEDICAL HISTORY    has a past medical history of Anxiety, CAD (coronary artery disease), Cardiac arrest ([facility] [address] COPD (chronic obstructive pulmonary disease) ([facility] [address] GERD (gastroesophageal reflux disease), Headaches due to old head injury, History of blood transfusion, Hypertension, Snores, Trigger finger, Wears dentures, and Wears glasses. I have reviewed the past medical history with the patient and it is not pertinent to this complaint. SURGICAL HISTORY      has a past surgical history that includes Tonsillectomy;  section; LEEP (); Cardiac catheterization (); pr incise finger tendon sheath (Right, 2017); Colonoscopy (2019); Cholecystectomy (2019); Cholecystectomy, laparoscopic (N/A, 2019); and alexandrea and bso (cervix removed) (). I have reviewed and agree with Surgical History entered    CURRENT MEDICATIONS     aspirin chewable tablet 243 mg, Once  albuterol sulfate  (90 Base) MCG/ACT inhaler 2 puff, 4x Daily PRN  aspirin EC tablet 81 mg, Daily  lisinopril-hydroCHLOROthiazide (PRINZIDE;ZESTORETIC) 10-12.5 MG per tablet 1 tablet, Daily  metoprolol tartrate (LOPRESSOR) tablet 50 mg, BID  FLUoxetine (PROZAC) capsule 20 mg, BID  sodium chloride flush 0.9 % injection 10 mL, 2 times per day  sodium chloride flush 0.9 % injection 10 mL, PRN  acetaminophen (TYLENOL) tablet 650 mg, Q4H PRN  ondansetron (ZOFRAN) injection 4 mg, Q8H PRN  albuterol (PROVENTIL) nebulizer solution 2.5 mg, As Directed RT PRN  ipratropium (ATROVENT) 0.02 % nebulizer solution 0.5 mg, Q6H PRN        All medication charted and reviewed. ALLERGIES     is allergic to adhesive tape. FAMILY HISTORY     She indicated that her mother is . She indicated that her father is alive.      family history includes Depression in her mother; High Blood Pressure in her father and mother. The patient denies any pertinent family history. I have reviewed and agree with the family history entered. I have reviewed the Family History and it is not significant to the case    SOCIAL HISTORY      reports that she has been smoking cigarettes. She has a 15.00 pack-year smoking history. She has never used smokeless tobacco. She reports current alcohol use. She reports that she does not use drugs. I have reviewed and agree with all Social.    PHYSICAL EXAM     INITIAL VITALS:  height is 5' 1\" (1.549 m) and weight is 129 lb (58.5 kg). Her oral temperature is 97.7 °F (36.5 °C). Her blood pressure is 120/76 and her pulse is 68. Her respiration is 18 and oxygen saturation is 93%.       CONSTITUTIONAL: AOx4, no apparent distress, appears stated age   HEAD: normocephalic, atraumatic   EYES: PERRLA, EOMI    ENT: moist mucous membranes, uvula midline   NECK: supple, symmetric   BACK: symmetric   LUNGS:  Bilateral wheezing diffusely   CARDIOVASCULAR: regular rate and rhythm, no murmurs, rubs or gallops   ABDOMEN: soft, non-tender, non-distended with normal active bowel sounds   NEUROLOGIC:  MAEx4, no focal sensory or motor deficits   MUSCULOSKELETAL: no clubbing, cyanosis or edema   SKIN: no rash or wounds       DIFFERENTIAL DIAGNOSIS/MDM:   Cough:  DDX: pneumonia, sinusitis, foreign body, URI, viral illness, asthma/ COPD, allergic rhinitis, GERD, ACE- inhibitor use, HIV (TB, PCP)    Chest Pain:  DDX: Emergent: ACS/NSTEMI/STEMI/angina, arrhythmia, trauma, aortic dissection,  PE, PNA, pneumothroax, esophageal rupture, tamponade, Cocaine use  Nonemergent: pneumonia, pericarditis, GERD, MSK, Endocarditis, anxiety  Evaluated for: diaphoresis, present chest pain, tachypnea, BP both arms, heart sounds, JVD, tender chest wall, wheezing      DIAGNOSTIC RESULTS     EKG: All EKG's are interpreted by the Observation Physician who either signs or Co-signs this chart in Include:   Hypercholesterolemia   Hypertension   Diabetes Mellitus   Cigarette smoking   Positive family history   Obesity   CAD   (SLE, CKDz, HIV, Cocaine abuse)   Troponin Levels   = Normal Limit (0 pts)   1-3 Times Normal Limit (1 pt)   > 3 Times Normal Limit (2 pts)  TOTAL:  4    Percent Risk for Major Adverse Cardiac Event (MACE)  0-3 pts indicates low risk for MACE   2.5% (DISCHARGE)   4-7 pts indicates moderate risk for MACE  20.3% (OBS)  8-10 pts indicates high risk for MACE  72.7% (EARLY INVASIVE TX)    CDU IMPRESSION / PLAN      Marcia Napoles is a 48 y.o. female who presents with:    1) acute midsternal chest pain, unclear etiology, initial encounter   1. Patient originally admitted for stress testing however patient ate prior to stress testing cannot have test done. 2.  Treated symptomatically with p.o. Tylenol. 3.  Patient is agreeable to having stress test performed on outpatient basis. Acute cardiac pathology unlikely given EKG from prior, negative troponins and improvement of symptoms. 2) acute productive cough secondary to COPD exacerbation, likely secondary to infectious etiology, initial encounter   1. Treated with nebulized albuterol, p.o. prednisone, p.o. azithromycin   2. Improvement in symptoms with above treatments.   Will be prescribed p.o. azithromycin and p.o. prednisone for COPD exacerbation upon discharge       · None  · Further workup and evaluation   · Follow up recommendations     · Continue current  home meds, pain control   · Monitor vitals, labs, imaging     DISPO: pending consults and clinical improvement     CONSULTS:    None    PROCEDURES:        PATIENT REFERRED TO:    Madelaine Henry MD  [address] [facility] [address] [address]            --  Elena Lomeli D.O. PGY1  Emergency Medicine Resident Physician

## 2020-03-10 NOTE — ED PROVIDER NOTES
EMERGENCY DEPARTMENT ENCOUNTER   ATTENDING ATTESTATION     Pt Name: Francois Monte  MRN: [id number]  [place] 1965  Date of evaluation: 3/9/20   Francois Monte is a 48 y.o. female with CC: Chest Pain (midsternal through upper back midshoulder blade pain starting with sudden onset)    MDM:     CP/COPD exac  Prod cough  Cardiac and COPD pathway    Likely admit       CRITICAL CARE:       EKG: All EKG's are interpreted by the Emergency Department Physician who either signs or Co-signs this chart in the absence of a cardiologist.      RADIOLOGY:All plain film, CT, MRI, and formal ultrasound images (except ED bedside ultrasound) are read by the radiologist, see reports below, unless otherwise noted in MDM or here. XR CHEST STANDARD (2 VW)    (Results Pending)     LABS: All lab results were reviewed by myself, and all abnormals are listed below. Labs Reviewed   CBC WITH AUTO DIFFERENTIAL   COMPREHENSIVE METABOLIC PANEL   TROPONIN   TROPONIN     CONSULTS:  None  FINAL IMPRESSION    No diagnosis found. PASTMEDICAL HISTORY     Past Medical History:   Diagnosis Date    Anxiety     CAD (coronary artery disease) 2013    Cath 13: Minimal CAD.  No stents    Cardiac arrest ([facility] [address]     cardiac arrest at age 25 months/s/p fall from bed with unconscious episode    COPD (chronic obstructive pulmonary disease) (HCC)     GERD (gastroesophageal reflux disease)     Headaches due to old head injury     s/p fall down steps with unconsciousness    History of blood transfusion     Hypertension     Snores     Trigger finger     right thumb and right middle finger    Wears dentures     FULL UPPER    Wears glasses      SURGICAL HISTORY       Past Surgical History:   Procedure Laterality Date    CARDIAC CATHETERIZATION      minimal cad, no stents     SECTION      X's 3    CHOLECYSTECTOMY  2019    robotic laprascopic    CHOLECYSTECTOMY, LAPAROSCOPIC N/A 2019    *ADD-ON JACQUI MAGANA DR[address] RONNY*CHOLECYSTECTOMY LAPAROSCOPIC ROBOTIC performed by Maycol Gifford IV, DO at [address] COLONOSCOPY  2019    with polypectomy     LEEP  1993    [place] INCISE FINGER TENDON SHEATH Right 2017     TRIGGER RELEASE THUMB,  MIDDLE FINGER performed by Ryan Do DO at [facility]    TONSILLECTOMY       CURRENT MEDICATIONS       Previous Medications    ALBUTEROL SULFATE  (90 BASE) MCG/ACT INHALER    Inhale 2 puffs into the lungs 4 times daily as needed for Wheezing    ASPIRIN EC 81 MG EC TABLET    Take 1 tablet by mouth daily    FLUOXETINE (PROZAC) 20 MG CAPSULE    TAKE ONE CAPSULE BY MOUTH twice a day    LISINOPRIL-HYDROCHLOROTHIAZIDE (PRINZIDE;ZESTORETIC) 10-12.5 MG PER TABLET    Take 1 tablet by mouth daily    METOPROLOL TARTRATE (LOPRESSOR) 50 MG TABLET    Take 1 tablet by mouth 2 times daily     ALLERGIES     is allergic to adhesive tape. FAMILY HISTORY     She indicated that her mother is . She indicated that her father is alive. SOCIAL HISTORY       Social History     Tobacco Use    Smoking status: Current Every Day Smoker     Packs/day: 0.50     Years: 30.00     Pack years: 15.00     Types: Cigarettes    Smokeless tobacco: Never Used   Substance Use Topics    Alcohol use: Yes     Alcohol/week: 0.0 standard drinks     Comment: occ    Drug use: No       I personally evaluated and examined the patient in conjunction with the APC and agree with the assessment, treatment plan, and disposition of the patient as recorded by the APC.    Taras Fulton MD  Attending Emergency Physician         Taras Fulton MD  20 [id number]

## 2020-03-10 NOTE — PLAN OF CARE
Problem: Pain:  Goal: Pain level will decrease  Description: Pain level will decrease  Outcome: Met This Shift  Goal: Control of acute pain  Description: Control of acute pain  Outcome: Met This Shift

## 2020-03-10 NOTE — ED NOTES
Pt to room 8 via LS11 with c/o midsternal CP to midupper back. Pt reports sudden onset with a hx of similar pains before being related to stress. Pt reports pain with sudden onset. Pt reports diaphoresis and SOB. Pt denies SOB, and is not diaphoretic. Pt reports frontal diffuse HA, with sensitivity to light. Pt denies n/v/d/c. Pt placed on continuous cardiac monitoring, BP, Pulse ox. EKG obtained. IV established and labs drawn. Pt alert and oriented x4, talking in complete sentences, respirations even and unlabored. Pt acting age appropriate. White board updated, will continue to monitor.        Elsa Pryor RN  03/09/20 3057

## 2020-03-10 NOTE — ED NOTES
Bed: 08  Expected date: 3/9/20  Expected time: 8:27 PM  Means of arrival: [facility]  Comments:  LS11  Cp x1 hour     Navarro Navas RN  03/09/20 2040

## 2020-03-10 NOTE — ED PROVIDER NOTES
[address]  ED  eMERGENCY dEPARTMENT eNCOUnter      Pt Name: Anil Muñiz  MRN: [id number]  [place] 1965  Date of evaluation: 3/9/2020  Provider: Quintin Dumont Dr       Chief Complaint   Patient presents with    Chest Pain     midsternal through upper back midshoulder blade pain starting with sudden onset             HISTORY OF PRESENT ILLNESS  (Location/Symptom, Timing/Onset, Context/Setting, Quality, Duration, Modifying Factors, Severity.)   Anil Muñiz is a 48 y.o. female who presents to the emergencydepartment complaining of chest pain 1 hour prior to arrival.  She states that she is having a productive cough as well. History of COPD. No cardiac history but family history of cardiac disease. She does smoke. She states that the chest pain is constant and does not always come with her cough. She denies any abdominal pain. No nausea or vomiting. No fever but she does states she has some chills. No other symptoms. She denies any radiation of the pain for me. REVIEW OF SYSTEMS    (2-9 systems for level 4, 10 ormore for level 5)     Review of Systems   Constitutional: Positive for chills. Negative for fatigue and fever. HENT: Negative for sore throat. Respiratory: Positive for cough and shortness of breath. Cardiovascular: Positive for chest pain. Negative for palpitations and leg swelling. Gastrointestinal: Negative for abdominal pain, diarrhea, nausea and vomiting. Genitourinary: Negative for flank pain. Musculoskeletal: Negative for back pain, neck pain and neck stiffness. Skin: Negative for color change. Neurological: Negative for dizziness, facial asymmetry, speech difficulty, weakness, light-headedness, numbness and headaches. PAST MEDICAL HISTORY         Diagnosis Date    Anxiety     CAD (coronary artery disease) 04/29/2013    Cath 04/29/13: Minimal CAD.  No stents    Cardiac arrest [facility])     cardiac arrest at has never used smokeless tobacco. She reports current alcohol use. She reports that she does not use drugs. PHYSICAL EXAM    (up to 7 for level 4, 8 or more for level 5)     Vitals:    03/09/20 2102 03/09/20 2103 03/09/20 2201 03/09/20 2216   BP: 104/67  121/76 135/79   Pulse: 62  67 63   Resp: 13  14 17   Temp:  97.7 °F (36.5 °C)     TempSrc:  Oral     SpO2: 95%  96% 94%       Physical Exam  Vitals signs and nursing note reviewed. Constitutional:       General: She is not in acute distress. Appearance: Normal appearance. She is obese. She is not ill-appearing, toxic-appearing or diaphoretic. HENT:      Head: Normocephalic and atraumatic. Nose: Nose normal.      Mouth/Throat:      Mouth: Mucous membranes are moist.      Pharynx: Oropharynx is clear. Eyes:      Extraocular Movements: Extraocular movements intact. Conjunctiva/sclera: Conjunctivae normal.      Pupils: Pupils are equal, round, and reactive to light. Neck:      Musculoskeletal: Normal range of motion and neck supple. Comments: No meningeal signs. Cardiovascular:      Rate and Rhythm: Normal rate and regular rhythm. Pulses: Normal pulses. Heart sounds: Normal heart sounds. No murmur. No friction rub. No gallop. Pulmonary:      Effort: Pulmonary effort is normal. No respiratory distress. Breath sounds: Normal breath sounds. No stridor. No wheezing, rhonchi or rales. Chest:      Chest wall: No tenderness. Abdominal:      General: Bowel sounds are normal. There is no distension. Palpations: Abdomen is soft. There is no mass. Tenderness: There is no abdominal tenderness. There is no guarding or rebound. Hernia: No hernia is present. Comments: No peritoneal signs. Musculoskeletal: Normal range of motion. Skin:     General: Skin is warm. Capillary Refill: Capillary refill takes less than 2 seconds. Neurological:      General: No focal deficit present.       Mental Status: She is

## 2020-03-11 ENCOUNTER — TELEPHONE (OUTPATIENT)
Dept: INTERNAL MEDICINE | Age: 55
End: 2020-03-11

## 2020-03-11 NOTE — TELEPHONE ENCOUNTER
[facility] Transitions Initial Follow Up Call    Call within 2 business days of discharge: Yes     Patient: Tash Chen Patient : 1965 MRN: [id number]    [unfilled]    RARS: Readmission Risk Score: 12       Spoke with: JACK , reminding pt of apt on 3/19/2020 at 11:00 am with PCP    Discharge department/facility: [place] [address]    Non-face-to-face services provided:  Obtained and reviewed discharge summary and/or continuity of care documents    Follow Up  Future Appointments   Date Time Provider Naheed Patel   3/19/2020 11:00 AM Alber Knapp MD [facility] RANDY Spence RN

## 2020-03-11 NOTE — DISCHARGE SUMMARY
CDU Discharge Summary        Patient:  Tash Chen  YOB: 1965    MRN: [id number]   Acct: [de-identified]    Primary Care Physician: Alber Knapp MD    Admit date:  3/9/2020  8:40 PM  Discharge date: 3/10/2020  3:00 PM    Discharge Diagnoses:     1) acute midsternal chest pain, unclear etiology, initial encounter              1.  Patient originally admitted for stress testing however patient ate prior to stress testing could not have test done. 2.  Treated symptomatically with p.o. Tylenol with improvement of symptoms              3.  Patient is agreeable to having stress test performed on outpatient basis. Acute cardiac pathology unlikely given EKG from prior, negative troponins and improvement of symptoms.    2) acute productive cough secondary to COPD exacerbation, likely secondary to infectious etiology, initial encounter              1.  Treated with nebulized albuterol, p.o. prednisone, p.o. azithromycin              2.  Improvement in symptoms with above treatments. Prescribed p.o. azithromycin and p.o. prednisone for COPD exacerbation at discharge    Follow-up:  Call today/tomorrow for a follow up appointment with Alber Knapp MD , or return to the Emergency Room with worsening symptoms    Stressed to patient the importance of following up with primary care doctor for further workup/management of symptoms. Pt verbalizes understanding and agrees with plan.     Discharge Medication Changes:       Medication List      START taking these medications    azithromycin 250 MG tablet  Commonly known as:  ZITHROMAX  Take 1 tablet by mouth See Admin Instructions for 5 days 500mg on day 1 followed by 250mg on days 2 - 5     ondansetron 4 MG disintegrating tablet  Commonly known as:  Zofran ODT  Take 1 tablet by mouth every 8 hours as needed for Nausea     predniSONE 50 MG tablet  Commonly known as:  DELTASONE  Take 1 tablet by mouth daily        CONTINUE taking these medications REPORTED    Comprehensive Metabolic Panel   Result Value Ref Range    Glucose 89 70 - 99 mg/dL    BUN 17 6 - 20 mg/dL    CREATININE 0.80 0.50 - 0.90 mg/dL    Bun/Cre Ratio NOT REPORTED 9 - 20    Calcium 9.1 8.6 - 10.4 mg/dL    Sodium 138 135 - 144 mmol/L    Potassium 3.8 3.7 - 5.3 mmol/L    Chloride 101 98 - 107 mmol/L    CO2 24 20 - 31 mmol/L    Anion Gap 13 9 - 17 mmol/L    Alkaline Phosphatase 110 (H) 35 - 104 U/L    ALT 26 5 - 33 U/L    AST 22 <32 U/L    Total Bilirubin 0.16 (L) 0.3 - 1.2 mg/dL    Total Protein 7.1 6.4 - 8.3 g/dL    Alb 3.9 3.5 - 5.2 g/dL    Albumin/Globulin Ratio 1.2 1.0 - 2.5    GFR Non-African American >60 >60 mL/min    GFR African American >60 >60 mL/min    GFR Comment          GFR Staging NOT REPORTED    Troponin   Result Value Ref Range    Troponin, High Sensitivity <6 0 - 14 ng/L    Troponin T NOT REPORTED <0.03 ng/mL    Troponin Interp NOT REPORTED    Troponin   Result Value Ref Range    Troponin, High Sensitivity <6 0 - 14 ng/L    Troponin T NOT REPORTED <0.03 ng/mL    Troponin Interp NOT REPORTED    LIPASE   Result Value Ref Range    Lipase 58 13 - 60 U/L   Troponin   Result Value Ref Range    Troponin, High Sensitivity <6 0 - 14 ng/L    Troponin T NOT REPORTED <0.03 ng/mL    Troponin Interp NOT REPORTED    EKG 12 Lead   Result Value Ref Range    Ventricular Rate 65 BPM    Atrial Rate 65 BPM    P-R Interval 114 ms    QRS Duration 84 ms    Q-T Interval 426 ms    QTc Calculation (Bazett) 443 ms    P Axis 70 degrees    R Axis 82 degrees    T Axis 71 degrees   EKG 12 Lead   Result Value Ref Range    Ventricular Rate 72 BPM    Atrial Rate 72 BPM    P-R Interval 120 ms    QRS Duration 90 ms    Q-T Interval 422 ms    QTc Calculation (Bazett) 462 ms    P Axis 71 degrees    R Axis 80 degrees    T Axis 64 degrees     Xr Chest Standard (2 Vw)    Result Date: 3/9/2020  EXAMINATION: TWO XRAY VIEWS OF THE CHEST 3/9/2020 9:37 pm COMPARISON: 01/13/2020 HISTORY: Reason for Exam: Cough, Chest pain FINDINGS: The lungs are without acute focal process. No effusion or pneumothorax. The cardiomediastinal silhouette is normal.  The osseous structures are intact without acute process. Stable chest without acute process. Physical Exam:    General appearance - NAD, AOx 3  Lungs -CTAB, no R/R/R  Heart - RRR, no M/R/G  Abdomen - Soft, NT/ND  Neurological:  MAEx4, No focal motor deficit, sensory loss  Extremities - Cap refil <2 sec in all ext., no edema  Skin -warm, dry      Hospital Course:  Clinical course has improved, labs and imaging reviewed. Anil Muñiz originally presented to the hospital on 3/9/2020  8:40 PM with acute midsternal chest pain and acute productive cough. .  At that time it was determined that She required further observation and testing and consultation. Patient was evaluated by cardiology however cannot have stress test performed as an inpatient. Patient agreed to outpatient stress testing and follow-up with cardiology. Patient was also treated with nebulized albuterol, p.o. steroids, p.o. antibiotics for COPD exacerbation. labs and imaging were followed daily. Imaging results as above. She is medically stable to be discharged. Disposition: Home    Patient stated that they will not drive themselves home from the hospital if they have gotten pain killers/ narcotics earlier that day and that they will arrange for transportation on their own or work with the  for a ride. Patient counseled NOT to drive while under the influence of narcotics/ pain killers. Condition: Good    Patient stable and ready for discharge home. I have discussed plan of care with patient and they are in understanding. They were instructed to read discharge paperwork. All of their questions and concerns were addressed. Time Spent: 0 day      --  Odilon Carlos DO  Emergency Medicine Resident Physician    This dictation was generated by voice recognition computer software.

## 2020-03-13 ENCOUNTER — HOSPITAL ENCOUNTER (EMERGENCY)
Age: 55
Discharge: HOME | End: 2020-03-13
Attending: EMERGENCY MEDICINE
Payer: COMMERCIAL

## 2020-03-13 ENCOUNTER — APPOINTMENT (OUTPATIENT)
Dept: CT IMAGING | Age: 55
End: 2020-03-13
Payer: COMMERCIAL

## 2020-03-13 ENCOUNTER — TELEPHONE (OUTPATIENT)
Dept: INTERNAL MEDICINE | Age: 55
End: 2020-03-13

## 2020-03-13 VITALS
SYSTOLIC BLOOD PRESSURE: 105 MMHG | HEART RATE: 67 BPM | OXYGEN SATURATION: 95 % | TEMPERATURE: 97.9 F | BODY MASS INDEX: 24.55 KG/M2 | WEIGHT: 130 LBS | HEIGHT: 61 IN | RESPIRATION RATE: 14 BRPM | DIASTOLIC BLOOD PRESSURE: 67 MMHG

## 2020-03-13 LAB
-: NORMAL
ABSOLUTE EOS #: 0.3 K/UL (ref 0–0.4)
ABSOLUTE EOS #: NORMAL K/UL (ref 0–0.4)
ABSOLUTE IMMATURE GRANULOCYTE: ABNORMAL K/UL (ref 0–0.3)
ABSOLUTE IMMATURE GRANULOCYTE: NORMAL K/UL (ref 0–0.3)
ABSOLUTE LYMPH #: 3.9 K/UL (ref 1–4.8)
ABSOLUTE LYMPH #: NORMAL K/UL (ref 1–4.8)
ABSOLUTE MONO #: 1.1 K/UL (ref 0.1–1.3)
ABSOLUTE MONO #: NORMAL K/UL (ref 0.1–1.3)
ALBUMIN SERPL-MCNC: 3.5 G/DL (ref 3.5–5.2)
ALBUMIN/GLOBULIN RATIO: ABNORMAL (ref 1–2.5)
ALP BLD-CCNC: 81 U/L (ref 35–104)
ALT SERPL-CCNC: 15 U/L (ref 5–33)
ANION GAP SERPL CALCULATED.3IONS-SCNC: 11 MMOL/L (ref 9–17)
AST SERPL-CCNC: 17 U/L
BASOPHILS # BLD: 1 % (ref 0–2)
BASOPHILS # BLD: NORMAL % (ref 0–2)
BASOPHILS ABSOLUTE: 0.1 K/UL (ref 0–0.2)
BASOPHILS ABSOLUTE: NORMAL K/UL (ref 0–0.2)
BILIRUB SERPL-MCNC: <0.15 MG/DL (ref 0.3–1.2)
BUN BLDV-MCNC: 18 MG/DL (ref 6–20)
BUN/CREAT BLD: ABNORMAL (ref 9–20)
CALCIUM SERPL-MCNC: 8.8 MG/DL (ref 8.6–10.4)
CHLORIDE BLD-SCNC: 108 MMOL/L (ref 98–107)
CO2: 23 MMOL/L (ref 20–31)
CREAT SERPL-MCNC: 0.58 MG/DL (ref 0.5–0.9)
DIFFERENTIAL TYPE: ABNORMAL
DIFFERENTIAL TYPE: NORMAL
EOSINOPHILS RELATIVE PERCENT: 2 % (ref 0–4)
EOSINOPHILS RELATIVE PERCENT: NORMAL %
GFR AFRICAN AMERICAN: >60 ML/MIN
GFR NON-AFRICAN AMERICAN: >60 ML/MIN
GFR SERPL CREATININE-BSD FRML MDRD: ABNORMAL ML/MIN/{1.73_M2}
GFR SERPL CREATININE-BSD FRML MDRD: ABNORMAL ML/MIN/{1.73_M2}
GLUCOSE BLD-MCNC: 95 MG/DL (ref 70–99)
HCT VFR BLD CALC: 40 % (ref 36–46)
HCT VFR BLD CALC: NORMAL % (ref 36–46)
HEMOGLOBIN: 13.1 G/DL (ref 12–16)
HEMOGLOBIN: NORMAL G/DL (ref 12–16)
IMMATURE GRANULOCYTES: ABNORMAL %
IMMATURE GRANULOCYTES: NORMAL %
LIPASE: 74 U/L (ref 13–60)
LYMPHOCYTES # BLD: 30 % (ref 24–44)
LYMPHOCYTES # BLD: NORMAL % (ref 24–44)
MCH RBC QN AUTO: 27.7 PG (ref 26–34)
MCH RBC QN AUTO: NORMAL PG (ref 26–34)
MCHC RBC AUTO-ENTMCNC: 32.8 G/DL (ref 31–37)
MCHC RBC AUTO-ENTMCNC: NORMAL G/DL (ref 31–37)
MCV RBC AUTO: 84.4 FL (ref 80–100)
MCV RBC AUTO: NORMAL FL (ref 80–100)
MONOCYTES # BLD: 9 % (ref 1–7)
MONOCYTES # BLD: NORMAL % (ref 1–7)
NRBC AUTOMATED: ABNORMAL PER 100 WBC
NRBC AUTOMATED: NORMAL PER 100 WBC
PDW BLD-RTO: 14.6 % (ref 11.5–14.9)
PDW BLD-RTO: NORMAL % (ref 11.5–14.9)
PLATELET # BLD: 235 K/UL (ref 150–450)
PLATELET # BLD: NORMAL K/UL (ref 150–450)
PLATELET ESTIMATE: ABNORMAL
PLATELET ESTIMATE: NORMAL
PMV BLD AUTO: 8 FL (ref 6–12)
PMV BLD AUTO: NORMAL FL (ref 6–12)
POTASSIUM SERPL-SCNC: 3.7 MMOL/L (ref 3.7–5.3)
RBC # BLD: 4.73 M/UL (ref 4–5.2)
RBC # BLD: ABNORMAL 10*6/UL
RBC # BLD: NORMAL 10*6/UL
RBC # BLD: NORMAL M/UL (ref 4–5.2)
REASON FOR REJECTION: NORMAL
SEG NEUTROPHILS: 58 % (ref 36–66)
SEG NEUTROPHILS: NORMAL % (ref 36–66)
SEGMENTED NEUTROPHILS ABSOLUTE COUNT: 7.5 K/UL (ref 1.3–9.1)
SEGMENTED NEUTROPHILS ABSOLUTE COUNT: NORMAL K/UL (ref 1.3–9.1)
SODIUM BLD-SCNC: 142 MMOL/L (ref 135–144)
TOTAL PROTEIN: 6 G/DL (ref 6.4–8.3)
WBC # BLD: 12.9 K/UL (ref 3.5–11)
WBC # BLD: ABNORMAL 10*3/UL
WBC # BLD: NORMAL 10*3/UL
WBC # BLD: NORMAL K/UL (ref 3.5–11)
ZZ NTE CLEAN UP: ORDERED TEST: NORMAL
ZZ NTE WITH NAME CLEAN UP: SPECIMEN SOURCE: NORMAL

## 2020-03-13 PROCEDURE — 74177 CT ABD & PELVIS W/CONTRAST: CPT

## 2020-03-13 PROCEDURE — 96374 THER/PROPH/DIAG INJ IV PUSH: CPT

## 2020-03-13 PROCEDURE — 36415 COLL VENOUS BLD VENIPUNCTURE: CPT

## 2020-03-13 PROCEDURE — 99284 EMERGENCY DEPT VISIT MOD MDM: CPT

## 2020-03-13 PROCEDURE — 85025 COMPLETE CBC W/AUTO DIFF WBC: CPT

## 2020-03-13 PROCEDURE — 6360000002 HC RX W HCPCS: Performed by: EMERGENCY MEDICINE

## 2020-03-13 PROCEDURE — 6360000004 HC RX CONTRAST MEDICATION: Performed by: EMERGENCY MEDICINE

## 2020-03-13 PROCEDURE — 2580000003 HC RX 258: Performed by: EMERGENCY MEDICINE

## 2020-03-13 PROCEDURE — 80053 COMPREHEN METABOLIC PANEL: CPT

## 2020-03-13 PROCEDURE — 83690 ASSAY OF LIPASE: CPT

## 2020-03-13 RX ORDER — 0.9 % SODIUM CHLORIDE 0.9 %
1000 INTRAVENOUS SOLUTION INTRAVENOUS ONCE
Status: COMPLETED | OUTPATIENT
Start: 2020-03-13 | End: 2020-03-13

## 2020-03-13 RX ORDER — FENTANYL CITRATE 50 UG/ML
50 INJECTION, SOLUTION INTRAMUSCULAR; INTRAVENOUS ONCE
Status: COMPLETED | OUTPATIENT
Start: 2020-03-13 | End: 2020-03-13

## 2020-03-13 RX ORDER — SODIUM CHLORIDE 0.9 % (FLUSH) 0.9 %
10 SYRINGE (ML) INJECTION PRN
Status: DISCONTINUED | OUTPATIENT
Start: 2020-03-13 | End: 2020-03-13 | Stop reason: HOSPADM

## 2020-03-13 RX ORDER — 0.9 % SODIUM CHLORIDE 0.9 %
80 INTRAVENOUS SOLUTION INTRAVENOUS ONCE
Status: COMPLETED | OUTPATIENT
Start: 2020-03-13 | End: 2020-03-13

## 2020-03-13 RX ORDER — PANTOPRAZOLE SODIUM 20 MG/1
40 TABLET, DELAYED RELEASE ORAL DAILY
Qty: 30 TABLET | Refills: 0 | Status: SHIPPED | OUTPATIENT
Start: 2020-03-13 | End: 2020-06-18

## 2020-03-13 RX ORDER — ONDANSETRON 4 MG/1
4 TABLET, ORALLY DISINTEGRATING ORAL EVERY 8 HOURS PRN
Qty: 10 TABLET | Refills: 0 | Status: SHIPPED | OUTPATIENT
Start: 2020-03-13 | End: 2020-03-19

## 2020-03-13 RX ADMIN — SODIUM CHLORIDE 80 ML: 9 INJECTION, SOLUTION INTRAVENOUS at 08:12

## 2020-03-13 RX ADMIN — SODIUM CHLORIDE 1000 ML: 9 INJECTION, SOLUTION INTRAVENOUS at 06:46

## 2020-03-13 RX ADMIN — Medication 10 ML: at 08:12

## 2020-03-13 RX ADMIN — IOVERSOL 75 ML: 741 INJECTION INTRA-ARTERIAL; INTRAVENOUS at 08:12

## 2020-03-13 RX ADMIN — FENTANYL CITRATE 50 MCG: 50 INJECTION, SOLUTION INTRAMUSCULAR; INTRAVENOUS at 06:47

## 2020-03-13 ASSESSMENT — PAIN DESCRIPTION - FREQUENCY: FREQUENCY: CONTINUOUS

## 2020-03-13 ASSESSMENT — PAIN DESCRIPTION - DESCRIPTORS
DESCRIPTORS: ACHING
DESCRIPTORS: CONSTANT

## 2020-03-13 ASSESSMENT — ENCOUNTER SYMPTOMS
SORE THROAT: 0
SHORTNESS OF BREATH: 0
VOMITING: 0
DIARRHEA: 0
NAUSEA: 0
BACK PAIN: 0
ABDOMINAL PAIN: 1
COUGH: 0
EYE PAIN: 0

## 2020-03-13 ASSESSMENT — PAIN SCALES - GENERAL
PAINLEVEL_OUTOF10: 7
PAINLEVEL_OUTOF10: 8
PAINLEVEL_OUTOF10: 8

## 2020-03-13 ASSESSMENT — PAIN DESCRIPTION - LOCATION
LOCATION: ABDOMEN
LOCATION: ABDOMEN

## 2020-03-13 ASSESSMENT — PAIN DESCRIPTION - ORIENTATION
ORIENTATION: RIGHT;LEFT;LOWER
ORIENTATION: RIGHT;LEFT

## 2020-03-13 ASSESSMENT — PAIN DESCRIPTION - PAIN TYPE: TYPE: ACUTE PAIN

## 2020-03-13 NOTE — ED PROVIDER NOTES
pain Reason for Exam: mid to upper abd pain since monday, has increased day by day, Acuity: Acute Type of Exam: Initial FINDINGS: Lower Chest: No acute abnormality in the included lung bases. Organs: Gallbladder is surgically absent. Mild central biliary dilatation is noted likely secondary to cholecystectomy. Spleen, adrenals, and kidneys are grossly unremarkable. Pancreas demonstrates no intrapancreatic mass. However, there is mucosal thickening in the adjacent duodenum. Low-grade pancreatitis is not excluded. GI/Bowel: No free air, or bowel obstruction is noted. There is mild duodenal wall thickening in the 1st to 2nd portion of the duodenum which may be related to ulcer disease/duodenitis. Scattered colonic diverticula are present without acute diverticulitis. Pelvis: No appendicitis. Bladder is unremarkable. No abnormal fluid collections, pelvic or inguinal adenopathy is noted. Peritoneum/Retroperitoneum: No aortic aneurysm, retroperitoneal mass, retroperitoneal or mesenteric adenopathy is noted. Shotty periportal lymph nodes are evident. Bones/Soft Tissues: No acute osseous or soft tissue abnormality. 1.  Status post cholecystectomy. Mild central biliary ductal dilatation likely secondary to cholecystectomy. 2.  Duodenal thickening with mild serosal haziness suggesting ulcer disease or duodenitis. The inflammatory changes adjacent to the head of the pancreas with low-grade pancreatitis a consideration. 3.  Colonic diverticulosis without acute diverticulitis. 4.  No obstructing urinary calculi, bowel obstruction, or appendicitis. RECOMMENDATIONS: Clinical and laboratory correlation is advised. LABS: All lab results were reviewed by myself, and all abnormals are listed below.   Labs Reviewed   CBC WITH AUTO DIFFERENTIAL - Abnormal; Notable for the following components:       Result Value    WBC 12.9 (*)     Monocytes 9 (*)     All other components within normal limits   COMPREHENSIVE

## 2020-03-13 NOTE — ED NOTES
Pt off floor to CT.      [facility]  03/13/20 7064

## 2020-03-13 NOTE — ED NOTES
Mode of arrival (squad #, walk in, police, etc) : walk in        Chief complaint(s): c/o RLQ and LLQ pain and nausea        Arrival Note (brief scenario, treatment PTA, etc). : Pt presents to ED from home c/o RLQ, LLQ pain and nausea starting Monday. Pt stated she was at [facility] and prescribed a steroid and a zpack. Pt denies any diarrhea. Pt is A&Ox4, eupneic, PWD. GCS=15. Bowel sounds active, soft, tender in RLQ and LLQ, non distended. Call light in reach. C= \"Have you ever felt that you should Cut down on your drinking? \"  No  A= \"Have people Annoyed you by criticizing your drinking? \"  No  G= \"Have you ever felt bad or Guilty about your drinking? \"  No  E= \"Have you ever had a drink as an Eye-opener first thing in the morning to steady your nerves or to help a hangover? \"  No      Deferred []      Reason for deferring: N/A    *If yes to two or more: probable alcohol abuse. Kavita Qiu RN  03/13/20 9582

## 2020-03-13 NOTE — LETTER
[facility] ED  [address] [address]  Phone: [phone]               March 13, 2020    Patient: Maryuri Reid   YOB: 1965   Date of Visit: 3/13/2020       To Whom It May Concern:    Karina Cornejo was seen and treated in our emergency department on 3/13/2020. She may return to work on 03/14/2020.       Sincerely,       monique toure RN         Signature:__________________________________

## 2020-03-19 ENCOUNTER — OFFICE VISIT (OUTPATIENT)
Dept: INTERNAL MEDICINE | Age: 55
End: 2020-03-19
Payer: COMMERCIAL

## 2020-03-19 VITALS
HEIGHT: 61 IN | TEMPERATURE: 98.1 F | SYSTOLIC BLOOD PRESSURE: 116 MMHG | WEIGHT: 128.2 LBS | HEART RATE: 72 BPM | BODY MASS INDEX: 24.2 KG/M2 | DIASTOLIC BLOOD PRESSURE: 71 MMHG

## 2020-03-19 PROCEDURE — 99214 OFFICE O/P EST MOD 30 MIN: CPT | Performed by: INTERNAL MEDICINE

## 2020-03-19 PROCEDURE — 99211 OFF/OP EST MAY X REQ PHY/QHP: CPT | Performed by: INTERNAL MEDICINE

## 2020-03-19 RX ORDER — ACETAMINOPHEN 325 MG/1
650 TABLET ORAL
Status: ON HOLD | COMMUNITY
Start: 2020-03-09 | End: 2021-02-03 | Stop reason: HOSPADM

## 2020-03-19 RX ORDER — TIOTROPIUM BROMIDE 18 UG/1
18 CAPSULE ORAL; RESPIRATORY (INHALATION) DAILY
Qty: 30 CAPSULE | Refills: 3 | Status: SHIPPED | OUTPATIENT
Start: 2020-03-19 | End: 2020-06-18

## 2020-03-19 ASSESSMENT — ENCOUNTER SYMPTOMS
EYES NEGATIVE: 1
ABDOMINAL PAIN: 1
SHORTNESS OF BREATH: 1
BACK PAIN: 1
WHEEZING: 1

## 2020-03-19 NOTE — PROGRESS NOTES
[facility]   Progress Note      Visit Information    Have you changed or started any medications since your last visit including any over-the-counter medicines, vitamins, or herbal medicines? yes - Aleve or Tylenol   Are you having any side effects from any of your medications? -  yes - Prednisone and Z-Stephen  Have you stopped taking any of your medications? Is so, why? -  yes - Prednisone and Z-Stephen    Have you seen any other physician or provider since your last visit? Yes - Records Obtained  Have you had any other diagnostic tests since your last visit? Yes - Records Obtained  Have you been seen in the emergency room and/or had an admission to a hospital since we last saw you? Yes - Records Obtained  Have you had your routine dental cleaning in the past 6 months? no    Have you activated your [employer] account? If not, what are your barriers?  Yes     Patient Care Team:  Forest Guerrero MD as PCP - General  Forest Guerrero MD as PCP - [facility] Provider    Medical History Review  Past Medical, Family, and Social History reviewed and does contribute to the patient presenting condition    Health Maintenance   Topic Date Due    Shingles Vaccine (1 of 2) 08/25/2017    A1C test (Diabetic or Prediabetic)  11/27/2020    Potassium monitoring  03/13/2021    Creatinine monitoring  03/13/2021    Lipid screen  04/25/2021    Breast cancer screen  10/22/2021    DTaP/Tdap/Td vaccine (2 - Td) 10/20/2024    Colon cancer screen colonoscopy  02/26/2029    Flu vaccine  Completed    Pneumococcal 0-64 years Vaccine  Completed    HIV screen  Completed    Hepatitis A vaccine  Aged Out    Hepatitis B vaccine  Aged Out    Hib vaccine  Aged Out    Meningococcal (ACWY) vaccine  Aged Out       Date of patient's visit: 3/19/2020  Patient's Name:  Augustus Rueda                   YOB: 1965        PCP:  Forest Guerrero MD    Marcia Corona is a 48 y.o. female who presents for   Chief Complaint Patient presents with    Hypertension    and follow up of chronic medical problems. Patient Active Problem List   Diagnosis    HTN (hypertension)    COPD (chronic obstructive pulmonary disease)    Hyperlipidemia    Near syncope    Anxiety    Tobacco abuse    Vesicular rash    Rash of hands    S/P LEEP    Gastroesophageal reflux disease without esophagitis    Trigger finger of right thumb    Trigger finger, right middle finger    Occipital headache    Pre-diabetes    Chest pain    Positive FIT (fecal immunochemical test)    Pancreatitis, unspecified pancreatitis type       HISTORY OF PRESENT ILLNESS:    History was obtained from the patient. Further completion of FMLA forms which was given to her for intermittent absences from work for depression. Patient recently had 2 hospital visits. Patient was reporting chest tightness and chest pain when she was admitted to observation for a work-up. She had a relatively stable course with her initial work-up being negative for any clear etiology. Patient had a stress test that was unable to be performed due to patient not remaining n.p.o. She is scheduled for an outpatient stress test on April 1. Patient was thought to have a COPD exacerbation at that time and was given antibiotics and a steroid taper. Patient does continue to smoke at this time and smoking cessation counseling was also done. Patient a few days after being home started to experience GI distress and ended up back in the emergency department. Patient had a CT abdomen which was suspicious for a mild pancreatitis or duodenitis and did reveal findings suggestive of ulcer disease. Patient is taking Protonix twice a day at this time however she does continue to smoke. She did not complete her steroid taper and has been avoiding NSAIDs. Depression  Patient complains of depression. She complains of depressed mood. Onset was approximately several months ago.  Symptoms have been albuterol sulfate  (90 Base) MCG/ACT inhaler Inhale 2 puffs into the lungs 4 times daily as needed for Wheezing 1 Inhaler 0    lisinopril-hydrochlorothiazide (PRINZIDE;ZESTORETIC) 10-12.5 MG per tablet Take 1 tablet by mouth daily 90 tablet 4    metoprolol tartrate (LOPRESSOR) 50 MG tablet Take 1 tablet by mouth 2 times daily 60 tablet 11    FLUoxetine (PROZAC) 20 MG capsule TAKE ONE CAPSULE BY MOUTH twice a day 60 capsule 11    aspirin EC 81 MG EC tablet Take 1 tablet by mouth daily 30 tablet 0     No current facility-administered medications for this visit. Patient Care Team:  Kamar Heller MD as PCP - Lary Kauffman MD as PCP - [facility] Empaneled Provider    PAST MEDICAL AND SURGICAL HISTORY:      Past Medical History:   Diagnosis Date    Anxiety     CAD (coronary artery disease) 2013    Cath 13: Minimal CAD.  No stents    Cardiac arrest ([facility] [address]     cardiac arrest at age 25 months/s/p fall from bed with unconscious episode    COPD (chronic obstructive pulmonary disease) (HCC)     GERD (gastroesophageal reflux disease)     Headaches due to old head injury     s/p fall down steps with unconsciousness    History of blood transfusion     Hypertension     Snores     Trigger finger     right thumb and right middle finger    Wears dentures     FULL UPPER    Wears glasses      Past Surgical History:   Procedure Laterality Date    CARDIAC CATHETERIZATION      minimal cad, no stents     SECTION      X's 3    CHOLECYSTECTOMY  2019    robotic laprascopic    CHOLECYSTECTOMY, LAPAROSCOPIC N/A 2019    *ADD-ON JACQUI Quiles LAPAROSCOPIC ROBOTIC performed by Yulia Gifford IV, DO at [address]  2019    with polypectomy     LEEP  1993    KS INCISE FINGER TENDON SHEATH Right 2017     TRIGGER RELEASE THUMB,  MIDDLE FINGER performed by Ricarda Merrill DO at [address] TONSILLECTOMY         SOCIAL HISTORY      Social History     Tobacco Use    Smoking status: Current Every Day Smoker     Packs/day: 0.50     Years: 30.00     Pack years: 15.00     Types: Cigarettes    Smokeless tobacco: Never Used   Substance Use Topics    Alcohol use: Yes     Alcohol/week: 0.0 standard drinks     Comment: destinee Kennedy  reports that she has been smoking cigarettes. She has a 15.00 pack-year smoking history. She has never used smokeless tobacco.    FAMILY HISTORY:      Family History   Problem Relation Age of Onset    High Blood Pressure Mother     Depression Mother     High Blood Pressure Father        REVIEW OF SYSTEMS:    Review of Systems   Constitutional: Negative. HENT: Negative. Eyes: Negative. Respiratory: Positive for shortness of breath and wheezing. Cardiovascular: Negative. Gastrointestinal: Positive for abdominal pain. Endocrine: Negative. Genitourinary: Negative. Musculoskeletal: Positive for back pain. Skin: Negative. Allergic/Immunologic: Positive for environmental allergies. Neurological: Negative. Hematological: Negative. Psychiatric/Behavioral: Negative.         PHYSICAL EXAM:      Vitals:    03/19/20 1111   BP: 116/71   Pulse: 72   Temp: 98.1 °F (36.7 °C)     BP Readings from Last 3 Encounters:   03/19/20 116/71   03/13/20 105/67   03/10/20 120/76       Physical Examination: General appearance - alert, well appearing, and in no distress  Mental status - depressed mood  Chest - clear to auscultation, no wheezes, rales or rhonchi, symmetric air entry  Heart - normal rate and regular rhythm    LABORATORY FINDINGS:    CBC:   Lab Results   Component Value Date    WBC 12.9 03/13/2020    HGB 13.1 03/13/2020     03/13/2020     03/18/2012     BMP:    Lab Results   Component Value Date     03/13/2020    K 3.7 03/13/2020     03/13/2020    CO2 23 03/13/2020    BUN 18 03/13/2020    CREATININE 0.58 03/13/2020    GLUCOSE 95 intermittent leave due to her active depression. Patient is on Prozac 20 mg daily which appears to not be effective but will defer to psychiatry for medication adjustments   Await stress test in 2 weeks  rtc in 3 months for COPD follow up      FOLLOW UP:   1. Marcia received counseling on the following healthy behaviors: nutrition and exercise    2. Reviewed prior labs and health maintenance. 3.  Discussed use, benefit, and side effects of prescribed medications. Barriers to medication compliance addressed. All patient questions answered. Pt voiced understanding. 4.  Continue current medications, diet and exercise. Orders Placed This Encounter   Medications    tiotropium (Amish Baptise) 18 MCG inhalation capsule     Sig: Inhale 1 capsule into the lungs daily     Dispense:  30 capsule     Refill:  3          Completed Refills               Requested Prescriptions     Signed Prescriptions Disp Refills    tiotropium (SPIRIVA HANDIHALER) 18 MCG inhalation capsule 30 capsule 3     Sig: Inhale 1 capsule into the lungs daily       5. Patient given educational materials - see patient instructions    6. Was a self-tracking handout given in paper form or via Dodonationhart? Yes  If yes, see orders or list here. No orders of the defined types were placed in this encounter. Return in about 3 months (around 6/19/2020) for COPD. Patient voiced understanding and agreed to treatment plan. This note is created with the assistance of a speech-recognition program. While intending to generate a document that actually reflects the content of the visit, the document can still have some mistakes which may not have been identified and corrected by editing.     Dr Andrew Matthew MD, [address]  Associate , Department of Internal Medicine  [facility]  Attending [address] [facility] [facility] [facility], [place] [address] 3/19/2020, 12:03 PM

## 2020-06-18 ENCOUNTER — TELEMEDICINE (OUTPATIENT)
Dept: INTERNAL MEDICINE | Age: 55
End: 2020-06-18
Payer: COMMERCIAL

## 2020-06-18 PROCEDURE — 99212 OFFICE O/P EST SF 10 MIN: CPT | Performed by: INTERNAL MEDICINE

## 2020-06-18 NOTE — PROGRESS NOTES
2020    TELEHEALTH EVALUATION -- Audio/Visual (During DJUOL-94 public health emergency)    Patient and physician are located in their individual homes    HPI:    Destiny Stark (:  1965) has requested an audio/video evaluation for the following concern(s):    Trigger Finger  Patient complaints of right Ring finger , and Little finger , catching. This has been present several months however recently has become progressively more aggravating. This affects most every activity involving gripping, including holding. Originally only a catching sensation was present, however now catching, locking is present. She presents today for evaluation  Treatment to date has been heat, Tylenol, NSAID's, with some relief. Has to follow up with GI. Has a polyp on scope that needed follow up after 1 year. COPD:  Current treatment includes short-acting beta agonist inhaler. Residual symptoms: acute dyspnea and chronic dyspnea. She denies any other symptoms. She requires her rescue inhaler 1 time(s) per day. Review of Systems    Prior to Visit Medications    Medication Sig Taking?  Authorizing Provider   acetaminophen (TYLENOL) 325 MG tablet Take 650 mg by mouth Yes Historical Provider, MD   ondansetron (ZOFRAN ODT) 4 MG disintegrating tablet Take 1 tablet by mouth every 8 hours as needed for Nausea Yes Willa Motta DO   albuterol sulfate  (90 Base) MCG/ACT inhaler Inhale 2 puffs into the lungs 4 times daily as needed for Wheezing Yes Liza Schreiber MD   lisinopril-hydrochlorothiazide (PRINZIDE;ZESTORETIC) 10-12.5 MG per tablet Take 1 tablet by mouth daily Yes Zoë Nguyen MD   metoprolol tartrate (LOPRESSOR) 50 MG tablet Take 1 tablet by mouth 2 times daily Yes Radha Mcfarland MD   FLUoxetine (PROZAC) 20 MG capsule TAKE ONE CAPSULE BY MOUTH twice a day  Patient taking differently: daily  Yes Zoë Nguyen MD   aspirin EC 81 MG EC tablet Take 1 tablet by mouth daily Yes Sharif Odom MD

## 2020-06-19 RX ORDER — TIOTROPIUM BROMIDE 18 UG/1
18 CAPSULE ORAL; RESPIRATORY (INHALATION) DAILY
Qty: 90 CAPSULE | Refills: 3 | Status: SHIPPED | OUTPATIENT
Start: 2020-06-19 | End: 2021-07-29

## 2020-07-23 ENCOUNTER — OFFICE VISIT (OUTPATIENT)
Dept: PRIMARY CARE CLINIC | Age: 55
End: 2020-07-23

## 2020-07-23 ENCOUNTER — TELEPHONE (OUTPATIENT)
Dept: INTERNAL MEDICINE | Age: 55
End: 2020-07-23

## 2020-07-23 ENCOUNTER — HOSPITAL ENCOUNTER (OUTPATIENT)
Age: 55
Setting detail: SPECIMEN
Discharge: HOME | End: 2020-07-23
Payer: COMMERCIAL

## 2020-07-23 VITALS
DIASTOLIC BLOOD PRESSURE: 83 MMHG | OXYGEN SATURATION: 95 % | TEMPERATURE: 98.1 F | HEART RATE: 68 BPM | SYSTOLIC BLOOD PRESSURE: 153 MMHG

## 2020-07-23 PROCEDURE — 99213 OFFICE O/P EST LOW 20 MIN: CPT | Performed by: INTERNAL MEDICINE

## 2020-07-23 ASSESSMENT — ENCOUNTER SYMPTOMS
FLU SYMPTOMS: 1
VOMITING: 1
NAUSEA: 1

## 2020-07-23 NOTE — LETTER
[address] [place] [address]  Phone: [phone]  Fax: [phone]    Radha Solomon MD        July 23, 2020    [place] [id number]      To whom this may concern:    Valerie Glass was seen here in our office on 07/23/2020 to test for covid. Marcia should remain off until test results come in. Results will be in 5-7 days. If you have any questions or concerns, please don't hesitate to call.     Sincerely,        Radha Solomon MD

## 2020-07-23 NOTE — PROGRESS NOTES
[address] [facility] [address] [place] [address]  Dept: [phone]  Dept Fax: [phone]    Miguel Goel is a 48 y.o. female who presents to the urgent care today for her medicalconditions/complaints as noted below. Miguel Goel is c/o of Covid Testing (pt c/o headache, body chills, diarrhea.)      HPI:     Influenza   This is a new problem. The current episode started in the past 7 days. The problem has been gradually worsening. Associated symptoms include anorexia, chills, fatigue, headaches, myalgias, nausea and vomiting. Nothing aggravates the symptoms. She has tried nothing for the symptoms. The treatment provided no relief. Past Medical History:   Diagnosis Date    Anxiety     CAD (coronary artery disease) 04/29/2013    Cath 04/29/13: Minimal CAD.  No stents    Cardiac arrest ([facility] [address]     cardiac arrest at age 25 months/s/p fall from bed with unconscious episode    COPD (chronic obstructive pulmonary disease) ([facility])     GERD (gastroesophageal reflux disease)     Headaches due to old head injury 2010    s/p fall down steps with unconsciousness    History of blood transfusion     Hypertension     Snores     Trigger finger     right thumb and right middle finger    Wears dentures     FULL UPPER    Wears glasses         Current Outpatient Medications   Medication Sig Dispense Refill    tiotropium (SPIRIVA HANDIHALER) 18 MCG inhalation capsule Inhale 1 capsule into the lungs daily 90 capsule 3    acetaminophen (TYLENOL) 325 MG tablet Take 650 mg by mouth      ondansetron (ZOFRAN ODT) 4 MG disintegrating tablet Take 1 tablet by mouth every 8 hours as needed for Nausea 20 tablet 0    albuterol sulfate  (90 Base) MCG/ACT inhaler Inhale 2 puffs into the lungs 4 times daily as needed for Wheezing 1 Inhaler 0    lisinopril-hydrochlorothiazide (PRINZIDE;ZESTORETIC) 10-12.5 MG per tablet Take 1 tablet by mouth daily 90 tablet 4    metoprolol tartrate (LOPRESSOR) 50 MG tablet Take 1 tablet by mouth 2 times daily 60 tablet 11    FLUoxetine (PROZAC) 20 MG capsule TAKE ONE CAPSULE BY MOUTH twice a day (Patient taking differently: daily ) 60 capsule 11    aspirin EC 81 MG EC tablet Take 1 tablet by mouth daily 30 tablet 0     No current facility-administered medications for this visit. Allergies   Allergen Reactions    Adhesive Tape Rash       Health Maintenance   Topic Date Due    Shingles Vaccine (1 of 2) 08/25/2017    Flu vaccine (1) 09/01/2020    A1C test (Diabetic or Prediabetic)  11/27/2020    Potassium monitoring  03/13/2021    Creatinine monitoring  03/13/2021    Lipid screen  04/25/2021    Breast cancer screen  10/22/2021    DTaP/Tdap/Td vaccine (2 - Td) 10/20/2024    Colon cancer screen colonoscopy  02/26/2029    Pneumococcal 0-64 years Vaccine  Completed    HIV screen  Completed    Hepatitis A vaccine  Aged Out    Hepatitis B vaccine  Aged Out    Hib vaccine  Aged Out    Meningococcal (ACWY) vaccine  Aged Out       Subjective:      Review of Systems   Constitutional: Positive for chills and fatigue. Gastrointestinal: Positive for anorexia, nausea and vomiting. Musculoskeletal: Positive for myalgias. Neurological: Positive for headaches. All other systems reviewed and are negative. Objective:     Physical Exam  Vitals signs reviewed. Constitutional:       Appearance: Normal appearance. HENT:      Head: Normocephalic and atraumatic. Skin:     General: Skin is warm and dry. Neurological:      General: No focal deficit present. Mental Status: She is alert and oriented to person, place, and time. Psychiatric:         Mood and Affect: Mood normal.         Behavior: Behavior normal.       BP (!) 153/83 (Site: Right Upper Arm, Position: Sitting, Cuff Size: Large Adult)   Pulse 68   Temp 98.1 °F (36.7 °C) (Oral)   SpO2 95%       Assessment:       Diagnosis Orders   1.  Flu-like symptoms  COVID-19

## 2020-07-29 LAB — SARS-COV-2, NAA: NOT DETECTED

## 2020-08-28 ENCOUNTER — TELEMEDICINE (OUTPATIENT)
Dept: INTERNAL MEDICINE | Age: 55
End: 2020-08-28
Payer: COMMERCIAL

## 2020-08-28 ENCOUNTER — TELEPHONE (OUTPATIENT)
Dept: INTERNAL MEDICINE | Age: 55
End: 2020-08-28

## 2020-08-28 PROCEDURE — 99213 OFFICE O/P EST LOW 20 MIN: CPT | Performed by: INTERNAL MEDICINE

## 2020-08-28 RX ORDER — ZOSTER VACCINE RECOMBINANT, ADJUVANTED 50 MCG/0.5
0.5 KIT INTRAMUSCULAR SEE ADMIN INSTRUCTIONS
Qty: 0.5 ML | Refills: 0 | Status: SHIPPED | OUTPATIENT
Start: 2020-08-28 | End: 2020-09-02 | Stop reason: SDUPTHER

## 2020-08-28 RX ORDER — ALBUTEROL SULFATE 2.5 MG/3ML
2.5 SOLUTION RESPIRATORY (INHALATION) EVERY 6 HOURS PRN
Qty: 120 EACH | Refills: 3 | Status: SHIPPED | OUTPATIENT
Start: 2020-08-28 | End: 2021-07-29

## 2020-08-28 NOTE — PROGRESS NOTES
2020    TELEHEALTH EVALUATION -- Audio/Visual (During HZJYH-79 public health emergency)    Patient and physician are located in their individual homes    HPI:    Margot Armstrong (:  1965) has requested an audio/video evaluation for the following concern(s):    COPD:  Current treatment includes short-acting beta agonist inhaler, anticholinergic inhaler. Residual symptoms: acute dyspnea and wheezing. She denies any other symptoms. She requires her rescue inhaler several time(s) per day. Hypertension:  Home blood pressure monitoring: Yes - controlled. She is adherent to a low sodium diet. Patient denies chest pain and headache. Antihypertensive medication side effects: no medication side effects noted. Use of agents associated with hypertension: none. Sodium (mmol/L)   Date Value   2020 142    BUN (mg/dL)   Date Value   2020 18    Glucose (mg/dL)   Date Value   2020 95   2012 107 (H)      Potassium (mmol/L)   Date Value   2020 3.7    CREATININE (mg/dL)   Date Value   2020 0.58         Depression   Onset was approximately several months ago. Symptoms have been stable since that time. Current symptoms include: depressed mood and difficulty concentrating. Patient denies suicidal attempt and suicidal thoughts without plan. Family history significant for no psychiatric illness. Possible organic causes contributing are: none. Risk factors: previous episode of depression. Previous treatment includes medication. She complains of the following side effects from the treatment: none. Takes prozac 20mg BID as her own preference rather than 40mg daily. Review of Systems    Prior to Visit Medications    Medication Sig Taking?  Authorizing Provider   zoster recombinant adjuvanted vaccine [facility]) 50 MCG/0.5ML SUSR injection Inject 0.5 mLs into the muscle See Admin Instructions 1 dose now and repeat in 2-6 months Yes Zoey Tello MD Respiratory effort normal.  [] No visualized signs of difficulty breathing or respiratory distress        [] Abnormal      Musculoskeletal:   [x] Normal gait with no signs of ataxia         [] Normal range of motion of neck        [] Abnormal     Neurological:        [x] No Facial Asymmetry (Cranial nerve 7 motor function) (limited exam to video visit)          [] No gaze palsy        [] Abnormal         Skin:        [x] No significant exanthematous lesions or discoloration noted on facial skin         [] Abnormal            Psychiatric:       [x] Normal Affect [] Abnormal        [] No Hallucinations            RECORD REVIEW: Previous medical records were reviewed at today's visit. The past family, medical and social histories were reviewed and unchanged with the exceptions of what is mentioned in this note. Due to this being a TeleHealth encounter, evaluation of the following organ systems is limited: Vitals/Constitutional/EENT/Resp/CV/GI//MS/Neuro/Skin/Heme-Lymph-Imm. ASSESSMENT/PLAN:   Diagnosis Orders   1. Chronic obstructive pulmonary disease, unspecified COPD type ([facility] [address]  DME Order for Nebulizer as OP    albuterol (PROVENTIL) (2.5 MG/3ML) 0.083% nebulizer solution   2. Essential hypertension     3. Current mild episode of major depressive disorder without prior episode ([facility] [address]         No follow-ups on file. An  electronic signature was used to authenticate this note. --Karina Seay MD on 8/28/2020 at 9:20 AM    9}    Pursuant to the emergency declaration under the [facility] waiver authority and the [employer] Act, this Virtual  Visit was conducted, with patient's consent, to reduce the patient's risk of exposure to COVID-19 and provide continuity of care for an established patient. Services were provided through a video synchronous discussion virtually to substitute for in-person clinic visit.

## 2020-08-28 NOTE — PATIENT INSTRUCTIONS
Medications e-scribe to pharmacy of pt's choice. Printed and signed script for Shingrix mailed to pt. Printed script for Nebulizer mailed to pt with signed office notes. Patient to return to clinic 4 weeks (9/24/20). AVS reviewed and mailed to pt. It is very important for your care that you keep your appointment. If for some reason you are unable to keep your appointment it is equally important that you call our office at [phone] to cancel your appointment and reschedule. Failure to do so may result in your termination from our practice.   MB

## 2020-09-02 RX ORDER — ZOSTER VACCINE RECOMBINANT, ADJUVANTED 50 MCG/0.5
0.5 KIT INTRAMUSCULAR SEE ADMIN INSTRUCTIONS
Qty: 0.5 ML | Refills: 0 | Status: SHIPPED | OUTPATIENT
Start: 2020-09-02 | End: 2021-03-01

## 2020-09-08 ENCOUNTER — TELEPHONE (OUTPATIENT)
Dept: INTERNAL MEDICINE | Age: 55
End: 2020-09-08

## 2020-10-15 ENCOUNTER — OFFICE VISIT (OUTPATIENT)
Dept: ORTHOPEDIC SURGERY | Age: 55
End: 2020-10-15
Payer: COMMERCIAL

## 2020-10-15 VITALS — WEIGHT: 124 LBS | BODY MASS INDEX: 23.41 KG/M2 | HEIGHT: 61 IN

## 2020-10-15 PROCEDURE — 20550 NJX 1 TENDON SHEATH/LIGAMENT: CPT | Performed by: STUDENT IN AN ORGANIZED HEALTH CARE EDUCATION/TRAINING PROGRAM

## 2020-10-15 PROCEDURE — 99213 OFFICE O/P EST LOW 20 MIN: CPT | Performed by: STUDENT IN AN ORGANIZED HEALTH CARE EDUCATION/TRAINING PROGRAM

## 2020-10-15 RX ORDER — LIDOCAINE HYDROCHLORIDE 10 MG/ML
0.5 INJECTION, SOLUTION INFILTRATION; PERINEURAL ONCE
Status: COMPLETED | OUTPATIENT
Start: 2020-10-15 | End: 2020-10-15

## 2020-10-15 RX ORDER — BETAMETHASONE SODIUM PHOSPHATE AND BETAMETHASONE ACETATE 3; 3 MG/ML; MG/ML
3 INJECTION, SUSPENSION INTRA-ARTICULAR; INTRALESIONAL; INTRAMUSCULAR; SOFT TISSUE ONCE
Status: COMPLETED | OUTPATIENT
Start: 2020-10-15 | End: 2020-10-15

## 2020-10-15 RX ORDER — LIDOCAINE HYDROCHLORIDE 10 MG/ML
10 INJECTION, SOLUTION INFILTRATION; PERINEURAL ONCE
Status: DISCONTINUED | OUTPATIENT
Start: 2020-10-15 | End: 2020-10-15 | Stop reason: CLARIF

## 2020-10-15 RX ADMIN — BETAMETHASONE SODIUM PHOSPHATE AND BETAMETHASONE ACETATE 3 MG: 3; 3 INJECTION, SUSPENSION INTRA-ARTICULAR; INTRALESIONAL; INTRAMUSCULAR; SOFT TISSUE at 15:41

## 2020-10-15 RX ADMIN — LIDOCAINE HYDROCHLORIDE 0.5 ML: 10 INJECTION, SOLUTION INFILTRATION; PERINEURAL at 15:43

## 2020-10-15 NOTE — PROGRESS NOTES
[facility]  [facility]  [address] [place]  [address]  Dept: [phone]    Ambulatory Orthopedic Consult    CHIEF COMPLAINT:    Chief Complaint   Patient presents with    Finger Pain     right ring finger pain x months     HISTORY OF PRESENT ILLNESS:      The patient is a 50 y.o. RHD female who is being seen for consultation and evaluation of right hand/ring finger pain and catching for past several months. Ms. Gita Liz has been seen in clinic prior for right thumb and middle finger trigger fingers for which she underwent A1 pulley releases in 2017. She recovered well from these surgeries after some OT and returned to work with no issues. She states that several months ago she started noting pain to her right ring finger and frequent catching and triggering that has been worsening. She endorses catching several times per day with occasional locking and having to open the digit with use of her other hand. She also endorses some pain to the small finger that started past couple of weeks, but no catching or triggering. Denies any numbness/tingling. No weakness. No involvement of left hand. She requests injection today as she needs to work and would like to delay surgery until after New Year. Past Medical History:    Past Medical History:   Diagnosis Date    Anxiety     CAD (coronary artery disease) 04/29/2013    Cath 04/29/13: Minimal CAD.  No stents    Cardiac arrest ([facility] [address]     cardiac arrest at age 25 months/s/p fall from bed with unconscious episode    COPD (chronic obstructive pulmonary disease) (HCC)     GERD (gastroesophageal reflux disease)     Headaches due to old head injury 2010    s/p fall down steps with unconsciousness    History of blood transfusion     Hypertension     Snores     Trigger finger     right thumb and right middle finger    Wears dentures     FULL UPPER    Wears glasses      Past Surgical History:    Past Surgical History:   Procedure Laterality Date    CARDIAC CATHETERIZATION      minimal cad, no stents     SECTION      X's 3    CHOLECYSTECTOMY  2019    robotic laprascopic    CHOLECYSTECTOMY, LAPAROSCOPIC N/A 2019    *[facility] T.F. DR. Hoyle Angelucci LAPAROSCOPIC ROBOTIC performed by Aisha Gifford IV, DO at [facility]  2019    with polypectomy     LEEP      ME INCISE FINGER TENDON SHEATH Right 2017     TRIGGER RELEASE THUMB,  MIDDLE FINGER performed by Christiano Barros DO at [facility]    TONSILLECTOMY       Current Medications:   Current Outpatient Medications   Medication Sig Dispense Refill    zoster recombinant adjuvanted vaccine (SHINGRIX) 50 MCG/0.5ML SUSR injection Inject 0.5 mLs into the muscle See Admin Instructions 1 dose now and repeat in 2-6 months 0.5 mL 0    albuterol (PROVENTIL) (2.5 MG/3ML) 0.083% nebulizer solution Take 3 mLs by nebulization every 6 hours as needed for Wheezing 120 each 3    tiotropium (SPIRIVA HANDIHALER) 18 MCG inhalation capsule Inhale 1 capsule into the lungs daily 90 capsule 3    acetaminophen (TYLENOL) 325 MG tablet Take 650 mg by mouth      ondansetron (ZOFRAN ODT) 4 MG disintegrating tablet Take 1 tablet by mouth every 8 hours as needed for Nausea 20 tablet 0    albuterol sulfate  (90 Base) MCG/ACT inhaler Inhale 2 puffs into the lungs 4 times daily as needed for Wheezing 1 Inhaler 0    lisinopril-hydrochlorothiazide (PRINZIDE;ZESTORETIC) 10-12.5 MG per tablet Take 1 tablet by mouth daily 90 tablet 4    metoprolol tartrate (LOPRESSOR) 50 MG tablet Take 1 tablet by mouth 2 times daily 60 tablet 11    FLUoxetine (PROZAC) 20 MG capsule TAKE ONE CAPSULE BY MOUTH twice a day (Patient taking differently: daily ) 60 capsule 11    aspirin EC 81 MG EC tablet Take 1 tablet by mouth daily 30 tablet 0     No current facility-administered medications for this visit.       Allergies:    Adhesive tape    Social History:   Social History     Socioeconomic History    Marital status:      Spouse name: Not on file    Number of children: Not on file    Years of education: Not on file    Highest education level: Not on file   Occupational History    Not on file   Social Needs    Financial resource strain: Not on file    Food insecurity     Worry: Not on file     Inability: Not on file    Transportation needs     Medical: Not on file     Non-medical: Not on file   Tobacco Use    Smoking status: Current Every Day Smoker     Packs/day: 0.50     Years: 30.00     Pack years: 15.00     Types: Cigarettes    Smokeless tobacco: Never Used   Substance and Sexual Activity    Alcohol use: Yes     Alcohol/week: 0.0 standard drinks     Comment: occ    Drug use: No    Sexual activity: Not on file   Lifestyle    Physical activity     Days per week: Not on file     Minutes per session: Not on file    Stress: Not on file   Relationships    Social connections     Talks on phone: Not on file     Gets together: Not on file     Attends Baptism service: Not on file     Active member of club or organization: Not on file     Attends meetings of clubs or organizations: Not on file     Relationship status: Not on file    Intimate partner violence     Fear of current or ex partner: Not on file     Emotionally abused: Not on file     Physically abused: Not on file     Forced sexual activity: Not on file   Other Topics Concern    Not on file   Social History Narrative    Not on file     Family History:  Family History   Problem Relation Age of Onset    High Blood Pressure Mother     Depression Mother     High Blood Pressure Father      REVIEW OF SYSTEMS:  Constitutional: Negative for fever and chills. HENT: Negative for congestion, nose bleeds, and sore throat. Eyes: Negative for blurred vision and double vision. Respiratory: Negative for cough, shortness of breath and wheezing.     Cardiovascular: Negative for chest pain and palpitations. Gastrointestinal: Negative for nausea, vomiting, diarrhea/constipation. Musculoskeletal: Positive for right ring finger pain and triggering. Skin: Negative for itching and rash. Neurological: Negative for dizziness, sensory change and headaches. Psychiatric/Behavioral: Negative for depression and suicidal ideas. I have reviewed the CC, HPI, ROS, PMH, FHX, Social History. I agree with the documentation provided by other staff, residents, and/or medical students and have reviewed their documentation prior to providing my signature indicating agreement. PHYSICAL EXAM:  Ht 5' 1\" (1.549 m)   Wt 124 lb (56.2 kg)   BMI 23.43 kg/m²   Gen: AAOx3, NAD, appears stated age  CV: no dependent edema, distal pulses 2+  Chest: unlabored respirations, equal chest rise bilaterally, no audible wheezes  Right Hand Exam     Tenderness   The patient is experiencing tenderness in the palmar area (right ring finger MP joint; palpable nodule and clicking with passive flexion and extension). Muscle Strength   Wrist extension: 5/5   Wrist flexion: 5/5   : 5/5     Other   Erythema: absent  Scars: present (well-healed scars from prior thumb and middle finger trigger releases)  Sensation: normal (Superficial Radial/Median/Ulnar distribution intact to light touch)  Pulse: present (2+ radial and ulnar; hand warm and perfused)    Comments:  Full active motion wrist and all digits. Pain with active flexion ring and small fingers. Radiology:  No new imaging obtained in clinic today. ASSESSMENT:     1. Trigger finger, right ring finger       PLAN:     -Treatment options for trigger finger discussed with patient.  She has underwent trigger finger release in the past with good relief and would consider surgery now, but she states would prefer injection as she needs to continue working until [facility] due to time off during COVID pandemic restrictions.  -Right ring finger trigger finger/flexor tendon sheath injection performed.  -Also will provide alumafoam splint to wear as needed and while at work to help with symptoms.  -Follow up in clinic as needed when ready to consider A1 pulley release. Right ring finger flexor tendon sheath injection:   Risks, benefits and alternatives were discussed with the patient including bleeding, infection, allergic reaction to skin prep or medication, post-injection flare/reaction, skin depigmentation, and elevation of blood glucose. Patient verbally consented and agreed to proceed with the injection. The appropriate anatomic landmark was palpated along flexor tendon sheath just overlying and proximal to MP joint, the anticipated injection site was marked, the skin was prepped using an alcohol swab and betadine, a 22 g needle was then used to inject 3 mg Celestone and ~0.5 ml of 1% lidocaine plain. The injection was advanced without resistance down to bone and then withdrawn slightly and medication injected into the flexor tendon sheath. During injection ring finger was noted to flex slightly confirming injection along flexor tendon sheath. The patient tolerated the procedure well and the site was dressed with a band-aid. Return if symptoms worsen or fail to improve.     Orders Placed This Encounter   Medications    DISCONTD: lidocaine 1 % injection 10 mL    betamethasone acetate-betamethasone sodium phosphate (CELESTONE) injection 3 mg    lidocaine 1 % injection 0.5 mL     Orders Placed This Encounter   Procedures    20600 - DRAIN/INJECT SMALL JOINT/BURSA

## 2020-10-16 PROBLEM — M65.341 TRIGGER FINGER, RIGHT RING FINGER: Status: ACTIVE | Noted: 2020-10-16

## 2020-11-05 ENCOUNTER — APPOINTMENT (OUTPATIENT)
Dept: GENERAL RADIOLOGY | Age: 55
End: 2020-11-05
Payer: COMMERCIAL

## 2020-11-05 ENCOUNTER — HOSPITAL ENCOUNTER (EMERGENCY)
Age: 55
Discharge: HOME | End: 2020-11-05
Attending: EMERGENCY MEDICINE
Payer: COMMERCIAL

## 2020-11-05 VITALS
HEIGHT: 61 IN | HEART RATE: 83 BPM | TEMPERATURE: 97.1 F | RESPIRATION RATE: 16 BRPM | SYSTOLIC BLOOD PRESSURE: 155 MMHG | WEIGHT: 124 LBS | BODY MASS INDEX: 23.41 KG/M2 | OXYGEN SATURATION: 98 % | DIASTOLIC BLOOD PRESSURE: 86 MMHG

## 2020-11-05 PROCEDURE — 99283 EMERGENCY DEPT VISIT LOW MDM: CPT

## 2020-11-05 PROCEDURE — 73130 X-RAY EXAM OF HAND: CPT

## 2020-11-05 RX ORDER — ACETAMINOPHEN 500 MG
1000 TABLET ORAL ONCE
Status: DISCONTINUED | OUTPATIENT
Start: 2020-11-05 | End: 2020-11-05 | Stop reason: HOSPADM

## 2020-11-05 ASSESSMENT — ENCOUNTER SYMPTOMS
SHORTNESS OF BREATH: 0
NAUSEA: 0
VOMITING: 0
COUGH: 0
BACK PAIN: 0
ABDOMINAL PAIN: 0

## 2020-11-05 ASSESSMENT — PAIN SCALES - GENERAL: PAINLEVEL_OUTOF10: 4

## 2020-11-05 ASSESSMENT — PAIN DESCRIPTION - DESCRIPTORS: DESCRIPTORS: ACHING

## 2020-11-05 ASSESSMENT — PAIN DESCRIPTION - FREQUENCY: FREQUENCY: INTERMITTENT

## 2020-11-05 ASSESSMENT — PAIN DESCRIPTION - ORIENTATION: ORIENTATION: RIGHT

## 2020-11-05 ASSESSMENT — PAIN DESCRIPTION - LOCATION: LOCATION: HAND

## 2020-11-05 ASSESSMENT — PAIN DESCRIPTION - PAIN TYPE: TYPE: ACUTE PAIN

## 2020-11-05 NOTE — ED PROVIDER NOTES
[facility]  ED  Emergency Department Encounter  EmergencyMedicine Resident     Pt Name:Marcia Jorgensen  MRN: [id number]  [place] 1965  Date of evaluation: 11/5/20  PCP:  Michael Bonilla MD    CHIEF COMPLAINT       No chief complaint on file. HISTORY OF PRESENT ILLNESS  (Location/Symptom, Timing/Onset, Context/Setting, Quality, Duration, Modifying Factors, Severity.)    This patient was evaluated in the Emergency Department for symptoms described in the history of present illness. He/she was evaluated in the context of the global COVID-19 pandemic, which necessitated consideration that the patient might be at risk for infection with the SARS-CoV-2 virus that causes COVID-19. Institutional protocols and algorithms that pertain to the evaluation of patients at risk for COVID-19 are in a state of rapid change based on information released by regulatory bodies including the CDC and federal and state organizations. These policies and algorithms were followed during the patient's care in the ED. Rick Peterson is a 50 y.o. female who presents to the ED today with complaints of right hand pain that has been persistent since injury 5 days ago. Patient states that she tripped getting out of her vehicle and fell onto the ground with her right hand under her abdomen. Reports injury to right hand as well as small bruising to lateral right thigh. Did not hit her head or lose consciousness. No neck and neck or back pain. Not on anticoagulation. Reports the pain is constant and right hand and worse with movement. No associated numbness, tingling, weakness. Has applied a splint at home for the past few days. Presenting to the emergency department today due to persistent pain and concern for fracture. Pain is localized to MCP region of third and fourth digit of right hand. Patient is right-hand dominant. Of note patient does have history of trigger finger in her right hand.   Has had 2 Attends meetings of clubs or organizations: Not on file     Relationship status: Not on file    Intimate partner violence     Fear of current or ex partner: Not on file     Emotionally abused: Not on file     Physically abused: Not on file     Forced sexual activity: Not on file   Other Topics Concern    Not on file   Social History Narrative    Not on file       Family History   Problem Relation Age of Onset    High Blood Pressure Mother     Depression Mother     High Blood Pressure Father        Allergies:  Adhesive tape    Home Medications:  Prior to Admission medications    Medication Sig Start Date End Date Taking?  Authorizing Provider   zoster recombinant adjuvanted vaccine [facility]) 50 MCG/0.5ML SUSR injection Inject 0.5 mLs into the muscle See Admin Instructions 1 dose now and repeat in 2-6 months 9/2/20 3/1/21  Yue Martinez MD   albuterol (PROVENTIL) (2.5 MG/3ML) 0.083% nebulizer solution Take 3 mLs by nebulization every 6 hours as needed for Wheezing 8/28/20   Yue Martinez MD   tiotropium (Karene Killer) 18 MCG inhalation capsule Inhale 1 capsule into the lungs daily 6/19/20   Yue Martinez MD   acetaminophen (TYLENOL) 325 MG tablet Take 650 mg by mouth 3/9/20   Historical Provider, MD   ondansetron (ZOFRAN ODT) 4 MG disintegrating tablet Take 1 tablet by mouth every 8 hours as needed for Nausea 3/10/20   Cain Polo DO   albuterol sulfate  (90 Base) MCG/ACT inhaler Inhale 2 puffs into the lungs 4 times daily as needed for Wheezing 1/14/20   Antonio Valerio MD   lisinopril-hydrochlorothiazide (PRINZIDE;ZESTORETIC) 10-12.5 MG per tablet Take 1 tablet by mouth daily 1/7/20   Lev Echevarria MD   metoprolol tartrate (LOPRESSOR) 50 MG tablet Take 1 tablet by mouth 2 times daily 1/7/20   Lev Echevarria MD   FLUoxetine (PROZAC) 20 MG capsule TAKE ONE CAPSULE BY MOUTH twice a day  Patient taking differently: daily  11/7/19   Lev Echevarria MD   aspirin EC 81 MG EC tablet Take 1 tablet by mouth daily 10/24/18   Sultana Patricia MD       REVIEW OF SYSTEMS    (2-9 systems for level 4, 10 or more for level 5)      Review of Systems   Constitutional: Negative for chills and fever. HENT:        No facial trauma or injury   Eyes: Negative for visual disturbance. Respiratory: Negative for cough and shortness of breath. Cardiovascular: Negative for chest pain. Gastrointestinal: Negative for abdominal pain, nausea and vomiting. Genitourinary: Negative for dysuria and hematuria. Musculoskeletal: Negative for back pain, neck pain and neck stiffness. Right hand pain, limited range of motion due to pain. Mild right hip pain. Skin: Negative for rash. Neurological: Negative for dizziness, weakness, numbness and headaches. Hematological: Does not bruise/bleed easily. PHYSICAL EXAM   (up to 7 for level 4, 8 or more for level 5)      INITIAL VITALS:   BP (!) 155/86   Pulse 83   Temp 97.1 °F (36.2 °C) (Skin)   Resp 16   Ht 5' 1\" (1.549 m)   Wt 124 lb (56.2 kg)   SpO2 98%   BMI 23.43 kg/m²     Physical Exam  Vitals signs reviewed. Constitutional:       General: She is not in acute distress. Appearance: Normal appearance. She is not ill-appearing or toxic-appearing. HENT:      Head: Normocephalic and atraumatic. Nose: Nose normal.      Mouth/Throat:      Mouth: Mucous membranes are moist.      Pharynx: No oropharyngeal exudate or posterior oropharyngeal erythema. Eyes:      Extraocular Movements: Extraocular movements intact. Conjunctiva/sclera: Conjunctivae normal.      Pupils: Pupils are equal, round, and reactive to light. Neck:      Musculoskeletal: Normal range of motion. No muscular tenderness. Cardiovascular:      Rate and Rhythm: Normal rate and regular rhythm. Pulses: Normal pulses. Pulmonary:      Effort: Pulmonary effort is normal. No respiratory distress. Breath sounds: No stridor. No wheezing or rhonchi.    Abdominal: General: There is no distension. Palpations: Abdomen is soft. Tenderness: There is no abdominal tenderness. Musculoskeletal:      Comments: Mild tenderness over MCP joint of fourth and fifth digit on right hand. Mild limited range of motion with flexion extension at MCP joint secondary to pain. Neurovascularly intact. No tenderness of the wrist.  No snuffbox tenderness. No other evidence of traumatic injury to extremities. Skin:     General: Skin is warm and dry. Findings: No rash. Neurological:      Mental Status: She is alert and oriented to person, place, and time. Psychiatric:         Behavior: Behavior normal.         DIFFERENTIAL  DIAGNOSIS     PLAN (LABS / IMAGING / EKG):  Orders Placed This Encounter   Procedures    XR HAND RIGHT (MIN 3 VIEWS)       MEDICATIONS ORDERED:  Orders Placed This Encounter   Medications    acetaminophen (TYLENOL) tablet 1,000 mg         DIAGNOSTIC RESULTS / EMERGENCY DEPARTMENT COURSE / MDM     No results found for this visit on 11/05/20. RADIOLOGY:  XR HAND RIGHT (MIN 3 VIEWS)   Final Result   Unremarkable radiographic views of the right hand without evidence of acute   trauma. IMPRESSION/MDM/EMERGENCY DEPARTMENT COURSE:  Patient came to emergency department, HPI and physical exam were conducted. All nursing notes were reviewed. 61-year-old female present emergency department with complaints of right hand pain after mechanical fall and injury to right hand 5 days ago. Has been in a splint for a few days. Still persistently painful with limited range of motion due to pain and concern for fracture. Minor injury to right hip during fall that has resolved. Otherwise no injury. Specifically denying any injury to head, neck, back. Patient not on anticoagulation. Patient was screened and has no clinical signs or symptoms of a CoVID-19 infection at this time.   However, given current pandemic and atypical presentations, face mask, MEDICATIONS:  Discharge Medication List as of 11/5/2020  5:25 AM          Narinder Mann DO  Emergency Medicine Resident    (Please note that portions of thisnote were completed with a voice recognition program.  Efforts were made to edit the dictations but occasionally words are mis-transcribed.)       Narinder Mann DO  Resident  11/05/20 7399 no JVD/supple/normal thyroid gland/normal

## 2020-11-06 ENCOUNTER — TELEMEDICINE (OUTPATIENT)
Dept: INTERNAL MEDICINE | Age: 55
End: 2020-11-06
Payer: COMMERCIAL

## 2020-11-06 PROCEDURE — 99213 OFFICE O/P EST LOW 20 MIN: CPT | Performed by: INTERNAL MEDICINE

## 2020-11-06 RX ORDER — ANTACID TABLETS 648 MG/1
1 TABLET, CHEWABLE ORAL 2 TIMES DAILY
Qty: 60 TABLET | Refills: 5 | Status: SHIPPED | OUTPATIENT
Start: 2020-11-06 | End: 2021-07-29

## 2020-11-06 RX ORDER — MELATONIN
1000 DAILY
Qty: 90 TABLET | Refills: 1 | Status: SHIPPED | OUTPATIENT
Start: 2020-11-06 | End: 2021-07-29

## 2020-11-06 NOTE — PROGRESS NOTES
2020    TELEHEALTH EVALUATION -- Audio/Visual (During [facility]-27 public health emergency)    HPI:    Leta Arias (:  1965) has requested an audio/video evaluation for the following concern(s):  Joint/Muscle Pain  Patient complains of arthralgias, which have/has been present for several weeks. Pain is located in multiple joints. The pain is described as intermittent, aching. Associated symptoms include: crepitation and decreased range of motion. The patient has tried nothing for pain relief. Related to injury: no.  Review of Systems    COPD:  Current treatment includes nebulized beta agonist, anticholinergic inhaler. Residual symptoms: none. She denies any other symptoms. Reports better relief with use of nebulizer. Depression  Patient reports improvement of depression. Still taking prozac 20mg BID. Prior to Visit Medications    Medication Sig Taking?  Authorizing Provider   albuterol (PROVENTIL) (2.5 MG/3ML) 0.083% nebulizer solution Take 3 mLs by nebulization every 6 hours as needed for Wheezing Yes River Mooney MD   tiotropium (SPIRIVA HANDIHALER) 18 MCG inhalation capsule Inhale 1 capsule into the lungs daily Yes River Mooney MD   acetaminophen (TYLENOL) 325 MG tablet Take 650 mg by mouth Yes Historical Provider, MD   albuterol sulfate  (90 Base) MCG/ACT inhaler Inhale 2 puffs into the lungs 4 times daily as needed for Wheezing Yes Ritu Gilbert MD   lisinopril-hydrochlorothiazide (PRINZIDE;ZESTORETIC) 10-12.5 MG per tablet Take 1 tablet by mouth daily Yes Shemar Teixeira MD   metoprolol tartrate (LOPRESSOR) 50 MG tablet Take 1 tablet by mouth 2 times daily Yes Shemar Teixeira MD   FLUoxetine (PROZAC) 20 MG capsule TAKE ONE CAPSULE BY MOUTH twice a day  Patient taking differently: daily  Yes Shemar Teixeira MD   aspirin EC 81 MG EC tablet Take 1 tablet by mouth daily Yes Nickie Alvarez MD   zoster recombinant adjuvanted vaccine [facility]) 50 MCG/0.5ML SUSR injection Inject 0.5 mLs into the muscle See Admin Instructions 1 dose now and repeat in 2-6 months  Patient not taking: Reported on 11/6/2020  Javed Hill MD   ondansetron (ZOFRAN ODT) 4 MG disintegrating tablet Take 1 tablet by mouth every 8 hours as needed for Nausea  Patient not taking: Reported on 11/6/2020  Norma Lay DO       Social History     Tobacco Use    Smoking status: Current Every Day Smoker     Packs/day: 0.50     Years: 30.00     Pack years: 15.00     Types: Cigarettes    Smokeless tobacco: Never Used   Substance Use Topics    Alcohol use: Yes     Alcohol/week: 0.0 standard drinks     Comment: occ    Drug use: No            PHYSICAL EXAMINATION:    Constitutional: [x] Appears well-developed and well-nourished [] No apparent distress      [] Abnormal-   Mental status  [x] Alert and awake  [] Oriented to person/place/time []Able to follow commands      Eyes:  EOM    [x]  Normal  [] Abnormal-  Sclera  []  Normal  [] Abnormal -         Discharge [x]  None visible  [] Abnormal -    HENT:   [x] Normocephalic, atraumatic. [] Abnormal   [] Mouth/Throat: Mucous membranes are moist.     External Ears [] Normal  [] Abnormal-     Neck: [x] No visualized mass     Pulmonary/Chest: [x] Respiratory effort normal.  [] No visualized signs of difficulty breathing or respiratory distress        [] Abnormal-          Neurological:        [x] No Facial Asymmetry (Cranial nerve 7 motor function) (limited exam to video visit)          [] No gaze palsy        [] Abnormal-         Skin:        [] No significant exanthematous lesions or discoloration noted on facial skin         [] Abnormal-            Psychiatric:       [x] Normal Affect [] No Hallucinations        [] Abnormal-         ASSESSMENT/PLAN:   Diagnosis Orders   1.  Arthralgia, unspecified joint  diclofenac sodium (VOLTAREN) 1 % GEL    calcium carbonate 648 MG TABS    vitamin D3 (CHOLECALCIFEROL) 25 MCG (1000 UT) TABS tablet    Comprehensive Metabolic Panel CBC Auto Differential    TSH with Reflex    Vitamin D 25 Hydroxy   2. Chronic obstructive pulmonary disease, unspecified COPD type ([facility] [address]     3. Current moderate episode of major depressive disorder without prior episode ([facility] [address]     4. Essential hypertension     5. Prediabetes  Hemoglobin A1C       No follow-ups on file. Ashley Forte is a 50 y.o. female being evaluated by a Virtual Visit (video visit) encounter to address concerns as mentioned above. A caregiver was present when appropriate. Due to this being a TeleHealth encounter (During [facility] public health emergency), evaluation of the following organ systems was limited: Vitals/Constitutional/EENT/Resp/CV/GI//MS/Neuro/Skin/Heme-Lymph-Imm. Pursuant to the emergency declaration under the [address] [place] [address], [address] [place] [address] authority and the [employer] Act, this Virtual Visit was conducted with patient's (and/or legal guardian's) consent, to reduce the patient's risk of exposure to COVID-19 and provide necessary medical care. The patient (and/or legal guardian) has also been advised to contact this office for worsening conditions or problems, and seek emergency medical treatment and/or call 911 if deemed necessary. Patient identification was verified at the start of the visit: Yes        Services were provided through a video synchronous discussion virtually to substitute for in-person clinic visit. Patient and provider were located at their individual homes. --Lashonda Doshi MD on 11/6/2020 at 9:57 AM    An electronic signature was used to authenticate this note.

## 2020-11-06 NOTE — PATIENT INSTRUCTIONS
Medications e-scribe to pharmacy of pt's choice. Script for lab given to pt, no fasting required. Pt will get labs done as soon . Patient to return to clinic 6-8 weeks (12/18/20). AVS reviewed and given to pt. It is very important for your care that you keep your appointment. If for some reason you are unable to keep your appointment it is equally important that you call our office at [phone] to cancel your appointment and reschedule. Failure to do so may result in your termination from our practice.   MB

## 2020-11-13 ENCOUNTER — HOSPITAL ENCOUNTER (OUTPATIENT)
Age: 55
Setting detail: SPECIMEN
Discharge: HOME | End: 2020-11-13
Payer: COMMERCIAL

## 2020-11-13 ENCOUNTER — NURSE ONLY (OUTPATIENT)
Dept: INTERNAL MEDICINE | Age: 55
End: 2020-11-13
Payer: COMMERCIAL

## 2020-11-13 PROCEDURE — 90688 IIV4 VACCINE SPLT 0.5 ML IM: CPT | Performed by: INTERNAL MEDICINE

## 2020-11-13 NOTE — PROGRESS NOTES
Vaccine Information Sheet, \"Influenza - Inactivated\"  given to Valentin Zazueta, or parent/legal guardian of  Valentin Zazueta and verbalized understanding. Patient responses:    Is the person being vaccinated sick today? No    Does the person to be vaccinated have an allergy to a component of the vaccine? No    Has the person to be vaccinated ever had a reaction to the flu vaccine in the past?  No    Have you ever had Guillian Rolla Syndrome? No    Flu vaccine given per order. Please see immunization tab. Pt reported today that her Voltaren gel that she rubs on her joints causes more pain after she applies it. Writer notified Dr. Greg Maher and per Dr. Greg Maher pt to try cutting the gel 50/50 with an oil such as Vaseline , emu or coconut. Pt verbalized her understanding and will call and report to writer on Monday.   Writer direct desk ph # given to pt

## 2020-11-23 NOTE — TELEPHONE ENCOUNTER
Request for fluoxetine - med pended. Please fill if appropriate.       Next Visit Date:  Future Appointments   Date Time Provider Naheed Amanda   12/18/2020 10:20 AM Prachi Yee MD [address] IM [facility]   1/14/2021  1:00 PM SCHEDULE, [facility] [address] Maintenance   Topic Date Due    Shingles Vaccine (1 of 2) 08/25/2017    A1C test (Diabetic or Prediabetic)  11/27/2020    Potassium monitoring  03/13/2021    Creatinine monitoring  03/13/2021    Lipid screen  04/25/2021    Breast cancer screen  10/22/2021    DTaP/Tdap/Td vaccine (2 - Td) 10/20/2024    Colon cancer screen colonoscopy  02/26/2029    Flu vaccine  Completed    Pneumococcal 0-64 years Vaccine  Completed    HIV screen  Completed    Hepatitis A vaccine  Aged Out    Hepatitis B vaccine  Aged Out    Hib vaccine  Aged Out    Meningococcal (ACWY) vaccine  Aged Out       Hemoglobin A1C (%)   Date Value   11/27/2019 6.0   07/25/2019 6.4 (H)   11/29/2018 6.1             ( goal A1C is < 7)   No results found for: LABMICR  LDL Cholesterol (mg/dL)   Date Value   04/25/2016 123       (goal LDL is <100)   AST (U/L)   Date Value   03/13/2020 17     ALT (U/L)   Date Value   03/13/2020 15     BUN (mg/dL)   Date Value   03/13/2020 18     BP Readings from Last 3 Encounters:   11/05/20 (!) 155/86   07/23/20 (!) 153/83   03/19/20 116/71          (goal 120/80)    All Future Testing planned in [facility]  Lab Frequency Next Occurrence   Comprehensive Metabolic Panel Once 36/41/4081   CBC Auto Differential Once 02/14/2021   TSH with Reflex Once 02/14/2021   Vitamin D 25 Hydroxy Once 02/16/2021   Hemoglobin A1C Once 02/14/2021   POCT activated clotting time PRN          Patient Active Problem List:     HTN (hypertension)     COPD (chronic obstructive pulmonary disease)     Hyperlipidemia     Near syncope     Anxiety     Tobacco abuse     Vesicular rash     Rash of hands     S/P LEEP     Gastroesophageal reflux disease without esophagitis     Trigger finger of right thumb     Trigger finger, right middle finger     Occipital headache     Pre-diabetes     Chest pain     Positive FIT (fecal immunochemical test)     Pancreatitis, unspecified pancreatitis type     Trigger finger, right ring finger

## 2020-11-24 RX ORDER — FLUOXETINE HYDROCHLORIDE 20 MG/1
20 CAPSULE ORAL 2 TIMES DAILY
Qty: 60 CAPSULE | Refills: 5 | Status: SHIPPED | OUTPATIENT
Start: 2020-11-24 | End: 2021-08-06

## 2021-01-14 ENCOUNTER — OFFICE VISIT (OUTPATIENT)
Dept: ORTHOPEDIC SURGERY | Age: 56
End: 2021-01-14
Payer: COMMERCIAL

## 2021-01-14 VITALS — WEIGHT: 124 LBS | HEIGHT: 61 IN | BODY MASS INDEX: 23.41 KG/M2

## 2021-01-14 DIAGNOSIS — Z01.818 PRE-OP TESTING: Primary | ICD-10-CM

## 2021-01-14 DIAGNOSIS — M65.351 TRIGGER LITTLE FINGER OF RIGHT HAND: ICD-10-CM

## 2021-01-14 DIAGNOSIS — M65.341 TRIGGER RING FINGER OF RIGHT HAND: ICD-10-CM

## 2021-01-14 PROCEDURE — 99213 OFFICE O/P EST LOW 20 MIN: CPT | Performed by: STUDENT IN AN ORGANIZED HEALTH CARE EDUCATION/TRAINING PROGRAM

## 2021-01-14 NOTE — PROGRESS NOTES
[facility]  [facility]  [address]  Dept: [phone]  Dept Fax: [phone]        Orthopaedic Clinic Follow Up      Subjective:     Alena Colunga is a 50 y.o. right hand dominant female who presents to the clinic today for routine followup regarding her right 4th and 5th trigger fingers. She was seen in the clinic in October of last year wished to delay surgery until the New Year due to having missed so much work because of [place]. The patient states that she is still having locking and catching of the 4th and 5th digits and needs to manually unlock the digits. Patient does state that she had a steroid injection to the right fourth digit trigger finger which did help for a while with her symptoms, but her symptoms have since returned and she is interested in surgery at this point. She notes that she has tried ice, Voltaren, and splinting at night, none of which have helped. Of note, the patient did have a fall in November and hit her right 5th digit. She has had increased discomfort in the digit since but this has not prevented her from performing daily activities. ROS:  Gen: no fevers, chills   Cardio: no chest pain   Lungs: no shortness of breath   MSK: pain in right hand     Objective :   Physical exam  Gen: AAO x3, no acute distress  Cardio: regular rate  Lungs: symmetrical chest expansion, no audible wheezing   MSK: Right hand: Previous well healed surgical scars over A1 pulley of first and third digits. No ecchymoses, abrasions, deformity, or lacerations. Tenderness to palpation over A1 pulley of 4th and 5th digits. Locking of 4th and 5th digits with active flexion of the digits. Compartments soft and compressible. Hand warm and perfused w/ BCR; 2+ radial pulses. Median/Radial/Ulnar/AIN/PIN gross motor intact although pain with adduction of the 5th digit. Median/Radial/Ulnar nerve SILT. Radiology:  No imaging taken for today's visit.

## 2021-01-21 ENCOUNTER — HOSPITAL ENCOUNTER (OUTPATIENT)
Dept: PREADMISSION TESTING | Age: 56
Discharge: HOME | End: 2021-01-25
Payer: COMMERCIAL

## 2021-01-21 ENCOUNTER — HOSPITAL ENCOUNTER (OUTPATIENT)
Dept: GENERAL RADIOLOGY | Age: 56
Discharge: HOME | End: 2021-01-23
Payer: COMMERCIAL

## 2021-01-21 VITALS
HEIGHT: 61 IN | RESPIRATION RATE: 18 BRPM | WEIGHT: 122 LBS | TEMPERATURE: 97 F | BODY MASS INDEX: 23.03 KG/M2 | SYSTOLIC BLOOD PRESSURE: 145 MMHG | OXYGEN SATURATION: 94 % | HEART RATE: 74 BPM | DIASTOLIC BLOOD PRESSURE: 78 MMHG

## 2021-01-21 DIAGNOSIS — Z01.818 PRE-OP TESTING: ICD-10-CM

## 2021-01-21 LAB
ALBUMIN SERPL-MCNC: 4.3 G/DL (ref 3.5–5.2)
ALBUMIN/GLOBULIN RATIO: 1.5 (ref 1–2.5)
ALP BLD-CCNC: 100 U/L (ref 35–104)
ALT SERPL-CCNC: 22 U/L (ref 5–33)
ANION GAP SERPL CALCULATED.3IONS-SCNC: 8 MMOL/L (ref 9–17)
AST SERPL-CCNC: 27 U/L
BILIRUB SERPL-MCNC: 0.33 MG/DL (ref 0.3–1.2)
BILIRUBIN URINE: NEGATIVE
BUN BLDV-MCNC: 16 MG/DL (ref 6–20)
BUN/CREAT BLD: ABNORMAL (ref 9–20)
CALCIUM SERPL-MCNC: 9.6 MG/DL (ref 8.6–10.4)
CHLORIDE BLD-SCNC: 103 MMOL/L (ref 98–107)
CO2: 27 MMOL/L (ref 20–31)
COLOR: YELLOW
COMMENT UA: NORMAL
CREAT SERPL-MCNC: 0.68 MG/DL (ref 0.5–0.9)
GFR AFRICAN AMERICAN: >60 ML/MIN
GFR NON-AFRICAN AMERICAN: >60 ML/MIN
GFR SERPL CREATININE-BSD FRML MDRD: ABNORMAL ML/MIN/{1.73_M2}
GFR SERPL CREATININE-BSD FRML MDRD: ABNORMAL ML/MIN/{1.73_M2}
GLUCOSE BLD-MCNC: 95 MG/DL (ref 70–99)
GLUCOSE URINE: NEGATIVE
HCT VFR BLD CALC: 46.5 % (ref 36.3–47.1)
HEMOGLOBIN: 14.8 G/DL (ref 11.9–15.1)
KETONES, URINE: NEGATIVE
LEUKOCYTE ESTERASE, URINE: NEGATIVE
MCH RBC QN AUTO: 27.4 PG (ref 25.2–33.5)
MCHC RBC AUTO-ENTMCNC: 31.8 G/DL (ref 28.4–34.8)
MCV RBC AUTO: 86 FL (ref 82.6–102.9)
NITRITE, URINE: NEGATIVE
NRBC AUTOMATED: 0 PER 100 WBC
PDW BLD-RTO: 14.4 % (ref 11.8–14.4)
PH UA: 6.5 (ref 5–8)
PLATELET # BLD: 301 K/UL (ref 138–453)
PMV BLD AUTO: 10.5 FL (ref 8.1–13.5)
POTASSIUM SERPL-SCNC: 4.2 MMOL/L (ref 3.7–5.3)
PROTEIN UA: NEGATIVE
RBC # BLD: 5.41 M/UL (ref 3.95–5.11)
SODIUM BLD-SCNC: 138 MMOL/L (ref 135–144)
SPECIFIC GRAVITY UA: 1.01 (ref 1–1.03)
TOTAL PROTEIN: 7.1 G/DL (ref 6.4–8.3)
TURBIDITY: CLEAR
URINE HGB: NEGATIVE
UROBILINOGEN, URINE: NORMAL
WBC # BLD: 8.2 K/UL (ref 3.5–11.3)

## 2021-01-21 PROCEDURE — 71046 X-RAY EXAM CHEST 2 VIEWS: CPT

## 2021-01-21 PROCEDURE — 85027 COMPLETE CBC AUTOMATED: CPT

## 2021-01-21 PROCEDURE — 36415 COLL VENOUS BLD VENIPUNCTURE: CPT

## 2021-01-21 PROCEDURE — 93005 ELECTROCARDIOGRAM TRACING: CPT

## 2021-01-21 PROCEDURE — 81003 URINALYSIS AUTO W/O SCOPE: CPT

## 2021-01-21 PROCEDURE — 80053 COMPREHEN METABOLIC PANEL: CPT

## 2021-01-21 RX ORDER — SODIUM CHLORIDE, SODIUM LACTATE, POTASSIUM CHLORIDE, CALCIUM CHLORIDE 600; 310; 30; 20 MG/100ML; MG/100ML; MG/100ML; MG/100ML
1000 INJECTION, SOLUTION INTRAVENOUS CONTINUOUS
Status: CANCELLED | OUTPATIENT
Start: 2021-01-21

## 2021-01-21 ASSESSMENT — PAIN DESCRIPTION - LOCATION: LOCATION: HAND

## 2021-01-21 ASSESSMENT — PAIN DESCRIPTION - DESCRIPTORS: DESCRIPTORS: CONSTANT

## 2021-01-21 ASSESSMENT — PAIN DESCRIPTION - ORIENTATION: ORIENTATION: RIGHT

## 2021-01-21 ASSESSMENT — PAIN SCALES - GENERAL: PAINLEVEL_OUTOF10: 3

## 2021-01-21 NOTE — PROGRESS NOTES
Anesthesia Focused Assessment    STOP-BANG Sleep Apnea Questionnaire    SNORE loudly (heard through closed doors)? Yes  TIRED, fatigued, sleepy during daytime? No  OBSERVED stopping breathing during sleep? No  High blood PRESSURE being treated? Yes    BMI over 35? No  AGE over 48? Yes  NECK circumference over 16\"? No  GENDER (male)? No             Total 3  High risk 5-8  Intermediate risk 3-4  Low risk 0-2    Obstructive Sleep Apnea: no  If YES, machine used: no     Type 1 DM:   no  T2DM:  no    Coronary Artery Disease:  no  Hypertension:  yes    Active smoker:  1/2- 3/4 ppd  Drinks Alcohol:  4 drinks per year    Dentition: full upper denture    Defib / AICD / Pacemaker: no      Renal Failure/dialysis:  no    Patient was evaluated in PAT & anesthesia guidelines were applied. NPO guidelines, medication instructions and scheduled arrival time were reviewed with patient.     Hx of anesthesia complications:  no  Family hx of anesthesia complications:  no                                                                                                                     Anesthesia contacted:   no  Medical or cardiac clearance ordered: MEDINA JollyC  1/21/21  2:50 PM

## 2021-01-21 NOTE — H&P (VIEW-ONLY)
History and Physical    Pt Name: Jacques Shah  MRN: [id number]  YOB: 1965  Date of evaluation: 2021    SUBJECTIVE:     History of Chief Complaint:    Patient complains of right hand pain for about one year with significant worsening in the past few months. She has problems gripping tools at work. Her fourth and fifth fingers sometimes lock and cause severe pain. She has been scheduled to release of right fourth and fifth trigger fingers. Past Medical History    has a past medical history of Anxiety, CAD (coronary artery disease), Cardiac arrest ([address] COPD (chronic obstructive pulmonary disease) ([address] GERD (gastroesophageal reflux disease), Headaches due to old head injury, History of blood transfusion, Hypertension, Snores, Trigger finger, Wears dentures, and Wears glasses. Past Surgical History   has a past surgical history that includes Tonsillectomy;  section; LEEP (); pr incise finger tendon sheath (Right, 2017); Colonoscopy (2019); alexandrea and bso (cervix removed) (); Cholecystectomy, laparoscopic (N/A, 2019); and Cardiac catheterization ().   Medications    Current Outpatient Medications:     FLUoxetine (PROZAC) 20 MG capsule, Take 1 capsule by mouth 2 times daily, Disp: 60 capsule, Rfl: 5    diclofenac sodium (VOLTAREN) 1 % GEL, Apply 4 g topically 4 times daily, Disp: 4 Tube, Rfl: 1    albuterol (PROVENTIL) (2.5 MG/3ML) 0.083% nebulizer solution, Take 3 mLs by nebulization every 6 hours as needed for Wheezing, Disp: 120 each, Rfl: 3    tiotropium (SPIRIVA HANDIHALER) 18 MCG inhalation capsule, Inhale 1 capsule into the lungs daily, Disp: 90 capsule, Rfl: 3    albuterol sulfate  (90 Base) MCG/ACT inhaler, Inhale 2 puffs into the lungs 4 times daily as needed for Wheezing, Disp: 1 Inhaler, Rfl: 0    lisinopril-hydrochlorothiazide (PRINZIDE;ZESTORETIC) 10-12.5 MG per tablet, Take 1 tablet by mouth daily, Disp: 90 tablet, Rfl: 4   metoprolol tartrate (LOPRESSOR) 50 MG tablet, Take 1 tablet by mouth 2 times daily, Disp: 60 tablet, Rfl: 11    aspirin EC 81 MG EC tablet, Take 1 tablet by mouth daily, Disp: 30 tablet, Rfl: 0    calcium carbonate 648 MG TABS, Take 1 tablet by mouth 2 times daily, Disp: 60 tablet, Rfl: 5    vitamin D3 (CHOLECALCIFEROL) 25 MCG (1000 UT) TABS tablet, Take 1 tablet by mouth daily, Disp: 90 tablet, Rfl: 1    zoster recombinant adjuvanted vaccine [facility]) 50 MCG/0.5ML SUSR injection, Inject 0.5 mLs into the muscle See Admin Instructions 1 dose now and repeat in 2-6 months (Patient not taking: Reported on 11/6/2020), Disp: 0.5 mL, Rfl: 0    acetaminophen (TYLENOL) 325 MG tablet, Take 650 mg by mouth, Disp: , Rfl:   Allergies  is allergic to adhesive tape. Family History  family history includes Cancer in her father; Depression in her mother; High Blood Pressure in her father and mother. Social History   reports that she has been smoking cigarettes. She has a 15.00 pack-year smoking history. She has never used smokeless tobacco.   reports current alcohol use. reports no history of drug use. Marital Status:   Children: three children and grandchildren  Occupation: [employer]    Review of Systems  CONSTITUTIONAL:  negative   EYES:  negative   HENT:  negative   RESPIRATORY:  cough with sputum and wheezing  CARDIOVASCULAR:  palpitations  GASTROINTESTINAL:  abdominal pain  GENITOURINARY:  negative   INTEGUMENT/BREAST:  negative   HEMATOLOGIC/LYMPHATIC:  negative   ALLERGIC/IMMUNOLOGIC:  negative   ENDOCRINE:  negative   MUSCULOSKELETAL:  pain and stiff joints  NEUROLOGICAL:  headaches  BEHAVIOR/PSYCH:  negative     OBJECTIVE:     VITALS:  height is 5' 1\" (1.549 m) and weight is 122 lb (55.3 kg). Her tympanic temperature is 97 °F (36.1 °C). Her blood pressure is 145/78 (abnormal) and her pulse is 74. Her respiration is 18 and oxygen saturation is 94%.

## 2021-01-21 NOTE — H&P
History and Physical    Pt Name: Mateo Ha  MRN: [id number]  YOB: 1965  Date of evaluation: 2021    SUBJECTIVE:     History of Chief Complaint:    Patient complains of right hand pain for about one year with significant worsening in the past few months. She has problems gripping tools at work. Her fourth and fifth fingers sometimes lock and cause severe pain. She has been scheduled to release of right fourth and fifth trigger fingers. Past Medical History    has a past medical history of Anxiety, CAD (coronary artery disease), Cardiac arrest ([facility] [address] COPD (chronic obstructive pulmonary disease) ([facility] [address] GERD (gastroesophageal reflux disease), Headaches due to old head injury, History of blood transfusion, Hypertension, Snores, Trigger finger, Wears dentures, and Wears glasses. Past Surgical History   has a past surgical history that includes Tonsillectomy;  section; LEEP (); pr incise finger tendon sheath (Right, 2017); Colonoscopy (2019); alexandrea and bso (cervix removed) (); Cholecystectomy, laparoscopic (N/A, 2019); and Cardiac catheterization ().   Medications    Current Outpatient Medications:     FLUoxetine (PROZAC) 20 MG capsule, Take 1 capsule by mouth 2 times daily, Disp: 60 capsule, Rfl: 5    diclofenac sodium (VOLTAREN) 1 % GEL, Apply 4 g topically 4 times daily, Disp: 4 Tube, Rfl: 1    albuterol (PROVENTIL) (2.5 MG/3ML) 0.083% nebulizer solution, Take 3 mLs by nebulization every 6 hours as needed for Wheezing, Disp: 120 each, Rfl: 3    tiotropium (SPIRIVA HANDIHALER) 18 MCG inhalation capsule, Inhale 1 capsule into the lungs daily, Disp: 90 capsule, Rfl: 3    albuterol sulfate  (90 Base) MCG/ACT inhaler, Inhale 2 puffs into the lungs 4 times daily as needed for Wheezing, Disp: 1 Inhaler, Rfl: 0    lisinopril-hydrochlorothiazide (PRINZIDE;ZESTORETIC) 10-12.5 MG per tablet, Take 1 tablet by mouth daily, Disp: 90 tablet, Rfl: 4    metoprolol tartrate (LOPRESSOR) 50 MG tablet, Take 1 tablet by mouth 2 times daily, Disp: 60 tablet, Rfl: 11    aspirin EC 81 MG EC tablet, Take 1 tablet by mouth daily, Disp: 30 tablet, Rfl: 0    calcium carbonate 648 MG TABS, Take 1 tablet by mouth 2 times daily, Disp: 60 tablet, Rfl: 5    vitamin D3 (CHOLECALCIFEROL) 25 MCG (1000 UT) TABS tablet, Take 1 tablet by mouth daily, Disp: 90 tablet, Rfl: 1    zoster recombinant adjuvanted vaccine [facility]) 50 MCG/0.5ML SUSR injection, Inject 0.5 mLs into the muscle See Admin Instructions 1 dose now and repeat in 2-6 months (Patient not taking: Reported on 11/6/2020), Disp: 0.5 mL, Rfl: 0    acetaminophen (TYLENOL) 325 MG tablet, Take 650 mg by mouth, Disp: , Rfl:   Allergies  is allergic to adhesive tape. Family History  family history includes Cancer in her father; Depression in her mother; High Blood Pressure in her father and mother. Social History   reports that she has been smoking cigarettes. She has a 15.00 pack-year smoking history. She has never used smokeless tobacco.   reports current alcohol use. reports no history of drug use. Marital Status:   Children: three children and grandchildren  Occupation: [employer]    Review of Systems  CONSTITUTIONAL:  negative   EYES:  negative   HENT:  negative   RESPIRATORY:  cough with sputum and wheezing  CARDIOVASCULAR:  palpitations  GASTROINTESTINAL:  abdominal pain  GENITOURINARY:  negative   INTEGUMENT/BREAST:  negative   HEMATOLOGIC/LYMPHATIC:  negative   ALLERGIC/IMMUNOLOGIC:  negative   ENDOCRINE:  negative   MUSCULOSKELETAL:  pain and stiff joints  NEUROLOGICAL:  headaches  BEHAVIOR/PSYCH:  negative     OBJECTIVE:     VITALS:  height is 5' 1\" (1.549 m) and weight is 122 lb (55.3 kg). Her tympanic temperature is 97 °F (36.1 °C). Her blood pressure is 145/78 (abnormal) and her pulse is 74. Her respiration is 18 and oxygen saturation is 94%. CONSTITUTIONAL: Alert & oriented x 3, no acute distress.    SKIN: Warm and dry, no rash or erythema. HEAD:  Normocephalic, atraumatic. EYES: PERRLA. EOMs intact. EARS:  Hearing grossly normal.    NOSE:  Nares patent. Septum midline. No rhinorrhea   MOUTH/THROAT:  Unremarkable   NECK: Supple with no lymphadenopathy. LUNGS: Clear to auscultation throughout. No wheezes, rales or rhonchi. CARDIOVASCULAR: Heart rate regular. Rhythm without murmur, click, gallop or rub. ABDOMEN: Soft, non tender, non distended, no masses or organomegaly. EXTREMITIES: No clubbing, cyanosis or edema. TESTING:     EK21  Labs pending: drawn 2021   Chest XRay: 21    IMPRESSION:   1. Right fourth and fifth trigger fingers  2.  has a past medical history of Anxiety, CAD (coronary artery disease) (2013), Cardiac arrest ([facility] [address] COPD (chronic obstructive pulmonary disease) ([facility] [address] GERD (gastroesophageal reflux disease), Headaches due to old head injury (), History of blood transfusion, Hypertension, Snores, Trigger finger, Wears dentures, and Wears glasses. PLAN:   1.  Release of right fourth and fifth trigger fingers    Lawyer Fatoumata CORRAL-BC, AE-C  Electronically signed 2021 at 3:29 PM

## 2021-01-22 LAB
EKG ATRIAL RATE: 61 BPM
EKG P AXIS: 71 DEGREES
EKG P-R INTERVAL: 110 MS
EKG Q-T INTERVAL: 422 MS
EKG QRS DURATION: 80 MS
EKG QTC CALCULATION (BAZETT): 424 MS
EKG R AXIS: 83 DEGREES
EKG T AXIS: 57 DEGREES
EKG VENTRICULAR RATE: 61 BPM

## 2021-01-30 ENCOUNTER — HOSPITAL ENCOUNTER (OUTPATIENT)
Dept: LAB | Age: 56
Setting detail: SPECIMEN
Discharge: HOME | End: 2021-01-30
Payer: COMMERCIAL

## 2021-01-30 DIAGNOSIS — Z20.822 COVID-19 RULED OUT BY LABORATORY TESTING: Primary | ICD-10-CM

## 2021-01-30 PROCEDURE — U0005 INFEC AGEN DETEC AMPLI PROBE: HCPCS

## 2021-01-30 PROCEDURE — U0003 INFECTIOUS AGENT DETECTION BY NUCLEIC ACID (DNA OR RNA); SEVERE ACUTE RESPIRATORY SYNDROME CORONAVIRUS 2 (SARS-COV-2) (CORONAVIRUS DISEASE [COVID-19]), AMPLIFIED PROBE TECHNIQUE, MAKING USE OF HIGH THROUGHPUT TECHNOLOGIES AS DESCRIBED BY CMS-2020-01-R: HCPCS

## 2021-01-31 LAB
SARS-COV-2, RAPID: NORMAL
SARS-COV-2: NORMAL
SARS-COV-2: NOT DETECTED
SOURCE: NORMAL

## 2021-02-01 ENCOUNTER — TELEPHONE (OUTPATIENT)
Dept: PRIMARY CARE CLINIC | Age: 56
End: 2021-02-01

## 2021-02-03 ENCOUNTER — ANESTHESIA (OUTPATIENT)
Dept: OPERATING ROOM | Age: 56
End: 2021-02-03
Payer: COMMERCIAL

## 2021-02-03 ENCOUNTER — HOSPITAL ENCOUNTER (OUTPATIENT)
Age: 56
Setting detail: OUTPATIENT SURGERY
Discharge: HOME | End: 2021-02-03
Attending: ORTHOPAEDIC SURGERY | Admitting: ORTHOPAEDIC SURGERY
Payer: COMMERCIAL

## 2021-02-03 ENCOUNTER — ANESTHESIA EVENT (OUTPATIENT)
Dept: OPERATING ROOM | Age: 56
End: 2021-02-03
Payer: COMMERCIAL

## 2021-02-03 VITALS
HEART RATE: 71 BPM | BODY MASS INDEX: 23.03 KG/M2 | SYSTOLIC BLOOD PRESSURE: 135 MMHG | TEMPERATURE: 97.2 F | DIASTOLIC BLOOD PRESSURE: 78 MMHG | HEIGHT: 61 IN | WEIGHT: 122 LBS | RESPIRATION RATE: 19 BRPM | OXYGEN SATURATION: 98 %

## 2021-02-03 VITALS — OXYGEN SATURATION: 98 % | SYSTOLIC BLOOD PRESSURE: 77 MMHG | DIASTOLIC BLOOD PRESSURE: 44 MMHG

## 2021-02-03 DIAGNOSIS — G89.18 POST-OP PAIN: Primary | ICD-10-CM

## 2021-02-03 LAB — POC POTASSIUM: 4.1 MMOL/L (ref 3.5–4.5)

## 2021-02-03 PROCEDURE — 2500000003 HC RX 250 WO HCPCS: Performed by: ORTHOPAEDIC SURGERY

## 2021-02-03 PROCEDURE — 3600000013 HC SURGERY LEVEL 3 ADDTL 15MIN: Performed by: ORTHOPAEDIC SURGERY

## 2021-02-03 PROCEDURE — 2709999900 HC NON-CHARGEABLE SUPPLY: Performed by: ORTHOPAEDIC SURGERY

## 2021-02-03 PROCEDURE — 2580000003 HC RX 258: Performed by: ORTHOPAEDIC SURGERY

## 2021-02-03 PROCEDURE — 2580000003 HC RX 258: Performed by: ANESTHESIOLOGY

## 2021-02-03 PROCEDURE — 3600000003 HC SURGERY LEVEL 3 BASE: Performed by: ORTHOPAEDIC SURGERY

## 2021-02-03 PROCEDURE — 7100000041 HC SPAR PHASE II RECOVERY - ADDTL 15 MIN: Performed by: ORTHOPAEDIC SURGERY

## 2021-02-03 PROCEDURE — 7100000040 HC SPAR PHASE II RECOVERY - FIRST 15 MIN: Performed by: ORTHOPAEDIC SURGERY

## 2021-02-03 PROCEDURE — 26055 INCISE FINGER TENDON SHEATH: CPT | Performed by: ORTHOPAEDIC SURGERY

## 2021-02-03 PROCEDURE — 2500000003 HC RX 250 WO HCPCS: Performed by: NURSE ANESTHETIST, CERTIFIED REGISTERED

## 2021-02-03 PROCEDURE — 6360000002 HC RX W HCPCS: Performed by: NURSE ANESTHETIST, CERTIFIED REGISTERED

## 2021-02-03 PROCEDURE — 84132 ASSAY OF SERUM POTASSIUM: CPT

## 2021-02-03 PROCEDURE — 3700000001 HC ADD 15 MINUTES (ANESTHESIA): Performed by: ORTHOPAEDIC SURGERY

## 2021-02-03 PROCEDURE — 3700000000 HC ANESTHESIA ATTENDED CARE: Performed by: ORTHOPAEDIC SURGERY

## 2021-02-03 RX ORDER — FENTANYL CITRATE 50 UG/ML
50 INJECTION, SOLUTION INTRAMUSCULAR; INTRAVENOUS EVERY 5 MIN PRN
Status: DISCONTINUED | OUTPATIENT
Start: 2021-02-03 | End: 2021-02-03 | Stop reason: HOSPADM

## 2021-02-03 RX ORDER — FENTANYL CITRATE 50 UG/ML
25 INJECTION, SOLUTION INTRAMUSCULAR; INTRAVENOUS EVERY 5 MIN PRN
Status: DISCONTINUED | OUTPATIENT
Start: 2021-02-03 | End: 2021-02-03 | Stop reason: HOSPADM

## 2021-02-03 RX ORDER — MIDAZOLAM HYDROCHLORIDE 1 MG/ML
INJECTION INTRAMUSCULAR; INTRAVENOUS PRN
Status: DISCONTINUED | OUTPATIENT
Start: 2021-02-03 | End: 2021-02-03 | Stop reason: SDUPTHER

## 2021-02-03 RX ORDER — BUPIVACAINE HYDROCHLORIDE 2.5 MG/ML
INJECTION, SOLUTION INFILTRATION; PERINEURAL PRN
Status: DISCONTINUED | OUTPATIENT
Start: 2021-02-03 | End: 2021-02-03 | Stop reason: ALTCHOICE

## 2021-02-03 RX ORDER — TRAMADOL HYDROCHLORIDE 50 MG/1
50 TABLET ORAL EVERY 6 HOURS PRN
Qty: 28 TABLET | Refills: 0 | Status: SHIPPED | OUTPATIENT
Start: 2021-02-03 | End: 2021-02-10

## 2021-02-03 RX ORDER — CEFAZOLIN SODIUM 2 G/50ML
SOLUTION INTRAVENOUS PRN
Status: DISCONTINUED | OUTPATIENT
Start: 2021-02-03 | End: 2021-02-03 | Stop reason: SDUPTHER

## 2021-02-03 RX ORDER — PHENYLEPHRINE HCL IN 0.9% NACL 1 MG/10 ML
SYRINGE (ML) INTRAVENOUS PRN
Status: DISCONTINUED | OUTPATIENT
Start: 2021-02-03 | End: 2021-02-03 | Stop reason: SDUPTHER

## 2021-02-03 RX ORDER — MAGNESIUM HYDROXIDE 1200 MG/15ML
LIQUID ORAL CONTINUOUS PRN
Status: COMPLETED | OUTPATIENT
Start: 2021-02-03 | End: 2021-02-03

## 2021-02-03 RX ORDER — TRAMADOL HYDROCHLORIDE 50 MG/1
50 TABLET ORAL EVERY 6 HOURS PRN
Qty: 28 TABLET | Refills: 0 | Status: SHIPPED
Start: 2021-02-03 | End: 2021-02-03 | Stop reason: HOSPADM

## 2021-02-03 RX ORDER — GLYCOPYRROLATE 1 MG/5 ML
SYRINGE (ML) INTRAVENOUS PRN
Status: DISCONTINUED | OUTPATIENT
Start: 2021-02-03 | End: 2021-02-03 | Stop reason: SDUPTHER

## 2021-02-03 RX ORDER — PROPOFOL 10 MG/ML
INJECTION, EMULSION INTRAVENOUS CONTINUOUS PRN
Status: DISCONTINUED | OUTPATIENT
Start: 2021-02-03 | End: 2021-02-03 | Stop reason: SDUPTHER

## 2021-02-03 RX ORDER — PROPOFOL 10 MG/ML
INJECTION, EMULSION INTRAVENOUS PRN
Status: DISCONTINUED | OUTPATIENT
Start: 2021-02-03 | End: 2021-02-03 | Stop reason: SDUPTHER

## 2021-02-03 RX ORDER — SODIUM CHLORIDE, SODIUM LACTATE, POTASSIUM CHLORIDE, CALCIUM CHLORIDE 600; 310; 30; 20 MG/100ML; MG/100ML; MG/100ML; MG/100ML
1000 INJECTION, SOLUTION INTRAVENOUS CONTINUOUS
Status: DISCONTINUED | OUTPATIENT
Start: 2021-02-03 | End: 2021-02-03 | Stop reason: HOSPADM

## 2021-02-03 RX ORDER — FENTANYL CITRATE 50 UG/ML
INJECTION, SOLUTION INTRAMUSCULAR; INTRAVENOUS PRN
Status: DISCONTINUED | OUTPATIENT
Start: 2021-02-03 | End: 2021-02-03 | Stop reason: SDUPTHER

## 2021-02-03 RX ADMIN — MIDAZOLAM HYDROCHLORIDE 2 MG: 1 INJECTION, SOLUTION INTRAMUSCULAR; INTRAVENOUS at 09:04

## 2021-02-03 RX ADMIN — SODIUM CHLORIDE, POTASSIUM CHLORIDE, SODIUM LACTATE AND CALCIUM CHLORIDE: 600; 310; 30; 20 INJECTION, SOLUTION INTRAVENOUS at 09:20

## 2021-02-03 RX ADMIN — CEFAZOLIN SODIUM 2 G: 2 SOLUTION INTRAVENOUS at 09:10

## 2021-02-03 RX ADMIN — FENTANYL CITRATE 25 MCG: 50 INJECTION, SOLUTION INTRAMUSCULAR; INTRAVENOUS at 09:09

## 2021-02-03 RX ADMIN — Medication 0.2 MG: at 09:11

## 2021-02-03 RX ADMIN — SODIUM CHLORIDE, POTASSIUM CHLORIDE, SODIUM LACTATE AND CALCIUM CHLORIDE: 600; 310; 30; 20 INJECTION, SOLUTION INTRAVENOUS at 09:02

## 2021-02-03 RX ADMIN — FENTANYL CITRATE 25 MCG: 50 INJECTION, SOLUTION INTRAMUSCULAR; INTRAVENOUS at 09:04

## 2021-02-03 RX ADMIN — PROPOFOL 40 MG: 10 INJECTION, EMULSION INTRAVENOUS at 09:08

## 2021-02-03 RX ADMIN — PROPOFOL 100 MCG/KG/MIN: 10 INJECTION, EMULSION INTRAVENOUS at 09:04

## 2021-02-03 RX ADMIN — Medication 50 MCG: at 09:23

## 2021-02-03 ASSESSMENT — PULMONARY FUNCTION TESTS
PIF_VALUE: 1
PIF_VALUE: 0
PIF_VALUE: 1
PIF_VALUE: 0
PIF_VALUE: 1
PIF_VALUE: 1
PIF_VALUE: 0
PIF_VALUE: 1
PIF_VALUE: 0

## 2021-02-03 ASSESSMENT — ENCOUNTER SYMPTOMS: SHORTNESS OF BREATH: 0

## 2021-02-03 ASSESSMENT — PAIN SCALES - GENERAL
PAINLEVEL_OUTOF10: 0
PAINLEVEL_OUTOF10: 0

## 2021-02-03 ASSESSMENT — PAIN DESCRIPTION - DESCRIPTORS: DESCRIPTORS: ACHING

## 2021-02-03 ASSESSMENT — LIFESTYLE VARIABLES: SMOKING_STATUS: 1

## 2021-02-03 NOTE — OP NOTE
Operative Note      Patient: Gricel Mccormack  YOB: 1965  MRN: [id number]     Date of Procedure: 2/3/2021     Pre-Op Diagnosis: RIGHT TRIGGER FINGERS 4TH, 5TH     Post-Op Diagnosis: Same       Procedure(s): FINGER TRIGGER RELEASE 4TH, 5TH DIGITS (SUPINE) 3080 TABLE, HAND TABLE     Surgeon(s):  Soledad Okeefe DO     Assistant:  Resident: Cheyenne Pereira DO; Gio Bingham DO     Anesthesia: Monitor Anesthesia Care     Estimated Blood Loss (mL): 1 mL     Fluids: 1000 mL crystalloids     Complications: None     TT: 11 minutes     Specimens:   * No specimens in log *     Implants:  * No implants in log *      Drains: * No LDAs found *    Indications:  51 yo F RHD who presented to clinic for right 4th and 5th trigger fingers. After nonoperative vs operative intervention was discussed patient elected to proceed with surgical intervention consisting of trigger finger release of right 4th and 5th fingers. Technique:   On the day of surgery the patient was met in the pre-operative unit where written consent was obtained and the operative site was marked with permanent marker. The patient was then transported to the operating room, was laid in the supine position with the right arm on the hand table. MAC anesthesia was induced. A well padded non sterile tourniquet was applied to the RUE. Appropriate antibiotics were being infused at this time. A time-out was held and after all members were in agreement we proceeded forward with surgery. After standard sterile preparation and draping of the right upper extremity was complete we performed esmarch exsanguination and inflated the tourniquet to 250 mmHg. We examined the involved digits under anesthesia and noted prominence over the 4th and 5th finger A1 pulley with clinical locking/triggering appreciated.  A 2cm incision was made with a #15 blade directly over the A1 pulley and blunt dissection was carried down through the subcutaneous tissue until the tendon

## 2021-02-03 NOTE — INTERVAL H&P NOTE
Pt Name: Juventino Stearns  MRN: [id number]  [place]: 1965  Date of evaluation: 2/3/2021    I have reviewed the patient's history and physical examination completed in pre-admission testing on 1/21/21    No changes to history or on examination today, unless noted below.    Negative covid-19 screening on 1/30/21    CHARITY TATE APRN-CNP  2/3/21  7:58 AM

## 2021-02-03 NOTE — ANESTHESIA PRE PROCEDURE
tablet Take 1 tablet by mouth daily 10/24/18   Emerson Barber MD       Current medications:    Current Facility-Administered Medications   Medication Dose Route Frequency Provider Last Rate Last Admin    lactated ringers infusion 1,000 mL  1,000 mL Intravenous Continuous Mandeep Lerma MD           Allergies: Allergies   Allergen Reactions    Adhesive Tape Rash       Problem List:    Patient Active Problem List   Diagnosis Code    HTN (hypertension) I10    COPD (chronic obstructive pulmonary disease) J44.9    Hyperlipidemia E78.5    Near syncope R55    Anxiety F41.9    Tobacco abuse Z72.0    Vesicular rash R23.8    Rash of hands R21    S/P LEEP Z98.890    Gastroesophageal reflux disease without esophagitis K21.9    Trigger finger of right thumb M65.311    Trigger finger, right middle finger M65.331    Occipital headache R51.9    Pre-diabetes R73.03    Chest pain R07.9    Positive FIT (fecal immunochemical test) R19.5    Pancreatitis, unspecified pancreatitis type K85.90    Trigger finger, right ring finger M65.341       Past Medical History:        Diagnosis Date    Anxiety     CAD (coronary artery disease) 04/29/2013    Cath 04/29/13: Minimal CAD.  No stents    Cardiac arrest ([facility] [address]     cardiac arrest at age 25 months/s/p fall from bed with unconscious episode    COPD (chronic obstructive pulmonary disease) (HCC)     GERD (gastroesophageal reflux disease)     Headaches due to old head injury 2010    s/p fall down steps with unconsciousness    History of blood transfusion     Hypertension     managed by Dr. Bryan Murray PCP    Snores     Trigger finger     right thumb and right middle finger    Wears dentures     FULL UPPER    Wears glasses        Past Surgical History:        Procedure Laterality Date    CARDIAC CATHETERIZATION  2013    minimal cad, no stents  Dr. Tomeka Bell      X's 3    CHOLECYSTECTOMY, LAPAROSCOPIC N/A 09/23/2019    *ADD-ON JACQUI MAGANA DR. RONNY*CHOLECYSTECTOMY LAPAROSCOPIC ROBOTIC performed by Alessandro Gifford IV, DO at [address] COLONOSCOPY  02/26/2019    with polypectomy     LEEP  1993    IN INCISE FINGER TENDON SHEATH Right 05/02/2017     TRIGGER RELEASE THUMB,  MIDDLE FINGER performed by Gabriele Walters DO at [facility]  2000    TONSILLECTOMY         Social History:    Social History     Tobacco Use    Smoking status: Current Every Day Smoker     Packs/day: 0.50     Years: 30.00     Pack years: 15.00     Types: Cigarettes    Smokeless tobacco: Never Used   Substance Use Topics    Alcohol use: Yes     Alcohol/week: 0.0 standard drinks     Comment: occasionally                                Ready to quit: Not Answered  Counseling given: Not Answered      Vital Signs (Current):   Vitals:    02/03/21 0730 02/03/21 0739   BP:  132/83   Pulse:  63   Resp:  16   Temp:  96.8 °F (36 °C)   TempSrc:  Temporal   SpO2:  98%   Weight: 122 lb (55.3 kg)    Height: 5' 1\" (1.549 m)                                               BP Readings from Last 3 Encounters:   02/03/21 132/83   01/21/21 (!) 145/78   11/05/20 (!) 155/86       NPO Status: Time of last liquid consumption: 2230                        Time of last solid consumption: 2230                        Date of last liquid consumption: 02/03/21                        Date of last solid food consumption: 02/02/21    BMI:   Wt Readings from Last 3 Encounters:   02/03/21 122 lb (55.3 kg)   01/21/21 122 lb (55.3 kg)   01/14/21 124 lb (56.2 kg)     Body mass index is 23.05 kg/m².     CBC:   Lab Results   Component Value Date    WBC 8.2 01/21/2021    RBC 5.41 01/21/2021    RBC 5.06 03/18/2012    HGB 14.8 01/21/2021    HCT 46.5 01/21/2021    MCV 86.0 01/21/2021    RDW 14.4 01/21/2021     01/21/2021     03/18/2012       CMP:   Lab Results   Component Value Date     01/21/2021    K 4.2 01/21/2021     01/21/2021    CO2 27 01/21/2021    BUN 16 01/21/2021 CREATININE 0.68 01/21/2021    GFRAA >60 01/21/2021    LABGLOM >60 01/21/2021    GLUCOSE 95 01/21/2021    GLUCOSE 107 03/18/2012    PROT 7.1 01/21/2021    CALCIUM 9.6 01/21/2021    BILITOT 0.33 01/21/2021    ALKPHOS 100 01/21/2021    AST 27 01/21/2021    ALT 22 01/21/2021       POC Tests: No results for input(s): POCGLU, POCNA, POCK, POCCL, POCBUN, POCHEMO, POCHCT in the last 72 hours.     Coags:   Lab Results   Component Value Date    PROTIME 10.3 12/15/2015    INR 1.0 12/15/2015    APTT 30.8 12/15/2015       HCG (If Applicable): No results found for: PREGTESTUR, PREGSERUM, HCG, HCGQUANT     ABGs: No results found for: PHART, PO2ART, HVX5NIG, HVB1OJF, BEART, O7AMBWWS     Type & Screen (If Applicable):  No results found for: LABABO, LABRH    Drug/Infectious Status (If Applicable):  Lab Results   Component Value Date    HEPCAB NONREACTIVE 10/20/2014       COVID-19 Screening (If Applicable):   Lab Results   Component Value Date    COVID19 Not Detected 01/30/2021    COVID19 Not Detected 07/23/2020         Anesthesia Evaluation  Patient summary reviewed and Nursing notes reviewed no history of anesthetic complications:   Airway: Mallampati: II  TM distance: >3 FB   Neck ROM: full  Mouth opening: > = 3 FB Dental:          Pulmonary:normal exam  breath sounds clear to auscultation  (+) COPD:  current smoker    (-) asthma, shortness of breath, recent URI and sleep apnea                           Cardiovascular:  Exercise tolerance: good (>4 METS),   (+) hypertension:, CAD: no interval change, hyperlipidemia    (-) past MI, CABG/stent,  angina,  CHF, orthopnea and  YANG      Rhythm: regular  Rate: normal                    Neuro/Psych:   (+) headaches:, depression/anxiety    (-) seizures, TIA and CVA           GI/Hepatic/Renal:   (+) GERD:,           Endo/Other: Negative Endo/Other ROS       (-) diabetes mellitus                ROS comment: RIGHT TRIGGER FINGERS 4TH, 5TH Abdominal:           Vascular: negative vascular ROS.                                TTE 1/3/2019  Left ventricle is normal in size with normal systolic function. Estimated ejection fraction is 55-60%. Normal right ventricular size and function. No significant pericardial effusion is seen. No significant valvular regurgitation or stenosis seen. Anesthesia Plan      MAC     ASA 3       Induction: intravenous. Anesthetic plan and risks discussed with patient.       Plan discussed with CRNA and surgical team.                  Don Lantigua MD   2/3/2021

## 2021-02-03 NOTE — BRIEF OP NOTE
Brief Postoperative Note      Patient: Yenny Arambula  YOB: 1965  MRN: [id number]    Date of Procedure: 2/3/2021    Pre-Op Diagnosis: RIGHT TRIGGER FINGERS 4TH, 5TH    Post-Op Diagnosis: Same       Procedure(s):   FINGER TRIGGER RELEASE 4TH, 5TH DIGITS (SUPINE) 3080 TABLE, HAND TABLE    Surgeon(s):  Ceferino Damico DO    Assistant:  Resident: Vicki Galindo DO; Jessica Simeon DO    Anesthesia: Monitor Anesthesia Care    Estimated Blood Loss (mL): 1 mL    Fluids: 1000 mL crystalloids    Complications: None    TT: 11 minutes    Specimens:   * No specimens in log *    Implants:  * No implants in log *      Drains: * No LDAs found *    Findings: see op note    Electronically signed by Jessica Simeon DO on 2/3/2021 at 9:40 AM

## 2021-02-03 NOTE — ANESTHESIA POSTPROCEDURE EVALUATION
Department of Anesthesiology  Postprocedure Note    Patient: Ryan Ward  MRN: [id number]  YOB: 1965  Date of evaluation: 2/3/2021  Time:  11:29 AM     Procedure Summary     Date: 02/03/21 Room / Location: [address]    Anesthesia Start: 0902 Anesthesia Stop: 7727    Procedure: FINGER [address], 5TH DIGITS (SUPINE) 3080 TABLE, HAND TABLE (Right ) Diagnosis: (RIGHT TRIGGER FINGERS 4TH, 5TH)    Surgeons: Teresita Meckel, DO Responsible Provider: Nayeli Medellin MD    Anesthesia Type: MAC ASA Status: 3          Anesthesia Type: MAC    Thomas Phase I:      Thomas Phase II: Thomas Score: 10    Last vitals: Reviewed and per EMR flowsheets.        Anesthesia Post Evaluation    Patient location during evaluation: PACU  Patient participation: complete - patient participated  Level of consciousness: awake and alert  Pain score: 0  Airway patency: patent  Nausea & Vomiting: no nausea and no vomiting  Complications: no  Cardiovascular status: hemodynamically stable  Respiratory status: acceptable, spontaneous ventilation and room air  Hydration status: euvolemic

## 2021-02-10 ENCOUNTER — TELEPHONE (OUTPATIENT)
Dept: ORTHOPEDIC SURGERY | Age: 56
End: 2021-02-10

## 2021-02-10 NOTE — TELEPHONE ENCOUNTER
Patient was called back. Message left informing patient that paperwork from [facility] was received, and will be faxed to her employer [employer], as soon as it is signed.

## 2021-02-10 NOTE — TELEPHONE ENCOUNTER
PT wants provider to send REJI note to provider (Cyrus) (PT did not provide a fax #), in order for Cyrus to request compensation for PT from [facility] for days not worked.

## 2021-02-18 ENCOUNTER — OFFICE VISIT (OUTPATIENT)
Dept: ORTHOPEDIC SURGERY | Age: 56
End: 2021-02-18

## 2021-02-18 ENCOUNTER — TELEPHONE (OUTPATIENT)
Dept: ORTHOPEDIC SURGERY | Age: 56
End: 2021-02-18

## 2021-02-18 VITALS — HEIGHT: 61 IN | BODY MASS INDEX: 23.03 KG/M2 | WEIGHT: 122 LBS

## 2021-02-18 DIAGNOSIS — Z98.890 S/P TRIGGER FINGER RELEASE: Primary | ICD-10-CM

## 2021-02-18 PROCEDURE — 99024 POSTOP FOLLOW-UP VISIT: CPT | Performed by: ORTHOPAEDIC SURGERY

## 2021-02-18 NOTE — PROGRESS NOTES
[facility]  [address] [address]  Dept: [phone]    Orthopedic Clinic Post Op    SUBJECTIVE: Heike Mendez is a 50 y.o. female who presents for postoperative follow up after right fourth and fifth trigger finger release. DOS was 2/3/2021. Therefore we are roughly 2 weeks post-op. Patient is doing quite well. She notes no more triggering in her fourth or fifth digits of her right hand. Denies any numbness, tingling, fever, chills. Denies any drainage or wound complications. ROS:  Constitutional: Negative for fever and chills. Musculoskeletal: Negative for right fourth and fifth digit triggering. OBJECTIVE:  RUE: No triggering of the right fourth and fifth digits. Minimal tenderness to palpation over the incisions. Incisions are well approximated with no gross signs of infection including erythema, warmth, drainage. No dehiscence noted. No motor or sensory deficit. Radial and ulnar pulses 2+. PROCEDURES: None    RADIOLOGY:  No radiographs were obtained in the office today. Prior radiographs were reviewed with the patient. ASSESSMENT:  50y.o. year old female 2 weeks post-op from right fourth and fifth trigger finger release. PLAN:  Heike Mendez is here for postoperative follow-up for a right fourth and fifth digit trigger finger release. She has no wound complications at this time. Sutures removed in the office. Patient can follow-up on an as-needed basis. Call with any questions or concerns. Monitor for signs of infection. If symptoms return, schedule follow-up appointment.    -Return if symptoms worsen or fail to improve. No orders of the defined types were placed in this encounter. No orders of the defined types were placed in this encounter. Electronically signed by Doren Sacks, DO, on 2/18/2021 at 1:48 PM    This dictation was generated by voice recognition computer software.   Although all attempts are made to edit the dictation for accuracy, there may be errors in the transcription that are not intended. The actual meaning should be extrapolated by the context of the sentence.

## 2021-02-18 NOTE — TELEPHONE ENCOUNTER
[employer] disability form states estimated return to work as 3/3/2021    Pt was seen in office today. PLAN:  Goldy Cabral is here for postoperative follow-up for a right fourth and fifth digit trigger finger release. She has no wound complications at this time. Sutures removed in the office. Patient can follow-up on an as-needed basis. Call with any questions or concerns. Monitor for signs of infection. If symptoms return, schedule follow-up appointment.     -Return if symptoms worsen or fail to improve.     Pt is cleared to go back to work    [facility] for pt to call back. Ask if she wants to go back earlier than 3/3/2021 that is stated in her disability paperwork. If so, ask if we need to send a return to work letter to employer as well as [employer].

## 2021-02-22 ENCOUNTER — TELEPHONE (OUTPATIENT)
Dept: INTERNAL MEDICINE | Age: 56
End: 2021-02-22

## 2021-02-22 NOTE — LETTER
[facility] [address]  [address] [address]  Phone: [phone]  Fax: [phone]    Kayce Hayden MD        February 22, 2021    [address] [place] [phone]      Dear Maureen Bee: This letter is a reminder that you may have diagnostic testing that has not been completed. It is important to your well-being that these test(s) are performed. Please find the outstanding test(s) attached. If you could please have these completed before your next appointment. You can have the test completed at any [facility] facility or Lab. Please see the order for scheduling instructions. Any testing that needs completed other than blood work or xray's please call [phone] to schedule an appointment. Otherwise can be done at any [facility]. Please call our office at Dept: [phone] for additional information on the outstanding tests or let us know if they have been completed so we may update your chart. If you have any questions or concerns, please don't hesitate to call.     Sincerely,        Kayce Hayden MD

## 2021-03-04 NOTE — TELEPHONE ENCOUNTER
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 Pt was called and she will pick this up at the . Pt was also told that we received Disability paperwork by fax today and we will fill out and fax back. Return to work forms were copied and will be scanned into her chart.

## 2021-03-17 DIAGNOSIS — I10 ESSENTIAL HYPERTENSION: Chronic | ICD-10-CM

## 2021-03-22 NOTE — TELEPHONE ENCOUNTER
Pharmacy sent request on 3/17 and Pt is calling to check on the status of request. Please call Pt to discuss further.     Pt is scheduled in the next appt date for PCP 4/6/21/

## 2021-03-24 RX ORDER — LISINOPRIL AND HYDROCHLOROTHIAZIDE 12.5; 1 MG/1; MG/1
TABLET ORAL
Qty: 90 TABLET | Refills: 0 | Status: SHIPPED | OUTPATIENT
Start: 2021-03-24 | End: 2021-07-19 | Stop reason: SDUPTHER

## 2021-03-24 RX ORDER — METOPROLOL TARTRATE 50 MG/1
TABLET, FILM COATED ORAL
Qty: 180 TABLET | Refills: 10 | Status: SHIPPED | OUTPATIENT
Start: 2021-03-24 | End: 2022-06-30

## 2021-04-13 ENCOUNTER — OFFICE VISIT (OUTPATIENT)
Dept: INTERNAL MEDICINE | Age: 56
End: 2021-04-13
Payer: COMMERCIAL

## 2021-04-13 VITALS
BODY MASS INDEX: 23.3 KG/M2 | HEIGHT: 61 IN | HEART RATE: 66 BPM | DIASTOLIC BLOOD PRESSURE: 83 MMHG | SYSTOLIC BLOOD PRESSURE: 136 MMHG | WEIGHT: 123.4 LBS

## 2021-04-13 DIAGNOSIS — M65.341 TRIGGER RING FINGER OF RIGHT HAND: Primary | ICD-10-CM

## 2021-04-13 DIAGNOSIS — Z13.220 SCREENING CHOLESTEROL LEVEL: ICD-10-CM

## 2021-04-13 PROCEDURE — 99213 OFFICE O/P EST LOW 20 MIN: CPT | Performed by: INTERNAL MEDICINE

## 2021-04-13 RX ORDER — IBUPROFEN 600 MG/1
600 TABLET ORAL DAILY PRN
Qty: 30 TABLET | Refills: 0 | Status: SHIPPED | OUTPATIENT
Start: 2021-04-13 | End: 2021-07-29 | Stop reason: SDUPTHER

## 2021-04-13 RX ORDER — METHOCARBAMOL 500 MG/1
500 TABLET, FILM COATED ORAL NIGHTLY PRN
Qty: 10 TABLET | Refills: 0 | Status: SHIPPED | OUTPATIENT
Start: 2021-04-13 | End: 2021-07-29 | Stop reason: SDUPTHER

## 2021-04-13 SDOH — HEALTH STABILITY: MENTAL HEALTH: HOW OFTEN DO YOU HAVE A DRINK CONTAINING ALCOHOL?: MONTHLY OR LESS

## 2021-04-13 ASSESSMENT — ENCOUNTER SYMPTOMS
EYES NEGATIVE: 1
SINUS PRESSURE: 1
COUGH: 1
GASTROINTESTINAL NEGATIVE: 1

## 2021-04-13 ASSESSMENT — PATIENT HEALTH QUESTIONNAIRE - PHQ9: SUM OF ALL RESPONSES TO PHQ QUESTIONS 1-9: 1

## 2021-04-13 NOTE — PROGRESS NOTES
histories were reviewed and updated as appropriate. ALLERGIES      Allergies   Allergen Reactions    Adhesive Tape Rash         MEDICATIONS:      Current Outpatient Medications   Medication Sig Dispense Refill    metoprolol tartrate (LOPRESSOR) 50 MG tablet TAKE ONE TABLET BY MOUTH TWICE A  tablet 10    lisinopril-hydroCHLOROthiazide (PRINZIDE;ZESTORETIC) 10-12.5 MG per tablet TAKE ONE TABLET BY MOUTH DAILY 90 tablet 0    FLUoxetine (PROZAC) 20 MG capsule Take 1 capsule by mouth 2 times daily 60 capsule 5    diclofenac sodium (VOLTAREN) 1 % GEL Apply 4 g topically 4 times daily 4 Tube 1    calcium carbonate 648 MG TABS Take 1 tablet by mouth 2 times daily 60 tablet 5    vitamin D3 (CHOLECALCIFEROL) 25 MCG (1000 UT) TABS tablet Take 1 tablet by mouth daily 90 tablet 1    albuterol (PROVENTIL) (2.5 MG/3ML) 0.083% nebulizer solution Take 3 mLs by nebulization every 6 hours as needed for Wheezing 120 each 3    tiotropium (SPIRIVA HANDIHALER) 18 MCG inhalation capsule Inhale 1 capsule into the lungs daily 90 capsule 3    albuterol sulfate  (90 Base) MCG/ACT inhaler Inhale 2 puffs into the lungs 4 times daily as needed for Wheezing 1 Inhaler 0    aspirin EC 81 MG EC tablet Take 1 tablet by mouth daily 30 tablet 0     No current facility-administered medications for this visit. Patient Care Team:  Jessica Vaz MD as PCP - Ellie Griffin MD as PCP - [facility]    PAST MEDICAL AND SURGICAL HISTORY:      Past Medical History:   Diagnosis Date    Anxiety     CAD (coronary artery disease) 04/29/2013    Cath 04/29/13: Minimal CAD.  No stents    Cardiac arrest ([facility] [address]     cardiac arrest at age 25 months/s/p fall from bed with unconscious episode    COPD (chronic obstructive pulmonary disease) (HCC)     GERD (gastroesophageal reflux disease)     Headaches due to old head injury 2010    s/p fall down steps with unconsciousness    History of blood transfusion     Hypertension     managed by Dr. Mckenna Alberto PCP    Snores     Trigger finger     right thumb and right middle finger    Wears dentures     FULL UPPER    Wears glasses      Past Surgical History:   Procedure Laterality Date    CARDIAC CATHETERIZATION  2013    minimal cad, no stents  Dr. Jay Azevedo      X's 3    CHOLECYSTECTOMY, LAPAROSCOPIC N/A 09/23/2019    *ADD-ON JACQUI Robison  LAPAROSCOPIC ROBOTIC performed by Edison Gifford IV, DO at [facility]  02/26/2019    with polypectomy     FINGER TRIGGER RELEASE Right 02/03/2021    FINGER TRIGGER RELEASE 4TH, 5TH DIGITS    FINGER TRIGGER RELEASE Right 2/3/2021    FINGER TRIGGER RELEASE 4TH, 5TH DIGITS (SUPINE) 3080 TABLE, HAND TABLE performed by Sonia Mcleod DO at [facility] [address] [facility] [address] Right 05/02/2017     TRIGGER RELEASE THUMB,  MIDDLE FINGER performed by Rupal Coughlin DO at [facility]    TONSILLECTOMY         SOCIAL HISTORY      Social History     Tobacco Use    Smoking status: Current Every Day Smoker     Packs/day: 0.50     Years: 30.00     Pack years: 15.00     Types: Cigarettes    Smokeless tobacco: Never Used   Substance Use Topics    Alcohol use: Yes     Alcohol/week: 0.0 standard drinks     Comment: occasionally     Marcia  reports that she has been smoking cigarettes. She has a 15.00 pack-year smoking history. She has never used smokeless tobacco.    FAMILY HISTORY:      Family History   Problem Relation Age of Onset    High Blood Pressure Mother     Depression Mother     High Blood Pressure Father     Cancer Father        REVIEW OF SYSTEMS:    Review of Systems   Constitutional: Negative. HENT: Positive for sinus pressure. Eyes: Negative. Respiratory: Positive for cough. Cardiovascular: Negative. Gastrointestinal: Negative. Endocrine: Negative. Genitourinary: Negative.     Musculoskeletal: Positive for arthralgias. Skin: Negative. Allergic/Immunologic: Positive for environmental allergies. Neurological: Negative. Hematological: Negative. Psychiatric/Behavioral: The patient is nervous/anxious. PHYSICAL EXAM:      Vitals:    04/13/21 1331   BP: 136/83   Pulse: 66     BP Readings from Last 3 Encounters:   04/13/21 136/83   02/03/21 135/78   02/03/21 (!) 77/44       Physical Examination: General appearance - alert, well appearing, and in no distress  Mental status - alert, oriented to person, place, and time  Chest - clear to auscultation, no wheezes, rales or rhonchi, symmetric air entry  Heart - normal rate and regular rhythm  Back exam - full range of motion, no tenderness, palpable spasm or pain on motion  Neurological - alert, oriented, normal speech, no focal findings or movement disorder noted  Musculoskeletal - no joint tenderness, deformity or swelling  Extremities - no pedal edema noted, patient's right hand does show a well-healed scar that resulted from her clinical finger release. There is some swelling in the fifth digit and in the palmar surface of her hands and it is tender to patient.   Patient is unable to create a tight fist due to pain    LABORATORY FINDINGS:    CBC:   Lab Results   Component Value Date    WBC 8.2 01/21/2021    HGB 14.8 01/21/2021     01/21/2021     03/18/2012     BMP:    Lab Results   Component Value Date     01/21/2021    K 4.2 01/21/2021     01/21/2021    CO2 27 01/21/2021    BUN 16 01/21/2021    CREATININE 0.68 01/21/2021    GLUCOSE 95 01/21/2021    GLUCOSE 107 03/18/2012     Hemoglobin A1C:   Lab Results   Component Value Date    LABA1C 6.0 11/27/2019     Microalbumin Urine: No results found for: MICROALBUR  Lipid profile:   Lab Results   Component Value Date    CHOL 194 04/25/2016    TRIG 128 04/25/2016    HDL 45 04/25/2016     Thyroid functions:   Lab Results   Component Value Date    TSH 4.28 07/25/2019      Hepatic functions: Lab Results   Component Value Date    ALT 22 01/21/2021    AST 27 01/21/2021    PROT 7.1 01/21/2021    BILITOT 0.33 01/21/2021    BILIDIR 0.08 09/24/2019    LABALBU 4.3 01/21/2021     Urine Analysis: No results found for: [address] [address] Oswald:      Health Maintenance Due   Topic Date Due    COVID-19 Vaccine (1) Never done    A1C test (Diabetic or Prediabetic)  11/27/2020    Lipid screen  04/25/2021       ASSESSMENT AND PLAN:       Diagnosis Orders   1. Trigger ring finger of right hand  [facility]    methocarbamol (ROBAXIN) 500 MG tablet    ibuprofen (ADVIL;MOTRIN) 600 MG tablet   2. Screening cholesterol level  Lipid Panel   Recommend patient use Motrin in the morning to prevent upset stomach. She will use Robaxin at night and set up physical therapy for her right hand. She does have follow-up with orthopedics next week or so and highly recommend that she see them for further recommendations. Patient encouraged to complete lab work and we will reprint lab slips at this time. FOLLOW UP:   1. Marcia received counseling on the following healthy behaviors: nutrition and exercise    2. Reviewed prior labs and health maintenance. 3.  Discussed use, benefit, and side effects of prescribed medications. Barriers to medication compliance addressed. All patient questions answered. Pt voiced understanding. 4.  Continue current medications, diet and exercise.               Orders Placed This Encounter   Medications    methocarbamol (ROBAXIN) 500 MG tablet     Sig: Take 1 tablet by mouth nightly as needed (hand pain)     Dispense:  10 tablet     Refill:  0    ibuprofen (ADVIL;MOTRIN) 600 MG tablet     Sig: Take 1 tablet by mouth daily as needed for Pain     Dispense:  30 tablet     Refill:  0          Completed Refills               Requested Prescriptions     Signed Prescriptions Disp Refills    methocarbamol (ROBAXIN) 500 MG tablet 10 tablet 0     Sig: Take 1 tablet by mouth nightly as needed (hand pain)    ibuprofen (ADVIL;MOTRIN) 600 MG tablet 30 tablet 0     Sig: Take 1 tablet by mouth daily as needed for Pain       5. Patient given educational materials - see patient instructions    6. Was a self-tracking handout given in paper form or via [employer]? Yes  If yes, see orders or list here. Orders Placed This Encounter   Procedures    Lipid Panel     Standing Status:   Future     Standing Expiration Date:   4/13/2022     Order Specific Question:   Is Patient Fasting?/# of Hours     Answer:   8 hours    [facility] Physical Therapy [facility]     Referral Priority:   Routine     Referral Type:   Eval and Treat     Referral Reason:   Specialty Services Required     Requested Specialty:   Physical Therapy     Number of Visits Requested:   1       Return in about 1 year (around 4/13/2022). Patient voiced understanding and agreed to treatment plan. This note is created with the assistance of a speech-recognition program. While intending to generate a document that actually reflects the content of the visit, the document can still have some mistakes which may not have been identified and corrected by editing.       Dr Noemi Smalls MD, [facility]  Associate , Department of Internal Medicine  Resident Ambulatory Site Medical Director  [address] [facility]  [address] [facility]  Internal Medicine Clerkship - Carlos Marquez                   4/13/2021, 1:47 PM

## 2021-04-13 NOTE — PATIENT INSTRUCTIONS
Medications e-scribe to pharmacy of pt's choice. Scripts for lab given to pt with fasting instructions, pt will get labs done as soon as possible. Patient to return to clinic 1 year (office will call and schedule appt). AVS reviewed and given to pt. It is very important for your care that you keep your appointment. If for some reason you are unable to keep your appointment it is equally important that you call our office at [phone] to cancel your appointment and reschedule. Failure to do so may result in your termination from our practice.   MB

## 2021-04-20 ENCOUNTER — APPOINTMENT (OUTPATIENT)
Dept: GENERAL RADIOLOGY | Age: 56
End: 2021-04-20
Payer: COMMERCIAL

## 2021-04-20 ENCOUNTER — HOSPITAL ENCOUNTER (EMERGENCY)
Age: 56
Discharge: HOME | End: 2021-04-20
Attending: EMERGENCY MEDICINE
Payer: COMMERCIAL

## 2021-04-20 VITALS
OXYGEN SATURATION: 99 % | HEART RATE: 71 BPM | TEMPERATURE: 98.9 F | DIASTOLIC BLOOD PRESSURE: 81 MMHG | RESPIRATION RATE: 18 BRPM | SYSTOLIC BLOOD PRESSURE: 130 MMHG

## 2021-04-20 DIAGNOSIS — J44.1 COPD WITH ACUTE EXACERBATION (HCC): Primary | ICD-10-CM

## 2021-04-20 PROCEDURE — 71045 X-RAY EXAM CHEST 1 VIEW: CPT

## 2021-04-20 PROCEDURE — 96372 THER/PROPH/DIAG INJ SC/IM: CPT

## 2021-04-20 PROCEDURE — 99284 EMERGENCY DEPT VISIT MOD MDM: CPT

## 2021-04-20 PROCEDURE — 6370000000 HC RX 637 (ALT 250 FOR IP): Performed by: STUDENT IN AN ORGANIZED HEALTH CARE EDUCATION/TRAINING PROGRAM

## 2021-04-20 PROCEDURE — 6360000002 HC RX W HCPCS: Performed by: STUDENT IN AN ORGANIZED HEALTH CARE EDUCATION/TRAINING PROGRAM

## 2021-04-20 RX ORDER — ONDANSETRON 4 MG/1
4 TABLET, ORALLY DISINTEGRATING ORAL EVERY 8 HOURS PRN
Qty: 20 TABLET | Refills: 0 | Status: SHIPPED | OUTPATIENT
Start: 2021-04-20

## 2021-04-20 RX ORDER — PREDNISONE 10 MG/1
TABLET ORAL
Qty: 20 TABLET | Refills: 0 | Status: SHIPPED | OUTPATIENT
Start: 2021-04-20 | End: 2021-04-30

## 2021-04-20 RX ORDER — ACETAMINOPHEN 500 MG
1000 TABLET ORAL ONCE
Status: COMPLETED | OUTPATIENT
Start: 2021-04-20 | End: 2021-04-20

## 2021-04-20 RX ORDER — AZITHROMYCIN 250 MG/1
500 TABLET, FILM COATED ORAL ONCE
Status: COMPLETED | OUTPATIENT
Start: 2021-04-20 | End: 2021-04-20

## 2021-04-20 RX ORDER — KETOROLAC TROMETHAMINE 30 MG/ML
30 INJECTION, SOLUTION INTRAMUSCULAR; INTRAVENOUS ONCE
Status: COMPLETED | OUTPATIENT
Start: 2021-04-20 | End: 2021-04-20

## 2021-04-20 RX ORDER — GUAIFENESIN 600 MG/1
600 TABLET, EXTENDED RELEASE ORAL 2 TIMES DAILY
Qty: 30 TABLET | Refills: 0 | Status: SHIPPED | OUTPATIENT
Start: 2021-04-20 | End: 2021-05-05

## 2021-04-20 RX ORDER — ONDANSETRON 4 MG/1
4 TABLET, ORALLY DISINTEGRATING ORAL ONCE
Status: COMPLETED | OUTPATIENT
Start: 2021-04-20 | End: 2021-04-20

## 2021-04-20 RX ORDER — AZITHROMYCIN 500 MG/1
500 TABLET, FILM COATED ORAL DAILY
Qty: 5 TABLET | Refills: 0 | Status: SHIPPED | OUTPATIENT
Start: 2021-04-20 | End: 2021-04-25

## 2021-04-20 RX ORDER — PREDNISONE 20 MG/1
40 TABLET ORAL ONCE
Status: COMPLETED | OUTPATIENT
Start: 2021-04-20 | End: 2021-04-20

## 2021-04-20 RX ADMIN — ACETAMINOPHEN 1000 MG: 500 TABLET ORAL at 21:43

## 2021-04-20 RX ADMIN — KETOROLAC TROMETHAMINE 30 MG: 30 INJECTION, SOLUTION INTRAMUSCULAR; INTRAVENOUS at 21:43

## 2021-04-20 RX ADMIN — ONDANSETRON 4 MG: 4 TABLET, ORALLY DISINTEGRATING ORAL at 21:43

## 2021-04-20 RX ADMIN — AZITHROMYCIN 500 MG: 250 TABLET, FILM COATED ORAL at 22:21

## 2021-04-20 RX ADMIN — PREDNISONE 40 MG: 20 TABLET ORAL at 22:21

## 2021-04-20 ASSESSMENT — PAIN SCALES - GENERAL
PAINLEVEL_OUTOF10: 5
PAINLEVEL_OUTOF10: 5

## 2021-04-20 ASSESSMENT — PAIN DESCRIPTION - LOCATION: LOCATION: HEAD

## 2021-04-20 ASSESSMENT — PAIN DESCRIPTION - PAIN TYPE: TYPE: ACUTE PAIN

## 2021-04-21 ASSESSMENT — ENCOUNTER SYMPTOMS
SHORTNESS OF BREATH: 1
VOMITING: 1
NAUSEA: 1
DIARRHEA: 0
CHEST TIGHTNESS: 0
WHEEZING: 1
COUGH: 1
COLOR CHANGE: 0
ABDOMINAL PAIN: 0
BACK PAIN: 0
CONSTIPATION: 0

## 2021-04-21 NOTE — ED PROVIDER NOTES
[address] [facility]     Emergency Department     Faculty Attestation    I performed a history and physical examination of the patient and discussed management with the resident. I reviewed the resident´s note and agree with the documented findings and plan of care. Any areas of disagreement are noted on the chart. I was personally present for the key portions of any procedures. I have documented in the chart those procedures where I was not present during the key portions. I have reviewed the emergency nurses triage note. I agree with the chief complaint, past medical history, past surgical history, allergies, medications, social and family history as documented unless otherwise noted below. For Physician Assistant/ Nurse Practitioner cases/documentation I have personally evaluated this patient and have completed at least one if not all key elements of the E/M (history, physical exam, and MDM). Additional findings are as noted. History of COPD, chest clear, heart exam normal, no lower extremity pain or swelling on examination.      Eric Medrano MD  04/20/21 [id number]

## 2021-04-21 NOTE — ED NOTES
Patient arrived to ED alert and oriented x4  Patient has complaints of a headache, cough and Nausea and vomiting  Patients states she started coughing up yellow phlegm last night, has a hx of COPD  Patient has headache from coughing so much she states  Patient has complaints of nausea and vomiting (emesis x2 last night and x2 this morning)  Patient resting on cot   Dr. Dotti Bernheim at bedside and patient updated on plan of care      Adilson Quiñones RN  04/20/21 1977

## 2021-04-21 NOTE — ED PROVIDER NOTES
[address]  ED  Emergency Department Encounter  EmergencyMedicine Resident     Pt Name:Marcia Mayers  MRN: [id number]  [place] 1965  Date of evaluation: 21  PCP:  Sarika Rose MD    CHIEF COMPLAINT       Chief Complaint   Patient presents with    Cough     cough x1 day yellow phlegm- has hx of COPD    Nausea     nausea x1 day    Emesis     emesis x2 episodes last night and x2 this morning       HISTORY OF PRESENT ILLNESS  (Location/Symptom, Timing/Onset, Context/Setting, Quality, Duration, Modifying Factors, Severity.)      Jammie Jamison is a 50 y.o. female who presents with cough with productive of yellow sputum with a history of COPD. Patient also states he has had some nausea and 2 episodes of emesis and 2 this morning. Patient in no acute distress, not any episodes of emesis since this morning. Patient does have a history of COPD, has been taking her breathing treatments more frequently. Has never had an exacerbation. Patient resting comfortably, however did use a breathing treatment prior to arrival.  No pain. PAST MEDICAL / SURGICAL / SOCIAL / FAMILY HISTORY      has a past medical history of Anxiety, CAD (coronary artery disease), Cardiac arrest ([facility] [address] COPD (chronic obstructive pulmonary disease) ([facility] [address] GERD (gastroesophageal reflux disease), Headaches due to old head injury, History of blood transfusion, Hypertension, Snores, Trigger finger, Wears dentures, and Wears glasses. has a past surgical history that includes Tonsillectomy;  section; AJIT (); pr incise finger tendon sheath (Right, 2017); Colonoscopy (2019); alexandrea and bso (cervix removed) (); Cholecystectomy, laparoscopic (N/A, 2019); Cardiac catheterization (); Finger trigger release (Right, 2021); and Finger trigger release (Right, 2/3/2021). Social History     Socioeconomic History    Marital status:       Spouse name: Not on file    Number of children: Not on file    Years of education: Not on file    Highest education level: Not on file   Occupational History    Not on file   Social Needs    Financial resource strain: Not on file    Food insecurity     Worry: Not on file     Inability: Not on file    Transportation needs     Medical: Not on file     Non-medical: Not on file   Tobacco Use    Smoking status: Current Every Day Smoker     Packs/day: 0.50     Years: 30.00     Pack years: 15.00     Types: Cigarettes    Smokeless tobacco: Never Used   Substance and Sexual Activity    Alcohol use: Yes     Frequency: Monthly or less     Comment: occasionally    Drug use: No    Sexual activity: Not on file   Lifestyle    Physical activity     Days per week: Not on file     Minutes per session: Not on file    Stress: Not on file   Relationships    Social connections     Talks on phone: Not on file     Gets together: Not on file     Attends Bahai service: Not on file     Active member of club or organization: Not on file     Attends meetings of clubs or organizations: Not on file     Relationship status: Not on file    Intimate partner violence     Fear of current or ex partner: Not on file     Emotionally abused: Not on file     Physically abused: Not on file     Forced sexual activity: Not on file   Other Topics Concern    Not on file   Social History Narrative    Not on file       Family History   Problem Relation Age of Onset    High Blood Pressure Mother     Depression Mother     High Blood Pressure Father     Cancer Father        Allergies:  Adhesive tape    Home Medications:  Prior to Admission medications    Medication Sig Start Date End Date Taking?  Authorizing Provider   predniSONE (DELTASONE) 10 MG tablet Take 4 tablets by mouth once daily for 5 days 4/20/21 4/30/21 Yes Elysia Crockett DO   azithromycin (ZITHROMAX) 500 MG tablet Take 1 tablet by mouth daily for 5 days 4/20/21 4/25/21 Yes Neha Gibson, DO ondansetron (ZOFRAN ODT) 4 MG disintegrating tablet Take 1 tablet by mouth every 8 hours as needed for Nausea 4/20/21  Yes Vicky Crockett DO   guaiFENesin (MUCINEX) 600 MG extended release tablet Take 1 tablet by mouth 2 times daily for 15 days 4/20/21 5/5/21 Yes Jimmie Crockett DO   methocarbamol (ROBAXIN) 500 MG tablet Take 1 tablet by mouth nightly as needed (hand pain) 4/13/21 4/23/21  Gabriela Hernandez MD   ibuprofen (ADVIL;MOTRIN) 600 MG tablet Take 1 tablet by mouth daily as needed for Pain 4/13/21   Gabriela Hernandez MD   metoprolol tartrate (LOPRESSOR) 50 MG tablet TAKE ONE TABLET BY MOUTH TWICE A DAY 3/24/21   Gabriela Hernandez MD   lisinopril-hydroCHLOROthiazide (PRINZIDE;ZESTORETIC) 10-12.5 MG per tablet TAKE ONE TABLET BY MOUTH DAILY 3/24/21   Gabriela Hernandez MD   FLUoxetine (PROZAC) 20 MG capsule Take 1 capsule by mouth 2 times daily 11/24/20   Gabriela Hernandez MD   diclofenac sodium (VOLTAREN) 1 % GEL Apply 4 g topically 4 times daily  Patient not taking: Reported on 4/13/2021 11/6/20   Gabriela Hernandez MD   calcium carbonate 648 MG TABS Take 1 tablet by mouth 2 times daily  Patient not taking: Reported on 4/13/2021 11/6/20 11/6/21  Gabriela Hernandez MD   vitamin D3 (CHOLECALCIFEROL) 25 MCG (1000 UT) TABS tablet Take 1 tablet by mouth daily  Patient not taking: Reported on 4/13/2021 11/6/20   Gabriela Hernandez MD   albuterol (PROVENTIL) (2.5 MG/3ML) 0.083% nebulizer solution Take 3 mLs by nebulization every 6 hours as needed for Wheezing 8/28/20   Gabriela Hernandez MD   tiotropium (SPIRIVA HANDIHALER) 18 MCG inhalation capsule Inhale 1 capsule into the lungs daily 6/19/20   Gabriela Hernandez MD   albuterol sulfate  (90 Base) MCG/ACT inhaler Inhale 2 puffs into the lungs 4 times daily as needed for Wheezing 1/14/20   Joceline Lawson MD   aspirin EC 81 MG EC tablet Take 1 tablet by mouth daily 10/24/18   Osiris Meraz MD       REVIEW OF SYSTEMS    (2-9 systems for level 4, 10 or more for level 5)      Review of Systems   Constitutional: Negative for chills, diaphoresis, fatigue and fever. Respiratory: Positive for cough, shortness of breath and wheezing. Negative for chest tightness. Cardiovascular: Negative for chest pain, palpitations and leg swelling. Gastrointestinal: Positive for nausea and vomiting. Negative for abdominal pain, constipation and diarrhea. Endocrine: Negative for polydipsia, polyphagia and polyuria. Genitourinary: Negative for difficulty urinating, dysuria and urgency. Musculoskeletal: Negative for arthralgias, back pain, neck pain and neck stiffness. Skin: Negative for color change, pallor and rash. Neurological: Negative for dizziness, weakness, light-headedness and headaches. PHYSICAL EXAM   (up to 7 for level 4, 8 or more for level 5)      INITIAL VITALS:   /81   Pulse 71   Temp 98.9 °F (37.2 °C) (Oral)   Resp 18   SpO2 99%     Physical Exam  Vitals signs and nursing note reviewed. Constitutional:       General: She is not in acute distress. Appearance: She is well-developed. She is not diaphoretic. HENT:      Head: Normocephalic and atraumatic. Nose: Nose normal. No rhinorrhea. Mouth/Throat:      Mouth: Mucous membranes are moist.      Pharynx: Oropharynx is clear. Eyes:      General: No scleral icterus. Conjunctiva/sclera: Conjunctivae normal.      Pupils: Pupils are equal, round, and reactive to light. Neck:      Musculoskeletal: Normal range of motion and neck supple. Vascular: No JVD. Trachea: No tracheal deviation. Cardiovascular:      Rate and Rhythm: Normal rate and regular rhythm. Pulses: Normal pulses. Heart sounds: Normal heart sounds. Pulmonary:      Effort: Pulmonary effort is normal.      Breath sounds: Wheezing (Minimal wheezing) present. Comments: Decreased breath sounds bilaterally  Chest:      Chest wall: No tenderness. Abdominal:      General: Abdomen is flat.  Bowel sounds are normal. There is no distension. Palpations: Abdomen is soft. Tenderness: There is no abdominal tenderness. There is no guarding. Skin:     General: Skin is warm and dry. Capillary Refill: Capillary refill takes less than 2 seconds. Coloration: Skin is not pale. Findings: No erythema. Neurological:      General: No focal deficit present. Mental Status: She is alert and oriented to person, place, and time. Mental status is at baseline. Cranial Nerves: No cranial nerve deficit. Sensory: No sensory deficit. Psychiatric:         Mood and Affect: Mood normal.         Behavior: Behavior normal.         DIFFERENTIAL  DIAGNOSIS     PLAN (LABS / IMAGING / EKG):  Orders Placed This Encounter   Procedures    XR CHEST PORTABLE       MEDICATIONS ORDERED:  Orders Placed This Encounter   Medications    acetaminophen (TYLENOL) tablet 1,000 mg    ketorolac (TORADOL) injection 30 mg    ondansetron (ZOFRAN-ODT) disintegrating tablet 4 mg    predniSONE (DELTASONE) tablet 40 mg    azithromycin (ZITHROMAX) tablet 500 mg     Order Specific Question:   Antimicrobial Indications     Answer:   COPD Exacerbation    predniSONE (DELTASONE) 10 MG tablet     Sig: Take 4 tablets by mouth once daily for 5 days     Dispense:  20 tablet     Refill:  0    azithromycin (ZITHROMAX) 500 MG tablet     Sig: Take 1 tablet by mouth daily for 5 days     Dispense:  5 tablet     Refill:  0    ondansetron (ZOFRAN ODT) 4 MG disintegrating tablet     Sig: Take 1 tablet by mouth every 8 hours as needed for Nausea     Dispense:  20 tablet     Refill:  0    guaiFENesin (MUCINEX) 600 MG extended release tablet     Sig: Take 1 tablet by mouth 2 times daily for 15 days     Dispense:  30 tablet     Refill:  0       DDX: COPD exacerbation, pneumonia, COVID-19, bronchitis    MDM/IMPRESSION: This is a 51-year-old female presenting with cough, some shortness of breath and increased use of her home breathing treatments.   She is also had increased sputum. Suspect COPD exacerbation. Patient no acute distress, not tachycardic, not tachypneic at this time, however just use a breathing treatment prior to arrival.  Trace wheezing. Decreased breath sounds bilateral.  Will perform chest x-ray, anticipate discharge with COPD exacerbation treatment. DIAGNOSTIC RESULTS / EMERGENCY DEPARTMENT COURSE / MDM   LAB RESULTS:  No results found for this visit on 04/20/21. RADIOLOGY:  XR CHEST PORTABLE   Final Result   No acute process. EKG      All EKG's are interpreted by the Emergency Department Physician who either signs or Co-signs this chart in the absence of a cardiologist.    EMERGENCY DEPARTMENT COURSE:  ED Course as of Apr 21 0602   Tue Apr 20, 2021   2200 Chest x-ray shows hyperinflation consistent with COPD. No focal consolidation or opacity. Awaiting facial read prior to discharge. Will discharge with symptomatic management. [JG]   2215 Patient reevaluated and her nausea has improved with over-the-counter Zofran. Abdomen continues to be soft and nontender. Chest x-ray shows hyperinflated lungs. Given increase sputum production increase use of home nebulizer and inhaler treatments, will treat as COPD exacerbation with azithromycin and a steroid burst.  Patient to follow-up with a primary care provider.     [JG]      ED Course User Index  [JG] Nancy Toribio,         PROCEDURES:      CONSULTS:  None    CRITICAL CARE:      FINAL IMPRESSION      1. COPD with acute exacerbation ([facility] [address]          DISPOSITION / PLAN     DISPOSITION Decision To Discharge 04/20/2021 10:13:15 PM      PATIENT REFERRED TO:  [facility]  [address] [facility] [address]  [phone]  Go to   If symptoms worsen    Shraddha Peace MD  [facility] [address] [place]  [phone]    Go in 3 days        DISCHARGE MEDICATIONS:  Discharge Medication List as of 4/20/2021 10:15 PM      START taking these medications    Details predniSONE (DELTASONE) 10 MG tablet Take 4 tablets by mouth once daily for 5 days, Disp-20 tablet, R-0Print      azithromycin (ZITHROMAX) 500 MG tablet Take 1 tablet by mouth daily for 5 days, Disp-5 tablet, R-0Print      ondansetron (ZOFRAN ODT) 4 MG disintegrating tablet Take 1 tablet by mouth every 8 hours as needed for Nausea, Disp-20 tablet, R-0Print      guaiFENesin (MUCINEX) 600 MG extended release tablet Take 1 tablet by mouth 2 times daily for 15 days, Disp-30 tablet, R-0Print             Tristen Antoine DO  Emergency Medicine Resident    (Please note that portions of thisnote were completed with a voice recognition program.  Efforts were made to edit the dictations but occasionally words are mis-transcribed.)     Tristen Antoine DO  Resident  04/21/21 [id number]

## 2021-04-22 ENCOUNTER — OFFICE VISIT (OUTPATIENT)
Dept: ORTHOPEDIC SURGERY | Age: 56
End: 2021-04-22

## 2021-04-22 VITALS — HEIGHT: 61 IN | WEIGHT: 123 LBS | BODY MASS INDEX: 23.22 KG/M2

## 2021-04-22 DIAGNOSIS — M65.341 TRIGGER FINGER, RIGHT RING FINGER: Primary | ICD-10-CM

## 2021-04-22 PROCEDURE — 99024 POSTOP FOLLOW-UP VISIT: CPT | Performed by: ORTHOPAEDIC SURGERY

## 2021-04-22 NOTE — PROGRESS NOTES
[facility]  [facility] [address]  Dept: [phone]    Orthopedic Clinic Post Op    SUBJECTIVE: Vahid Biggs is a 50 y.o. female who presents for postoperative follow up after fourth and fifth digit trigger finger release. DOS was 2/3/2021. Therefore we are roughly 10 weeks post-op. Patient was doing well, but has started working longer hours, 6 days a week for 10 hours each. 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 She notes mostly cramping pain within her right hand and pain along the flexor tendons. No gross signs of infection. No fevers or chills. Her incisions have healed well with no complications. Pain is localized over the area of the incisions mostly. Denies numbness or tingling. She saw her PCP recently who prescribed occupational therapy as well as 600 mg of Motrin 3 times daily. ROS:  Constitutional: Negative for fever and chills. Musculoskeletal: Positive for left hand pain. OBJECTIVE:  Left hand: Tenderness to palpation over the prior incisions/A1 pulleys. Mild tenderness palpation along the flexor tendons, but no pain with passive extension of the digits or with palpation under the palms. No overt rotational deformity. Mild ulnar deviation of the fifth digit. With active flexion extension of the digits there is a notable ulnar subluxation of the extensor tendon of the fifth digit. No visualized triggering of the fourth and fifth digits. Sensation and motor intact with no obvious deficits. Radial pulse 2+. PROCEDURES: None. RADIOLOGY:  No radiographs were obtained in the office today. ASSESSMENT:  50y.o. year old female post-op 10 weeks from right fourth and fifth digit trigger finger release. PLAN:  Vahid Biggs is here for postoperative follow-up for her right fourth and fifth digit trigger finger release. She was doing well postoperatively with no signs of infection. Good range of motion. However, she has had increased pain and spasming within the hand related to her extensive occupation at [employer]. We discussed options at this point, which include conservative measures including anti-inflammatory medication and occupational therapy. Both of which were already prescribed by her primary care physician. We would like her to try out these modalities for roughly 6 weeks and then she can return to the office at the 6-week robina for further evaluation. If she is not doing better at that time we can consider other therapy such as injection, MRI, surgical intervention. Likely this will resolve with extensive occupational therapy.    -Return in about 6 weeks (around 6/3/2021) for recheck ROM. No orders of the defined types were placed in this encounter. No orders of the defined types were placed in this encounter. Electronically signed by Russ Singh DO on 4/22/2021 at 1:51 PM    This dictation was generated by voice recognition computer software. Although all attempts are made to edit the dictation for accuracy, there may be errors in the transcription that are not intended. The actual meaning should be extrapolated by the context of the sentence.

## 2021-04-23 ENCOUNTER — TELEPHONE (OUTPATIENT)
Dept: INTERNAL MEDICINE | Age: 56
End: 2021-04-23

## 2021-04-30 ENCOUNTER — OFFICE VISIT (OUTPATIENT)
Dept: INTERNAL MEDICINE | Age: 56
End: 2021-04-30
Payer: COMMERCIAL

## 2021-04-30 VITALS
WEIGHT: 120 LBS | BODY MASS INDEX: 22.66 KG/M2 | HEART RATE: 65 BPM | DIASTOLIC BLOOD PRESSURE: 69 MMHG | HEIGHT: 61 IN | SYSTOLIC BLOOD PRESSURE: 122 MMHG

## 2021-04-30 DIAGNOSIS — M65.341 TRIGGER RING FINGER OF RIGHT HAND: Primary | ICD-10-CM

## 2021-04-30 PROCEDURE — 99211 OFF/OP EST MAY X REQ PHY/QHP: CPT | Performed by: INTERNAL MEDICINE

## 2021-04-30 PROCEDURE — 99213 OFFICE O/P EST LOW 20 MIN: CPT | Performed by: INTERNAL MEDICINE

## 2021-04-30 NOTE — PROGRESS NOTES
[facility]    Progress Note    Date of patient's visit: 4/30/2021  YOB: 1965  Patient's Name:  Gaetano Mohr    Patient Care Team:  Olivia Flores MD as PCP - General  Olivia Flores MD as PCP - [facility] Empaneled Provider    REASON FOR VISIT: Follow-up visit, needs [facility] paperwork filled out    HISTORY OF PRESENT ILLNESS:    History was obtained from the patient. Gaetano Mohr is a 50 y.o. is here for a follow-up visit. She follows Dr. Di Gutierrez and missed her appointment this morning. She was rescheduled for this afternoon. Her only complaint today is pain in the right hand with mild swelling. Patient had surgery on her dominant hand( right) for trigger finger in February 2021. She would like [facility] paperwork filled out to be able to take time off work as needed due to being unable to cope with her current work schedule of 10 hours/day, 6 days a week.,  As a result of pain in her right hand. She was referred to physical therapy during her last office visit a few weeks ago and is due to follow-up next week. She also recently followed with her orthopedic surgeon. No changes made. Recently seen in the ED for COPD exacerbation.     Patient Active Problem List   Diagnosis    HTN (hypertension)    COPD (chronic obstructive pulmonary disease)    Hyperlipidemia    Near syncope    Anxiety    Tobacco abuse    Vesicular rash    Rash of hands    S/P LEEP    Gastroesophageal reflux disease without esophagitis    Trigger finger of right thumb    Trigger finger, right middle finger    Occipital headache    Pre-diabetes    Chest pain    Positive FIT (fecal immunochemical test)    Pancreatitis, unspecified pancreatitis type    Trigger finger, right ring finger    Post-op pain    Trigger ring finger of right hand       ALLERGIES      Allergies   Allergen Reactions    Adhesive Tape Rash         MEDICATIONS:      Current Outpatient Medications on File Prior to Visit   Medication Sig Dispense Refill    predniSONE (DELTASONE) 10 MG tablet Take 4 tablets by mouth once daily for 5 days 20 tablet 0    ondansetron (ZOFRAN ODT) 4 MG disintegrating tablet Take 1 tablet by mouth every 8 hours as needed for Nausea 20 tablet 0    guaiFENesin (MUCINEX) 600 MG extended release tablet Take 1 tablet by mouth 2 times daily for 15 days 30 tablet 0    ibuprofen (ADVIL;MOTRIN) 600 MG tablet Take 1 tablet by mouth daily as needed for Pain 30 tablet 0    metoprolol tartrate (LOPRESSOR) 50 MG tablet TAKE ONE TABLET BY MOUTH TWICE A  tablet 10    lisinopril-hydroCHLOROthiazide (PRINZIDE;ZESTORETIC) 10-12.5 MG per tablet TAKE ONE TABLET BY MOUTH DAILY 90 tablet 0    FLUoxetine (PROZAC) 20 MG capsule Take 1 capsule by mouth 2 times daily 60 capsule 5    diclofenac sodium (VOLTAREN) 1 % GEL Apply 4 g topically 4 times daily 4 Tube 1    calcium carbonate 648 MG TABS Take 1 tablet by mouth 2 times daily 60 tablet 5    vitamin D3 (CHOLECALCIFEROL) 25 MCG (1000 UT) TABS tablet Take 1 tablet by mouth daily 90 tablet 1    albuterol (PROVENTIL) (2.5 MG/3ML) 0.083% nebulizer solution Take 3 mLs by nebulization every 6 hours as needed for Wheezing 120 each 3    tiotropium (SPIRIVA HANDIHALER) 18 MCG inhalation capsule Inhale 1 capsule into the lungs daily 90 capsule 3    albuterol sulfate  (90 Base) MCG/ACT inhaler Inhale 2 puffs into the lungs 4 times daily as needed for Wheezing 1 Inhaler 0    aspirin EC 81 MG EC tablet Take 1 tablet by mouth daily 30 tablet 0     No current facility-administered medications on file prior to visit. SOCIAL HISTORY    Reviewed and no change from previous record. Marcia  reports that she has been smoking cigarettes. She has a 15.00 pack-year smoking history.  She has never used smokeless tobacco.    FAMILY HISTORY:    Reviewed and No change from previous visit    REVIEW OF SYSTEMS:    ENT: negative  Respiratory: no cough, shortness of breath, or wheezing  Cardiovascular: no chest pain or dyspnea on exertion  Gastrointestinal: no abdominal pain, black or bloody stools  Neurological: no TIA or stroke symptoms  Endocrine: negative  Genito-Urinary: no dysuria, trouble voiding, or hematuria  Musculoskeletal: Pain and swelling in the right hand  Dermatological: negative    PHYSICAL EXAM:      Vitals:    04/30/21 1242   BP: 122/69   Pulse: 65     General appearance - alert, well appearing, and in no distress  Mental status - alert, oriented to person, place, and time  Eyes - pupils equal and reactive, extraocular eye movements intact  Neck - supple, no significant adenopathy  Lymphatics - no palpable lymphadenopathy, no hepatosplenomegaly  Chest - clear to auscultation, no wheezes, rales or rhonchi, symmetric air entry  Heart - normal rate, regular rhythm, normal S1, S2, no murmurs, rubs, clicks or gallops  Abdomen - soft, nontender, nondistended, no masses or organomegaly  Neurological - alert, oriented, normal speech, no focal findings or movement disorder noted  Musculoskeletal -right hand tenderness with mild erythema and swelling  Extremities - peripheral pulses normal, no pedal edema, no clubbing or cyanosis  Skin - normal coloration and turgor, no rashes, no suspicious skin lesions noted    LABORATORY FINDINGS:    CBC:  Lab Results   Component Value Date    WBC 8.2 01/21/2021    HGB 14.8 01/21/2021     01/21/2021     03/18/2012     BMP:    Lab Results   Component Value Date     01/21/2021    K 4.2 01/21/2021     01/21/2021    CO2 27 01/21/2021    BUN 16 01/21/2021    CREATININE 0.68 01/21/2021    GLUCOSE 95 01/21/2021    GLUCOSE 107 03/18/2012     HEMOGLOBIN A1C:   Lab Results   Component Value Date    LABA1C 6.0 11/27/2019     MICROALBUMIN URINE: No results found for: MICROALBUR  FASTING LIPID PANEL:  Lab Results   Component Value Date    CHOL 194 04/25/2016    HDL 45 04/25/2016    TRIG 128 04/25/2016 LIVER PROFILE:  Lab Results   Component Value Date    ALT 22 01/21/2021    AST 27 01/21/2021    PROT 7.1 01/21/2021    BILITOT 0.33 01/21/2021    BILIDIR 0.08 09/24/2019    LABALBU 4.3 01/21/2021      THYROID FUNCTION:   Lab Results   Component Value Date    TSH 4.28 07/25/2019      URINE ANALYSIS: No results found for: LABURIN  ASSESSMENT AND PLAN:    1. Trigger ring finger of right hand  -S/p repair.  -Needs FMLA paperwork filled out.  -Continue as needed ibuprofen and Robaxin. FOLLOW UP:       1. Marcia received counseling on the following healthy behaviors: medication adherence  2. Patient given educational materials - see patient instructions  3. Discussed use, benefit, and side effects of prescribed medications. Barriers to medication compliance addressed. All patient questions answered. Pt voiced understanding. 4.  Reviewed prior labs and health maintenance  5. Continue current medications, diet and exercise.       Marcelene Siemens, MD  PGY-3 Resident  Internal Medicine  [facility]    4/30/2021, 1:11 PM

## 2021-04-30 NOTE — PATIENT INSTRUCTIONS
-please call office if symptoms worsen     fmla paperwork will be faxed to [place] when completed     tv

## 2021-05-26 ENCOUNTER — TELEPHONE (OUTPATIENT)
Dept: INTERNAL MEDICINE | Age: 56
End: 2021-05-26

## 2021-06-09 ENCOUNTER — TELEPHONE (OUTPATIENT)
Dept: INTERNAL MEDICINE | Age: 56
End: 2021-06-09

## 2021-06-09 NOTE — LETTER
JOHNNY Scott [address]  [address] [address]  Phone: [phone]  Fax: [phone]    Hannah Duran MD        June 9, 2021    [address] [place] [phone]      Dear Stan Reason: This letter is a reminder that you may have diagnostic testing that has not been completed. It is important to your well-being that these test(s) are performed. Please find the outstanding test(s) attached. If you could please have these completed before your next appointment. You can have the test completed at any [facility] facility or Lab. Please see the order for scheduling instructions. Any testing that needs completed other than blood work or xray's please call [phone] to schedule an appointment. Otherwise can be done at any [facility]. Please call our office at Dept: [phone] for additional information on the outstanding tests or let us know if they have been completed so we may update your chart. If you have any questions or concerns, please don't hesitate to call.     Sincerely,        Hannah Duran MD

## 2021-06-10 ENCOUNTER — APPOINTMENT (OUTPATIENT)
Dept: GENERAL RADIOLOGY | Age: 56
End: 2021-06-10
Payer: COMMERCIAL

## 2021-06-10 ENCOUNTER — HOSPITAL ENCOUNTER (OUTPATIENT)
Age: 56
Setting detail: OBSERVATION
Discharge: HOME | End: 2021-06-11
Attending: EMERGENCY MEDICINE | Admitting: EMERGENCY MEDICINE
Payer: COMMERCIAL

## 2021-06-10 DIAGNOSIS — R07.9 CHEST PAIN, UNSPECIFIED TYPE: Primary | ICD-10-CM

## 2021-06-10 LAB
ABSOLUTE EOS #: 0.12 K/UL (ref 0–0.44)
ABSOLUTE IMMATURE GRANULOCYTE: <0.03 K/UL (ref 0–0.3)
ABSOLUTE LYMPH #: 1.96 K/UL (ref 1.1–3.7)
ABSOLUTE MONO #: 0.73 K/UL (ref 0.1–1.2)
ANION GAP SERPL CALCULATED.3IONS-SCNC: 14 MMOL/L (ref 9–17)
BASOPHILS # BLD: 1 % (ref 0–2)
BASOPHILS ABSOLUTE: 0.06 K/UL (ref 0–0.2)
BNP INTERPRETATION: ABNORMAL
BUN BLDV-MCNC: 15 MG/DL (ref 6–20)
BUN/CREAT BLD: NORMAL (ref 9–20)
CALCIUM SERPL-MCNC: 9.6 MG/DL (ref 8.6–10.4)
CHLORIDE BLD-SCNC: 101 MMOL/L (ref 98–107)
CO2: 24 MMOL/L (ref 20–31)
CREAT SERPL-MCNC: 0.81 MG/DL (ref 0.5–0.9)
DIFFERENTIAL TYPE: ABNORMAL
EOSINOPHILS RELATIVE PERCENT: 2 % (ref 1–4)
GFR AFRICAN AMERICAN: >60 ML/MIN
GFR NON-AFRICAN AMERICAN: >60 ML/MIN
GFR SERPL CREATININE-BSD FRML MDRD: NORMAL ML/MIN/{1.73_M2}
GFR SERPL CREATININE-BSD FRML MDRD: NORMAL ML/MIN/{1.73_M2}
GLUCOSE BLD-MCNC: 96 MG/DL (ref 70–99)
HCT VFR BLD CALC: 45.5 % (ref 36.3–47.1)
HEMOGLOBIN: 14.8 G/DL (ref 11.9–15.1)
IMMATURE GRANULOCYTES: 0 %
LYMPHOCYTES # BLD: 28 % (ref 24–43)
MCH RBC QN AUTO: 28 PG (ref 25.2–33.5)
MCHC RBC AUTO-ENTMCNC: 32.5 G/DL (ref 28.4–34.8)
MCV RBC AUTO: 86 FL (ref 82.6–102.9)
MONOCYTES # BLD: 11 % (ref 3–12)
NRBC AUTOMATED: 0 PER 100 WBC
PDW BLD-RTO: 13.7 % (ref 11.8–14.4)
PLATELET # BLD: 269 K/UL (ref 138–453)
PLATELET ESTIMATE: ABNORMAL
PMV BLD AUTO: 9.7 FL (ref 8.1–13.5)
POTASSIUM SERPL-SCNC: 4.2 MMOL/L (ref 3.7–5.3)
PRO-BNP: 310 PG/ML
RBC # BLD: 5.29 M/UL (ref 3.95–5.11)
RBC # BLD: ABNORMAL 10*6/UL
SARS-COV-2, RAPID: NOT DETECTED
SEG NEUTROPHILS: 58 % (ref 36–65)
SEGMENTED NEUTROPHILS ABSOLUTE COUNT: 4.04 K/UL (ref 1.5–8.1)
SODIUM BLD-SCNC: 139 MMOL/L (ref 135–144)
SPECIMEN DESCRIPTION: NORMAL
THYROXINE, FREE: 1.33 NG/DL (ref 0.93–1.7)
TROPONIN INTERP: NORMAL
TROPONIN INTERP: NORMAL
TROPONIN T: NORMAL NG/ML
TROPONIN T: NORMAL NG/ML
TROPONIN, HIGH SENSITIVITY: <6 NG/L (ref 0–14)
TROPONIN, HIGH SENSITIVITY: <6 NG/L (ref 0–14)
TSH SERPL DL<=0.05 MIU/L-ACNC: 1.35 MIU/L (ref 0.3–5)
WBC # BLD: 6.9 K/UL (ref 3.5–11.3)
WBC # BLD: ABNORMAL 10*3/UL

## 2021-06-10 PROCEDURE — 80048 BASIC METABOLIC PNL TOTAL CA: CPT

## 2021-06-10 PROCEDURE — 87635 SARS-COV-2 COVID-19 AMP PRB: CPT

## 2021-06-10 PROCEDURE — 85025 COMPLETE CBC W/AUTO DIFF WBC: CPT

## 2021-06-10 PROCEDURE — 96374 THER/PROPH/DIAG INJ IV PUSH: CPT

## 2021-06-10 PROCEDURE — 93005 ELECTROCARDIOGRAM TRACING: CPT | Performed by: STUDENT IN AN ORGANIZED HEALTH CARE EDUCATION/TRAINING PROGRAM

## 2021-06-10 PROCEDURE — G0378 HOSPITAL OBSERVATION PER HR: HCPCS

## 2021-06-10 PROCEDURE — 84443 ASSAY THYROID STIM HORMONE: CPT

## 2021-06-10 PROCEDURE — 84439 ASSAY OF FREE THYROXINE: CPT

## 2021-06-10 PROCEDURE — 96375 TX/PRO/DX INJ NEW DRUG ADDON: CPT

## 2021-06-10 PROCEDURE — 84484 ASSAY OF TROPONIN QUANT: CPT

## 2021-06-10 PROCEDURE — 2580000003 HC RX 258: Performed by: EMERGENCY MEDICINE

## 2021-06-10 PROCEDURE — 6360000002 HC RX W HCPCS: Performed by: EMERGENCY MEDICINE

## 2021-06-10 PROCEDURE — 2580000003 HC RX 258: Performed by: STUDENT IN AN ORGANIZED HEALTH CARE EDUCATION/TRAINING PROGRAM

## 2021-06-10 PROCEDURE — 71046 X-RAY EXAM CHEST 2 VIEWS: CPT

## 2021-06-10 PROCEDURE — 99285 EMERGENCY DEPT VISIT HI MDM: CPT

## 2021-06-10 PROCEDURE — 83880 ASSAY OF NATRIURETIC PEPTIDE: CPT

## 2021-06-10 PROCEDURE — 6370000000 HC RX 637 (ALT 250 FOR IP): Performed by: STUDENT IN AN ORGANIZED HEALTH CARE EDUCATION/TRAINING PROGRAM

## 2021-06-10 RX ORDER — METOPROLOL TARTRATE 50 MG/1
50 TABLET, FILM COATED ORAL 2 TIMES DAILY
Status: DISCONTINUED | OUTPATIENT
Start: 2021-06-10 | End: 2021-06-11 | Stop reason: HOSPADM

## 2021-06-10 RX ORDER — SODIUM CHLORIDE 9 MG/ML
25 INJECTION, SOLUTION INTRAVENOUS PRN
Status: DISCONTINUED | OUTPATIENT
Start: 2021-06-10 | End: 2021-06-11 | Stop reason: HOSPADM

## 2021-06-10 RX ORDER — SODIUM CHLORIDE 0.9 % (FLUSH) 0.9 %
5-40 SYRINGE (ML) INJECTION EVERY 12 HOURS SCHEDULED
Status: DISCONTINUED | OUTPATIENT
Start: 2021-06-10 | End: 2021-06-11 | Stop reason: HOSPADM

## 2021-06-10 RX ORDER — MAGNESIUM HYDROXIDE/ALUMINUM HYDROXICE/SIMETHICONE 120; 1200; 1200 MG/30ML; MG/30ML; MG/30ML
30 SUSPENSION ORAL ONCE
Status: COMPLETED | OUTPATIENT
Start: 2021-06-10 | End: 2021-06-10

## 2021-06-10 RX ORDER — SODIUM CHLORIDE, SODIUM LACTATE, POTASSIUM CHLORIDE, AND CALCIUM CHLORIDE .6; .31; .03; .02 G/100ML; G/100ML; G/100ML; G/100ML
1000 INJECTION, SOLUTION INTRAVENOUS ONCE
Status: COMPLETED | OUTPATIENT
Start: 2021-06-10 | End: 2021-06-10

## 2021-06-10 RX ORDER — FLUOXETINE HYDROCHLORIDE 20 MG/1
20 CAPSULE ORAL 2 TIMES DAILY
Status: DISCONTINUED | OUTPATIENT
Start: 2021-06-10 | End: 2021-06-11 | Stop reason: HOSPADM

## 2021-06-10 RX ORDER — SODIUM CHLORIDE 0.9 % (FLUSH) 0.9 %
5-40 SYRINGE (ML) INJECTION PRN
Status: DISCONTINUED | OUTPATIENT
Start: 2021-06-10 | End: 2021-06-11 | Stop reason: HOSPADM

## 2021-06-10 RX ORDER — ONDANSETRON 4 MG/1
4 TABLET, ORALLY DISINTEGRATING ORAL EVERY 8 HOURS PRN
Status: DISCONTINUED | OUTPATIENT
Start: 2021-06-10 | End: 2021-06-11 | Stop reason: HOSPADM

## 2021-06-10 RX ORDER — DIPHENHYDRAMINE HYDROCHLORIDE 50 MG/ML
25 INJECTION INTRAMUSCULAR; INTRAVENOUS ONCE
Status: COMPLETED | OUTPATIENT
Start: 2021-06-10 | End: 2021-06-10

## 2021-06-10 RX ORDER — ASPIRIN 81 MG/1
81 TABLET ORAL DAILY
Status: DISCONTINUED | OUTPATIENT
Start: 2021-06-10 | End: 2021-06-11 | Stop reason: HOSPADM

## 2021-06-10 RX ORDER — FAMOTIDINE 20 MG/1
20 TABLET, FILM COATED ORAL ONCE
Status: COMPLETED | OUTPATIENT
Start: 2021-06-10 | End: 2021-06-10

## 2021-06-10 RX ORDER — LISINOPRIL AND HYDROCHLOROTHIAZIDE 12.5; 1 MG/1; MG/1
1 TABLET ORAL DAILY
Status: DISCONTINUED | OUTPATIENT
Start: 2021-06-10 | End: 2021-06-11 | Stop reason: HOSPADM

## 2021-06-10 RX ORDER — ASPIRIN 81 MG/1
243 TABLET, CHEWABLE ORAL ONCE
Status: COMPLETED | OUTPATIENT
Start: 2021-06-10 | End: 2021-06-10

## 2021-06-10 RX ORDER — ACETAMINOPHEN 325 MG/1
650 TABLET ORAL EVERY 4 HOURS PRN
Status: DISCONTINUED | OUTPATIENT
Start: 2021-06-10 | End: 2021-06-11 | Stop reason: HOSPADM

## 2021-06-10 RX ORDER — METOCLOPRAMIDE HYDROCHLORIDE 5 MG/ML
10 INJECTION INTRAMUSCULAR; INTRAVENOUS ONCE
Status: COMPLETED | OUTPATIENT
Start: 2021-06-10 | End: 2021-06-10

## 2021-06-10 RX ADMIN — ASPIRIN 243 MG: 81 TABLET, CHEWABLE ORAL at 17:28

## 2021-06-10 RX ADMIN — DIPHENHYDRAMINE HYDROCHLORIDE 25 MG: 50 INJECTION INTRAMUSCULAR; INTRAVENOUS at 17:52

## 2021-06-10 RX ADMIN — SODIUM CHLORIDE, POTASSIUM CHLORIDE, SODIUM LACTATE AND CALCIUM CHLORIDE 1000 ML: 600; 310; 30; 20 INJECTION, SOLUTION INTRAVENOUS at 17:54

## 2021-06-10 RX ADMIN — METOCLOPRAMIDE 10 MG: 5 INJECTION, SOLUTION INTRAMUSCULAR; INTRAVENOUS at 17:52

## 2021-06-10 RX ADMIN — SODIUM CHLORIDE, PRESERVATIVE FREE 10 ML: 5 INJECTION INTRAVENOUS at 23:44

## 2021-06-10 RX ADMIN — FAMOTIDINE 20 MG: 20 TABLET, FILM COATED ORAL at 18:16

## 2021-06-10 RX ADMIN — ALUMINUM HYDROXIDE, MAGNESIUM HYDROXIDE, AND SIMETHICONE 30 ML: 200; 200; 20 SUSPENSION ORAL at 18:16

## 2021-06-10 ASSESSMENT — PAIN DESCRIPTION - PAIN TYPE: TYPE: ACUTE PAIN

## 2021-06-10 ASSESSMENT — PAIN SCALES - GENERAL
PAINLEVEL_OUTOF10: 4
PAINLEVEL_OUTOF10: 3

## 2021-06-10 ASSESSMENT — PAIN DESCRIPTION - ORIENTATION: ORIENTATION: MID;RIGHT

## 2021-06-10 ASSESSMENT — PAIN DESCRIPTION - FREQUENCY: FREQUENCY: CONTINUOUS

## 2021-06-10 ASSESSMENT — PAIN DESCRIPTION - LOCATION: LOCATION: CHEST

## 2021-06-10 NOTE — ED PROVIDER NOTES
[facility]     Emergency Department     Faculty Note/ Attestation      Pt Name: Mango Giraldo                                       MRN: [id number]  [place] 1965  Date of evaluation: 6/10/2021    Patients PCP:    Benoit Bagley MD    Attestation  I performed a history and physical examination of the patient and discussed management with the resident. I reviewed the residents note and agree with the documented findings and plan of care. Any areas of disagreement are noted on the chart. I was personally present for the key portions of any procedures. I have documented in the chart those procedures where I was not present during the key portions. I have reviewed the emergency nurses triage note. I agree with the chief complaint, past medical history, past surgical history, allergies, medications, social and family history as documented unless otherwise noted below. For Physician Assistant/ Nurse Practitioner cases/documentation I have personally evaluated this patient and have completed at least one if not all key elements of the E/M (history, physical exam, and MDM). Additional findings are as noted. Initial Screens:             Vitals:    Vitals:    06/10/21 1653   BP: (!) 146/79   Pulse: 64   Resp: 15   Temp: 98.1 °F (36.7 °C)   TempSrc: Oral   SpO2: 96%       CHIEF COMPLAINT       Chief Complaint   Patient presents with    Chest Pain     began last night, HA. blurred vision. pt denies SOB       The pt is a 50 smoker with HTN and family history of MI had sudden chest pain worse with moving around comes and goes at rest not exertional in nature chest movement and breathing was worse until pt tried inhaler with improvement. Pain currently absent in the chest.  HA after medications.           EMERGENCY DEPARTMENT COURSE:     -------------------------  BP: (!) 146/79, Temp: 98.1 °F (36.7 °C), Pulse: 64, Resp: 15  Physical Exam  Constitutional:       Appearance: She is

## 2021-06-10 NOTE — ED PROVIDER NOTES
[address]  ED  Emergency Department Encounter  EmergencyMedicine Resident     Pt Name:Marcia Padilla  MRN: [id number]  [place] 1965  Date of evaluation: 6/10/21  PCP:  MD David Ambrose       Chief Complaint   Patient presents with    Chest Pain     began last night, HA. blurred vision. pt denies SOB       HISTORY OF PRESENT ILLNESS  (Location/Symptom, Timing/Onset, Context/Setting, Quality, Duration, Modifying Factors, Severity.)      Pretty Orellana is a 50 y.o. female who presents with concerns of 4/10 sternal nonradiating chest pain, headache, blurry vision. Denies shortness of breath. States symptoms started yesterday while at work, works physical job in a [employer] on an [employer], symptoms went away but then started again today while at rest.  Patient has had numerous episodes in the past but has not sought care. History of hypertension anxiety COPD treated with medications. Patient says she takes her antihypertensives twice daily as indicated, does not miss doses. Denies nausea vomiting abdominal pain diarrhea fevers. Smokes half a pack a day, denies alcohol, street drugs. PAST MEDICAL / SURGICAL / SOCIAL / FAMILY HISTORY      has a past medical history of Anxiety, CAD (coronary artery disease), Cardiac arrest ([facility] COPD (chronic obstructive pulmonary disease) ([facility] GERD (gastroesophageal reflux disease), Headaches due to old head injury, History of blood transfusion, Hypertension, Snores, Trigger finger, Wears dentures, and Wears glasses. has a past surgical history that includes Tonsillectomy;  section; LEEP (1993); pr incise finger tendon sheath (Right, 2017); Colonoscopy (2019); alexandrea and bso (cervix removed) (); Cholecystectomy, laparoscopic (N/A, 2019); Cardiac catheterization (); Finger trigger release (Right, 2021); and Finger trigger release (Right, 2/3/2021).     Social History     Socioeconomic History    Marital status:      Spouse name: Not on file    Number of children: Not on file    Years of education: Not on file    Highest education level: Not on file   Occupational History    Not on file   Tobacco Use    Smoking status: Current Every Day Smoker     Packs/day: 0.50     Years: 30.00     Pack years: 15.00     Types: Cigarettes    Smokeless tobacco: Never Used   Vaping Use    Vaping Use: Never used   Substance and Sexual Activity    Alcohol use: Yes     Comment: occasionally    Drug use: No    Sexual activity: Not on file   Other Topics Concern    Not on file   Social History Narrative    Not on file     Social Determinants of Health     Financial Resource Strain:     Difficulty of Paying Living Expenses:    Food Insecurity:     Worried About Running Out of Food in the Last Year:     [address] in the Last Year:    Transportation Needs:     Lack of Transportation (Medical):  Lack of Transportation (Non-Medical):    Physical Activity:     Days of Exercise per Week:     Minutes of Exercise per Session:    Stress:     Feeling of Stress :    Social Connections:     Frequency of Communication with Friends and Family:     Frequency of Social Gatherings with Friends and Family:     Attends Muslim Services:     Active Member of Clubs or Organizations:     Attends Club or Organization Meetings:     Marital Status:    Intimate Partner Violence:     Fear of Current or Ex-Partner:     Emotionally Abused:     Physically Abused:     Sexually Abused:        Family History   Problem Relation Age of Onset    High Blood Pressure Mother     Depression Mother     High Blood Pressure Father     Cancer Father        Allergies:  Adhesive tape    Home Medications:  Prior to Admission medications    Medication Sig Start Date End Date Taking?  Authorizing Provider   ondansetron (ZOFRAN ODT) 4 MG disintegrating tablet Take 1 tablet by mouth every 8 hours as needed for Nausea 4/20/21   Arlean Schilder, DO   ibuprofen (ADVIL;MOTRIN) 600 MG tablet Take 1 tablet by mouth daily as needed for Pain 4/13/21   Claudene Guise, MD   metoprolol tartrate (LOPRESSOR) 50 MG tablet TAKE ONE TABLET BY MOUTH TWICE A DAY 3/24/21   Claudene Guise, MD   lisinopril-hydroCHLOROthiazide (PRINZIDE;ZESTORETIC) 10-12.5 MG per tablet TAKE ONE TABLET BY MOUTH DAILY 3/24/21   Claudene Guise, MD   FLUoxetine (PROZAC) 20 MG capsule Take 1 capsule by mouth 2 times daily 11/24/20   Claudene Guise, MD   diclofenac sodium (VOLTAREN) 1 % GEL Apply 4 g topically 4 times daily 11/6/20   Claudene Guise, MD   calcium carbonate 648 MG TABS Take 1 tablet by mouth 2 times daily 11/6/20 11/6/21  Claudene Guise, MD   vitamin D3 (CHOLECALCIFEROL) 25 MCG (1000 UT) TABS tablet Take 1 tablet by mouth daily 11/6/20   Claudene Guise, MD   albuterol (PROVENTIL) (2.5 MG/3ML) 0.083% nebulizer solution Take 3 mLs by nebulization every 6 hours as needed for Wheezing 8/28/20   Claudene Guise, MD   tiotropium (Ludger Keen) 18 MCG inhalation capsule Inhale 1 capsule into the lungs daily 6/19/20   Claudene Guise, MD   albuterol sulfate  (90 Base) MCG/ACT inhaler Inhale 2 puffs into the lungs 4 times daily as needed for Wheezing 1/14/20   Perla Reis MD   aspirin EC 81 MG EC tablet Take 1 tablet by mouth daily 10/24/18   Zo Gonzalez MD       REVIEW OF SYSTEMS    (2-9 systems for level 4, 10 or more for level 5)      Review of Systems   Constitutional: Negative for fever. HENT: Negative for congestion. Eyes: Negative for photophobia. Respiratory: Negative for shortness of breath. Cardiovascular: Positive for chest pain. Gastrointestinal: Negative for abdominal pain and vomiting. Endocrine: Negative for polyuria. Genitourinary: Negative for dysuria. Musculoskeletal: Negative for arthralgias. Skin: Negative for color change. Allergic/Immunologic: Negative for immunocompromised state.    Neurological: Negative for dizziness. Hematological: Does not bruise/bleed easily. Psychiatric/Behavioral: Negative for agitation. PHYSICAL EXAM   (up to 7 for level 4, 8 or more for level 5)      INITIAL VITALS:   BP (!) 146/79   Pulse 64   Temp 98.1 °F (36.7 °C) (Oral)   Resp 15   SpO2 96%     Physical Exam  Constitutional:       General: Not in acute distress. Appearance: Normal appearance. Normal weight. Not toxic-appearing. HENT:      Head: Normocephalic and atraumatic. Nose: Nose normal.      Mouth/Throat: Mucous membranes are moist.  Uvula midline no oropharyngeal edema. Pharynx: Oropharynx is clear. Eyes:      Extraocular Movements: Extraocular movements intact. Conjunctiva/sclera: Conjunctivae normal.      Pupils: Pupils are equal, round, and reactive to light. Neck:      Musculoskeletal: Normal range of motion and neck supple. No neck rigidity. Cardiovascular:      Rate and Rhythm: Normal rate and regular rhythm. Pulses: Normal pulses. Heart sounds: Normal heart sounds. No murmur. Pulmonary:      Effort: Pulmonary effort is normal.      Breath sounds: Normal breath sounds. No wheezing. Abdominal:      General: Abdomen is flat. Bowel sounds are normal.      Tenderness: There is no abdominal tenderness. Musculoskeletal:     Normal range of motion. General: No swelling or tenderness. No LE edema    Skin:     General: Skin is warm. Capillary Refill: Capillary refill takes less than 2 seconds. Coloration: Skin is not jaundiced. Neurological:      General: No focal deficit present. Mental Status: Alert and oriented to person, place, and time. Mental status is at baseline. Motor: No weakness.        DIFFERENTIAL  DIAGNOSIS     PLAN (LABS / IMAGING / EKG):  Orders Placed This Encounter   Procedures    XR CHEST (2 VW)    CBC Auto Differential    Basic Metabolic Panel w/ Reflex to MG    Troponin    Brain Natriuretic Peptide    TSH with Anion Gap 14 9 - 17 mmol/L    GFR Non-African American >60 >60 mL/min    GFR African American >60 >60 mL/min    GFR Comment          GFR Staging NOT REPORTED    Troponin   Result Value Ref Range    Troponin, High Sensitivity <6 0 - 14 ng/L    Troponin T NOT REPORTED <0.03 ng/mL    Troponin Interp NOT REPORTED    Brain Natriuretic Peptide   Result Value Ref Range    Pro- (H) <300 pg/mL    BNP Interpretation Pro-BNP Reference Range:    TSH with Reflex   Result Value Ref Range    TSH 1.35 0.30 - 5.00 mIU/L   T4, FREE   Result Value Ref Range    Thyroxine, Free 1.33 0.93 - 1.70 ng/dL   Troponin   Result Value Ref Range    Troponin, High Sensitivity <6 0 - 14 ng/L    Troponin T NOT REPORTED <0.03 ng/mL    Troponin Interp NOT REPORTED          RADIOLOGY:  XR CHEST (2 VW)    Result Date: 6/10/2021  EXAMINATION: TWO XRAY VIEWS OF THE CHEST 6/10/2021 4:58 pm COMPARISON: Multiple prior examinations most recently 422 HISTORY: ORDERING SYSTEM PROVIDED HISTORY: chest pain FINDINGS: The lungs are without acute focal process. No effusion or pneumothorax. The cardiomediastinal silhouette is normal.  The osseous structures are intact without acute process. Stable chest without acute process. EKG  EKG Interpretation    Interpreted by me    Rhythm: normal sinus   Rate: normal  Axis: normal  Ectopy: none  Conduction: normal  ST Segments: no acute change  T Waves: no acute change  Q Waves: none    Clinical Impression: no acute changes and normal EKG    All EKG's are interpreted by the Emergency Department Physician who either signs or Co-signs this chart in the absence of a cardiologist.    EMERGENCY DEPARTMENT COURSE:  Patient breathing quietly and unlabored on room air. Speech is normal and speaking in full sentences without requiring to pause to take a breath. Physical exam unremarkable, vital signs stable, afebrile. Patient ambulatory with normal gait. On chart review patient had normal echo 1/2 years ago. Given symptoms concern of MI ACS syndrome. Heart score is 4: 1 due to age, with 1 due to symptoms, to due to family history risk factors smoking hypertension family history of cardiac death. Will give aspirin completion dose. Reevaluated patient, says chest pain is resolved. Troponin is 6, repeat troponin VI. Will recommend abs admission because of heart score 4. Also giving a dose of Maalox and Pepcid for possibility of gastritis, ulcer. Patient says she does have a history of gastritis, was taking omeprazole but stopped about a year ago. PROCEDURES:  None    CONSULTS:  None    CRITICAL CARE:  None    FINAL IMPRESSION      1. Chest pain, unspecified type          DISPOSITION / [facility] [address] Admitted 06/10/2021 06:58:35 PM      PATIENT REFERRED TO:  No follow-up provider specified.     DISCHARGE MEDICATIONS:  New Prescriptions    No medications on file       Olaf Curtis MD  Emergency Medicine Resident    (Please note that portions of thisnote were completed with a voice recognition program.  Efforts were made to edit the dictations but occasionally words are mis-transcribed.)       Olaf Curtis MD  Resident  06/10/21 4501

## 2021-06-10 NOTE — ED TRIAGE NOTES
Pt co midsternal cp radiating to right arm x 1 day. 400mg Motrin PTA. EKG complete. Pt respirations are even and unlabored, pt is oriented X 4, speaking in complete sentences, bed is in the lowest position, call light is within reach. Will continue to monitor.

## 2021-06-10 NOTE — LETTER
Quynh Pak  Med Surg  [address] [address]  Phone: [phone]    No name on file. June 11, 2021     Patient: nAy Booth   YOB: 1965   Date of Visit: 6/10/2021       To Whom It May Concern: It is my medical opinion that Tai Nieves may return to full duty immediately with no restrictions. She was admitted to the hospital on 6/10/21 and discharged on 6/11/21. If you have any questions or concerns, please don't hesitate to call.     Sincerely,

## 2021-06-10 NOTE — ED NOTES
Bed: 01  Expected date:   Expected time:   Means of arrival:   Comments:     Chu Jacobo RN  06/10/21 4111

## 2021-06-11 ENCOUNTER — APPOINTMENT (OUTPATIENT)
Dept: NUCLEAR MEDICINE | Age: 56
End: 2021-06-11
Payer: COMMERCIAL

## 2021-06-11 VITALS
WEIGHT: 125 LBS | OXYGEN SATURATION: 95 % | DIASTOLIC BLOOD PRESSURE: 70 MMHG | HEART RATE: 59 BPM | RESPIRATION RATE: 16 BRPM | BODY MASS INDEX: 23.6 KG/M2 | TEMPERATURE: 97.3 F | HEIGHT: 61 IN | SYSTOLIC BLOOD PRESSURE: 111 MMHG

## 2021-06-11 LAB
EKG ATRIAL RATE: 58 BPM
EKG P AXIS: 74 DEGREES
EKG P-R INTERVAL: 124 MS
EKG Q-T INTERVAL: 452 MS
EKG QRS DURATION: 82 MS
EKG QTC CALCULATION (BAZETT): 443 MS
EKG R AXIS: 85 DEGREES
EKG T AXIS: 65 DEGREES
EKG VENTRICULAR RATE: 58 BPM
LV EF: 70 %
LVEF MODALITY: NORMAL

## 2021-06-11 PROCEDURE — 93005 ELECTROCARDIOGRAM TRACING: CPT | Performed by: EMERGENCY MEDICINE

## 2021-06-11 PROCEDURE — G0378 HOSPITAL OBSERVATION PER HR: HCPCS

## 2021-06-11 PROCEDURE — 3430000000 HC RX DIAGNOSTIC RADIOPHARMACEUTICAL: Performed by: STUDENT IN AN ORGANIZED HEALTH CARE EDUCATION/TRAINING PROGRAM

## 2021-06-11 PROCEDURE — 2580000003 HC RX 258: Performed by: STUDENT IN AN ORGANIZED HEALTH CARE EDUCATION/TRAINING PROGRAM

## 2021-06-11 PROCEDURE — 93017 CV STRESS TEST TRACING ONLY: CPT

## 2021-06-11 PROCEDURE — 6370000000 HC RX 637 (ALT 250 FOR IP): Performed by: STUDENT IN AN ORGANIZED HEALTH CARE EDUCATION/TRAINING PROGRAM

## 2021-06-11 PROCEDURE — 78452 HT MUSCLE IMAGE SPECT MULT: CPT

## 2021-06-11 PROCEDURE — 6360000002 HC RX W HCPCS: Performed by: STUDENT IN AN ORGANIZED HEALTH CARE EDUCATION/TRAINING PROGRAM

## 2021-06-11 PROCEDURE — A9500 TC99M SESTAMIBI: HCPCS | Performed by: STUDENT IN AN ORGANIZED HEALTH CARE EDUCATION/TRAINING PROGRAM

## 2021-06-11 RX ORDER — NITROGLYCERIN 0.4 MG/1
0.4 TABLET SUBLINGUAL EVERY 5 MIN PRN
Status: DISCONTINUED | OUTPATIENT
Start: 2021-06-11 | End: 2021-06-11 | Stop reason: ALTCHOICE

## 2021-06-11 RX ORDER — METOPROLOL TARTRATE 5 MG/5ML
5 INJECTION INTRAVENOUS EVERY 5 MIN PRN
Status: DISCONTINUED | OUTPATIENT
Start: 2021-06-11 | End: 2021-06-11 | Stop reason: ALTCHOICE

## 2021-06-11 RX ORDER — SODIUM CHLORIDE 0.9 % (FLUSH) 0.9 %
10 SYRINGE (ML) INJECTION PRN
Status: DISCONTINUED | OUTPATIENT
Start: 2021-06-11 | End: 2021-06-11 | Stop reason: HOSPADM

## 2021-06-11 RX ORDER — MAGNESIUM HYDROXIDE/ALUMINUM HYDROXICE/SIMETHICONE 120; 1200; 1200 MG/30ML; MG/30ML; MG/30ML
30 SUSPENSION ORAL EVERY 6 HOURS PRN
Status: DISCONTINUED | OUTPATIENT
Start: 2021-06-11 | End: 2021-06-11 | Stop reason: HOSPADM

## 2021-06-11 RX ORDER — SODIUM CHLORIDE 9 MG/ML
500 INJECTION, SOLUTION INTRAVENOUS CONTINUOUS PRN
Status: DISCONTINUED | OUTPATIENT
Start: 2021-06-11 | End: 2021-06-11 | Stop reason: ALTCHOICE

## 2021-06-11 RX ORDER — SODIUM CHLORIDE 0.9 % (FLUSH) 0.9 %
5-40 SYRINGE (ML) INJECTION PRN
Status: DISCONTINUED | OUTPATIENT
Start: 2021-06-11 | End: 2021-06-11 | Stop reason: ALTCHOICE

## 2021-06-11 RX ORDER — PANTOPRAZOLE SODIUM 20 MG/1
40 TABLET, DELAYED RELEASE ORAL DAILY
Qty: 30 TABLET | Refills: 0 | Status: SHIPPED | OUTPATIENT
Start: 2021-06-11 | End: 2021-07-29 | Stop reason: SDUPTHER

## 2021-06-11 RX ORDER — AMINOPHYLLINE DIHYDRATE 25 MG/ML
50 INJECTION, SOLUTION INTRAVENOUS PRN
Status: DISCONTINUED | OUTPATIENT
Start: 2021-06-11 | End: 2021-06-11 | Stop reason: ALTCHOICE

## 2021-06-11 RX ORDER — ALBUTEROL SULFATE 90 UG/1
2 AEROSOL, METERED RESPIRATORY (INHALATION) PRN
Status: DISCONTINUED | OUTPATIENT
Start: 2021-06-11 | End: 2021-06-11 | Stop reason: ALTCHOICE

## 2021-06-11 RX ORDER — MAGNESIUM HYDROXIDE/ALUMINUM HYDROXICE/SIMETHICONE 120; 1200; 1200 MG/30ML; MG/30ML; MG/30ML
30 SUSPENSION ORAL EVERY 6 HOURS PRN
Qty: 1 BOTTLE | Refills: 0 | Status: SHIPPED | OUTPATIENT
Start: 2021-06-11 | End: 2022-05-17 | Stop reason: ALTCHOICE

## 2021-06-11 RX ORDER — ATROPINE SULFATE 0.1 MG/ML
0.5 INJECTION INTRAVENOUS EVERY 5 MIN PRN
Status: DISCONTINUED | OUTPATIENT
Start: 2021-06-11 | End: 2021-06-11 | Stop reason: ALTCHOICE

## 2021-06-11 RX ADMIN — SODIUM CHLORIDE, PRESERVATIVE FREE 10 ML: 5 INJECTION INTRAVENOUS at 09:20

## 2021-06-11 RX ADMIN — ONDANSETRON 4 MG: 4 TABLET, ORALLY DISINTEGRATING ORAL at 09:37

## 2021-06-11 RX ADMIN — REGADENOSON 0.4 MG: 0.08 INJECTION, SOLUTION INTRAVENOUS at 10:34

## 2021-06-11 RX ADMIN — FLUOXETINE HYDROCHLORIDE 20 MG: 20 CAPSULE ORAL at 05:51

## 2021-06-11 RX ADMIN — TETRAKIS(2-METHOXYISOBUTYLISOCYANIDE)COPPER(I) TETRAFLUOROBORATE 38 MILLICURIE: 1 INJECTION, POWDER, LYOPHILIZED, FOR SOLUTION INTRAVENOUS at 10:40

## 2021-06-11 RX ADMIN — Medication 81 MG: at 07:41

## 2021-06-11 RX ADMIN — SODIUM CHLORIDE, PRESERVATIVE FREE 10 ML: 5 INJECTION INTRAVENOUS at 10:40

## 2021-06-11 RX ADMIN — METOPROLOL TARTRATE 50 MG: 50 TABLET, FILM COATED ORAL at 05:51

## 2021-06-11 RX ADMIN — SODIUM CHLORIDE, PRESERVATIVE FREE 10 ML: 5 INJECTION INTRAVENOUS at 10:21

## 2021-06-11 RX ADMIN — SODIUM CHLORIDE, PRESERVATIVE FREE 10 ML: 5 INJECTION INTRAVENOUS at 09:44

## 2021-06-11 RX ADMIN — SODIUM CHLORIDE, PRESERVATIVE FREE 10 ML: 5 INJECTION INTRAVENOUS at 10:37

## 2021-06-11 RX ADMIN — LISINOPRIL AND HYDROCHLOROTHIAZIDE 1 TABLET: 12.5; 1 TABLET ORAL at 05:51

## 2021-06-11 RX ADMIN — TETRAKIS(2-METHOXYISOBUTYLISOCYANIDE)COPPER(I) TETRAFLUOROBORATE 14.1 MILLICURIE: 1 INJECTION, POWDER, LYOPHILIZED, FOR SOLUTION INTRAVENOUS at 09:20

## 2021-06-11 RX ADMIN — ALUMINUM HYDROXIDE, MAGNESIUM HYDROXIDE, AND SIMETHICONE 30 ML: 200; 200; 20 SUSPENSION ORAL at 09:40

## 2021-06-11 ASSESSMENT — ENCOUNTER SYMPTOMS
SHORTNESS OF BREATH: 0
VOMITING: 0
NAUSEA: 0
DIARRHEA: 0
BACK PAIN: 0
COUGH: 0
ABDOMINAL PAIN: 0
RHINORRHEA: 0

## 2021-06-11 ASSESSMENT — PAIN DESCRIPTION - FREQUENCY: FREQUENCY: CONTINUOUS

## 2021-06-11 ASSESSMENT — PAIN DESCRIPTION - ORIENTATION: ORIENTATION: MID

## 2021-06-11 ASSESSMENT — PAIN SCALES - GENERAL: PAINLEVEL_OUTOF10: 3

## 2021-06-11 ASSESSMENT — PAIN DESCRIPTION - LOCATION: LOCATION: CHEST

## 2021-06-11 ASSESSMENT — PAIN DESCRIPTION - DESCRIPTORS: DESCRIPTORS: BURNING

## 2021-06-11 ASSESSMENT — PAIN DESCRIPTION - ONSET: ONSET: ON-GOING

## 2021-06-11 ASSESSMENT — PAIN DESCRIPTION - PAIN TYPE: TYPE: ACUTE PAIN

## 2021-06-11 ASSESSMENT — PAIN DESCRIPTION - PROGRESSION: CLINICAL_PROGRESSION: NOT CHANGED

## 2021-06-11 ASSESSMENT — PAIN - FUNCTIONAL ASSESSMENT: PAIN_FUNCTIONAL_ASSESSMENT: PREVENTS OR INTERFERES SOME ACTIVE ACTIVITIES AND ADLS

## 2021-06-11 NOTE — H&P
[facility]  CDU / OBSERVATION ENCOUNTER  RESIDENT NOTE     Pt Name: Steve Schwartz  MRN: [id number]  [place] 1965  Date of evaluation: 6/11/21  Patient's PCP is :  Duncan Tao MD    [address] [place] [address]       Chief Complaint   Patient presents with    Chest Pain     began last night, HA. blurred vision. pt denies SOB         HISTORY OF PRESENT ILLNESS    Marcia Davison is a 50 y.o. female who presents with chest pain that is substernal, nonradiating, associated with headache and blurry vision. No associated shortness of breath, nausea, vomiting, abdominal pain, diarrhea, fever, chills. Severity is 4/10. 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 Pain presented suddenly, burning in nature. She has had similar pain in the past that was due to gastric ulcers. Tobacco use is 10 cigarettes/day, no alcohol or drug use. Past medical history significant for COPD, hypertension, anxiety, CAD. Patient had echo done in 2019 that was normal. Work-up in the ED included troponin 6 x2. She also received treatment for gastritis-like symptoms in the ED. Patient has had ulcers in the past.  Heart score is 5    Location/Symptom: Substernal and epigastric pain  Timing/Onset: Yesterday  Provocation: Unknown  Quality: Burning, heaviness  Radiation: N/A  Severity: Moderate  Timing/Duration: Persistent  Modifying Factors: Slightly improved with Maalox, time    REVIEW OF SYSTEMS       Review of Systems   Constitutional: Negative for chills and fever. HENT: Negative for congestion and rhinorrhea. Eyes: Negative for visual disturbance. Respiratory: Negative for cough and shortness of breath. Cardiovascular: Positive for chest pain. Gastrointestinal: Negative for abdominal pain, diarrhea, nausea and vomiting. Genitourinary: Negative for dysuria. Musculoskeletal: Negative for back pain and neck pain. Skin: Negative for rash. mother; High Blood Pressure in her father and mother. The patient denies any pertinent family history. I have reviewed and agree with the family history entered. I have reviewed the Family History and it is not significant to the case    SOCIAL HISTORY      reports that she has been smoking cigarettes. She has a 15.00 pack-year smoking history. She has never used smokeless tobacco. She reports current alcohol use. She reports that she does not use drugs. I have reviewed and agree with all Social.  There are no concerns for substance abuse/use. PHYSICAL EXAM     INITIAL VITALS:  height is 5' 1\" (1.549 m) and weight is 125 lb (56.7 kg). Her oral temperature is 97.3 °F (36.3 °C). Her blood pressure is 110/65 and her pulse is 70. Her respiration is 18 and oxygen saturation is 93%. Physical Exam  Constitutional:       General: She is not in acute distress. Appearance: Normal appearance. She is normal weight. She is not ill-appearing, toxic-appearing or diaphoretic. HENT:      Head: Normocephalic and atraumatic. Nose: Nose normal.      Mouth/Throat:      Mouth: Mucous membranes are moist.      Pharynx: Oropharynx is clear. Eyes:      Extraocular Movements: Extraocular movements intact. Pupils: Pupils are equal, round, and reactive to light. Cardiovascular:      Rate and Rhythm: Normal rate and regular rhythm. Heart sounds: Normal heart sounds. No murmur heard. Pulmonary:      Effort: Pulmonary effort is normal. No respiratory distress. Breath sounds: Normal breath sounds. No wheezing, rhonchi or rales. Abdominal:      Palpations: Abdomen is soft. Comments: Mild tenderness palpation in the epigastric region. No rebound or guarding. Musculoskeletal:         General: Normal range of motion. Cervical back: Normal range of motion and neck supple. Right lower leg: No edema. Left lower leg: No edema. Skin:     General: Skin is warm and dry.    Neurological: General: No focal deficit present. Mental Status: She is alert and oriented to person, place, and time. Motor: No weakness. Psychiatric:         Mood and Affect: Mood normal.             DIFFERENTIAL DIAGNOSIS/MDM:     DDx: ACS, gastritis, gastric ulcer, pneumonia, hernia, other    DIAGNOSTIC RESULTS     EKG: All EKG's are interpreted by the Observation Physician who either signs or Co-signs this chart in the absence of a cardiologist.    EKG Interpretation    Interpreted by observation physician    Rhythm: normal sinus   Rate: normal  Axis: normal  Ectopy: none  Conduction: normal  ST Segments: normal  T Waves: Inverted in V2, V3  Q Waves: none    Clinical Impression: non-specific EKG and T wave inversions new since EKG in any way 2021    Eliana Wyman DO      RADIOLOGY:   I directly visualized the following  images and reviewed the radiologist interpretations:    XR CHEST (2 VW)    Result Date: 6/10/2021  EXAMINATION: TWO XRAY VIEWS OF THE CHEST 6/10/2021 4:58 pm COMPARISON: Multiple prior examinations most recently 422 HISTORY: [address] [facility] [address]: chest pain FINDINGS: The lungs are without acute focal process. No effusion or pneumothorax. The cardiomediastinal silhouette is normal.  The osseous structures are intact without acute process. Stable chest without acute process. LABS:  I have reviewed and interpreted all available lab results.   Labs Reviewed   CBC WITH AUTO DIFFERENTIAL - Abnormal; Notable for the following components:       Result Value    RBC 5.29 (*)     All other components within normal limits   BRAIN NATRIURETIC PEPTIDE - Abnormal; Notable for the following components:    Pro- (*)     All other components within normal limits   COVID-19, RAPID   BASIC METABOLIC PANEL W/ REFLEX TO MG FOR LOW K   TROPONIN   TSH WITH REFLEX   T4, FREE   TROPONIN       SCREENING TOOLS:    HEART Risk Score for Chest Pain Patients   History and Physical Exam Suspicion Level  (Nausea, Vomiting, Diaphoresis, Radiation, Exertion)   Slightly Suspicious (0 pts)   Moderately Suspicious (1 pt)   Highly Suspicious (2 pts)   EKG Interpretation   Normal (0 pts)   Non-Specific Repolarization Disturbance (1 pt)   Significant ST-Depression (2 pts)   Age of Patient (in years)   = 39 (0 pts)   46-64 (1 pt)   = 65 (2 pts)   Risk Factors   No Risk Factors (0 pts)   1-2 Risk Factors (1 pt)   = 3 Risk Factors (2 pts)   Risk Factors Include:   Hypercholesterolemia   Hypertension   Diabetes Mellitus   Cigarette smoking   Positive family history   Obesity   CAD   (SLE, CKDz, HIV, Cocaine abuse)   Troponin Levels   = Normal Limit (0 pts)   1-3 Times Normal Limit (1 pt)   > 3 Times Normal Limit (2 pts)  TOTAL: 5    Percent Risk for Major Adverse Cardiac Event (MACE)  0-3 pts indicates low risk for MACE   2.5% (DISCHARGE)   4-7 pts indicates moderate risk for MACE  20.3% (OBS)  8-10 pts indicates high risk for MACE  72.7% (EARLY INVASIVE TX)    CDU IMPRESSION / PLAN      Marcia Lay is a 50 y.o. female who presents with substernal chest pain and epigastric pain, heavy and burning in nature. 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 Past medical history significant for hypertension, COPD, hyperlipidemia, 10 cigarettes/day tobacco use. Patient had mild improvement of symptoms with Maalox, Pepcid in the ED. 1. Substernal chest pain  · Symptoms consistent with gastritis or ulcer, but patient has risk factors for cardiac disease  · Stress test ordered, will consult cardiology as needed  · Symptom management    · Continue home medications and pain control  · Monitor vitals, labs, and imaging  · DISPO: pending consults and clinical improvement    CONSULTS:    None    PROCEDURES:  Not indicated       PATIENT REFERRED TO:    No follow-up provider specified.     --  Johnny Zuniga DO   Emergency Medicine Resident     This dictation was generated by voice recognition computer

## 2021-06-11 NOTE — ED NOTES
Pt respirations are even and unlabored, pt is oriented X 4, speaking in complete sentences, bed is in the lowest position, call light is within reach. Will continue to monitor.        Sathya Amador RN  06/10/21 2003

## 2021-06-11 NOTE — PROGRESS NOTES
[address]  CDU / OBSERVATION ENCOUNTER  ATTENDING NOTE       I performed a history and physical examination of the patient and discussed management with the resident. I reviewed the residents note and agree with the documented findings and plan of care. Any areas of disagreement are noted on the chart. I was personally present for the key portions of any procedures. I have documented in the chart those procedures where I was not present during the key portions. I have reviewed the nurses notes. I agree with the chief complaint, past medical history, past surgical history, allergies, medications, social and family history as documented unless otherwise noted below. The Family history, social history, and ROS are effectively unchanged since admission unless noted elsewhere in the chart. The patient is a 51-year-old female with history of tobacco abuse, CAD, hypertension, COPD, hyperlipidemia, GERD, and pancreatitis who presents for evaluation of chest pain. The patient has had improvement using her albuterol inhaler. She also complains of associated intermittent headache and blurry vision. She has nonspecific T wave inversions on EKG. Awaiting results of stress test.    Patient smokes 0.5 pack cigarettes per day for 30 years. Smoking cessation counseling for 3 minutes. Discussed the effects of smoking in regards to overall health. I explained how this negatively effects their condition, will contribute to increased recovery time, and possible lead to further health problems in the future. The patient is diabetic. I have reviewed the patient's chart and found the most recent A1c is 6.0. This indicates reasonable control.  Will continue to follow-up with PCP          Irene Jarrett DO  Attending Emergency Physician

## 2021-06-11 NOTE — PROCEDURES
[address]                  [address] [facility], [facility] [address]                              CARDIAC STRESS TEST    PATIENT NAME: David Kaplan                    :        1965  MED REC NO:   [id number]                             ROOM:       Critical access hospital  ACCOUNT NO:   [de-identified]                           ADMIT DATE: 06/10/2021  PROVIDER:     Teofilo Johnson    DATE OF STUDY:  2021    ORDERING PROVIDER:  Natividad Funk DO  INTERPRETING PHYSICIAN:  Teofilo Johnson MD    LEXISCAN STRESS STUDY:  Indication:  chest pain    Medications:  Lexiscan, 0.4 mg  Resting heart rate:  59 bpm  Resting blood pressure:  121/60 mm/Hg  Infusion heart rate:  94 bpm  Infusion blood pressure:  143/79 mm/Hg  Resting EKG:  Normal (sinus bradycardia/otherwise normal)  Stress heart response:  Normal response  Stress BP response:  Appropriate  Stress EKG(S):  No changes seen  Chest discomfort:  None  Ischemic EKG changes:  None    IMPRESSION:  Electrocardiographically negative Lexiscan stress study. Radio-isotope  results to follow from the department of Nuclear Medicine.       [facility]    D: 2021 13:34:51       T: 2021 13:37:03     /[place]  Job#: [id number]     Doc#: Unknown    CC:    ()

## 2021-06-11 NOTE — CARE COORDINATION
Case Management Initial Discharge Plan  Naheed saint shamika,             Met with:patient to discuss discharge plans. Information verified: address, contacts, phone number, , insurance Yes  Insurance Provider: [address] [place] [address]     Emergency Contact/Next of Kin name & number: Tala Michaud, child [phone]  Who are involved in patient's support system? Nephew and daughter     PCP: Lissa Issa MD  Date of last visit: March       Discharge Planning    Living Arrangements:  Family Members     Home has 2 stories  3 stairs to climb to get into front door, 1 flight stairs to climb to reach second floor  Location of bedroom/bathroom in home bedroom on second and bathroom on first     Patient able to perform ADL's:Independent    Current Services (outpatient & in home) none   DME equipment: na   DME provider: na     Is patient receiving oral anticoagulation therapy?  asa    If indicated:   Physician managing anticoagulation treatment: pcp  Where does patient obtain lab work for ATC treatment? Na       Potential Assistance Needed:  N/A    Patient agreeable to home care: No  [place] of choice provided:  n/a    Prior SNF/Rehab Placement and Facility: none   Agreeable to SNF/Rehab: No  [place] of choice provided: n/a     Evaluation: no    Expected Discharge date:  21    Patient expects to be discharged to:  Home    If home: is the family and/or caregiver wiling & able to provide support at home? Yes   Who will be providing this support? Nephew and daughter     Follow Up Appointment: Best Day/ Time: Monday AM    Transportation provider: self   Transportation arrangements needed for discharge: No    Readmission Risk              Risk of Unplanned Readmission:  0             Does patient have a readmission risk score greater than 14?: No  If yes, follow-up appointment must be made within 7 days of discharge.      Goals of Care: TO determine why having cp      Educated patient  on transitional options, provided freedom of choice and are agreeable with plan      Discharge Plan: Home independently           Electronically signed by Shaun Fregoso RN on 6/11/21 at 2:25 PM EDT

## 2021-06-11 NOTE — DISCHARGE SUMMARY
CDU Discharge Summary        Patient:  Lorrie Dawn  YOB: 1965    MRN: [id number]   Acct: [de-identified]    Primary Care Physician: Zulay Banuelos MD    Admit date:  6/10/2021  4:43 PM  Discharge date: 6/11/2021  2:34 PM     Discharge Diagnoses:     Acute substernal chest pain likely due to reflux  Improved with PPIs, H2 blockers, rest, fluids    Follow-up:  Call today/tomorrow for a follow up appointment with Zulay Banuelos MD , or return to the Emergency Room with worsening symptoms    Stressed to patient the importance of following up with primary care doctor for further workup/management of symptoms. Pt verbalizes understanding and agrees with plan.     Discharge Medications:  Changes to medications          [facility] Medication Instructions [id number]    Printed on:06/12/21 6918   Medication Information                      albuterol (PROVENTIL) (2.5 MG/3ML) 0.083% nebulizer solution  Take 3 mLs by nebulization every 6 hours as needed for Wheezing             albuterol sulfate  (90 Base) MCG/ACT inhaler  Inhale 2 puffs into the lungs 4 times daily as needed for Wheezing             aluminum & magnesium hydroxide-simethicone (MAALOX) 200-200-20 MG/5ML SUSP suspension  Take 30 mLs by mouth every 6 hours as needed for Indigestion             aspirin EC 81 MG EC tablet  Take 1 tablet by mouth daily             calcium carbonate 648 MG TABS  Take 1 tablet by mouth 2 times daily             diclofenac sodium (VOLTAREN) 1 % GEL  Apply 4 g topically 4 times daily             FLUoxetine (PROZAC) 20 MG capsule  Take 1 capsule by mouth 2 times daily             ibuprofen (ADVIL;MOTRIN) 600 MG tablet  Take 1 tablet by mouth daily as needed for Pain             lisinopril-hydroCHLOROthiazide (PRINZIDE;ZESTORETIC) 10-12.5 MG per tablet  TAKE ONE TABLET BY MOUTH DAILY             metoprolol tartrate (LOPRESSOR) 50 MG tablet  TAKE ONE TABLET BY MOUTH TWICE A DAY             ondansetron (ZOFRAN ODT) 4 MG disintegrating tablet  Take 1 tablet by mouth every 8 hours as needed for Nausea             pantoprazole (PROTONIX) 20 MG tablet  Take 2 tablets by mouth daily             tiotropium (SPIRIVA HANDIHALER) 18 MCG inhalation capsule  Inhale 1 capsule into the lungs daily             vitamin D3 (CHOLECALCIFEROL) 25 MCG (1000 UT) TABS tablet  Take 1 tablet by mouth daily                 Diet:  No diet orders on file , Advance as tolerated     Activity:  As tolerated    Consultants: None    Procedures:  Not indicated     Diagnostic Test:   Results for orders placed or performed during the hospital encounter of 06/10/21   COVID-19, Rapid    Specimen: Nasopharyngeal Swab   Result Value Ref Range    Specimen Description . NASOPHARYNGEAL SWAB     SARS-CoV-2, Rapid Not Detected Not Detected   CBC Auto Differential   Result Value Ref Range    WBC 6.9 3.5 - 11.3 k/uL    RBC 5.29 (H) 3.95 - 5.11 m/uL    Hemoglobin 14.8 11.9 - 15.1 g/dL    Hematocrit 45.5 36.3 - 47.1 %    MCV 86.0 82.6 - 102.9 fL    MCH 28.0 25.2 - 33.5 pg    MCHC 32.5 28.4 - 34.8 g/dL    RDW 13.7 11.8 - 14.4 %    Platelets 604 161 - 695 k/uL    MPV 9.7 8.1 - 13.5 fL    NRBC Automated 0.0 0.0 per 100 WBC    Differential Type NOT REPORTED     Seg Neutrophils 58 36 - 65 %    Lymphocytes 28 24 - 43 %    Monocytes 11 3 - 12 %    Eosinophils % 2 1 - 4 %    Basophils 1 0 - 2 %    Immature Granulocytes 0 0 %    Segs Absolute 4.04 1.50 - 8.10 k/uL    Absolute Lymph # 1.96 1.10 - 3.70 k/uL    Absolute Mono # 0.73 0.10 - 1.20 k/uL    Absolute Eos # 0.12 0.00 - 0.44 k/uL    Basophils Absolute 0.06 0.00 - 0.20 k/uL    Absolute Immature Granulocyte <0.03 0.00 - 0.30 k/uL    WBC Morphology NOT REPORTED     RBC Morphology NOT REPORTED     Platelet Estimate NOT REPORTED    Basic Metabolic Panel w/ Reflex to MG   Result Value Ref Range    Glucose 96 70 - 99 mg/dL    BUN 15 6 - 20 mg/dL    CREATININE 0.81 0.50 - 0.90 mg/dL    Bun/Cre Ratio NOT REPORTED 9 - 20 PERFUSION IMAGING 6/11/2021 10:45 am TECHNIQUE: Rest dose:  14.1 mCi Tc-99m sestamibi intravenously Stress dose:  38 mCi Tc-99m sestamibi intravenously Under cardiology supervision, 0.4 mg Lexiscan was infused intravenously prior to injection of the stress dose. SPECT imaging was acquired following injection of the sestamibi. ECG gating was obtained following the stress acquisition. COMPARISON: 01/09/2015 HISTORY: ORDERING SYSTEM PROVIDED HISTORY: Chest pain TECHNOLOGIST PROVIDED HISTORY: Reason for Exam: Chest pain Procedure Type->Rx r/o ischemia Is the patient pregnant?->No Reason for Exam: chest pain  that is substernal, nonradiating, associated with headache and blurry vision. medical history significant for COPD, hypertension, anxiety, CAD. Smoker 62-year-old female with chest pain FINDINGS: The patient achieved a maximum heart rate of 94 beats per minute, 56 % of the maximum age predicted heart rate of 167 beats per minute. Perfusion: There is no scintigraphic evidence for a reversible or fixed perfusion defect to suggest reversible ischemia or infarct. Function: The gated SPECT data demonstrates normal left ventricular size and normal wall motion. Left ventricular ejection fraction:  Greater than 70% TID score:  1.06 (threshold value of 1.39 is used for Lexiscan stress with Tc-99m). There is no stress-induced cavitary dilatation to suggest compensated triple vessel disease. End diastolic volume:  52PN Scores are visually adjusted to account for potential artifact. Summed stress score:  0 Summed rest score:  0 Summed reversibility score:  0     1. No definitive scintigraphic evidence for reversible ischemia or infarct. 2. Left ventricular ejection fraction of greater than 70%. 3.  Please see report for EKG portion of the examination which will be performed separately by physician from cardiology. Risk stratification:  Low risk Note:  Risk stratification incorporates both clinical history and test results. Final risk determination is the responsibility of the ordering provider as history and other test results may increase or decrease the risk stratification reported for this examination. Risk stratification criteria are adapted from \"Noninvasive Risk Stratification\" criteria from [employer] [place]. [place], ACC/AATS/AHA/ASE/ASNC/SCAI/SCCT/STS 2017 Appropriate Use Criteria For Coronary Revascularization in Patients With Stable Ischemic Heart Disease [facility] Volume 69, Issue 17, May 2017 High risk (>3% annual death or MI) 1. Severe resting LV dysfunction (LVEF >35%) not readily explained by non coronary causes 2. Resting perfusion abnormalities greater than 10% of the myocardium in patients without prior history or evidence of MI 3. Stress-induced perfusion abnormalities encumbering greater than or equal to 10% myocardium or stress segmental scores indicating multiple vascular territories with abnormalities 4. Stress-induced LV dilatation (TID ratio greater than 1.19 for exercise and greater than 1.39 for regadenoson) Intermediate risk (1% to 3% annual death or MI) 1. Mild/moderate resting LV dysfunction (LVEF 35% to 49%) not readily explained by non coronary causes. 2.  Resting perfusion abnormalities in 5%-9.9% of the myocardium in patients without a history or prior evidence of MI 3. Stress-induced perfusion abnormality encumbering 5%-9.9% of the myocardium or stress segmental scores indicating 1 vascular territory with abnormalities but without LV dilation 4. Small wall motion abnormality involving 1-2 segments and only 1 coronary bed. Low Risk (Less than 1% annual death or MI) 1. Normal or small myocardial perfusion defect at rest or with stress encumbering less than 5% of the myocardium.            Physical Exam:    General appearance - NAD, AOx 3   Lungs -CTAB, no R/R/R  Heart - RRR, no M/R/G  Abdomen - Soft, NT/ND  Neurological:  MAEx4, No focal motor deficit, sensory loss  Extremities - Cap refil <2 sec in all ext., no edema  Skin -warm, dry      Hospital Course:  Clinical course has improved, labs and imaging reviewed. Sb Marsh originally presented to the hospital on 6/10/2021  4:43 PM. with substernal chest pain, associated with headache and nausea. 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 Past medical history notable for hypertension, COPD, hyperlipidemia, 10 cigarettes/day tobacco use. At that time it was determined that She required further observation and stress test. She was admitted and labs and imaging were followed daily. Imaging results as above. Stress test was negative, patient did have some improvement of symptoms with Maalox, Pepcid, other symptomatic management. She is medically stable to be discharged. Disposition: Home    Patient stated that they will not drive themselves home from the hospital if they have gotten pain killers/ narcotics earlier that day and that they will arrange for transportation on their own or work with the  for a ride. Patient counseled NOT to drive while under the influence of narcotics/ pain killers. Condition: Good    Patient stable and ready for discharge home. I have discussed plan of care with patient and they are in understanding. They were instructed to read discharge paperwork. All of their questions and concerns were addressed. Time Spent: 0 day      --  Donn Hendrix DO  Emergency Medicine Resident Physician    This dictation was generated by voice recognition computer software. Although all attempts are made to edit the dictation for accuracy, there may be errors in the transcription that are not intended.

## 2021-06-12 LAB
EKG ATRIAL RATE: 68 BPM
EKG P AXIS: 72 DEGREES
EKG P-R INTERVAL: 112 MS
EKG Q-T INTERVAL: 408 MS
EKG QRS DURATION: 82 MS
EKG QTC CALCULATION (BAZETT): 410 MS
EKG R AXIS: 82 DEGREES
EKG T AXIS: 60 DEGREES
EKG VENTRICULAR RATE: 61 BPM

## 2021-06-12 PROCEDURE — 93010 ELECTROCARDIOGRAM REPORT: CPT | Performed by: INTERNAL MEDICINE

## 2021-07-02 ENCOUNTER — TELEPHONE (OUTPATIENT)
Dept: INTERNAL MEDICINE | Age: 56
End: 2021-07-02

## 2021-07-02 NOTE — TELEPHONE ENCOUNTER
Pt dropped off forms at window for Work. Pt needs to be seen for forms to be completed. Appt made with pt on 7/29/01. Forms placed in Next day basket and scanned into chart.

## 2021-07-16 DIAGNOSIS — I10 ESSENTIAL HYPERTENSION: Chronic | ICD-10-CM

## 2021-07-19 DIAGNOSIS — I10 ESSENTIAL HYPERTENSION: Chronic | ICD-10-CM

## 2021-07-19 NOTE — TELEPHONE ENCOUNTER
Request for medication refill, lisinopril-hydrochlorothiazide  Next Visit Date:7/29/21  Future Appointments   Date Time Provider Naheed Amanda   7/29/2021  1:40 PM Trent Badillo MD [facility] [address] Maintenance   Topic Date Due    COVID-19 Vaccine (1) Never done    A1C test (Diabetic or Prediabetic)  11/27/2020    Lipid screen  04/25/2021    Shingles Vaccine (1 of 2) 12/16/2021 (Originally 8/25/2017)    Flu vaccine (1) 09/01/2021    Breast cancer screen  10/22/2021    Potassium monitoring  06/10/2022    Creatinine monitoring  06/10/2022    DTaP/Tdap/Td vaccine (2 - Td or Tdap) 10/20/2024    Colon cancer screen colonoscopy  02/26/2029    Pneumococcal 0-64 years Vaccine (2 of 2 - PPSV23) 08/25/2032    Hepatitis C screen  Completed    HIV screen  Completed    Hepatitis A vaccine  Aged Out    Hepatitis B vaccine  Aged Out    Hib vaccine  Aged Out    Meningococcal (ACWY) vaccine  Aged Out       Hemoglobin A1C (%)   Date Value   11/27/2019 6.0   07/25/2019 6.4 (H)   11/29/2018 6.1             ( goal A1C is < 7)   No results found for: LABMICR  LDL Cholesterol (mg/dL)   Date Value   04/25/2016 123       (goal LDL is <100)   AST (U/L)   Date Value   01/21/2021 27     ALT (U/L)   Date Value   01/21/2021 22     BUN (mg/dL)   Date Value   06/10/2021 15     BP Readings from Last 3 Encounters:   06/11/21 111/70   04/30/21 122/69   04/20/21 130/81          (goal 120/80)    All Future Testing planned in [facility]  Lab Frequency Next Occurrence   TSH with Reflex Once 07/30/2021   Vitamin D 25 Hydroxy Once 09/09/2021   Hemoglobin A1C Once 07/30/2021   Urinalysis Once 01/21/2021   COVID-19 Once 01/28/2021   Lipid Panel Once 07/13/2021   POCT activated clotting time PRN          Patient Active Problem List:     HTN (hypertension)     COPD (chronic obstructive pulmonary disease)     Hyperlipidemia     Near syncope     Anxiety     Tobacco abuse     Vesicular rash     Rash of hands     S/P LEEP Gastroesophageal reflux disease without esophagitis     Trigger finger of right thumb     Trigger finger, right middle finger     Occipital headache     Pre-diabetes     Chest pain     Positive FIT (fecal immunochemical test)     Pancreatitis, unspecified pancreatitis type     Trigger finger, right ring finger     Post-op pain     Trigger ring finger of right hand

## 2021-07-19 NOTE — TELEPHONE ENCOUNTER
E-scribe request from  [employer] [facility] [place] for Lisinopril-Hydrochlorothiazide 10-12.5 mg. Patient has an appt on 7/29/21.       Health Maintenance   Topic Date Due    COVID-19 Vaccine (1) Never done    A1C test (Diabetic or Prediabetic)  11/27/2020    Lipid screen  04/25/2021    Shingles Vaccine (1 of 2) 12/16/2021 (Originally 8/25/2017)    Flu vaccine (1) 09/01/2021    Breast cancer screen  10/22/2021    Potassium monitoring  06/10/2022    Creatinine monitoring  06/10/2022    DTaP/Tdap/Td vaccine (2 - Td or Tdap) 10/20/2024    Colon cancer screen colonoscopy  02/26/2029    Pneumococcal 0-64 years Vaccine (2 of 2 - PPSV23) 08/25/2032    Hepatitis C screen  Completed    HIV screen  Completed    Hepatitis A vaccine  Aged Out    Hepatitis B vaccine  Aged Out    Hib vaccine  Aged Out    Meningococcal (ACWY) vaccine  Aged Out             (applicable per patient's age: Cancer Screenings, Depression Screening, Fall Risk Screening, Immunizations)    Hemoglobin A1C (%)   Date Value   11/27/2019 6.0   07/25/2019 6.4 (H)   11/29/2018 6.1     LDL Cholesterol (mg/dL)   Date Value   04/25/2016 123     AST (U/L)   Date Value   01/21/2021 27     ALT (U/L)   Date Value   01/21/2021 22     BUN (mg/dL)   Date Value   06/10/2021 15      (goal A1C is < 7)   (goal LDL is <100) need 30-50% reduction from baseline     BP Readings from Last 3 Encounters:   06/11/21 111/70   04/30/21 122/69   04/20/21 130/81    (goal /80)      All Future Testing planned in [facility]:  Lab Frequency Next Occurrence   TSH with Reflex Once 07/30/2021   Vitamin D 25 Hydroxy Once 09/09/2021   Hemoglobin A1C Once 07/30/2021   Urinalysis Once 01/21/2021   COVID-19 Once 01/28/2021   Lipid Panel Once 07/13/2021   POCT activated clotting time PRN        Next Visit Date:  Future Appointments   Date Time Provider Naheed Patel   7/29/2021  1:40 PM [facility]            Patient Active Problem List:     HTN (hypertension) COPD (chronic obstructive pulmonary disease)     Hyperlipidemia     Near syncope     Anxiety     Tobacco abuse     Vesicular rash     Rash of hands     S/P LEEP     Gastroesophageal reflux disease without esophagitis     Trigger finger of right thumb     Trigger finger, right middle finger     Occipital headache     Pre-diabetes     Chest pain     Positive FIT (fecal immunochemical test)     Pancreatitis, unspecified pancreatitis type     Trigger finger, right ring finger     Post-op pain     Trigger ring finger of right hand

## 2021-07-20 RX ORDER — LISINOPRIL AND HYDROCHLOROTHIAZIDE 12.5; 1 MG/1; MG/1
TABLET ORAL
Qty: 90 TABLET | Refills: 0 | Status: SHIPPED | OUTPATIENT
Start: 2021-07-20 | End: 2021-11-08 | Stop reason: SDUPTHER

## 2021-07-20 RX ORDER — LISINOPRIL AND HYDROCHLOROTHIAZIDE 12.5; 1 MG/1; MG/1
TABLET ORAL
Qty: 90 TABLET | Refills: 3 | Status: SHIPPED | OUTPATIENT
Start: 2021-07-20 | End: 2021-07-20

## 2021-07-27 ENCOUNTER — HOSPITAL ENCOUNTER (OUTPATIENT)
Age: 56
Setting detail: SPECIMEN
Discharge: HOME | End: 2021-07-27
Payer: COMMERCIAL

## 2021-07-27 DIAGNOSIS — M25.50 ARTHRALGIA, UNSPECIFIED JOINT: ICD-10-CM

## 2021-07-27 DIAGNOSIS — Z13.220 SCREENING CHOLESTEROL LEVEL: ICD-10-CM

## 2021-07-27 DIAGNOSIS — R73.03 PREDIABETES: ICD-10-CM

## 2021-07-27 LAB
ABSOLUTE EOS #: 0.15 K/UL (ref 0–0.44)
ABSOLUTE IMMATURE GRANULOCYTE: <0.03 K/UL (ref 0–0.3)
ABSOLUTE LYMPH #: 3.18 K/UL (ref 1.1–3.7)
ABSOLUTE MONO #: 0.86 K/UL (ref 0.1–1.2)
ALBUMIN SERPL-MCNC: 4.2 G/DL (ref 3.5–5.2)
ALBUMIN/GLOBULIN RATIO: 1.6 (ref 1–2.5)
ALP BLD-CCNC: 90 U/L (ref 35–104)
ALT SERPL-CCNC: 16 U/L (ref 5–33)
ANION GAP SERPL CALCULATED.3IONS-SCNC: 17 MMOL/L (ref 9–17)
AST SERPL-CCNC: 24 U/L
BASOPHILS # BLD: 1 % (ref 0–2)
BASOPHILS ABSOLUTE: 0.06 K/UL (ref 0–0.2)
BILIRUB SERPL-MCNC: 0.39 MG/DL (ref 0.3–1.2)
BUN BLDV-MCNC: 19 MG/DL (ref 6–20)
BUN/CREAT BLD: NORMAL (ref 9–20)
CALCIUM SERPL-MCNC: 9.6 MG/DL (ref 8.6–10.4)
CHLORIDE BLD-SCNC: 103 MMOL/L (ref 98–107)
CHOLESTEROL/HDL RATIO: 4.1
CHOLESTEROL: 175 MG/DL
CO2: 21 MMOL/L (ref 20–31)
CREAT SERPL-MCNC: 0.69 MG/DL (ref 0.5–0.9)
DIFFERENTIAL TYPE: ABNORMAL
EOSINOPHILS RELATIVE PERCENT: 2 % (ref 1–4)
ESTIMATED AVERAGE GLUCOSE: 131 MG/DL
GFR AFRICAN AMERICAN: >60 ML/MIN
GFR NON-AFRICAN AMERICAN: >60 ML/MIN
GFR SERPL CREATININE-BSD FRML MDRD: NORMAL ML/MIN/{1.73_M2}
GFR SERPL CREATININE-BSD FRML MDRD: NORMAL ML/MIN/{1.73_M2}
GLUCOSE BLD-MCNC: 88 MG/DL (ref 70–99)
HBA1C MFR BLD: 6.2 % (ref 4–6)
HCT VFR BLD CALC: 47.3 % (ref 36.3–47.1)
HDLC SERPL-MCNC: 43 MG/DL
HEMOGLOBIN: 15.2 G/DL (ref 11.9–15.1)
IMMATURE GRANULOCYTES: 0 %
LDL CHOLESTEROL: 96 MG/DL (ref 0–130)
LYMPHOCYTES # BLD: 35 % (ref 24–43)
MCH RBC QN AUTO: 27.8 PG (ref 25.2–33.5)
MCHC RBC AUTO-ENTMCNC: 32.1 G/DL (ref 28.4–34.8)
MCV RBC AUTO: 86.5 FL (ref 82.6–102.9)
MONOCYTES # BLD: 9 % (ref 3–12)
NRBC AUTOMATED: 0 PER 100 WBC
PDW BLD-RTO: 13.9 % (ref 11.8–14.4)
PLATELET # BLD: 290 K/UL (ref 138–453)
PLATELET ESTIMATE: ABNORMAL
PMV BLD AUTO: 10.7 FL (ref 8.1–13.5)
POTASSIUM SERPL-SCNC: 4.5 MMOL/L (ref 3.7–5.3)
RBC # BLD: 5.47 M/UL (ref 3.95–5.11)
RBC # BLD: ABNORMAL 10*6/UL
SEG NEUTROPHILS: 53 % (ref 36–65)
SEGMENTED NEUTROPHILS ABSOLUTE COUNT: 4.91 K/UL (ref 1.5–8.1)
SODIUM BLD-SCNC: 141 MMOL/L (ref 135–144)
TOTAL PROTEIN: 6.9 G/DL (ref 6.4–8.3)
TRIGL SERPL-MCNC: 179 MG/DL
TSH SERPL DL<=0.05 MIU/L-ACNC: 2.88 MIU/L (ref 0.3–5)
VLDLC SERPL CALC-MCNC: ABNORMAL MG/DL (ref 1–30)
WBC # BLD: 9.2 K/UL (ref 3.5–11.3)
WBC # BLD: ABNORMAL 10*3/UL

## 2021-07-28 LAB — VITAMIN D 25-HYDROXY: 37.3 NG/ML (ref 30–100)

## 2021-07-29 ENCOUNTER — OFFICE VISIT (OUTPATIENT)
Dept: INTERNAL MEDICINE | Age: 56
End: 2021-07-29
Payer: COMMERCIAL

## 2021-07-29 VITALS
HEART RATE: 76 BPM | HEIGHT: 61 IN | SYSTOLIC BLOOD PRESSURE: 111 MMHG | WEIGHT: 119.4 LBS | DIASTOLIC BLOOD PRESSURE: 77 MMHG | BODY MASS INDEX: 22.54 KG/M2

## 2021-07-29 DIAGNOSIS — M65.341 TRIGGER RING FINGER OF RIGHT HAND: ICD-10-CM

## 2021-07-29 DIAGNOSIS — K21.9 GASTROESOPHAGEAL REFLUX DISEASE WITHOUT ESOPHAGITIS: Primary | ICD-10-CM

## 2021-07-29 PROCEDURE — 99214 OFFICE O/P EST MOD 30 MIN: CPT | Performed by: INTERNAL MEDICINE

## 2021-07-29 RX ORDER — METHOCARBAMOL 500 MG/1
500 TABLET, FILM COATED ORAL NIGHTLY PRN
Qty: 10 TABLET | Refills: 0 | Status: SHIPPED | OUTPATIENT
Start: 2021-07-29 | End: 2021-08-08

## 2021-07-29 RX ORDER — IBUPROFEN 600 MG/1
600 TABLET ORAL DAILY PRN
Qty: 30 TABLET | Refills: 0 | Status: SHIPPED | OUTPATIENT
Start: 2021-07-29 | End: 2022-04-12 | Stop reason: ALTCHOICE

## 2021-07-29 RX ORDER — PANTOPRAZOLE SODIUM 20 MG/1
40 TABLET, DELAYED RELEASE ORAL DAILY
Qty: 30 TABLET | Refills: 0 | Status: SHIPPED | OUTPATIENT
Start: 2021-07-29 | End: 2022-04-28

## 2021-07-29 SDOH — ECONOMIC STABILITY: FOOD INSECURITY: WITHIN THE PAST 12 MONTHS, THE FOOD YOU BOUGHT JUST DIDN'T LAST AND YOU DIDN'T HAVE MONEY TO GET MORE.: NEVER TRUE

## 2021-07-29 SDOH — ECONOMIC STABILITY: TRANSPORTATION INSECURITY
IN THE PAST 12 MONTHS, HAS LACK OF TRANSPORTATION KEPT YOU FROM MEETINGS, WORK, OR FROM GETTING THINGS NEEDED FOR DAILY LIVING?: NO

## 2021-07-29 SDOH — ECONOMIC STABILITY: FOOD INSECURITY: WITHIN THE PAST 12 MONTHS, YOU WORRIED THAT YOUR FOOD WOULD RUN OUT BEFORE YOU GOT MONEY TO BUY MORE.: NEVER TRUE

## 2021-07-29 SDOH — ECONOMIC STABILITY: TRANSPORTATION INSECURITY
IN THE PAST 12 MONTHS, HAS THE LACK OF TRANSPORTATION KEPT YOU FROM MEDICAL APPOINTMENTS OR FROM GETTING MEDICATIONS?: NO

## 2021-07-29 ASSESSMENT — ENCOUNTER SYMPTOMS
GASTROINTESTINAL NEGATIVE: 1
SHORTNESS OF BREATH: 0

## 2021-07-29 ASSESSMENT — SOCIAL DETERMINANTS OF HEALTH (SDOH): HOW HARD IS IT FOR YOU TO PAY FOR THE VERY BASICS LIKE FOOD, HOUSING, MEDICAL CARE, AND HEATING?: NOT HARD AT ALL

## 2021-07-29 NOTE — PATIENT INSTRUCTIONS
Medications e-scribe to pharmacy of pt's choice. Patient to return to clinic 3 months (office will call and schedule appt). AVS reviewed and given to pt. It is very important for your care that you keep your appointment. If for some reason you are unable to keep your appointment it is equally important that you call our office at [phone] to cancel your appointment and reschedule. Failure to do so may result in your termination from our practice.   MB

## 2021-07-29 NOTE — PROGRESS NOTES
[facility]   Progress Note        Date of patient's visit: 7/29/2021  Patient's Name:  Shirley Bella                   YOB: 1965        PCP:  MD Marcia Almodovar Charmayne Pavy is a 50 y.o. female who presents for   Chief Complaint   Patient presents with    Forms     FMLA Paperwork    Hip Pain     patient states having rt hip pain that radiates down leg    and follow up of chronic medical problems. Patient Active Problem List   Diagnosis    HTN (hypertension)    COPD (chronic obstructive pulmonary disease)    Hyperlipidemia    Near syncope    Anxiety    Tobacco abuse    Vesicular rash    Rash of hands    S/P LEEP    Gastroesophageal reflux disease without esophagitis    Trigger finger of right thumb    Trigger finger, right middle finger    Occipital headache    Pre-diabetes    Chest pain    Positive FIT (fecal immunochemical test)    Pancreatitis, unspecified pancreatitis type    Trigger finger, right ring finger    Post-op pain    Trigger ring finger of right hand       HISTORY OF PRESENT ILLNESS:    History was obtained from the patient. Shirley Bella is a 50 y.o. female who presents for FMLA form completion She had fourth and fifth digit trigger finger release 2/3/2021. Still reporting mostly cramping pain within her right hand and pain along the flexor tendons. She also reports that she drops things out of that hand. Her incisions have healed well with the pain continues and she was given a 30-day FMLA for intermittent time off. Patient had seen orthopedics at the end of April and was supposed to return in 6 weeks however she has not. Pain is localized over the area of the incisions mostly. Denies numbness or tingling. Patient was given PT OT however she reports that she does the therapy at home and does not have time to actually go to facility and she continues to use 600 mg of Motrin 3 times daily.   GLENNY Iyer complains of heartburn. This has been associated with no other symptoms. She denies abdominal bloating. Symptoms have been present for several months. She denies dysphagia. She has not lost weight. She denies melena, hematochezia, hematemesis, and coffee ground emesis. Medical therapy in the past has included proton pump inhibitors. Patient's allergies, medications, past medical, surgical, social and family histories were reviewed and updated as appropriate. ALLERGIES      Allergies   Allergen Reactions    Adhesive Tape Rash         MEDICATIONS:      Current Outpatient Medications   Medication Sig Dispense Refill    ibuprofen (ADVIL;MOTRIN) 600 MG tablet Take 1 tablet by mouth daily as needed for Pain 30 tablet 0    pantoprazole (PROTONIX) 20 MG tablet Take 2 tablets by mouth daily 30 tablet 0    methocarbamol (ROBAXIN) 500 MG tablet Take 1 tablet by mouth nightly as needed (hand pain) 10 tablet 0    lisinopril-hydroCHLOROthiazide (PRINZIDE;ZESTORETIC) 10-12.5 MG per tablet TAKE ONE TABLET BY MOUTH DAILY 90 tablet 0    aluminum & magnesium hydroxide-simethicone (MAALOX) 200-200-20 MG/5ML SUSP suspension Take 30 mLs by mouth every 6 hours as needed for Indigestion 1 Bottle 0    ondansetron (ZOFRAN ODT) 4 MG disintegrating tablet Take 1 tablet by mouth every 8 hours as needed for Nausea 20 tablet 0    metoprolol tartrate (LOPRESSOR) 50 MG tablet TAKE ONE TABLET BY MOUTH TWICE A  tablet 10    FLUoxetine (PROZAC) 20 MG capsule Take 1 capsule by mouth 2 times daily 60 capsule 5     No current facility-administered medications for this visit. Patient Care Team:  Lashonda Tilley MD as PCP - Jeanette Douglas MD as PCP - [facility]    PAST MEDICAL AND SURGICAL HISTORY:      Past Medical History:   Diagnosis Date    Anxiety     CAD (coronary artery disease) 04/29/2013    Cath 04/29/13: Minimal CAD.  No stents    Cardiac arrest ([address]     cardiac arrest at age 25 months/s/p fall from bed with unconscious episode    COPD (chronic obstructive pulmonary disease) (HCC)     GERD (gastroesophageal reflux disease)     Headaches due to old head injury 2010    s/p fall down steps with unconsciousness    History of blood transfusion     Hypertension     managed by Dr. Juan José Rolon PCP    Snores     Trigger finger     right thumb and right middle finger    Wears dentures     FULL UPPER    Wears glasses      Past Surgical History:   Procedure Laterality Date    CARDIAC CATHETERIZATION  2013    minimal cad, no stents  Dr. Parker Cremal      X's 3    CHOLECYSTECTOMY, LAPAROSCOPIC N/A 09/23/2019    *ADD-ON JACQUI TKADEEM Gaffney LAPAROSCOPIC ROBOTIC performed by Yared Gifford IV, DO at [facility]  02/26/2019    with polypectomy     FINGER TRIGGER RELEASE Right 02/03/2021    FINGER TRIGGER RELEASE 4TH, 5TH DIGITS    FINGER TRIGGER RELEASE Right 2/3/2021    FINGER TRIGGER RELEASE 4TH, 5TH DIGITS (SUPINE) 3080 TABLE, HAND TABLE performed by Hemant Oliva DO at [address] [place] Right 05/02/2017     TRIGGER RELEASE THUMB,  MIDDLE FINGER performed by Scotty Akhtar DO at [facility]    TONSILLECTOMY         SOCIAL HISTORY      Social History     Tobacco Use    Smoking status: Current Every Day Smoker     Packs/day: 0.50     Years: 30.00     Pack years: 15.00     Types: Cigarettes    Smokeless tobacco: Never Used   Substance Use Topics    Alcohol use: Yes     Comment: occasionally     Marcia  reports that she has been smoking cigarettes. She has a 15.00 pack-year smoking history. She has never used smokeless tobacco.    FAMILY HISTORY:      Family History   Problem Relation Age of Onset    High Blood Pressure Mother     Depression Mother     High Blood Pressure Father     Cancer Father        REVIEW OF SYSTEMS:    Review of Systems   Constitutional: Negative for chills and fatigue.    HENT: Negative for congestion. Respiratory: Negative for shortness of breath. Cardiovascular: Negative for chest pain. Gastrointestinal: Negative. Genitourinary: Negative. Musculoskeletal: Positive for arthralgias and myalgias. Neurological: Positive for weakness.          PHYSICAL EXAM:      Vitals:    07/29/21 1404   BP: 111/77   Pulse: 76     BP Readings from Last 3 Encounters:   07/29/21 111/77   06/11/21 111/70   04/30/21 122/69       Physical Examination: General appearance - alert, well appearing, and in no distress  Mental status - affect appropriate to mood  Chest - clear to auscultation, no wheezes, rales or rhonchi, symmetric air entry  Heart - normal rate and regular rhythm  Abdomen - bowel sounds normal  Back exam - full range of motion, no tenderness, palpable spasm or pain on motion  Neurological - alert, oriented, normal speech, no focal findings or movement disorder noted  Musculoskeletal - no joint tenderness, deformity or swelling  Extremities - no pedal edema noted    LABORATORY FINDINGS:    CBC:   Lab Results   Component Value Date    WBC 9.2 07/27/2021    HGB 15.2 07/27/2021     07/27/2021     03/18/2012     BMP:    Lab Results   Component Value Date     07/27/2021    K 4.5 07/27/2021     07/27/2021    CO2 21 07/27/2021    BUN 19 07/27/2021    CREATININE 0.69 07/27/2021    GLUCOSE 88 07/27/2021    GLUCOSE 107 03/18/2012     Hemoglobin A1C:   Lab Results   Component Value Date    LABA1C 6.2 07/27/2021     Microalbumin Urine: No results found for: MICROALBUR  Lipid profile:   Lab Results   Component Value Date    CHOL 175 07/27/2021    TRIG 179 07/27/2021    HDL 43 07/27/2021     Thyroid functions:   Lab Results   Component Value Date    TSH 2.88 07/27/2021      Hepatic functions:   Lab Results   Component Value Date    ALT 16 07/27/2021    AST 24 07/27/2021    PROT 6.9 07/27/2021    BILITOT 0.39 07/27/2021    BILIDIR 0.08 09/24/2019    LABALBU 4.2 07/27/2021     Urine Analysis: No results found for: [address] [address]:      Health Maintenance Due   Topic Date Due    COVID-19 Vaccine (1) Never done       ASSESSMENT AND PLAN:       Diagnosis Orders   1. Gastroesophageal reflux disease without esophagitis  pantoprazole (PROTONIX) 20 MG tablet   2. Trigger ring finger of right hand  ibuprofen (ADVIL;MOTRIN) 600 MG tablet    methocarbamol (ROBAXIN) 500 MG tablet     Refill Protonix  Smoking cessation counseling  Encourage patient to follow-up with orthopedics. Given 30 more days of FMLA from our office which will be the last and she will need to work with orthopedics for additional FMLA time. FOLLOW UP:   1. Marcia received counseling on the following healthy behaviors: nutrition and exercise    2. Reviewed prior labs and health maintenance. 3.  Discussed use, benefit, and side effects of prescribed medications. Barriers to medication compliance addressed. All patient questions answered. Pt voiced understanding. 4.  Continue current medications, diet and exercise. Orders Placed This Encounter   Medications    ibuprofen (ADVIL;MOTRIN) 600 MG tablet     Sig: Take 1 tablet by mouth daily as needed for Pain     Dispense:  30 tablet     Refill:  0    pantoprazole (PROTONIX) 20 MG tablet     Sig: Take 2 tablets by mouth daily     Dispense:  30 tablet     Refill:  0    methocarbamol (ROBAXIN) 500 MG tablet     Sig: Take 1 tablet by mouth nightly as needed (hand pain)     Dispense:  10 tablet     Refill:  0          Completed Refills               Requested Prescriptions     Signed Prescriptions Disp Refills    ibuprofen (ADVIL;MOTRIN) 600 MG tablet 30 tablet 0     Sig: Take 1 tablet by mouth daily as needed for Pain    pantoprazole (PROTONIX) 20 MG tablet 30 tablet 0     Sig: Take 2 tablets by mouth daily    methocarbamol (ROBAXIN) 500 MG tablet 10 tablet 0     Sig: Take 1 tablet by mouth nightly as needed (hand pain)       5.  Patient given educational materials - see patient instructions    6. Was a self-tracking handout given in paper form or via [employer]? Yes  If yes, see orders or list here. No orders of the defined types were placed in this encounter. Return in about 3 months (around 10/29/2021). Patient voiced understanding and agreed to treatment plan. This note is created with the assistance of a speech-recognition program. While intending to generate a document that actually reflects the content of the visit, the document can still have some mistakes which may not have been identified and corrected by editing.       Dr Dolly Thrasher MD, [address]  Associate , Department of Internal Medicine  Resident Ambulatory Site Medical Director  [address] [facility] Internal Medicine  [address] [facility],[address] Floor  Internal Medicine Clerkship - Brandi Lowry                   7/29/2021, 2:39 PM

## 2021-08-05 DIAGNOSIS — F32.2 CURRENT SEVERE EPISODE OF MAJOR DEPRESSIVE DISORDER WITHOUT PSYCHOTIC FEATURES WITHOUT PRIOR EPISODE (HCC): ICD-10-CM

## 2021-08-06 RX ORDER — FLUOXETINE HYDROCHLORIDE 20 MG/1
CAPSULE ORAL
Qty: 60 CAPSULE | Refills: 4 | Status: SHIPPED | OUTPATIENT
Start: 2021-08-06 | End: 2022-04-12

## 2021-08-09 NOTE — TELEPHONE ENCOUNTER
Pt called today said her fmla forms needed something added to them , her employer stated part b question 11 needs completion of duration for flare ups , forms back in dr Antonio Cedeno basket

## 2021-08-17 ENCOUNTER — TELEPHONE (OUTPATIENT)
Dept: INTERNAL MEDICINE | Age: 56
End: 2021-08-17

## 2021-08-24 NOTE — TELEPHONE ENCOUNTER
Corrected copy faxed. Confirmation received.
Forms mailed to pt today
Paperwork printed and given to Dr Fritz Taylor to initial.
Proceed with changes and I will sign it.
Received PC form patient - patient states FMLA forms were completed. Patient states her first day of missing work due to condition was June 16th but wasn't able to be seen until July 29th. Patient states she needs date changed on FMLA forms or it will count against her and result in a 10 day suspension. Please advise.
- - -

## 2021-10-14 ENCOUNTER — TELEPHONE (OUTPATIENT)
Dept: INTERNAL MEDICINE | Age: 56
End: 2021-10-14

## 2021-10-14 NOTE — TELEPHONE ENCOUNTER
----- Message from Eleroygabriela Farfan sent at 10/14/2021 11:20 AM EDT -----  Subject: Message to Provider    QUESTIONS  Information for Provider? pt tested positive for covid. only symptoms are   constant headache, body ache, fatigue. pt just wanted to make dr aware in   case more happens  ---------------------------------------------------------------------------  --------------  CALL BACK INFO  What is the best way for the office to contact you? OK to leave message on   voicemail  Preferred Call Back Phone Number? [phone]  ---------------------------------------------------------------------------  --------------  SCRIPT ANSWERS  Relationship to Patient?  Self

## 2021-10-19 ENCOUNTER — VIRTUAL VISIT (OUTPATIENT)
Dept: INTERNAL MEDICINE | Age: 56
End: 2021-10-19
Payer: COMMERCIAL

## 2021-10-19 DIAGNOSIS — U07.1 COVID: Primary | ICD-10-CM

## 2021-10-19 PROCEDURE — 99441 PR PHYS/QHP TELEPHONE EVALUATION 5-10 MIN: CPT | Performed by: INTERNAL MEDICINE

## 2021-10-19 RX ORDER — CYCLOBENZAPRINE HCL 10 MG
10 TABLET ORAL NIGHTLY PRN
COMMUNITY
End: 2022-04-28

## 2021-10-19 RX ORDER — ASPIRIN 81 MG/1
81 TABLET ORAL DAILY
COMMUNITY

## 2021-10-19 NOTE — PROGRESS NOTES
Cathie Rodriguez is a 49 y.o. female evaluated via telephone on 10/19/2021. Consent:  She and/or health care decision maker is aware that that she may receive a bill for this telephone service, depending on her insurance coverage, and has provided verbal consent to proceed: Yes      Documentation:  COVID-19 Assessment Note     Subjective   Marcia Lentz is currently Confirmed for COVID-19. Presenting symptoms: nasal congestion, headache, abdominal pain and malaise. she denies  fever and chills  Max temperature in last 24 hours: unsure   Symptoms began on 10/13   Exposure source: member of household         Diagnosis Orders   1. COVID     ok to work on mon oct 25  She is improving overall  Letter drafted        I affirm this is a Patient Initiated Episode with a Patient who has not had a related appointment within my department in the past 7 days or scheduled within the next 24 hours. Patient identification was verified at the start of the visit: Yes    Total Time: minutes: 5-10 minutes    The visit was conducted pursuant to the emergency declaration under the [facility], [address] [place] [address] authority and the [employer] General Act. Patient identification was verified, and a caregiver was present when appropriate. The patient was located in a state where the provider was credentialed to provide care.     Note: not billable if this call serves to triage the patient into an appointment for the relevant concern      Samuel Blevins MD

## 2021-10-19 NOTE — LETTER
NOTIFICATION RETURN TO WORK / SCHOOL    10/19/2021    Ms. [employer]      To Whom It May Concern:    Joanne Cox was tested for COVID-19 on 10/13, and the result was positive. She may return to work on 10/25. Her time off from start of symptoms includes 10/12 to 10/24. I recommend:return without restrictions    If there are questions or concerns, please have the patient contact our office.         Sincerely,      Parvez Paulson MD

## 2021-10-19 NOTE — PATIENT INSTRUCTIONS
Printed letter to return to work mailed to patient. Patient to return to clinic 6 months (office will call and schedule appt). AVS reviewed and given to pt. It is very important for your care that you keep your appointment. If for some reason you are unable to keep your appointment it is equally important that you call our office at [phone] to cancel your appointment and reschedule. Failure to do so may result in your termination from our practice.   MB

## 2021-11-08 DIAGNOSIS — I10 ESSENTIAL HYPERTENSION: Chronic | ICD-10-CM

## 2021-11-08 NOTE — TELEPHONE ENCOUNTER
Request for Lisinpril-hydroChlorothiazide      Next Visit Date: on wait list for 04/2022  No future appointments.     Health Maintenance   Topic Date Due    COVID-19 Vaccine (1) Never done    Flu vaccine (1) 09/01/2021    Breast cancer screen  10/22/2021    Shingles Vaccine (1 of 2) 12/16/2021 (Originally 8/25/2017)    A1C test (Diabetic or Prediabetic)  07/27/2022    Potassium monitoring  07/27/2022    Creatinine monitoring  07/27/2022    DTaP/Tdap/Td vaccine (2 - Td or Tdap) 10/20/2024    Lipid screen  07/27/2026    Colon cancer screen colonoscopy  02/26/2029    Pneumococcal 0-64 years Vaccine (2 of 2 - PPSV23) 08/25/2032    Hepatitis C screen  Completed    HIV screen  Completed    Hepatitis A vaccine  Aged Out    Hepatitis B vaccine  Aged Out    Hib vaccine  Aged Out    Meningococcal (ACWY) vaccine  Aged Out       Hemoglobin A1C (%)   Date Value   07/27/2021 6.2 (H)   11/27/2019 6.0   07/25/2019 6.4 (H)             ( goal A1C is < 7)   No results found for: LABMICR  LDL Cholesterol (mg/dL)   Date Value   07/27/2021 96       (goal LDL is <100)   AST (U/L)   Date Value   07/27/2021 24     ALT (U/L)   Date Value   07/27/2021 16     BUN (mg/dL)   Date Value   07/27/2021 19     BP Readings from Last 3 Encounters:   07/29/21 111/77   06/11/21 111/70   04/30/21 122/69          (goal 120/80)    All Future Testing planned in [facility]  Lab Frequency Next Occurrence   COVID-19 Once 01/28/2021   POCT activated clotting time PRN          Patient Active Problem List:     HTN (hypertension)     COPD (chronic obstructive pulmonary disease)     Hyperlipidemia     Near syncope     Anxiety     Tobacco abuse     Vesicular rash     Rash of hands     S/P LEEP     Gastroesophageal reflux disease without esophagitis     Trigger finger of right thumb     Trigger finger, right middle finger     Occipital headache     Pre-diabetes     Chest pain     Positive FIT (fecal immunochemical test)     Pancreatitis, unspecified pancreatitis type     Trigger finger, right ring finger     Post-op pain     Trigger ring finger of right hand

## 2021-11-09 RX ORDER — LISINOPRIL AND HYDROCHLOROTHIAZIDE 12.5; 1 MG/1; MG/1
TABLET ORAL
Qty: 90 TABLET | Refills: 3 | Status: SHIPPED | OUTPATIENT
Start: 2021-11-09 | End: 2022-09-08

## 2022-02-04 NOTE — PLAN OF CARE
Clinic hours for [facility]:  Monday 7 am - 5 pm  Tuesday 7 am - 5 pm  Wednesday 7 am - 5 pm  Thursday 7 am - 5 pm  Friday  7 am - 4 pm      If you need a refill on your prescription please call your pharmacy and let them know. Please be proactive and call before your medication runs out. The pharmacy will then contact us for the refill. Please allow 24-48 hours for the refill to be processed.     If your physician has ordered additional laboratory or radiology testing as part of your ongoing plan of care, please allow 7-10 business days from the day of your lab draw or test for the results to be sent and reviewed by your provider. If your results are critical and require more immediate intervention, you will be contacted sooner. Your results will be conveyed to you via a phone call or letter.    You may be receiving a patient satisfaction survey in the mail. Please take the time to complete, as your feedback is very important to us. We strive to make your experience exceptional and your comments help us with that goal. We look forward to hearing from you.       Problem: Pain:  Goal: Pain level will decrease  Description: Pain level will decrease  6/11/2021 1030 by Isacc Gutierrez RN  Outcome: Ongoing  6/11/2021 0217 by Nicci Strickland RN  Outcome: Ongoing

## 2022-04-11 DIAGNOSIS — F32.2 CURRENT SEVERE EPISODE OF MAJOR DEPRESSIVE DISORDER WITHOUT PSYCHOTIC FEATURES WITHOUT PRIOR EPISODE (HCC): ICD-10-CM

## 2022-04-11 NOTE — TELEPHONE ENCOUNTER
E-scribe request from [facility] for Fluoxetine 20 mg. Patient has an appt on 4/28/22.       Health Maintenance   Topic Date Due    COVID-19 Vaccine (1) Never done    Shingles Vaccine (1 of 2) Never done    Breast cancer screen  10/22/2021    Depression Monitoring  04/13/2022    A1C test (Diabetic or Prediabetic)  07/27/2022    Potassium monitoring  07/27/2022    Creatinine monitoring  07/27/2022    Flu vaccine (Season Ended) 09/01/2022    DTaP/Tdap/Td vaccine (2 - Td or Tdap) 10/20/2024    Lipid screen  07/27/2026    Colorectal Cancer Screen  02/26/2029    Pneumococcal 0-64 years Vaccine (2 of 2 - PPSV23) 08/25/2032    Hepatitis C screen  Completed    HIV screen  Completed    Hepatitis A vaccine  Aged Out    Hepatitis B vaccine  Aged Out    Hib vaccine  Aged Out    Meningococcal (ACWY) vaccine  Aged Out             (applicable per patient's age: Cancer Screenings, Depression Screening, Fall Risk Screening, Immunizations)    Hemoglobin A1C (%)   Date Value   07/27/2021 6.2 (H)   11/27/2019 6.0   07/25/2019 6.4 (H)     LDL Cholesterol (mg/dL)   Date Value   07/27/2021 96     AST (U/L)   Date Value   07/27/2021 24     ALT (U/L)   Date Value   07/27/2021 16     BUN (mg/dL)   Date Value   07/27/2021 19      (goal A1C is < 7)   (goal LDL is <100) need 30-50% reduction from baseline     BP Readings from Last 3 Encounters:   07/29/21 111/77   06/11/21 111/70   04/30/21 122/69    (goal /80)      All Future Testing planned in [facility]:  Lab Frequency Next Occurrence   POCT activated clotting time PRN        Next Visit Date:  Future Appointments   Date Time Provider Naheed Patel   4/28/2022  1:00 PM Kofi Burr MD [id number]            Patient Active Problem List:     HTN (hypertension)     COPD (chronic obstructive pulmonary disease)     Hyperlipidemia     Near syncope     Anxiety     Tobacco abuse     Vesicular rash     Rash of hands     S/P LEEP     Gastroesophageal reflux disease without esophagitis     Trigger finger of right thumb     Trigger finger, right middle finger     Occipital headache     Pre-diabetes     Chest pain     Positive FIT (fecal immunochemical test)     Pancreatitis, unspecified pancreatitis type     Trigger finger, right ring finger     Post-op pain     Trigger ring finger of right hand

## 2022-04-12 RX ORDER — FLUOXETINE HYDROCHLORIDE 20 MG/1
CAPSULE ORAL
Qty: 60 CAPSULE | Refills: 4 | Status: SHIPPED | OUTPATIENT
Start: 2022-04-12 | End: 2022-04-28 | Stop reason: SDUPTHER

## 2022-04-28 ENCOUNTER — OFFICE VISIT (OUTPATIENT)
Dept: INTERNAL MEDICINE | Age: 57
End: 2022-04-28
Payer: COMMERCIAL

## 2022-04-28 ENCOUNTER — HOSPITAL ENCOUNTER (OUTPATIENT)
Age: 57
Setting detail: SPECIMEN
Discharge: HOME | End: 2022-04-28

## 2022-04-28 VITALS
DIASTOLIC BLOOD PRESSURE: 77 MMHG | HEIGHT: 61 IN | BODY MASS INDEX: 24.28 KG/M2 | HEART RATE: 62 BPM | SYSTOLIC BLOOD PRESSURE: 139 MMHG | WEIGHT: 128.6 LBS

## 2022-04-28 DIAGNOSIS — M25.50 ARTHRALGIA, UNSPECIFIED JOINT: ICD-10-CM

## 2022-04-28 DIAGNOSIS — J44.9 CHRONIC OBSTRUCTIVE PULMONARY DISEASE, UNSPECIFIED COPD TYPE (HCC): Chronic | ICD-10-CM

## 2022-04-28 DIAGNOSIS — Z12.31 SCREENING MAMMOGRAM FOR HIGH-RISK PATIENT: ICD-10-CM

## 2022-04-28 DIAGNOSIS — F32.2 CURRENT SEVERE EPISODE OF MAJOR DEPRESSIVE DISORDER WITHOUT PSYCHOTIC FEATURES WITHOUT PRIOR EPISODE (HCC): ICD-10-CM

## 2022-04-28 DIAGNOSIS — M25.50 ARTHRALGIA, UNSPECIFIED JOINT: Primary | ICD-10-CM

## 2022-04-28 DIAGNOSIS — K21.9 GASTROESOPHAGEAL REFLUX DISEASE WITHOUT ESOPHAGITIS: ICD-10-CM

## 2022-04-28 PROCEDURE — 99214 OFFICE O/P EST MOD 30 MIN: CPT | Performed by: INTERNAL MEDICINE

## 2022-04-28 RX ORDER — CYCLOBENZAPRINE HCL 10 MG
10 TABLET ORAL NIGHTLY PRN
Qty: 30 TABLET | Refills: 0 | Status: SHIPPED | OUTPATIENT
Start: 2022-04-28

## 2022-04-28 RX ORDER — FLUOXETINE HYDROCHLORIDE 40 MG/1
40 CAPSULE ORAL 2 TIMES DAILY
Qty: 60 CAPSULE | Refills: 1 | Status: SHIPPED | OUTPATIENT
Start: 2022-04-28

## 2022-04-28 RX ORDER — TIOTROPIUM BROMIDE 18 UG/1
18 CAPSULE ORAL; RESPIRATORY (INHALATION) DAILY
Qty: 90 CAPSULE | Refills: 3 | Status: SHIPPED | OUTPATIENT
Start: 2022-04-28

## 2022-04-28 RX ORDER — LORAZEPAM 0.5 MG/1
0.5 TABLET ORAL EVERY 8 HOURS PRN
Qty: 30 TABLET | Refills: 0 | Status: SHIPPED | OUTPATIENT
Start: 2022-04-28 | End: 2022-05-28

## 2022-04-28 RX ORDER — PANTOPRAZOLE SODIUM 20 MG/1
40 TABLET, DELAYED RELEASE ORAL DAILY
Qty: 30 TABLET | Refills: 0 | Status: SHIPPED | OUTPATIENT
Start: 2022-04-28 | End: 2022-06-21 | Stop reason: SDUPTHER

## 2022-04-28 ASSESSMENT — ENCOUNTER SYMPTOMS
GASTROINTESTINAL NEGATIVE: 1
COUGH: 1
EYES NEGATIVE: 1

## 2022-04-28 ASSESSMENT — PATIENT HEALTH QUESTIONNAIRE - PHQ9
3. TROUBLE FALLING OR STAYING ASLEEP: 0
7. TROUBLE CONCENTRATING ON THINGS, SUCH AS READING THE NEWSPAPER OR WATCHING TELEVISION: 0
2. FEELING DOWN, DEPRESSED OR HOPELESS: 0
8. MOVING OR SPEAKING SO SLOWLY THAT OTHER PEOPLE COULD HAVE NOTICED. OR THE OPPOSITE, BEING SO FIGETY OR RESTLESS THAT YOU HAVE BEEN MOVING AROUND A LOT MORE THAN USUAL: 0
6. FEELING BAD ABOUT YOURSELF - OR THAT YOU ARE A FAILURE OR HAVE LET YOURSELF OR YOUR FAMILY DOWN: 0
SUM OF ALL RESPONSES TO PHQ QUESTIONS 1-9: 0
SUM OF ALL RESPONSES TO PHQ QUESTIONS 1-9: 0
9. THOUGHTS THAT YOU WOULD BE BETTER OFF DEAD, OR OF HURTING YOURSELF: 0
4. FEELING TIRED OR HAVING LITTLE ENERGY: 0
SUM OF ALL RESPONSES TO PHQ QUESTIONS 1-9: 0
SUM OF ALL RESPONSES TO PHQ QUESTIONS 1-9: 0
SUM OF ALL RESPONSES TO PHQ9 QUESTIONS 1 & 2: 0
5. POOR APPETITE OR OVEREATING: 0
1. LITTLE INTEREST OR PLEASURE IN DOING THINGS: 0
10. IF YOU CHECKED OFF ANY PROBLEMS, HOW DIFFICULT HAVE THESE PROBLEMS MADE IT FOR YOU TO DO YOUR WORK, TAKE CARE OF THINGS AT HOME, OR GET ALONG WITH OTHER PEOPLE: 0

## 2022-04-28 NOTE — PATIENT INSTRUCTIONS
Medications e-scribe to pharmacy of pt's choice. Script for lab given to pt, no fasting required. Pt will get labs done as soon as possible. Script for Mammogram and instructions for OBINNA given to pt. Scheduling will call with the appointment date and time. Please call [phone] if you haven't heard anything from them. Referral for Massage Therapy given to patient. Patient to return to clinic 6 months (office will call and schedule appt). AVS reviewed and given to pt. It is very important for your care that you keep your appointment. If for some reason you are unable to keep your appointment it is equally important that you call our office at [phone] to cancel your appointment and reschedule. Failure to do so may result in your termination from our practice.   MB

## 2022-04-28 NOTE — PROGRESS NOTES
[address] [facility]   Progress Note        Date of patient's visit: 4/28/2022  Patient's Name:  Alba Valentin                   YOB: 1965        PCP:  Javed Hill MD    Marcia Bryan is a 49 y.o. female who presents for   Chief Complaint   Patient presents with    Hypertension     6 month follow up    COPD    and follow up of chronic medical problems. Patient Active Problem List   Diagnosis    HTN (hypertension)    COPD (chronic obstructive pulmonary disease)    Hyperlipidemia    Near syncope    Anxiety    Tobacco abuse    Vesicular rash    Rash of hands    S/P LEEP    Gastroesophageal reflux disease without esophagitis    Trigger finger of right thumb    Trigger finger, right middle finger    Occipital headache    Pre-diabetes    Chest pain    Positive FIT (fecal immunochemical test)    Pancreatitis, unspecified pancreatitis type    Trigger finger, right ring finger    Post-op pain    Trigger ring finger of right hand       HISTORY OF PRESENT ILLNESS:    History was obtained from the patient. Joint/Muscle Pain  Patient complains of arthralgias, which have/has been present for several weeks. Pain is located in multiple joints. The pain is described as constant, aching. Associated symptoms include: decreased range of motion. The patient has tried arthritis medications and rest for pain, with minimal relief. Related to injury: no. COPD:  Current treatment includes short-acting beta agonist inhaler and anticholinergic. Residual symptoms: acute dyspnea. She denies any other symptoms. She requires her rescue inhaler 0 time(s) per day- she has no active scripts. Anxiety  Patient is here for evaluation of anxiety. She has the following anxiety symptoms: chest pain, difficulty concentrating. Onset of symptoms was approximately several weeks ago. Symptoms have been unchanged since that time. She denies current suicidal and homicidal ideation.  Family history significant for anxiety and depression. Possible organic causes contributing are: none. Risk factors: negative life event youngest son going through some turmoil Previous treatment includes medication Ativan and Prozac. She complains of the following medication side effects: none. Patient's allergies, medications, past medical, surgical, social and family histories were reviewed and updated as appropriate. ALLERGIES      Allergies   Allergen Reactions    Adhesive Tape Rash         MEDICATIONS:      Current Outpatient Medications   Medication Sig Dispense Refill    FLUoxetine (PROZAC) 20 MG capsule TAKE ONE CAPSULE BY MOUTH TWICE A DAY 60 capsule 4    lisinopril-hydroCHLOROthiazide (PRINZIDE;ZESTORETIC) 10-12.5 MG per tablet TAKE ONE TABLET BY MOUTH DAILY 90 tablet 3    cyclobenzaprine (FLEXERIL) 10 MG tablet Take 10 mg by mouth nightly as needed      aspirin 81 MG EC tablet Take 81 mg by mouth daily      pantoprazole (PROTONIX) 20 MG tablet Take 2 tablets by mouth daily 30 tablet 0    aluminum & magnesium hydroxide-simethicone (MAALOX) 200-200-20 MG/5ML SUSP suspension Take 30 mLs by mouth every 6 hours as needed for Indigestion 1 Bottle 0    ondansetron (ZOFRAN ODT) 4 MG disintegrating tablet Take 1 tablet by mouth every 8 hours as needed for Nausea 20 tablet 0    metoprolol tartrate (LOPRESSOR) 50 MG tablet TAKE ONE TABLET BY MOUTH TWICE A  tablet 10     No current facility-administered medications for this visit. Patient Care Team:  Darío Engel MD as PCP - Greg Howell MD as PCP - Julia Copeland Provider    PAST MEDICAL AND SURGICAL HISTORY:      Past Medical History:   Diagnosis Date    Anxiety     CAD (coronary artery disease) 04/29/2013    Cath 04/29/13: Minimal CAD.  No stents    Cardiac arrest ([facility] [address]     cardiac arrest at age 25 months/s/p fall from bed with unconscious episode    COPD (chronic obstructive pulmonary disease) (HCC)     GERD (gastroesophageal reflux disease)     Headaches due to old head injury 2010    s/p fall down steps with unconsciousness    History of blood transfusion     Hypertension     managed by Dr. Otto PCP    Snores     Trigger finger     right thumb and right middle finger    Wears dentures     FULL UPPER    Wears glasses      Past Surgical History:   Procedure Laterality Date    CARDIAC CATHETERIZATION  2013    minimal cad, no stents  Dr. Rambo Schulz      X's 3    CHOLECYSTECTOMY, LAPAROSCOPIC N/A 09/23/2019    *ADD-ON WANTS IZZ[employer] LAPAROSCOPIC ROBOTIC performed by Lynette Gifford IV, DO at [address]  02/26/2019    with polypectomy     FINGER TRIGGER RELEASE Right 02/03/2021    FINGER TRIGGER RELEASE 4TH, 5TH DIGITS    FINGER TRIGGER RELEASE Right 2/3/2021    FINGER TRIGGER RELEASE 4TH, 5TH DIGITS (SUPINE) 3080 TABLE, HAND TABLE performed by Kervin Frankel DO at [address] [place] Right 05/02/2017     TRIGGER RELEASE THUMB,  MIDDLE FINGER performed by Lynda Larose DO at [facility]    TONSILLECTOMY         SOCIAL HISTORY      Social History     Tobacco Use    Smoking status: Current Every Day Smoker     Packs/day: 0.50     Years: 30.00     Pack years: 15.00     Types: Cigarettes    Smokeless tobacco: Never Used   Substance Use Topics    Alcohol use: Yes     Comment: occasionally     Marcia  reports that she has been smoking cigarettes. She has a 15.00 pack-year smoking history. She has never used smokeless tobacco.    FAMILY HISTORY:      Family History   Problem Relation Age of Onset    High Blood Pressure Mother     Depression Mother     High Blood Pressure Father     Cancer Father        REVIEW OF SYSTEMS:    Review of Systems   Constitutional: Negative. HENT: Positive for congestion. Eyes: Negative. Respiratory: Positive for cough. Cardiovascular: Positive for palpitations. Gastrointestinal: Negative. Endocrine: Negative. Genitourinary: Negative. Musculoskeletal: Positive for arthralgias. Skin: Negative. Allergic/Immunologic: Positive for environmental allergies. Neurological: Positive for headaches. Hematological: Negative. Psychiatric/Behavioral: Positive for sleep disturbance. The patient is nervous/anxious.           PHYSICAL EXAM:      Vitals:    04/28/22 1313   BP: 139/77   Pulse: 62     BP Readings from Last 3 Encounters:   04/28/22 139/77   07/29/21 111/77   06/11/21 111/70       Physical Examination: General appearance - alert, well appearing, and in no distress  Eyes - pupils equal and reactive, extraocular eye movements intact  Chest - clear to auscultation, no wheezes, rales or rhonchi, symmetric air entry  Heart - normal rate and regular rhythm  Abdomen - bowel sounds normal  Back exam - full range of motion, no tenderness, palpable spasm or pain on motion  Neurological - alert, oriented, normal speech, no focal findings or movement disorder noted  Musculoskeletal - no joint tenderness, deformity or swelling  Extremities - peripheral pulses normal, no pedal edema, no clubbing or cyanosis, no synovitis noted    LABORATORY FINDINGS:    CBC:   Lab Results   Component Value Date    WBC 9.2 07/27/2021    HGB 15.2 07/27/2021     07/27/2021     03/18/2012     BMP:    Lab Results   Component Value Date     07/27/2021    K 4.5 07/27/2021     07/27/2021    CO2 21 07/27/2021    BUN 19 07/27/2021    CREATININE 0.69 07/27/2021    GLUCOSE 88 07/27/2021    GLUCOSE 107 03/18/2012     Hemoglobin A1C:   Lab Results   Component Value Date    LABA1C 6.2 07/27/2021     Microalbumin Urine: No results found for: MICROALBUR  Lipid profile:   Lab Results   Component Value Date    CHOL 175 07/27/2021    TRIG 179 07/27/2021    HDL 43 07/27/2021     Thyroid functions:   Lab Results   Component Value Date    TSH 2.88 07/27/2021      Hepatic functions: Lab Results   Component Value Date    ALT 16 07/27/2021    AST 24 07/27/2021    PROT 6.9 07/27/2021    BILITOT 0.39 07/27/2021    BILIDIR 0.08 09/24/2019    LABALBU 4.2 07/27/2021     Urine Analysis: No results found for: [address] [address]:      Health Maintenance Due   Topic Date Due    COVID-19 Vaccine (1) Never done    Pneumococcal 0-64 years Vaccine (2 - PCV) 04/16/2016    Breast cancer screen  10/22/2021    Depression Monitoring  04/13/2022       ASSESSMENT AND PLAN:       Diagnosis Orders   1. Arthralgia, unspecified joint  ALONZO Screen with Reflex    Hemoglobin G3J    Cyclic Citrul Peptide Antibody, IgG    External Referral To Massage Therapy   2. Current severe episode of major depressive disorder without psychotic features without prior episode (HCC)  FLUoxetine (PROZAC) 40 MG capsule    LORazepam (ATIVAN) 0.5 MG tablet   3. Chronic obstructive pulmonary disease, unspecified COPD type ([facility] [address]  tiotropium (SPIRIVA HANDIHALER) 18 MCG inhalation capsule   4. Gastroesophageal reflux disease without esophagitis  pantoprazole (PROTONIX) 20 MG tablet   5. Screening mammogram for high-risk patient  OBINNA DIGITAL SCREEN W OR WO CAD BILATERAL         FOLLOW UP:   1. Marcia received counseling on the following healthy behaviors: nutrition and exercise    2. Reviewed prior labs and health maintenance. 3.  Discussed use, benefit, and side effects of prescribed medications. Barriers to medication compliance addressed. All patient questions answered. Pt voiced understanding. 4.  Continue current medications, diet and exercise.               Orders Placed This Encounter   Medications    cyclobenzaprine (FLEXERIL) 10 MG tablet     Sig: Take 1 tablet by mouth nightly as needed for Muscle spasms     Dispense:  30 tablet     Refill:  0    pantoprazole (PROTONIX) 20 MG tablet     Sig: Take 2 tablets by mouth daily     Dispense:  30 tablet     Refill:  0    FLUoxetine (PROZAC) 40 MG capsule     Sig: Take 1 capsule by mouth 2 times daily     Dispense:  60 capsule     Refill:  1    LORazepam (ATIVAN) 0.5 MG tablet     Sig: Take 1 tablet by mouth every 8 hours as needed for Anxiety for up to 30 days. Dispense:  30 tablet     Refill:  0    tiotropium (SPIRIVA HANDIHALER) 18 MCG inhalation capsule     Sig: Inhale 1 capsule into the lungs daily     Dispense:  90 capsule     Refill:  3          Completed Refills               Requested Prescriptions     Signed Prescriptions Disp Refills    cyclobenzaprine (FLEXERIL) 10 MG tablet 30 tablet 0     Sig: Take 1 tablet by mouth nightly as needed for Muscle spasms    pantoprazole (PROTONIX) 20 MG tablet 30 tablet 0     Sig: Take 2 tablets by mouth daily    FLUoxetine (PROZAC) 40 MG capsule 60 capsule 1     Sig: Take 1 capsule by mouth 2 times daily    LORazepam (ATIVAN) 0.5 MG tablet 30 tablet 0     Sig: Take 1 tablet by mouth every 8 hours as needed for Anxiety for up to 30 days.  tiotropium (SPIRIVA HANDIHALER) 18 MCG inhalation capsule 90 capsule 3     Sig: Inhale 1 capsule into the lungs daily       5. Patient given educational materials - see patient instructions    6. Was a self-tracking handout given in paper form or via [employer]? Yes  If yes, see orders or list here. Orders Placed This Encounter   Procedures    OBINNA DIGITAL SCREEN W OR WO CAD BILATERAL     Standing Status:   Future     Standing Expiration Date:   4/28/2023     Scheduling Instructions:      Every year for women age 36 and under 76     Order Specific Question:   Reason for exam:     Answer:   Screening    ALONZO Screen with Reflex     Please get this labwork done before your next visit.      Standing Status:   Future     Standing Expiration Date:   4/28/2023    Hemoglobin A1C     Standing Status:   Future     Standing Expiration Date:   9/08/1382    Cyclic Citrul Peptide Antibody, IgG     Standing Status:   Future     Standing Expiration Date:   4/28/2023   [facility] External Referral To Massage Therapy     Referral Priority:   Routine     Referral Type:   Consult for Advice and Opinion     Referral Reason:   Specialty Services Required     Requested Specialty:   Massage Therapy     Number of Visits Requested:   1   Increase Prozac to 40 mg  Add Ativan  Controlled substances monitoring: no signs of potential drug abuse or diversion identified and [place] report reviewed today- activity consistent with treatment plan. Refilled other meds      Return in about 6 months (around 10/28/2022). Patient voiced understanding and agreed to treatment plan. This note is created with the assistance of a speech-recognition program. While intending to generate a document that actually reflects the content of the visit, the document can still have some mistakes which may not have been identified and corrected by editing.       Dr Jocelyn Ravi MD, [address]  Associate , Department of Internal Medicine  Resident Ambulatory Site Medical Director  [address] [facility] Internal Medicine  [address] [facility]  Internal Medicine Clerkship - Kiara Richardson                   4/28/2022, 2:03 PM

## 2022-04-29 LAB
ANTI DNA DOUBLE STRANDED: <0.5 IU/ML
ANTI-NUCLEAR ANTIBODY (ANA): NEGATIVE
CCP IGG ANTIBODIES: 1.8 U/ML (ref 0–7)
ENA ANTIBODIES SCREEN: 0.2 U/ML
ESTIMATED AVERAGE GLUCOSE: 126 MG/DL
HBA1C MFR BLD: 6 % (ref 4–6)

## 2022-05-06 ENCOUNTER — APPOINTMENT (OUTPATIENT)
Dept: CT IMAGING | Age: 57
End: 2022-05-06
Payer: COMMERCIAL

## 2022-05-06 ENCOUNTER — HOSPITAL ENCOUNTER (EMERGENCY)
Age: 57
Discharge: HOME | End: 2022-05-06
Attending: EMERGENCY MEDICINE
Payer: COMMERCIAL

## 2022-05-06 VITALS
DIASTOLIC BLOOD PRESSURE: 79 MMHG | SYSTOLIC BLOOD PRESSURE: 136 MMHG | OXYGEN SATURATION: 95 % | HEART RATE: 71 BPM | BODY MASS INDEX: 24.17 KG/M2 | RESPIRATION RATE: 14 BRPM | WEIGHT: 128 LBS | TEMPERATURE: 97.9 F | HEIGHT: 61 IN

## 2022-05-06 DIAGNOSIS — S39.012A BACK STRAIN, INITIAL ENCOUNTER: Primary | ICD-10-CM

## 2022-05-06 LAB
ABSOLUTE EOS #: 0.17 K/UL (ref 0–0.44)
ABSOLUTE IMMATURE GRANULOCYTE: 0.03 K/UL (ref 0–0.3)
ABSOLUTE LYMPH #: 2.31 K/UL (ref 1.1–3.7)
ABSOLUTE MONO #: 0.98 K/UL (ref 0.1–1.2)
ALBUMIN SERPL-MCNC: 4 G/DL (ref 3.5–5.2)
ALP BLD-CCNC: 97 U/L (ref 35–104)
ALT SERPL-CCNC: 25 U/L (ref 5–33)
ANION GAP SERPL CALCULATED.3IONS-SCNC: 12 MMOL/L (ref 9–17)
AST SERPL-CCNC: 48 U/L
BASOPHILS # BLD: 1 % (ref 0–2)
BASOPHILS ABSOLUTE: 0.08 K/UL (ref 0–0.2)
BILIRUB SERPL-MCNC: 0.2 MG/DL (ref 0.3–1.2)
BILIRUBIN DIRECT: <0.08 MG/DL
BILIRUBIN, INDIRECT: ABNORMAL MG/DL (ref 0–1)
BUN BLDV-MCNC: 23 MG/DL (ref 6–20)
BUN/CREAT BLD: 31 (ref 9–20)
CALCIUM SERPL-MCNC: 8.9 MG/DL (ref 8.6–10.4)
CHLORIDE BLD-SCNC: 99 MMOL/L (ref 98–107)
CO2: 24 MMOL/L (ref 20–31)
CREAT SERPL-MCNC: 0.75 MG/DL (ref 0.5–0.9)
EKG ATRIAL RATE: 62 BPM
EKG P AXIS: 79 DEGREES
EKG P-R INTERVAL: 124 MS
EKG Q-T INTERVAL: 442 MS
EKG QRS DURATION: 88 MS
EKG QTC CALCULATION (BAZETT): 448 MS
EKG R AXIS: 83 DEGREES
EKG T AXIS: 76 DEGREES
EKG VENTRICULAR RATE: 62 BPM
EOSINOPHILS RELATIVE PERCENT: 1 % (ref 1–4)
GFR AFRICAN AMERICAN: >60 ML/MIN
GFR NON-AFRICAN AMERICAN: >60 ML/MIN
GFR SERPL CREATININE-BSD FRML MDRD: ABNORMAL ML/MIN/{1.73_M2}
GLUCOSE BLD-MCNC: 134 MG/DL (ref 70–99)
HCT VFR BLD CALC: 43.2 % (ref 36.3–47.1)
HEMOGLOBIN: 14.1 G/DL (ref 11.9–15.1)
IMMATURE GRANULOCYTES: 0 %
LIPASE: 148 U/L (ref 13–60)
LYMPHOCYTES # BLD: 19 % (ref 24–43)
MAGNESIUM: 1.8 MG/DL (ref 1.6–2.6)
MCH RBC QN AUTO: 28 PG (ref 25.2–33.5)
MCHC RBC AUTO-ENTMCNC: 32.6 G/DL (ref 28.4–34.8)
MCV RBC AUTO: 85.7 FL (ref 82.6–102.9)
MONOCYTES # BLD: 8 % (ref 3–12)
NRBC AUTOMATED: 0 PER 100 WBC
PDW BLD-RTO: 13.4 % (ref 11.8–14.4)
PLATELET # BLD: 280 K/UL (ref 138–453)
PMV BLD AUTO: 9.7 FL (ref 8.1–13.5)
POTASSIUM SERPL-SCNC: 4 MMOL/L (ref 3.7–5.3)
RBC # BLD: 5.04 M/UL (ref 3.95–5.11)
SEG NEUTROPHILS: 71 % (ref 36–65)
SEGMENTED NEUTROPHILS ABSOLUTE COUNT: 8.39 K/UL (ref 1.5–8.1)
SODIUM BLD-SCNC: 135 MMOL/L (ref 135–144)
TOTAL PROTEIN: 6.6 G/DL (ref 6.4–8.3)
TROPONIN, HIGH SENSITIVITY: <6 NG/L (ref 0–14)
TROPONIN, HIGH SENSITIVITY: <6 NG/L (ref 0–14)
WBC # BLD: 12 K/UL (ref 3.5–11.3)

## 2022-05-06 PROCEDURE — 93005 ELECTROCARDIOGRAM TRACING: CPT | Performed by: EMERGENCY MEDICINE

## 2022-05-06 PROCEDURE — 96375 TX/PRO/DX INJ NEW DRUG ADDON: CPT

## 2022-05-06 PROCEDURE — 80076 HEPATIC FUNCTION PANEL: CPT

## 2022-05-06 PROCEDURE — 80048 BASIC METABOLIC PNL TOTAL CA: CPT

## 2022-05-06 PROCEDURE — 99284 EMERGENCY DEPT VISIT MOD MDM: CPT

## 2022-05-06 PROCEDURE — 6370000000 HC RX 637 (ALT 250 FOR IP): Performed by: EMERGENCY MEDICINE

## 2022-05-06 PROCEDURE — 85025 COMPLETE CBC W/AUTO DIFF WBC: CPT

## 2022-05-06 PROCEDURE — 96374 THER/PROPH/DIAG INJ IV PUSH: CPT

## 2022-05-06 PROCEDURE — 83690 ASSAY OF LIPASE: CPT

## 2022-05-06 PROCEDURE — 84484 ASSAY OF TROPONIN QUANT: CPT

## 2022-05-06 PROCEDURE — 83735 ASSAY OF MAGNESIUM: CPT

## 2022-05-06 PROCEDURE — 71250 CT THORAX DX C-: CPT

## 2022-05-06 PROCEDURE — 6360000002 HC RX W HCPCS: Performed by: EMERGENCY MEDICINE

## 2022-05-06 RX ORDER — ONDANSETRON 2 MG/ML
4 INJECTION INTRAMUSCULAR; INTRAVENOUS ONCE
Status: COMPLETED | OUTPATIENT
Start: 2022-05-06 | End: 2022-05-06

## 2022-05-06 RX ORDER — MORPHINE SULFATE 4 MG/ML
4 INJECTION, SOLUTION INTRAMUSCULAR; INTRAVENOUS ONCE
Status: COMPLETED | OUTPATIENT
Start: 2022-05-06 | End: 2022-05-06

## 2022-05-06 RX ORDER — ACETAMINOPHEN AND CODEINE PHOSPHATE 300; 30 MG/1; MG/1
1 TABLET ORAL EVERY 4 HOURS PRN
Qty: 12 TABLET | Refills: 0 | Status: SHIPPED | OUTPATIENT
Start: 2022-05-06 | End: 2022-05-09

## 2022-05-06 RX ADMIN — MORPHINE SULFATE 4 MG: 4 INJECTION, SOLUTION INTRAMUSCULAR; INTRAVENOUS at 05:44

## 2022-05-06 RX ADMIN — ALUMINA, MAGNESIA, AND SIMETHICONE ORAL SUSPENSION REGULAR STRENGTH: 1200; 1200; 120 SUSPENSION ORAL at 07:18

## 2022-05-06 RX ADMIN — ONDANSETRON 4 MG: 2 INJECTION INTRAMUSCULAR; INTRAVENOUS at 09:15

## 2022-05-06 ASSESSMENT — ENCOUNTER SYMPTOMS
NAUSEA: 0
COUGH: 0
SHORTNESS OF BREATH: 0
SORE THROAT: 0
RHINORRHEA: 0
COLOR CHANGE: 0
EYE REDNESS: 0
DIARRHEA: 0
VOMITING: 0
EYE DISCHARGE: 0

## 2022-05-06 NOTE — ED PROVIDER NOTES
EMERGENCY DEPARTMENT ENCOUNTER    Pt Name: Yasir Marion  MRN: [id number]  [place] 1965  Date of evaluation: 5/6/22  CHIEF COMPLAINT       Chief Complaint   Patient presents with    Back Pain     HISTORY OF PRESENT ILLNESS   82-year-old presents with complaints of back pain. Patient states that she was walking into her house, she slipped on the tile stairs and landed on her back. She was having some pain discomfort and then she coughed later this evening and she developed severe pain and discomfort in the posterior aspect of her left ribs. Patient denies any shortness of breath, she states her pain is aggravated by movement and cough and shortness of breath, no lower extremity pain or swelling. REVIEW OF SYSTEMS     Review of Systems   Constitutional: Negative for chills and fever. HENT: Negative for rhinorrhea and sore throat. Eyes: Negative for discharge, redness and visual disturbance. Respiratory: Negative for cough and shortness of breath. Cardiovascular: Negative for chest pain, palpitations and leg swelling. Gastrointestinal: Negative for diarrhea, nausea and vomiting. Musculoskeletal: Negative for arthralgias, myalgias and neck pain. Posterior rib pain     Skin: Negative for color change and rash. Neurological: Negative for seizures, weakness and headaches. Psychiatric/Behavioral: Negative for hallucinations, self-injury and suicidal ideas. PASTMEDICAL HISTORY     Past Medical History:   Diagnosis Date    Anxiety     CAD (coronary artery disease) 04/29/2013    Cath 04/29/13: Minimal CAD.  No stents    Cardiac arrest ([facility] [address]     cardiac arrest at age 25 months/s/p fall from bed with unconscious episode    [facility] obstructive [facility]     GERD (gastroesophageal reflux disease)     Headaches due to old head injury 2010    s/p fall down steps with unconsciousness    History of blood transfusion     Hypertension     managed by  Stefania PCP    Snores     Trigger finger     right thumb and right middle finger    Wears dentures     FULL UPPER    Wears glasses      Past Problem List  Patient Active Problem List   Diagnosis Code    HTN (hypertension) I10    COPD (chronic obstructive pulmonary disease) J44.9    Hyperlipidemia E78.5    Near syncope R55    Anxiety F41.9    Tobacco abuse Z72.0    Vesicular rash R23.8    Rash of hands R21    S/P LEEP Z98.890    Gastroesophageal reflux disease without esophagitis K21.9    Trigger finger of right thumb M65.311    Trigger finger, right middle finger M65.331    Occipital headache R51.9    Pre-diabetes R73.03    Chest pain R07.9    Positive FIT (fecal immunochemical test) R19.5    Pancreatitis, unspecified pancreatitis type K85.90    Trigger finger, right ring finger M65.341    Post-op pain G89.18    Trigger ring finger of right hand M65.341     SURGICAL HISTORY       Past Surgical History:   Procedure Laterality Date    CARDIAC CATHETERIZATION  2013    minimal cad, no stents  Dr. Juan Yung X's 3    CHOLECYSTECTOMY, LAPAROSCOPIC N/A 09/23/2019    *ADD-ON JACQUI Daley LAPAROSCOPIC ROBOTIC performed by Jenny Gifford IV, DO at [address] [facility]  02/26/2019    with polypectomy     FINGER TRIGGER RELEASE Right 02/03/2021    FINGER TRIGGER RELEASE 4TH, 5TH DIGITS    FINGER TRIGGER RELEASE Right 2/3/2021    FINGER TRIGGER RELEASE 4TH, 5TH DIGITS (SUPINE) 3080 TABLE, HAND TABLE performed by Anabella Garcia DO at [address] Right 05/02/2017     TRIGGER RELEASE THUMB,  MIDDLE FINGER performed by Kamala Lake DO at [address]       Discharge Medication List as of 5/6/2022  9:29 AM      CONTINUE these medications which have NOT CHANGED    Details   cyclobenzaprine (FLEXERIL) 10 MG tablet Take 1 tablet by mouth nightly as needed for Muscle spasms, Disp-30 tablet, R-0Normal      pantoprazole (PROTONIX) 20 MG tablet Take 2 tablets by mouth daily, Disp-30 tablet, R-0Normal      FLUoxetine (PROZAC) 40 MG capsule Take 1 capsule by mouth 2 times daily, Disp-60 capsule, R-1Normal      LORazepam (ATIVAN) 0.5 MG tablet Take 1 tablet by mouth every 8 hours as needed for Anxiety for up to 30 days. , Disp-30 tablet, R-0Normal      tiotropium (SPIRIVA HANDIHALER) 18 MCG inhalation capsule Inhale 1 capsule into the lungs daily, Disp-90 capsule, R-3Normal      lisinopril-hydroCHLOROthiazide (PRINZIDE;ZESTORETIC) 10-12.5 MG per tablet TAKE ONE TABLET BY MOUTH DAILY, Disp-90 tablet, R-3Normal      aspirin 81 MG EC tablet Take 81 mg by mouth dailyHistorical Med      aluminum & magnesium hydroxide-simethicone (MAALOX) 200-200-20 MG/5ML SUSP suspension Take 30 mLs by mouth every 6 hours as needed for Indigestion, Disp-1 Bottle, R-0Normal      ondansetron (ZOFRAN ODT) 4 MG disintegrating tablet Take 1 tablet by mouth every 8 hours as needed for Nausea, Disp-20 tablet, R-0Print      metoprolol tartrate (LOPRESSOR) 50 MG tablet TAKE ONE TABLET BY MOUTH TWICE A DAY, Disp-180 tablet, R-10Normal           ALLERGIES     is allergic to adhesive tape. FAMILY HISTORY     She indicated that her mother is . She indicated that her father is . SOCIAL HISTORY       Social History     Tobacco Use    Smoking status: Current Every Day Smoker     Packs/day: 0.50     Years: 30.00     Pack years: 15.00     Types: Cigarettes    Smokeless tobacco: Never Used   Vaping Use    Vaping Use: Never used   Substance Use Topics    Alcohol use: Yes     Comment: occasionally    Drug use: No     PHYSICAL EXAM     INITIAL VITALS: /79   Pulse 71   Temp 97.9 °F (36.6 °C) (Oral)   Resp 14   Ht 5' 1\" (1.549 m)   Wt 128 lb (58.1 kg)   SpO2 95%   BMI 24.19 kg/m²    Physical Exam  Constitutional:       Appearance: Normal appearance.  She is well-developed. She is not ill-appearing or toxic-appearing. HENT:      Head: Normocephalic and atraumatic. Eyes:      Conjunctiva/sclera: Conjunctivae normal.      Pupils: Pupils are equal, round, and reactive to light. Neck:      Trachea: Trachea normal.   Cardiovascular:      Rate and Rhythm: Normal rate and regular rhythm. Heart sounds: S1 normal and S2 normal. No murmur heard. Pulmonary:      Effort: Pulmonary effort is normal. No accessory muscle usage or respiratory distress. Breath sounds: Normal breath sounds. Chest:      Chest wall: No deformity or tenderness. Abdominal:      General: Bowel sounds are normal. There is no distension or abdominal bruit. Palpations: Abdomen is not rigid. Tenderness: There is no abdominal tenderness. There is no guarding or rebound. Musculoskeletal:      Cervical back: Normal range of motion and neck supple. Skin:     General: Skin is warm. Findings: No rash. Neurological:      Mental Status: She is alert and oriented to person, place, and time. GCS: GCS eye subscore is 4. GCS verbal subscore is 5. GCS motor subscore is 6. Psychiatric:         Speech: Speech normal.         MEDICAL DECISION MAKIN-year-old female presents with complaints of pain and discomfort in her left posterior ribs and back. Plan is CT scan without contrast, pain control and reevaluation. 6:44 AM EDT  Patient's not complaining of some substernal chest pain. She states this started just before she went to CAT scan. We will obtain an EKG, cardiac enzymes and reevaluate.        CRITICAL CARE:       PROCEDURES:    Procedures    DIAGNOSTIC RESULTS   EKG:All EKG's are interpreted by the Emergency Department Physician who either signs or Co-signs this chart in the absence of a cardiologist.    Patient's EKG shows sinus rhythm with a rate of 62 ME QRS QTC intervals unremarkable patient has normal axis no ST elevations or depressions, no significant T wave changes. Nonspecific EKG. RADIOLOGY:All plain film, CT, MRI, and formal ultrasound images (except ED bedside ultrasound) are read by the radiologist, see reports below, unless otherwisenoted in MDM or here. CT CHEST ABDOMEN PELVIS WO CONTRAST   Final Result   1. No evidence for acute visceral or osseous abnormality. 2. No acute infective or inflammatory process. LABS: All lab results were reviewed by myself, and all abnormals are listed below.   Labs Reviewed   BASIC METABOLIC PANEL - Abnormal; Notable for the following components:       Result Value    Glucose 134 (*)     BUN 23 (*)     Bun/Cre Ratio 31 (*)     All other components within normal limits   CBC WITH AUTO DIFFERENTIAL - Abnormal; Notable for the following components:    WBC 12.0 (*)     Seg Neutrophils 71 (*)     Lymphocytes 19 (*)     Segs Absolute 8.39 (*)     All other components within normal limits   LIPASE - Abnormal; Notable for the following components:    Lipase 148 (*)     All other components within normal limits   HEPATIC FUNCTION PANEL - Abnormal; Notable for the following components:    AST 48 (*)     Total Bilirubin 0.20 (*)     All other components within normal limits   MAGNESIUM   TROPONIN   TROPONIN       EMERGENCY DEPARTMENTCOURSE:         Vitals:    Vitals:    05/06/22 0511 05/06/22 0514 05/06/22 0650 05/06/22 0938   BP: (!) 151/86   136/79   Pulse:   72 71   Resp:   13 14   Temp:  97.9 °F (36.6 °C)     TempSrc:  Oral     SpO2:   94% 95%   Weight:       Height:           The patient was given the following medications while in the emergency department:  Orders Placed This Encounter   Medications    morphine sulfate (PF) injection 4 mg    aluminum & magnesium hydroxide-simethicone (MAALOX) 30 mL, lidocaine viscous hcl (XYLOCAINE) 5 mL (GI COCKTAIL)    ondansetron (ZOFRAN) injection 4 mg    acetaminophen-codeine (TYLENOL/CODEINE #3) 300-30 MG per tablet     Sig: Take 1 tablet by mouth every 4 hours as needed for Pain for up to 3 days. Intended supply: 3 days. Take lowest dose possible to manage pain     Dispense:  12 tablet     Refill:  0     CONSULTS:  None    FINAL IMPRESSION      1. Back strain, initial encounter          DISPOSITION/PLAN   DISPOSITION Decision To Discharge 05/06/2022 09:28:39 AM      PATIENT REFERRED TO:  MD Yaneli Bradford [facility] [address]  [facility] [address]  [phone]    In 2 days      DISCHARGE MEDICATIONS:  Discharge Medication List as of 5/6/2022  9:29 AM      START taking these medications    Details   acetaminophen-codeine (TYLENOL/CODEINE #3) 300-30 MG per tablet Take 1 tablet by mouth every 4 hours as needed for Pain for up to 3 days. Intended supply: 3 days. Take lowest dose possible to manage pain, Disp-12 tablet, R-0Print           The care is provided during an unprecedented national emergency due to the novel coronavirus, COVID 19.   MD Андрей Morales MD  05/10/22 4611

## 2022-05-06 NOTE — ED PROVIDER NOTES
EMERGENCY DEPARTMENT ENCOUNTER    Pt Name: Gilberto Gaffney  MRN: [id number]  [place] 1965  Date of evaluation: 5/6/22      MEDICAL DECISION MAKING:   Signout from Dr. Ariana Lind . 21-year-old female here for evaluation of back pain after fall at home. Awaiting results of CT scan she complained of chest pain right after having GI cocktail. Troponins were ordered. Labs:  Potassium 4.0  Creatinine 0.75  Troponin 6, 6  WBC 12.0  Hemoglobin 14.1    CTAP negative for acute pathology. Given Zofran for nausea. Given Rx for Tylenol 3 for home. No further work-up indicated at this time. Nursing notes reviewed. At this time this is what I find, the patient appears well and does not appear sick or toxic. I gave my usual and customary discussion of the risks and benefits of discharge versus admission. I answered the patient's questions. I gave the patient strict return precautions. Patient expressed understanding of the discharge instructions. The care is provided during an unprecedented national emergency due to the novel coronavirus, COVID-19. Dictated but not reviewed. DIAGNOSTIC RESULTS   EKG:All EKG's are interpreted by the Emergency Department Physician who either signs or Co-signs this chart in the absence of a cardiologist.        RADIOLOGY:All plain film, CT, MRI, and formal ultrasound images (except ED bedside ultrasound) are read by the radiologist, see reports below, unless otherwisenoted in MDM or here. CT CHEST ABDOMEN PELVIS WO CONTRAST   Final Result   1. No evidence for acute visceral or osseous abnormality. 2. No acute infective or inflammatory process. LABS: All lab results were reviewed by myself, and all abnormals are listed below.   Labs Reviewed   BASIC METABOLIC PANEL - Abnormal; Notable for the following components:       Result Value    Glucose 134 (*)     BUN 23 (*)     Bun/Cre Ratio 31 (*)     All other components within normal limits   CBC WITH AUTO DIFFERENTIAL - Abnormal; Notable for the following components:    WBC 12.0 (*)     Seg Neutrophils 71 (*)     Lymphocytes 19 (*)     Segs Absolute 8.39 (*)     All other components within normal limits   LIPASE - Abnormal; Notable for the following components:    Lipase 148 (*)     All other components within normal limits   HEPATIC FUNCTION PANEL - Abnormal; Notable for the following components:    AST 48 (*)     Total Bilirubin 0.20 (*)     All other components within normal limits   MAGNESIUM   TROPONIN   TROPONIN       EMERGENCY DEPARTMENTCOURSE:         Vitals:    Vitals:    05/06/22 0510 05/06/22 0511 05/06/22 0514 05/06/22 0650   BP:  (!) 151/86     Pulse: 76   72   Resp: 18   13   Temp:   97.9 °F (36.6 °C)    TempSrc:   Oral    SpO2: 94%   94%   Weight:       Height:           The patient was given the following medications while in the emergency department:  Orders Placed This Encounter   Medications    morphine sulfate (PF) injection 4 mg    aluminum & magnesium hydroxide-simethicone (MAALOX) 30 mL, lidocaine viscous hcl (XYLOCAINE) 5 mL (GI COCKTAIL)    ondansetron (ZOFRAN) injection 4 mg    acetaminophen-codeine (TYLENOL/CODEINE #3) 300-30 MG per tablet     Sig: Take 1 tablet by mouth every 4 hours as needed for Pain for up to 3 days. Intended supply: 3 days. Take lowest dose possible to manage pain     Dispense:  12 tablet     Refill:  0     CONSULTS:  None    FINAL IMPRESSION      1. Back strain, initial encounter          DISPOSITION/PLAN   DISPOSITION Decision To Discharge 05/06/2022 09:28:39 AM      PATIENT REFERRED TO:  Idania Todd MD  [address] [place] [address]  [phone]    In 2 days      DISCHARGE MEDICATIONS:  New Prescriptions    ACETAMINOPHEN-CODEINE (TYLENOL/CODEINE #3) 300-30 MG PER TABLET    Take 1 tablet by mouth every 4 hours as needed for Pain for up to 3 days. Intended supply: 3 days.  Take lowest dose possible to manage pain     Ashish Reid MD  Attending Emergency Physician                    Smiley Hansen MD  05/06/22 0302

## 2022-05-12 ENCOUNTER — OFFICE VISIT (OUTPATIENT)
Dept: INTERNAL MEDICINE | Age: 57
End: 2022-05-12
Payer: COMMERCIAL

## 2022-05-12 VITALS
BODY MASS INDEX: 23.03 KG/M2 | HEART RATE: 57 BPM | WEIGHT: 122 LBS | HEIGHT: 61 IN | DIASTOLIC BLOOD PRESSURE: 70 MMHG | OXYGEN SATURATION: 96 % | TEMPERATURE: 97.6 F | SYSTOLIC BLOOD PRESSURE: 126 MMHG

## 2022-05-12 DIAGNOSIS — I10 ESSENTIAL HYPERTENSION: ICD-10-CM

## 2022-05-12 DIAGNOSIS — J44.9 CHRONIC OBSTRUCTIVE PULMONARY DISEASE, UNSPECIFIED COPD TYPE (HCC): ICD-10-CM
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
## 2022-05-12 DIAGNOSIS — F32.1 CURRENT MODERATE EPISODE OF MAJOR DEPRESSIVE DISORDER WITHOUT PRIOR EPISODE (HCC): ICD-10-CM

## 2022-05-12 PROCEDURE — G8427 DOCREV CUR MEDS BY ELIG CLIN: HCPCS | Performed by: STUDENT IN AN ORGANIZED HEALTH CARE EDUCATION/TRAINING PROGRAM

## 2022-05-12 PROCEDURE — 3023F SPIROM DOC REV: CPT | Performed by: STUDENT IN AN ORGANIZED HEALTH CARE EDUCATION/TRAINING PROGRAM

## 2022-05-12 PROCEDURE — 3017F COLORECTAL CA SCREEN DOC REV: CPT | Performed by: STUDENT IN AN ORGANIZED HEALTH CARE EDUCATION/TRAINING PROGRAM

## 2022-05-12 PROCEDURE — 4004F PT TOBACCO SCREEN RCVD TLK: CPT | Performed by: STUDENT IN AN ORGANIZED HEALTH CARE EDUCATION/TRAINING PROGRAM

## 2022-05-12 PROCEDURE — 99213 OFFICE O/P EST LOW 20 MIN: CPT | Performed by: STUDENT IN AN ORGANIZED HEALTH CARE EDUCATION/TRAINING PROGRAM

## 2022-05-12 PROCEDURE — G8420 CALC BMI NORM PARAMETERS: HCPCS | Performed by: STUDENT IN AN ORGANIZED HEALTH CARE EDUCATION/TRAINING PROGRAM

## 2022-05-12 RX ORDER — NAPROXEN 375 MG/1
375 TABLET ORAL 2 TIMES DAILY PRN
Qty: 60 TABLET | Refills: 0 | Status: SHIPPED | OUTPATIENT
Start: 2022-05-12 | End: 2022-08-01

## 2022-05-12 RX ORDER — ALBUTEROL SULFATE 90 UG/1
2 AEROSOL, METERED RESPIRATORY (INHALATION) 4 TIMES DAILY PRN
Status: CANCELLED | OUTPATIENT
Start: 2022-05-12

## 2022-05-12 RX ORDER — LIDOCAINE 50 MG/G
1 PATCH TOPICAL DAILY
Qty: 30 PATCH | Refills: 0 | Status: SHIPPED
Start: 2022-05-12 | End: 2022-05-17 | Stop reason: CLARIF

## 2022-05-12 ASSESSMENT — ENCOUNTER SYMPTOMS
ABDOMINAL PAIN: 0
DIARRHEA: 0
VOMITING: 0
SHORTNESS OF BREATH: 0
BLOOD IN STOOL: 0
CHOKING: 0
BACK PAIN: 1
COUGH: 0
NAUSEA: 0
STRIDOR: 0
CONSTIPATION: 0
WHEEZING: 0
ABDOMINAL DISTENTION: 0
APNEA: 0

## 2022-05-12 NOTE — PROGRESS NOTES
Internal Medicine Clinic progress Note    Date of patient's visit: 5/12/2022  Name:  Lou Mc  Primary Care Physician: Jocelyn Ravi MD    Reason for visit: First Visit, establish care   Chief Complaint   Patient presents with   [facility]      ED follow up, left rib/back contusion due to fall     Forms     forms for work filled out          HISTORY OF PRESENTING ILLNESS:    History was obtained from the patient. Lou Mc is a 49 y.o. is here to establish care. Formal PCP is  . Last seen in 4/28/2022. Patient is here after a mechanical fall on 5/6/22. Imaging negative for any traumatic findings. Patient still reports L chest wall pain where she fell. States that tylenol is not helping but nsaids \"help take the edge off\". Denies any changes with her HTN, anxiety, COPD. Is still smoking half a pack a day, occasional alcohol, no illegal drugs. PAST MEDICAL HISTORY:          Diagnosis Date    Anxiety     CAD (coronary artery disease) 04/29/2013    Cath 04/29/13: Minimal CAD.  No stents    Cardiac arrest ([facility] [address]     cardiac arrest at age 25 months/s/p fall from bed with unconscious episode    COPD (chronic obstructive pulmonary disease) (HCC)     GERD (gastroesophageal reflux disease)     Headaches due to old head injury 2010    s/p fall down steps with unconsciousness    History of blood transfusion     Hypertension     managed by Dr. Vince Andrews PCP    Snores     Trigger finger     right thumb and right middle finger    Wears dentures     FULL UPPER    Wears glasses        PAST SURGICAL HISTORY:          Procedure Laterality Date    CARDIAC CATHETERIZATION  2013    minimal cad, no stents  Dr. Og Magana X's 3    CHOLECYSTECTOMY, LAPAROSCOPIC N/A 09/23/2019    *ADD-ON JACQUI Blackwell LAPAROSCOPIC ROBOTIC performed by Carlotta Gifford IV, DO at [address] [place] [address]  02/26/2019    with polypectomy     FINGER TRIGGER RELEASE Right 02/03/2021 FINGER TRIGGER RELEASE 4TH, 5TH DIGITS    FINGER TRIGGER RELEASE Right 2/3/2021    FINGER TRIGGER RELEASE 4TH, 5TH DIGITS (SUPINE) 3080 TABLE, HAND TABLE performed by Zander Villareal DO at [address] [place] [address] RI INCISE FINGER TENDON SHEATH Right 05/02/2017     TRIGGER RELEASE THUMB,  MIDDLE FINGER performed by Zaida Rodriguez DO at [place] [address]    TONSILLECTOMY         ALLERGIES:      Allergies   Allergen Reactions    Adhesive Tape Rash         HOME MEDICATION:      Current Outpatient Medications on File Prior to Visit   Medication Sig Dispense Refill    cyclobenzaprine (FLEXERIL) 10 MG tablet Take 1 tablet by mouth nightly as needed for Muscle spasms (Patient taking differently: Take 10 mg by mouth as needed for Muscle spasms ) 30 tablet 0    pantoprazole (PROTONIX) 20 MG tablet Take 2 tablets by mouth daily 30 tablet 0    FLUoxetine (PROZAC) 40 MG capsule Take 1 capsule by mouth 2 times daily 60 capsule 1    LORazepam (ATIVAN) 0.5 MG tablet Take 1 tablet by mouth every 8 hours as needed for Anxiety for up to 30 days.  (Patient taking differently: Take 0.5 mg by mouth as needed for Anxiety. ) 30 tablet 0    tiotropium (SPIRIVA HANDIHALER) 18 MCG inhalation capsule Inhale 1 capsule into the lungs daily 90 capsule 3    lisinopril-hydroCHLOROthiazide (PRINZIDE;ZESTORETIC) 10-12.5 MG per tablet TAKE ONE TABLET BY MOUTH DAILY 90 tablet 3    aspirin 81 MG EC tablet Take 81 mg by mouth daily      aluminum & magnesium hydroxide-simethicone (MAALOX) 200-200-20 MG/5ML SUSP suspension Take 30 mLs by mouth every 6 hours as needed for Indigestion (Patient taking differently: Take 30 mLs by mouth as needed for Indigestion ) 1 Bottle 0    ondansetron (ZOFRAN ODT) 4 MG disintegrating tablet Take 1 tablet by mouth every 8 hours as needed for Nausea (Patient taking differently: Take 4 mg by mouth as needed for Nausea ) 20 tablet 0    metoprolol tartrate (LOPRESSOR) 50 MG tablet TAKE ONE TABLET BY MOUTH TWICE A  tablet 10     No current facility-administered medications on file prior to visit. SOCIAL HISTORY:      Social History     Socioeconomic History    Marital status:      Spouse name: Not on file    Number of children: Not on file    Years of education: Not on file    Highest education level: Not on file   Occupational History    Not on file   Tobacco Use    Smoking status: Current Every Day Smoker     Packs/day: 0.50     Years: 30.00     Pack years: 15.00     Types: Cigarettes    Smokeless tobacco: Never Used   Vaping Use    Vaping Use: Never used   Substance and Sexual Activity    Alcohol use: Yes     Comment: occasionally    Drug use: No    Sexual activity: Not on file   Other Topics Concern    Not on file   Social History Narrative    Not on file     Social Determinants of Health     Financial Resource Strain: Low Risk     Difficulty of Paying Living Expenses: Not hard at all   Food Insecurity: No Food Insecurity    Worried About Running Out of Food in the Last Year: Never true    Matthias of Food in the Last Year: Never true   Transportation Needs: No Transportation Needs    Lack of Transportation (Medical): No    Lack of Transportation (Non-Medical):  No   Physical Activity:     Days of Exercise per Week: Not on file    Minutes of Exercise per Session: Not on file   Stress:     Feeling of Stress : Not on file   Social Connections:     Frequency of Communication with Friends and Family: Not on file    Frequency of Social Gatherings with Friends and Family: Not on file    Attends Congregational Services: Not on file    Active Member of Clubs or Organizations: Not on file    Attends Club or Organization Meetings: Not on file    Marital Status: Not on file   Intimate Partner Violence:     Fear of Current or Ex-Partner: Not on file    Emotionally Abused: Not on file    Physically Abused: Not on file    Sexually Abused: Not on file   Housing Stability:     Unable to Pay for Housing in the Last Year: Not on file    Number of Places Lived in the Last Year: Not on file    Unstable Housing in the Last Year: Not on file         FAMILY HISTORY:          Problem Relation Age of Onset    High Blood Pressure Mother     Depression Mother     High Blood Pressure Father     Cancer Father        Review of Systems   Constitutional: Negative for chills, diaphoresis, fatigue and fever. Respiratory: Negative for apnea, cough, choking, shortness of breath, wheezing and stridor. Cardiovascular: Positive for chest pain. Negative for leg swelling. Gastrointestinal: Negative for abdominal distention, abdominal pain, blood in stool, constipation, diarrhea, nausea and vomiting. Genitourinary: Negative for decreased urine volume, dysuria and urgency. Musculoskeletal: Positive for back pain. Skin: Negative for pallor and wound. Neurological: Negative for syncope, weakness, numbness and headaches. Psychiatric/Behavioral: Negative for agitation, behavioral problems, confusion, decreased concentration and self-injury. PHYSICAL EXAM:      Vitals:    05/12/22 1248   BP: 126/70   Pulse: 57   Temp: 97.6 °F (36.4 °C)   SpO2: 96%      Physical Exam  Constitutional:       General: She is not in acute distress. Appearance: Normal appearance. She is normal weight. She is not ill-appearing, toxic-appearing or diaphoretic. HENT:      Mouth/Throat:      Mouth: Mucous membranes are moist.      Pharynx: Oropharynx is clear. Eyes:      General: No scleral icterus. Cardiovascular:      Rate and Rhythm: Normal rate and regular rhythm. Pulses: Normal pulses. Heart sounds: Normal heart sounds. No murmur heard. No friction rub. No gallop. Pulmonary:      Effort: Pulmonary effort is normal.      Breath sounds: Normal breath sounds. Abdominal:      General: Abdomen is flat. Bowel sounds are normal. There is no distension.       Palpations: Abdomen is soft. There is no mass. Tenderness: There is no abdominal tenderness. There is no guarding or rebound. Hernia: No hernia is present. Musculoskeletal:         General: Tenderness present. No swelling, deformity or signs of injury. Right lower leg: No edema. Left lower leg: No edema. Comments: L chest wall and back tenderness    Skin:     General: Skin is warm and dry. Coloration: Skin is not jaundiced or pale. Findings: No bruising. Neurological:      General: No focal deficit present. Mental Status: She is alert and oriented to person, place, and time. Cranial Nerves: No cranial nerve deficit. Motor: No weakness. Psychiatric:         Mood and Affect: Mood normal.         Behavior: Behavior normal.         Thought Content: Thought content normal.         Judgment: Judgment normal.          LABORATORY FINDINGS:    CBC:   Lab Results   Component Value Date    WBC 12.0 05/06/2022    HGB 14.1 05/06/2022     05/06/2022     03/18/2012     BMP:    Lab Results   Component Value Date     05/06/2022    K 4.0 05/06/2022    CL 99 05/06/2022    CO2 24 05/06/2022    BUN 23 05/06/2022    CREATININE 0.75 05/06/2022    GLUCOSE 134 05/06/2022    GLUCOSE 107 03/18/2012     Hemoglobin A1C:   Lab Results   Component Value Date    LABA1C 6.0 04/28/2022     Lipid profile:   Lab Results   Component Value Date    CHOL 175 07/27/2021    TRIG 179 07/27/2021    HDL 43 07/27/2021     Thyroid functions:   Lab Results   Component Value Date    TSH 2.88 07/27/2021      Hepatic functions:   Lab Results   Component Value Date    ALT 25 05/06/2022    AST 48 05/06/2022    PROT 6.6 05/06/2022    BILITOT 0.20 05/06/2022    BILIDIR <0.08 05/06/2022    LABALBU 4.0 05/06/2022       Any additional findings:    Assessment and Plan:      Diagnosis Orders   1. Fall, subsequent encounter     2. Essential hypertension     3.  Chronic obstructive pulmonary disease, unspecified COPD type ([facility]     4. Current moderate episode of major depressive disorder without prior episode ([facility]         Marcia was seen today for fall and forms. Diagnoses and all orders for this visit:    Fall, subsequent encounter    Essential hypertension  Stable on lisinopril and lopressor     Chronic obstructive pulmonary disease, unspecified COPD type ([facility]  Stable on home inhalers      Current moderate episode of major depressive disorder without prior episode ([facility]  Stable on fluoxetine        Follow-up:   1. F/u with  as scheduled    Marcia received counseling on the following healthy behaviors: nutrition, exercise, medication adherence and tobacco cessation      Was a self-tracking handout given in paper form or via MyPermissionshart? No  If yes, see orders or list here. Discussed use, benefit, and side effects of prescribed medications. Barriers to medication compliance addressed. All patient questions answered. Pt voiced understanding.      Sean Kay MD  PGY-1  Internal Medicine  [facility]   1:[address] [place] 5/12/2022

## 2022-05-12 NOTE — PROGRESS NOTES
Attending Physician Statement  I have discussed the care of Bertram Fischer 17 including pertinent history and exam findings,  with the resident. I have reviewed the key elements of all parts of the encounter with the resident. I agree with the assessment, plan and orders as documented by the resident. (GE Modifier)    Patient has an uncomplicated fall and is requesting disability paperwork. Will provide her with a letter for work excuse for 1 week.  She is advised to see PT and follow up in 2 weeks with the PCP to evaluate the need and completion of the disability paper work    MD [facility]  Attending Physician, [address] [place] [address], Internal Medicine Residency Program  [address] [place] [address]  5/12/2022, 1:30 PM

## 2022-05-12 NOTE — PATIENT INSTRUCTIONS
Medications e-scribe to pharmacy of pt's choice. Referral to Physical Therapy was placed, summary of care printed and faxed to office, phone numbers given to the patient, they will contact office for an appt    Letter for work given to patient.

## 2022-05-12 NOTE — TELEPHONE ENCOUNTER
Request for Albuterol Inhaler. Patient requesting refill for Inhaler.      Next Visit Date:  Future Appointments   Date Time Provider Naheed Trejoi   5/12/2022  1:00 PM Jennifer Tran MD [facility]   Topic Date Due    COVID-19 Vaccine (1) Never done    Pneumococcal 0-64 years Vaccine (2 - PCV) 04/16/2016    Breast cancer screen  10/22/2021    Shingles vaccine (1 of 2) 04/27/2023 (Originally 8/25/2017)    Flu vaccine (Season Ended) 09/01/2022    A1C test (Diabetic or Prediabetic)  04/28/2023    Depression Monitoring  04/28/2023    DTaP/Tdap/Td vaccine (2 - Td or Tdap) 10/20/2024    Lipids  07/27/2026    Colorectal Cancer Screen  02/26/2029    Hepatitis C screen  Completed    HIV screen  Completed    Hepatitis A vaccine  Aged Out    Hepatitis B vaccine  Aged Out    Hib vaccine  Aged Out    Meningococcal (ACWY) vaccine  Aged Out       Hemoglobin A1C (%)   Date Value   04/28/2022 6.0   07/27/2021 6.2 (H)   11/27/2019 6.0             ( goal A1C is < 7)   No results found for: LABMICR  LDL Cholesterol (mg/dL)   Date Value   07/27/2021 96       (goal LDL is <100)   AST (U/L)   Date Value   05/06/2022 48 (H)     ALT (U/L)   Date Value   05/06/2022 25     BUN (mg/dL)   Date Value   05/06/2022 23 (H)     BP Readings from Last 3 Encounters:   05/06/22 136/79   04/28/22 139/77   07/29/21 111/77          (goal 120/80)    All Future Testing planned in [facility]  Lab Frequency Next Occurrence   OBINNA DIGITAL SCREEN W OR WO CAD BILATERAL Once 07/28/2022   POCT activated clotting time PRN          Patient Active Problem List:     HTN (hypertension)     COPD (chronic obstructive pulmonary disease)     Hyperlipidemia     Near syncope     Anxiety     Tobacco abuse     Vesicular rash     Rash of hands     S/P LEEP     Gastroesophageal reflux disease without esophagitis     Trigger finger of right thumb     Trigger finger, right middle finger     Occipital headache     Pre-diabetes     Chest pain     Positive FIT (fecal immunochemical test)     Pancreatitis, unspecified pancreatitis type     Trigger finger, right ring finger     Post-op pain     Trigger ring finger of right hand

## 2022-05-13 RX ORDER — ALBUTEROL SULFATE 90 UG/1
2 AEROSOL, METERED RESPIRATORY (INHALATION) EVERY 6 HOURS PRN
Qty: 8 G | Refills: 5 | Status: SHIPPED | OUTPATIENT
Start: 2022-05-13

## 2022-05-17 ENCOUNTER — OFFICE VISIT (OUTPATIENT)
Dept: INTERNAL MEDICINE | Age: 57
End: 2022-05-17
Payer: COMMERCIAL

## 2022-05-17 VITALS
HEART RATE: 55 BPM | BODY MASS INDEX: 23.43 KG/M2 | DIASTOLIC BLOOD PRESSURE: 82 MMHG | SYSTOLIC BLOOD PRESSURE: 134 MMHG | OXYGEN SATURATION: 99 % | WEIGHT: 124 LBS | TEMPERATURE: 98.1 F

## 2022-05-17 DIAGNOSIS — S39.012A BACK STRAIN, INITIAL ENCOUNTER: ICD-10-CM

## 2022-05-17 DIAGNOSIS — R74.01 TRANSAMINITIS: Primary | ICD-10-CM
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
## 2022-05-17 DIAGNOSIS — Z23 NEED FOR PROPHYLACTIC VACCINATION AGAINST STREPTOCOCCUS PNEUMONIAE (PNEUMOCOCCUS): ICD-10-CM

## 2022-05-17 PROCEDURE — 3017F COLORECTAL CA SCREEN DOC REV: CPT | Performed by: INTERNAL MEDICINE

## 2022-05-17 PROCEDURE — G8427 DOCREV CUR MEDS BY ELIG CLIN: HCPCS | Performed by: INTERNAL MEDICINE

## 2022-05-17 PROCEDURE — 4004F PT TOBACCO SCREEN RCVD TLK: CPT | Performed by: INTERNAL MEDICINE

## 2022-05-17 PROCEDURE — G8420 CALC BMI NORM PARAMETERS: HCPCS | Performed by: INTERNAL MEDICINE

## 2022-05-17 PROCEDURE — 99214 OFFICE O/P EST MOD 30 MIN: CPT | Performed by: INTERNAL MEDICINE

## 2022-05-17 PROCEDURE — 99211 OFF/OP EST MAY X REQ PHY/QHP: CPT | Performed by: INTERNAL MEDICINE

## 2022-05-17 ASSESSMENT — PATIENT HEALTH QUESTIONNAIRE - PHQ9
SUM OF ALL RESPONSES TO PHQ QUESTIONS 1-9: 0
2. FEELING DOWN, DEPRESSED OR HOPELESS: 0
SUM OF ALL RESPONSES TO PHQ9 QUESTIONS 1 & 2: 0
4. FEELING TIRED OR HAVING LITTLE ENERGY: 0
7. TROUBLE CONCENTRATING ON THINGS, SUCH AS READING THE NEWSPAPER OR WATCHING TELEVISION: 0
5. POOR APPETITE OR OVEREATING: 0
SUM OF ALL RESPONSES TO PHQ QUESTIONS 1-9: 0
SUM OF ALL RESPONSES TO PHQ QUESTIONS 1-9: 0
6. FEELING BAD ABOUT YOURSELF - OR THAT YOU ARE A FAILURE OR HAVE LET YOURSELF OR YOUR FAMILY DOWN: 0
10. IF YOU CHECKED OFF ANY PROBLEMS, HOW DIFFICULT HAVE THESE PROBLEMS MADE IT FOR YOU TO DO YOUR WORK, TAKE CARE OF THINGS AT HOME, OR GET ALONG WITH OTHER PEOPLE: 0
SUM OF ALL RESPONSES TO PHQ QUESTIONS 1-9: 0
9. THOUGHTS THAT YOU WOULD BE BETTER OFF DEAD, OR OF HURTING YOURSELF: 0
8. MOVING OR SPEAKING SO SLOWLY THAT OTHER PEOPLE COULD HAVE NOTICED. OR THE OPPOSITE, BEING SO FIGETY OR RESTLESS THAT YOU HAVE BEEN MOVING AROUND A LOT MORE THAN USUAL: 0
3. TROUBLE FALLING OR STAYING ASLEEP: 0
1. LITTLE INTEREST OR PLEASURE IN DOING THINGS: 0

## 2022-05-17 ASSESSMENT — ENCOUNTER SYMPTOMS
BACK PAIN: 1
SINUS PAIN: 0
ABDOMINAL PAIN: 0
COUGH: 1
RHINORRHEA: 0
PHOTOPHOBIA: 0
ABDOMINAL DISTENTION: 0

## 2022-05-17 NOTE — PATIENT INSTRUCTIONS
Return To Clinic 6/21/2022 . After Visit Summary  given and reviewed. It is very important for your care that you keep your appointment. If for some reason you are unable to keep your appointment it is equally important that you call our office at [phone] to cancel your appointment and reschedule. Failure to do so may result in your termination from our practice.     -Bloodwork orders given to patient, they will have them done before their next visit.     -Forms completed, scanned into media and given to patient    -YASMIN Woods

## 2022-05-17 NOTE — PROGRESS NOTES
Kashif Mazariegos   Progress Note        Date of patient's visit: 5/17/2022  Patient's Name:  Cindy Ruiz                   YOB: 1965        PCP:  Kimberlee Corea MD    Marcia Velarde is a 49 y.o. female who presents for   Chief Complaint   Patient presents with    Back Pain     Pt is here to have FMLA forms completed due to her back pain. She states that she fell about 2 weeks ago and is still having pain     Health Maintenance     pneumo given     and follow up of chronic medical problems. Patient Active Problem List   Diagnosis    HTN (hypertension)    COPD (chronic obstructive pulmonary disease)    Hyperlipidemia    Near syncope    Anxiety    Tobacco abuse    S/P LEEP    Gastroesophageal reflux disease without esophagitis    Trigger finger of right thumb    Trigger finger, right middle finger    Occipital headache    Pre-diabetes    Chest pain    Positive FIT (fecal immunochemical test)    Pancreatitis, unspecified pancreatitis type    Trigger finger, right ring finger    Post-op pain    Trigger ring finger of right hand       HISTORY OF PRESENT ILLNESS:    History was obtained from the patient. Patient is here after a mechanical fall on 5/6/22 and she was seen in office last week but unable to get forms completed. Imaging negative for any traumatic findings. Patient still reports posterior wall rib pain and some difficulty with bending down. Was rx T3's which are too strong. Started taking naprosyn and has flexeril but told not to use it together. Needs forms to go back to work. Labs reviewed with her as well. AST was elevated as well as lipase. She reports to drinking heavily. Patient's allergies, medications, past medical, surgical, social and family histories were reviewed and updated as appropriate.     ALLERGIES      Allergies   Allergen Reactions    Adhesive Tape Rash         MEDICATIONS:      Current Outpatient Medications   Medication Sig Dispense Refill    albuterol sulfate HFA (VENTOLIN HFA) 108 (90 Base) MCG/ACT inhaler Inhale 2 puffs into the lungs every 6 hours as needed for Wheezing 8 g 5    naproxen (NAPROSYN) 375 MG tablet Take 1 tablet by mouth 2 times daily as needed for Pain 60 tablet 0    cyclobenzaprine (FLEXERIL) 10 MG tablet Take 1 tablet by mouth nightly as needed for Muscle spasms 30 tablet 0    pantoprazole (PROTONIX) 20 MG tablet Take 2 tablets by mouth daily 30 tablet 0    FLUoxetine (PROZAC) 40 MG capsule Take 1 capsule by mouth 2 times daily 60 capsule 1    LORazepam (ATIVAN) 0.5 MG tablet Take 1 tablet by mouth every 8 hours as needed for Anxiety for up to 30 days. (Patient taking differently: Take 0.5 mg by mouth as needed for Anxiety. ) 30 tablet 0    tiotropium (SPIRIVA HANDIHALER) 18 MCG inhalation capsule Inhale 1 capsule into the lungs daily 90 capsule 3    lisinopril-hydroCHLOROthiazide (PRINZIDE;ZESTORETIC) 10-12.5 MG per tablet TAKE ONE TABLET BY MOUTH DAILY 90 tablet 3    aspirin 81 MG EC tablet Take 81 mg by mouth daily      ondansetron (ZOFRAN ODT) 4 MG disintegrating tablet Take 1 tablet by mouth every 8 hours as needed for Nausea (Patient taking differently: Take 4 mg by mouth as needed for Nausea ) 20 tablet 0    metoprolol tartrate (LOPRESSOR) 50 MG tablet TAKE ONE TABLET BY MOUTH TWICE A  tablet 10     No current facility-administered medications for this visit. Patient Care Team:  Javed Hill MD as PCP - Jackie Keyes MD as PCP - [facility]    PAST MEDICAL AND SURGICAL HISTORY:      Past Medical History:   Diagnosis Date    Anxiety     CAD (coronary artery disease) 04/29/2013    Cath 04/29/13: Minimal CAD.  No stents    Cardiac arrest ([facility] [address]     cardiac arrest at age 25 months/s/p fall from bed with unconscious episode    COPD (chronic obstructive pulmonary disease) (HCC)     GERD (gastroesophageal reflux disease)     Headaches due to old head injury 2010 s/p fall down steps with unconsciousness    History of blood transfusion     Hypertension     managed by Dr. Shauna Magaña PCP    Snores     Trigger finger     right thumb and right middle finger    Wears dentures     FULL UPPER    Wears glasses      Past Surgical History:   Procedure Laterality Date    CARDIAC CATHETERIZATION  2013    minimal cad, no stents  Dr. Rajesh Rush      X's 3    CHOLECYSTECTOMY, LAPAROSCOPIC N/A 09/23/2019    *ADD-ON JACQUI Gomez Nipple LAPAROSCOPIC ROBOTIC performed by Anna Marie Gifford IV, DO at [facility]  02/26/2019    with polypectomy     FINGER TRIGGER RELEASE Right 02/03/2021    FINGER TRIGGER RELEASE 4TH, 5TH DIGITS    FINGER TRIGGER RELEASE Right 2/3/2021    FINGER TRIGGER RELEASE 4TH, 5TH DIGITS (SUPINE) 3080 TABLE, HAND TABLE performed by Lara Yanes DO at [facility] [address] Right 05/02/2017     TRIGGER RELEASE THUMB,  MIDDLE FINGER performed by Hasmukh Ferrara DO at [facility]    TONSILLECTOMY         SOCIAL HISTORY      Social History     Tobacco Use    Smoking status: Current Every Day Smoker     Packs/day: 0.50     Years: 30.00     Pack years: 15.00     Types: Cigarettes    Smokeless tobacco: Never Used   Substance Use Topics    Alcohol use: Yes     Comment: occasionally     Marcia  reports that she has been smoking cigarettes. She has a 15.00 pack-year smoking history. She has never used smokeless tobacco.    FAMILY HISTORY:      Family History   Problem Relation Age of Onset    High Blood Pressure Mother     Depression Mother     High Blood Pressure Father     Cancer Father        REVIEW OF SYSTEMS:    Review of Systems   Constitutional: Negative for chills, fatigue and fever. HENT: Negative for congestion, rhinorrhea and sinus pain. Eyes: Negative for photophobia and visual disturbance. Respiratory: Positive for cough.     Cardiovascular: Negative for chest pain and leg swelling. Gastrointestinal: Negative for abdominal distention and abdominal pain. Endocrine: Negative for cold intolerance and heat intolerance. Musculoskeletal: Positive for arthralgias and back pain. Skin: Negative for pallor and rash. Neurological: Negative for dizziness and facial asymmetry. Psychiatric/Behavioral: Negative for agitation and behavioral problems.              PHYSICAL EXAM:      Vitals:    05/17/22 1328   BP: 134/82   Pulse: 55   Temp: 98.1 °F (36.7 °C)   SpO2: 99%     BP Readings from Last 3 Encounters:   05/17/22 134/82   05/12/22 126/70   05/06/22 136/79       Physical Examination: General appearance - alert, well appearing, and in no distress  Mental status - alert, oriented to person, place, and time  Chest - clear to auscultation, no wheezes, rales or rhonchi, symmetric air entry  Heart - normal rate and regular rhythm  Abdomen - bowel sounds normal  Back exam - full range of motion, no tenderness, palpable spasm or pain on motion  Neurological - alert, oriented, normal speech, no focal findings or movement disorder noted  Musculoskeletal - no joint tenderness, deformity or swelling  Extremities - no pedal edema noted    LABORATORY FINDINGS:    CBC:   Lab Results   Component Value Date    WBC 12.0 05/06/2022    HGB 14.1 05/06/2022     05/06/2022     03/18/2012     BMP:    Lab Results   Component Value Date     05/06/2022    K 4.0 05/06/2022    CL 99 05/06/2022    CO2 24 05/06/2022    BUN 23 05/06/2022    CREATININE 0.75 05/06/2022    GLUCOSE 134 05/06/2022    GLUCOSE 107 03/18/2012     Hemoglobin A1C:   Lab Results   Component Value Date    LABA1C 6.0 04/28/2022     Microalbumin Urine: No results found for: MICROALBUR  Lipid profile:   Lab Results   Component Value Date    CHOL 175 07/27/2021    TRIG 179 07/27/2021    HDL 43 07/27/2021     Thyroid functions:   Lab Results   Component Value Date    TSH 2.88 07/27/2021 Hepatic functions:   Lab Results   Component Value Date    ALT 25 05/06/2022    AST 48 05/06/2022    PROT 6.6 05/06/2022    BILITOT 0.20 05/06/2022    BILIDIR <0.08 05/06/2022    LABALBU 4.0 05/06/2022     Urine Analysis: No results found for: [id number] Calvin Oswald:      Health Maintenance Due   Topic Date Due    COVID-19 Vaccine (1) Never done    Breast cancer screen  10/22/2021       ASSESSMENT AND PLAN:       Diagnosis Orders   1. Transaminitis on labs. Patient was asked about her current pattern of alcohol consumption, and results of screening for alcohol misuse, performed within the past 12 months, have been consistent with harmful or hazardous drinking, but do not meet criteria for alcohol dependence. Patient's individual health risks associated with alcohol misuse were discussed, as well as the likely benefits of abstinence or cutting down. Based upon patient's motivation to change her behavior, the following plan was agreed upon: limit consumption of alcohol to no more than 1 drinks/week and 1 drinks per occasion. Educational materials to reinforce counseling were provided. Patient will follow-up in 1 month(s) with PCP. Provider spent 10 minutes counseling patient. Hepatic Function Panel in 1 month   2. Fall, subsequent encounter  Disability papers completed for time off given by ED and previous provider. 3. Back strain, initial encounter     4. Need for prophylactic vaccination against Streptococcus pneumoniae (pneumococcus)  Pneumococcal Conjugate PCV20, PF (Prevnar 20)         FOLLOW UP:   1. Marcia received counseling on the following healthy behaviors: nutrition and exercise    2. Reviewed prior labs and health maintenance. 3.  Discussed use, benefit, and side effects of prescribed medications. Barriers to medication compliance addressed. All patient questions answered. Pt voiced understanding. 4.  Continue current medications, diet and exercise.             No orders of the defined types were placed in this encounter. Completed Refills               Requested Prescriptions      No prescriptions requested or ordered in this encounter       5. Patient given educational materials - see patient instructions    6. Was a self-tracking handout given in paper form or via [employer]? Yes  If yes, see orders or list here. Orders Placed This Encounter   Procedures    Pneumococcal Conjugate PCV20, PF (Prevnar 20)    Hepatic Function Panel     Please get this labwork done today     Standing Status:   Future     Standing Expiration Date:   5/16/2023       Return in about 4 weeks (around 6/14/2022). Patient voiced understanding and agreed to treatment plan. This note is created with the assistance of a speech-recognition program. While intending to generate a document that actually reflects the content of the visit, the document can still have some mistakes which may not have been identified and corrected by editing.       Dr Aryan Soto MD, [address]  Associate , Department of Internal Medicine  Resident Ambulatory Site Medical Director  [address] [place] [address] Internal Medicine  [address] [place] [address],[address] Floor  Internal Medicine Clerkship - Juan Tucker                   5/17/2022, 2:57 PM

## 2022-06-21 ENCOUNTER — TELEMEDICINE (OUTPATIENT)
Dept: INTERNAL MEDICINE | Age: 57
End: 2022-06-21
Payer: COMMERCIAL

## 2022-06-21 DIAGNOSIS — K21.9 GASTROESOPHAGEAL REFLUX DISEASE WITHOUT ESOPHAGITIS: ICD-10-CM

## 2022-06-21 PROCEDURE — 99441 PR PHYS/QHP TELEPHONE EVALUATION 5-10 MIN: CPT | Performed by: INTERNAL MEDICINE

## 2022-06-21 RX ORDER — PANTOPRAZOLE SODIUM 20 MG/1
40 TABLET, DELAYED RELEASE ORAL DAILY
Qty: 60 TABLET | Refills: 1 | Status: SHIPPED | OUTPATIENT
Start: 2022-06-21 | End: 2022-08-25

## 2022-06-21 NOTE — PROGRESS NOTES
Phoebe Dyer is a 49 y.o. female evaluated via telephone on 6/21/2022 for Back Pain (1 month follow up)  . Documentation:  Reports back is still sore at times but overall showing improvement. GERD  Jaylon complains of heartburn. This has been associated with no other symptoms. She denies belching and eructation. Symptoms have been present for several weeks. She denies dysphagia. She has not lost weight. She denies melena, hematochezia, hematemesis, and coffee ground emesis. Medical therapy in the past has included none. Been out of her PPI for a week due to lack of refill. Did not complete labs yet. Diagnosis Orders   1. Gastroesophageal reflux disease without esophagitis  pantoprazole (PROTONIX) 20 MG tablet         Total Time: minutes: 5-10 minutes    Marcia Webster Lat was evaluated through a synchronous (real-time) audio encounter. Patient identification was verified at the start of the visit. She (or guardian if applicable) is aware that this is a billable service, which includes applicable co-pays. This visit was conducted with the patient's (and/or legal guardian's) verbal consent. She has not had a related appointment within my department in the past 7 days or scheduled within the next 24 hours. The patient was located at Home: [place] [address]. The provider was located at [facility] (Appt Dept): [address],  [address] [facility].     Note: not billable if this call serves to triage the patient into an appointment for the relevant concern    Lashonda Tilley MD

## 2022-06-29 NOTE — TELEPHONE ENCOUNTER
Lopressor refill / script            Next Visit Date:  Future Appointments   Date Time Provider Naheed Amanda   2022 12:40 PM Gerry Nunez MD [facility] [address] Maintenance   Topic Date Due    COVID-19 Vaccine (1) Never done    Breast cancer screen  10/22/2021    Shingles vaccine (1 of 2) 2023 (Originally 2017)    Flu vaccine (Season Ended) 2022    A1C test (Diabetic or Prediabetic)  2023    Depression Monitoring  2023    DTaP/Tdap/Td vaccine (2 - Td or Tdap) 10/20/2024    Lipids  2026    Colorectal Cancer Screen  2029    Pneumococcal 0-64 years Vaccine  Completed    Hepatitis C screen  Completed    HIV screen  Completed    Hepatitis A vaccine  Aged Out    Hepatitis B vaccine  Aged Out    Hib vaccine  Aged Out    Meningococcal (ACWY) vaccine  Aged Out       Hemoglobin A1C (%)   Date Value   2022 6.0   2021 6.2 (H)   2019 6.0             ( goal A1C is < 7)   No results found for: LABMICR  LDL Cholesterol (mg/dL)   Date Value   2021 96   2016 123       (goal LDL is <100)   AST (U/L)   Date Value   2022 48 (H)     ALT (U/L)   Date Value   2022 25     BUN (mg/dL)   Date Value   2022 23 (H)     BP Readings from Last 3 Encounters:   22 134/82   22 126/70   22 136/79          (goal 120/80)    All Future Testing planned in [facility]  Lab Frequency Next Occurrence   OBINNA DIGITAL SCREEN W OR WO CAD BILATERAL Once 2022   Hepatic Function Panel Once 2022   POCT activated clotting time PRN                Patient Active Problem List:     HTN (hypertension)     COPD (chronic obstructive pulmonary disease)     Hyperlipidemia     Near syncope     Anxiety     Tobacco abuse     S/P LEEP     Gastroesophageal reflux disease without esophagitis     Trigger finger of right thumb     Trigger finger, right middle finger     Occipital headache     Pre-diabetes     Chest pain Positive FIT (fecal immunochemical test)     Pancreatitis, unspecified pancreatitis type     Trigger finger, right ring finger     Post-op pain     Trigger ring finger of right hand

## 2022-06-30 RX ORDER — METOPROLOL TARTRATE 50 MG/1
TABLET, FILM COATED ORAL
Qty: 180 TABLET | Refills: 10 | Status: SHIPPED | OUTPATIENT
Start: 2022-06-30

## 2022-07-19 ENCOUNTER — TELEMEDICINE (OUTPATIENT)
Dept: INTERNAL MEDICINE | Age: 57
End: 2022-07-19
Payer: COMMERCIAL

## 2022-07-19 DIAGNOSIS — K21.9 GASTROESOPHAGEAL REFLUX DISEASE WITHOUT ESOPHAGITIS: ICD-10-CM

## 2022-07-19 DIAGNOSIS — U07.1 COVID-19: Primary | ICD-10-CM

## 2022-07-19 PROCEDURE — 99442 PR PHYS/QHP TELEPHONE EVALUATION 11-20 MIN: CPT | Performed by: INTERNAL MEDICINE

## 2022-07-19 RX ORDER — FLUOXETINE HYDROCHLORIDE 20 MG/1
CAPSULE ORAL
COMMUNITY
Start: 2022-06-29 | End: 2022-08-25

## 2022-07-19 ASSESSMENT — PATIENT HEALTH QUESTIONNAIRE - PHQ9
1. LITTLE INTEREST OR PLEASURE IN DOING THINGS: 0
SUM OF ALL RESPONSES TO PHQ QUESTIONS 1-9: 1
5. POOR APPETITE OR OVEREATING: 0
4. FEELING TIRED OR HAVING LITTLE ENERGY: 0
SUM OF ALL RESPONSES TO PHQ QUESTIONS 1-9: 1
6. FEELING BAD ABOUT YOURSELF - OR THAT YOU ARE A FAILURE OR HAVE LET YOURSELF OR YOUR FAMILY DOWN: 0
3. TROUBLE FALLING OR STAYING ASLEEP: 0
10. IF YOU CHECKED OFF ANY PROBLEMS, HOW DIFFICULT HAVE THESE PROBLEMS MADE IT FOR YOU TO DO YOUR WORK, TAKE CARE OF THINGS AT HOME, OR GET ALONG WITH OTHER PEOPLE: 0
7. TROUBLE CONCENTRATING ON THINGS, SUCH AS READING THE NEWSPAPER OR WATCHING TELEVISION: 0
9. THOUGHTS THAT YOU WOULD BE BETTER OFF DEAD, OR OF HURTING YOURSELF: 0
SUM OF ALL RESPONSES TO PHQ QUESTIONS 1-9: 1
2. FEELING DOWN, DEPRESSED OR HOPELESS: 0
8. MOVING OR SPEAKING SO SLOWLY THAT OTHER PEOPLE COULD HAVE NOTICED. OR THE OPPOSITE, BEING SO FIGETY OR RESTLESS THAT YOU HAVE BEEN MOVING AROUND A LOT MORE THAN USUAL: 1
SUM OF ALL RESPONSES TO PHQ9 QUESTIONS 1 & 2: 0
SUM OF ALL RESPONSES TO PHQ QUESTIONS 1-9: 1

## 2022-07-19 NOTE — LETTER
NOTIFICATION RETURN TO WORK / SCHOOL    7/19/2022    Ms. Kashif Maya [id number]      To Whom It May Concern:    Rachel Vines was tested for COVID-19 on 7/19, and the result was negative. She may return to work in 5-6 days. I recommend:return without restrictions    If there are questions or concerns, please have the patient contact our office.         Sincerely,      Jack Easley MD

## 2022-07-19 NOTE — PROGRESS NOTES
Cathie Rodriguez is a 49 y.o. female evaluated via telephone on 7/19/2022 for Gastroesophageal Reflux and Positive For Covid-19 (Tested positive for covid today. Symptoms started last Friday. Fatigue, )  . Documentation:  GLENNY Iyer complains of hx heartburn. This has been associated with no other symptoms. She denies abdominal bloating, belching, and dysphagia. Symptoms have been present for several months. She denies dysphagia. She has not lost weight. She denies melena, hematochezia, hematemesis, and coffee ground emesis. Medical therapy in the past has included proton pump inhibitors. COVID-19 Assessment Note     Subjective   Marcia Lentz is currently Confirmed for COVID-19. Presenting symptoms: fever, cough, nasal congestion, sputum production, diarrhea, nausea and vomiting, headache, and malaise. she denies   fever at this time. No Gi symptoms or loss of taste and smell. Max temperature in last 24 hours: afebrile. last fever 3 days ago. Symptoms began on last week. Exposure source: unknown       Diagnosis Orders   1. COVID-19        2. Gastroesophageal reflux disease without esophagitis        Cont PPI  Cont symptomatic treatment        Total Time: minutes: 11-20 minutes    Marcia Carcamo was evaluated through a synchronous (real-time) audio encounter. Patient identification was verified at the start of the visit. She (or guardian if applicable) is aware that this is a billable service, which includes applicable co-pays. This visit was conducted with the patient's (and/or legal guardian's) verbal consent. She has not had a related appointment within my department in the past 7 days or scheduled within the next 24 hours. The patient was located at Home: [place] [address]. The provider was located at [place] [address] (Appt Dept): [address] [facility],  [address] [place] [address].     Note: not billable if this call serves to triage the patient into an appointment for the relevant concern    Billy Vasques MD

## 2022-08-01 ENCOUNTER — APPOINTMENT (OUTPATIENT)
Dept: CT IMAGING | Age: 57
End: 2022-08-01
Payer: COMMERCIAL

## 2022-08-01 ENCOUNTER — HOSPITAL ENCOUNTER (EMERGENCY)
Age: 57
Discharge: HOME | End: 2022-08-01
Payer: COMMERCIAL

## 2022-08-01 ENCOUNTER — APPOINTMENT (OUTPATIENT)
Dept: GENERAL RADIOLOGY | Age: 57
End: 2022-08-01
Payer: COMMERCIAL

## 2022-08-01 VITALS
BODY MASS INDEX: 23.22 KG/M2 | TEMPERATURE: 98.4 F | WEIGHT: 123 LBS | RESPIRATION RATE: 16 BRPM | SYSTOLIC BLOOD PRESSURE: 168 MMHG | HEIGHT: 61 IN | HEART RATE: 75 BPM | OXYGEN SATURATION: 96 % | DIASTOLIC BLOOD PRESSURE: 89 MMHG

## 2022-08-01 DIAGNOSIS — S09.90XA CLOSED HEAD INJURY, INITIAL ENCOUNTER: Primary | ICD-10-CM

## 2022-08-01 DIAGNOSIS — S70.02XA CONTUSION OF LEFT HIP, INITIAL ENCOUNTER: ICD-10-CM

## 2022-08-01 DIAGNOSIS — S40.012A CONTUSION OF LEFT SHOULDER, INITIAL ENCOUNTER: ICD-10-CM

## 2022-08-01 PROCEDURE — 73030 X-RAY EXAM OF SHOULDER: CPT

## 2022-08-01 PROCEDURE — 73502 X-RAY EXAM HIP UNI 2-3 VIEWS: CPT

## 2022-08-01 PROCEDURE — 72125 CT NECK SPINE W/O DYE: CPT

## 2022-08-01 PROCEDURE — 70450 CT HEAD/BRAIN W/O DYE: CPT

## 2022-08-01 PROCEDURE — 6370000000 HC RX 637 (ALT 250 FOR IP): Performed by: PHYSICIAN ASSISTANT

## 2022-08-01 PROCEDURE — 99284 EMERGENCY DEPT VISIT MOD MDM: CPT

## 2022-08-01 RX ORDER — ACETAMINOPHEN 500 MG
1000 TABLET ORAL ONCE
Status: COMPLETED | OUTPATIENT
Start: 2022-08-01 | End: 2022-08-01

## 2022-08-01 RX ORDER — NAPROXEN 500 MG/1
500 TABLET ORAL 2 TIMES DAILY WITH MEALS
Qty: 20 TABLET | Refills: 0 | Status: SHIPPED | OUTPATIENT
Start: 2022-08-01

## 2022-08-01 RX ORDER — METHOCARBAMOL 750 MG/1
750 TABLET, FILM COATED ORAL 4 TIMES DAILY
Qty: 40 TABLET | Refills: 0 | Status: SHIPPED | OUTPATIENT
Start: 2022-08-01 | End: 2022-08-11

## 2022-08-01 RX ADMIN — ACETAMINOPHEN 1000 MG: 500 TABLET ORAL at 22:21

## 2022-08-01 ASSESSMENT — PAIN SCALES - GENERAL
PAINLEVEL_OUTOF10: 9
PAINLEVEL_OUTOF10: 9

## 2022-08-01 ASSESSMENT — PAIN - FUNCTIONAL ASSESSMENT: PAIN_FUNCTIONAL_ASSESSMENT: 0-10

## 2022-08-01 NOTE — Clinical Note
Dennie Escort was seen and treated in our emergency department on 8/1/2022. She may return to work on 08/03/2022. If you have any questions or concerns, please don't hesitate to call.       Mickey Fernandes PA-C

## 2022-08-02 NOTE — ED PROVIDER NOTES
[address] [place] [address] ED  [facility]      Pt Name: Pérez Jones  MRN: [id number]  [place] 1965  Date ofevaluation: 8/1/2022  Provider: Juvencio Hurst PA-C    CHIEF COMPLAINT       Chief Complaint   Patient presents with    Shoulder Pain     Left, fell at work    Hip Pain     left    Headache         HISTORY OF PRESENT ILLNESS  (Location/Symptom, Timing/Onset, Context/Setting, Quality, Duration, Modifying Factors, Severity.)   Pérez Jones is a 49 y.o. female who presents to the emergency department with left hip pain, left shoulder pain, headache status post injury that occurred at work tonight just prior to arrival.  Patient reports a trip and fall. No LOC. Reports severe headache status post fall. No other complaints. Nursing Notes were reviewed. ALLERGIES     Adhesive tape    CURRENT MEDICATIONS       Previous Medications    ALBUTEROL SULFATE HFA (VENTOLIN HFA) 108 (90 BASE) MCG/ACT INHALER    Inhale 2 puffs into the lungs every 6 hours as needed for Wheezing    ASPIRIN 81 MG EC TABLET    Take 81 mg by mouth daily    CYCLOBENZAPRINE (FLEXERIL) 10 MG TABLET    Take 1 tablet by mouth nightly as needed for Muscle spasms    FLUOXETINE (PROZAC) 20 MG CAPSULE        FLUOXETINE (PROZAC) 40 MG CAPSULE    Take 1 capsule by mouth 2 times daily    LISINOPRIL-HYDROCHLOROTHIAZIDE (PRINZIDE;ZESTORETIC) 10-12.5 MG PER TABLET    TAKE ONE TABLET BY MOUTH DAILY    METOPROLOL TARTRATE (LOPRESSOR) 50 MG TABLET    TAKE ONE TABLET BY MOUTH TWICE A DAY    ONDANSETRON (ZOFRAN ODT) 4 MG DISINTEGRATING TABLET    Take 1 tablet by mouth every 8 hours as needed for Nausea    PANTOPRAZOLE (PROTONIX) 20 MG TABLET    Take 2 tablets by mouth daily    TIOTROPIUM (SPIRIVA HANDIHALER) 18 MCG INHALATION CAPSULE    Inhale 1 capsule into the lungs daily       PAST MEDICAL HISTORY         Diagnosis Date    Anxiety     CAD (coronary artery disease) 04/29/2013    Cath 04/29/13: Minimal CAD.  No stents was reviewed and negative. PHYSICAL EXAM    (up to 7 for level 4, 8 or more for level 5)     ED Triage Vitals   BP Temp Temp Source Heart Rate Resp SpO2 Height Weight   08/01/22 2142 08/01/22 2144 08/01/22 2144 08/01/22 2142 08/01/22 2142 08/01/22 2142 08/01/22 2142 08/01/22 2142   (!) 168/89 98.4 °F (36.9 °C) Oral 75 16 96 % 5' 1\" (1.549 m) 123 lb (55.8 kg)      Physical Exam  Constitutional:       Appearance: She is well-developed. HENT:      Head: Normocephalic. Contusion present. Cardiovascular:      Rate and Rhythm: Normal rate and regular rhythm. Pulmonary:      Effort: Pulmonary effort is normal.      Breath sounds: Normal breath sounds. Abdominal:      Palpations: Abdomen is soft. Musculoskeletal:         General: Normal range of motion. Arms:       Cervical back: Normal range of motion and neck supple. Skin:     General: Skin is warm. Findings: No rash. Neurological:      Mental Status: She is alert and oriented to person, place, and time. GCS: GCS eye subscore is 4. GCS verbal subscore is 5. GCS motor subscore is 6. Cranial Nerves: Cranial nerves are intact. Sensory: Sensation is intact. Motor: Motor function is intact. Psychiatric:         Behavior: Behavior normal.               DIAGNOSTIC RESULTS     EKG: All EKG's are interpreted by the Emergency Department Physician who either signs or Co-signs this chart in the absence of a cardiologist.        RADIOLOGY:   Non-plain film images such as CT, Ultrasound and MRI are read by the radiologist. Plain radiographic images arevisualized and preliminarily interpreted by the emergency physician with the below findings:        Interpretation per the Radiologist below, if available at thetime of this note:          ED BEDSIDE ULTRASOUND:   Performed by ED Physician - none    LABS:  Labs Reviewed - No data to display    All other labs were within normal range or not returned as of this dictation.     EMERGENCY DEPARTMENT COURSE and DIFFERENTIAL DIAGNOSIS/MDM:   Vitals:    Vitals:    08/01/22 2142 08/01/22 2144   BP: (!) 168/89    Pulse: 75    Resp: 16    Temp:  98.4 °F (36.9 °C)   TempSrc:  Oral   SpO2: 96%    Weight: 123 lb (55.8 kg)    Height: 5' 1\" (1.549 m)      Imaging is negative. Patient will treat symptomatically and discharged home. CT brain done due to severe headache status post injury. Imaging negative. No vomiting. CONSULTS:  None    PROCEDURES:  Procedures        FINAL IMPRESSION      1. Closed head injury, initial encounter    2. Contusion of left shoulder, initial encounter    3. Contusion of left hip, initial encounter          DISPOSITION/PLAN   DISPOSITION Decision To Discharge 08/01/2022 11:31:27 PM      PATIENTREFERRED TO:   Jayant Collins MD  [facility]  [phone]    In 3 days      DISCHARGE MEDICATIONS:     New Prescriptions    METHOCARBAMOL (ROBAXIN-750) 750 MG TABLET    Take 1 tablet by mouth in the morning and 1 tablet at noon and 1 tablet in the evening and 1 tablet before bedtime. Do all this for 10 days. NAPROXEN (NAPROSYN) 500 MG TABLET    Take 1 tablet by mouth in the morning and 1 tablet in the evening. Take with meals.            (Please note that portions of this note were completed with a voice recognition program.  Efforts were made to edit thedictations but occasionally words are mis-transcribed.)    GUNNER Seay PA-C  08/01/22 [address] PA-C  08/01/22 5405

## 2022-08-25 DIAGNOSIS — K21.9 GASTROESOPHAGEAL REFLUX DISEASE WITHOUT ESOPHAGITIS: ICD-10-CM

## 2022-08-25 RX ORDER — FLUOXETINE HYDROCHLORIDE 20 MG/1
CAPSULE ORAL
Qty: 60 CAPSULE | Refills: 5 | Status: SHIPPED | OUTPATIENT
Start: 2022-08-25

## 2022-08-25 RX ORDER — PANTOPRAZOLE SODIUM 20 MG/1
TABLET, DELAYED RELEASE ORAL
Qty: 60 TABLET | Refills: 1 | Status: SHIPPED | OUTPATIENT
Start: 2022-08-25 | End: 2022-11-01

## 2022-09-07 DIAGNOSIS — I10 ESSENTIAL HYPERTENSION: Chronic | ICD-10-CM

## 2022-09-07 NOTE — TELEPHONE ENCOUNTER
Lisinopril hctz refill    Last seen 07/19/22   Health Maintenance   Topic Date Due    COVID-19 Vaccine (1) Never done    Breast cancer screen  10/22/2021    Flu vaccine (1) 09/01/2022    Shingles vaccine (1 of 2) 04/27/2023 (Originally 8/25/2017)    A1C test (Diabetic or Prediabetic)  04/28/2023    Depression Monitoring  07/19/2023    DTaP/Tdap/Td vaccine (2 - Td or Tdap) 10/20/2024    Lipids  07/27/2026    Colorectal Cancer Screen  02/26/2029    Pneumococcal 0-64 years Vaccine  Completed    Hepatitis C screen  Completed    HIV screen  Completed    Hepatitis A vaccine  Aged Out    Hepatitis B vaccine  Aged Out    Hib vaccine  Aged Out    Meningococcal (ACWY) vaccine  Aged Out             (applicable per patient's age: Cancer Screenings, Depression Screening, Fall Risk Screening, Immunizations)    Hemoglobin A1C (%)   Date Value   04/28/2022 6.0   07/27/2021 6.2 (H)   11/27/2019 6.0     LDL Cholesterol (mg/dL)   Date Value   07/27/2021 96     AST (U/L)   Date Value   05/06/2022 48 (H)     ALT (U/L)   Date Value   05/06/2022 25     BUN (mg/dL)   Date Value   05/06/2022 23 (H)      (goal A1C is < 7)   (goal LDL is <100) need 30-50% reduction from baseline     BP Readings from Last 3 Encounters:   08/01/22 (!) 168/89   05/17/22 134/82   05/12/22 126/70    (goal /80)      All Future Testing planned in [facility]:  Lab Frequency Next Occurrence   OBINNA DIGITAL SCREEN W OR WO CAD BILATERAL Once 07/28/2022   Hepatic Function Panel Once 06/17/2022   POCT activated clotting time PRN        Next Visit Date:  No future appointments.          Patient Active Problem List:     HTN (hypertension)     COPD (chronic obstructive pulmonary disease)     Hyperlipidemia     Near syncope     Anxiety     Tobacco abuse     S/P LEEP     Gastroesophageal reflux disease without esophagitis     Trigger finger of right thumb     Trigger finger, right middle finger     Occipital headache     Pre-diabetes     Chest pain     Positive FIT (fecal immunochemical test)     Pancreatitis, unspecified pancreatitis type     Trigger finger, right ring finger     Post-op pain     Trigger ring finger of right hand

## 2022-09-08 RX ORDER — LISINOPRIL AND HYDROCHLOROTHIAZIDE 12.5; 1 MG/1; MG/1
TABLET ORAL
Qty: 30 TABLET | Refills: 3 | Status: SHIPPED | OUTPATIENT
Start: 2022-09-08

## 2022-11-01 DIAGNOSIS — K21.9 GASTROESOPHAGEAL REFLUX DISEASE WITHOUT ESOPHAGITIS: ICD-10-CM

## 2022-11-01 RX ORDER — PANTOPRAZOLE SODIUM 20 MG/1
TABLET, DELAYED RELEASE ORAL
Qty: 60 TABLET | Refills: 1 | Status: SHIPPED | OUTPATIENT
Start: 2022-11-01

## 2022-11-01 NOTE — TELEPHONE ENCOUNTER
E-scribe request from [facility] for Pantoprazole 20 mg. Patient is on the waitlist for an appt. Health Maintenance   Topic Date Due    COVID-19 Vaccine (1) Never done    Breast cancer screen  10/22/2021    Flu vaccine (1) 08/01/2022    Shingles vaccine (1 of 2) 04/27/2023 (Originally 8/25/2017)    A1C test (Diabetic or Prediabetic)  04/28/2023    Depression Monitoring  07/19/2023    DTaP/Tdap/Td vaccine (2 - Td or Tdap) 10/20/2024    Lipids  07/27/2026    Colorectal Cancer Screen  02/26/2029    Pneumococcal 0-64 years Vaccine  Completed    Hepatitis C screen  Completed    HIV screen  Completed    Hepatitis A vaccine  Aged Out    Hib vaccine  Aged Out    Meningococcal (ACWY) vaccine  Aged Out             (applicable per patient's age: Cancer Screenings, Depression Screening, Fall Risk Screening, Immunizations)    Hemoglobin A1C (%)   Date Value   04/28/2022 6.0   07/27/2021 6.2 (H)   11/27/2019 6.0     LDL Cholesterol (mg/dL)   Date Value   07/27/2021 96     AST (U/L)   Date Value   05/06/2022 48 (H)     ALT (U/L)   Date Value   05/06/2022 25     BUN (mg/dL)   Date Value   05/06/2022 23 (H)      (goal A1C is < 7)   (goal LDL is <100) need 30-50% reduction from baseline     BP Readings from Last 3 Encounters:   08/01/22 (!) 168/89   05/17/22 134/82   05/12/22 126/70    (goal /80)      All Future Testing planned in [facility]:  Lab Frequency Next Occurrence   OBINNA DIGITAL SCREEN W OR WO CAD BILATERAL Once 07/28/2022   Hepatic Function Panel Once 06/17/2022   POCT activated clotting time PRN        Next Visit Date:  No future appointments.          Patient Active Problem List:     HTN (hypertension)     COPD (chronic obstructive pulmonary disease)     Hyperlipidemia     Near syncope     Anxiety     Tobacco abuse     S/P LEEP     Gastroesophageal reflux disease without esophagitis     Trigger finger of right thumb     Trigger finger, right middle finger     Occipital headache     Pre-diabetes     Chest pain     Positive FIT (fecal immunochemical test)     Pancreatitis, unspecified pancreatitis type     Trigger finger, right ring finger     Post-op pain     Trigger ring finger of right hand

## 2022-12-02 NOTE — PROGRESS NOTES
Needs to see . released by orthopedics after surgery. Keeps c/o pain. Needs PT/OT. Follow up with Orthopedics. Attending Physician Statement  I have discussed the care of Leidy Chisholm, including pertinent history and exam findings,  with the resident. I have reviewed the key elements of all parts of the encounter with the resident. I agree with the assessment, plan and orders as documented by the resident.   (GE Modifier) no

## 2023-02-07 ENCOUNTER — HOSPITAL ENCOUNTER (EMERGENCY)
Age: 58
Discharge: HOME | End: 2023-02-07
Attending: EMERGENCY MEDICINE
Payer: COMMERCIAL

## 2023-02-07 VITALS
DIASTOLIC BLOOD PRESSURE: 67 MMHG | HEIGHT: 61 IN | WEIGHT: 132 LBS | OXYGEN SATURATION: 97 % | TEMPERATURE: 98.2 F | BODY MASS INDEX: 24.92 KG/M2 | SYSTOLIC BLOOD PRESSURE: 131 MMHG | HEART RATE: 75 BPM | RESPIRATION RATE: 16 BRPM

## 2023-02-07 DIAGNOSIS — N30.00 ACUTE CYSTITIS WITHOUT HEMATURIA: Primary | ICD-10-CM

## 2023-02-07 LAB
BILIRUBIN URINE: NEGATIVE
COLOR: YELLOW
EPITHELIAL CELLS UA: ABNORMAL /HPF (ref 0–5)
GLUCOSE UR STRIP.AUTO-MCNC: NEGATIVE MG/DL
KETONES UR STRIP.AUTO-MCNC: NEGATIVE MG/DL
LEUKOCYTE ESTERASE UR QL STRIP.AUTO: ABNORMAL
NITRITE UR QL STRIP.AUTO: NEGATIVE
PROT UR STRIP.AUTO-MCNC: 6 MG/DL (ref 5–8)
PROT UR STRIP.AUTO-MCNC: NEGATIVE MG/DL
RBC CLUMPS #/AREA URNS AUTO: ABNORMAL /HPF (ref 0–2)
SPECIFIC GRAVITY UA: 1.01 (ref 1–1.03)
TURBIDITY: ABNORMAL
URINE HGB: NEGATIVE
UROBILINOGEN, URINE: NORMAL
WBC UA: ABNORMAL /HPF (ref 0–5)

## 2023-02-07 PROCEDURE — 99283 EMERGENCY DEPT VISIT LOW MDM: CPT

## 2023-02-07 PROCEDURE — 81001 URINALYSIS AUTO W/SCOPE: CPT

## 2023-02-07 RX ORDER — SULFAMETHOXAZOLE AND TRIMETHOPRIM 800; 160 MG/1; MG/1
1 TABLET ORAL 2 TIMES DAILY
Qty: 14 TABLET | Refills: 0 | Status: SHIPPED | OUTPATIENT
Start: 2023-02-07 | End: 2023-02-14

## 2023-02-07 ASSESSMENT — PAIN SCALES - GENERAL: PAINLEVEL_OUTOF10: 6

## 2023-02-07 ASSESSMENT — PAIN - FUNCTIONAL ASSESSMENT: PAIN_FUNCTIONAL_ASSESSMENT: 0-10

## 2023-02-07 NOTE — ED PROVIDER NOTES
eMERGENCY dEPARTMENT eNCOUnter   Independent Attestation     Pt Name: Jen Tee  MRN: [id number]  [place] 1965  Date of evaluation: 2/7/23     Jen Tee is a 56 y.o. female with CC: Dysuria (Started Thursday night, worse each day /)        This visit was performed by both a physician and an APC. I performed all aspects of the MDM as documented.       The care is provided during an unprecedented national emergency due to the novel coronavirus, Janak Jama MD  Attending Emergency Physician           Disha Barrett MD  02/07/23 [id number]

## 2023-02-07 NOTE — ED NOTES
Patient presents to ED for pelvic and flank pain with dysuria and is concerned for UTI.       Lamonte Almeida., RN  59/79/54 [id number]

## 2023-02-07 NOTE — ED PROVIDER NOTES
[facility] ED  eMERGENCY dEPARTMENT eNCOUnter      Pt Name: Noreen Lombardo  MRN: [id number]  [place] 1965  Date of evaluation: 2/7/2023  Provider: Edd Marley PA-C    CHIEF COMPLAINT       Chief Complaint   Patient presents with    Dysuria     Started Thursday night, worse each day        HISTORY OF PRESENT ILLNESS  (Location/Symptom, Timing/Onset, Context/Setting, Quality, Duration, Modifying Factors, Severity.)   Noreen Lombardo is a 56 y.o. female who presents to the emergency department. Patient states that she has been having symptoms of a UTI for the last 5 days. She has been having dysuria, flank pain, suprapubic pain and thinks that she may have had a fever and some chills. Patient has had a UTI in the past and she states that this is similar in nature. Patient has not seen anyone and has not taken anything for pain. Nursing Notes were reviewed. ALLERGIES     Adhesive tape    CURRENT MEDICATIONS       Previous Medications    ALBUTEROL SULFATE HFA (VENTOLIN HFA) 108 (90 BASE) MCG/ACT INHALER    Inhale 2 puffs into the lungs every 6 hours as needed for Wheezing    ASPIRIN 81 MG EC TABLET    Take 81 mg by mouth daily    CYCLOBENZAPRINE (FLEXERIL) 10 MG TABLET    Take 1 tablet by mouth nightly as needed for Muscle spasms    FLUOXETINE (PROZAC) 20 MG CAPSULE    TAKE ONE CAPSULE BY MOUTH TWICE A DAY    FLUOXETINE (PROZAC) 40 MG CAPSULE    Take 1 capsule by mouth 2 times daily    LISINOPRIL-HYDROCHLOROTHIAZIDE (PRINZIDE;ZESTORETIC) 10-12.5 MG PER TABLET    TAKE ONE TABLET BY MOUTH DAILY    METOPROLOL TARTRATE (LOPRESSOR) 50 MG TABLET    TAKE ONE TABLET BY MOUTH TWICE A DAY    NAPROXEN (NAPROSYN) 500 MG TABLET    Take 1 tablet by mouth in the morning and 1 tablet in the evening. Take with meals.     ONDANSETRON (ZOFRAN ODT) 4 MG DISINTEGRATING TABLET    Take 1 tablet by mouth every 8 hours as needed for Nausea    PANTOPRAZOLE (PROTONIX) 20 MG TABLET    TAKE TWO TABLETS BY MOUTH DAILY TIOTROPIUM ([place]) 18 MCG INHALATION CAPSULE    Inhale 1 capsule into the lungs daily     PAST MEDICAL HISTORY         Diagnosis Date    Anxiety     CAD (coronary artery disease) 2013    Cath 13: Minimal CAD. No stents    Cardiac arrest ([facility] [address]     cardiac arrest at age 25 months/s/p fall from bed with unconscious episode    COPD (chronic obstructive pulmonary disease) (HCC)     GERD (gastroesophageal reflux disease)     Headaches due to old head injury     s/p fall down steps with unconsciousness    History of blood transfusion     Hypertension     managed by Dr. Maurice Severino PCP    Snores     Trigger finger     right thumb and right middle finger    Wears dentures     FULL UPPER    Wears glasses      SURGICAL HISTORY           Procedure Laterality Date    CARDIAC CATHETERIZATION      minimal cad, no stents  Dr. Waleska Thakur      X's 3    CHOLECYSTECTOMY, LAPAROSCOPIC N/A 2019    *ADD-ON JACQUI Pulido LAPAROSCOPIC ROBOTIC performed by Diego Gifford IV, DO at [address] [place] [address]  2019    with polypectomy     FINGER TRIGGER RELEASE Right 2021    FINGER TRIGGER RELEASE 4TH, 5TH DIGITS    FINGER TRIGGER RELEASE Right 2/3/2021    FINGER TRIGGER RELEASE 4TH, 5TH DIGITS (SUPINE) 3080 TABLE, HAND TABLE performed by Kierra Yoon DO at [address] [place] [address] Right 2017     TRIGGER RELEASE THUMB,  MIDDLE FINGER performed by Clara Lima DO at [facility] (CERVIX REMOVED)      TONSILLECTOMY       FAMILY HISTORY           Problem Relation Age of Onset    High Blood Pressure Mother     Depression Mother     High Blood Pressure Father     Cancer Father      Family Status   Relation Name Status    Mother      Father        SOCIAL HISTORY      reports that she has been smoking cigarettes. She has a 15.00 pack-year smoking history.  She has never used smokeless tobacco. She reports current alcohol use. She reports that she does not use drugs. REVIEW OF SYSTEMS    (2-9 systems for level 4, 10 or more for level 5)     Constitutional: Denies recent fever, chills, weakness. Visual: Denies recent change in vision, discharge or pain. ENT: Denies recent sore throat, nasal congestion, ear pain. Respiratory: Denies recent shortness of breath,  cough , wheezing or pleuritic chest pain. Cardiac:  Denies recent chest pain palpitations dyspnea on exertion or swelling in the lower extremities. GI: denies any recent abdominal pain nausea vomiting or diarrhea. : + dysuria and flank pain . Musculoskeletal :  Denies neck pain back pain joint pain or recent trauma. Neurologic: denies any seizure activity headaches stroke like symptoms or syncope. Skin:  Denies any recent new rash itching or change in skin color. Psychiatric:  Denies any thoughts of suicide homicide. Patient does not voice any problems with anxiety or depression     Except as noted above the remainder of the review of systems was reviewed and negative. PHYSICAL EXAM    (up to 7 for level 4, 8 or more for level 5)     ED Triage Vitals [02/07/23 1617]   BP Temp Temp src Heart Rate Resp SpO2 Height Weight   131/67 98.2 °F (36.8 °C) -- 75 16 97 % 5' 1\" (1.549 m) 132 lb (59.9 kg)       Physical Exam  Vitals and nursing note reviewed. Constitutional:       Appearance: Normal appearance. She is normal weight. HENT:      Head: Normocephalic. Nose: Nose normal.      Mouth/Throat:      Mouth: Mucous membranes are moist.      Pharynx: Oropharynx is clear. Eyes:      Extraocular Movements: Extraocular movements intact. Conjunctiva/sclera: Conjunctivae normal.      Pupils: Pupils are equal, round, and reactive to light. Cardiovascular:      Rate and Rhythm: Normal rate and regular rhythm. Pulses: Normal pulses. Heart sounds: Normal heart sounds.    Pulmonary:      Effort: Pulmonary effort is normal.      Breath sounds: Normal breath sounds. Abdominal:      General: Abdomen is flat. Bowel sounds are normal.      Palpations: Abdomen is soft. Musculoskeletal:         General: Normal range of motion. Cervical back: Normal range of motion and neck supple. Skin:     General: Skin is warm. Neurological:      General: No focal deficit present. Mental Status: She is alert and oriented to person, place, and time. Mental status is at baseline. Psychiatric:         Mood and Affect: Mood normal.         Behavior: Behavior normal.         Thought Content: Thought content normal.         Judgment: Judgment normal.       DIAGNOSTIC RESULTS     EKG: Not clinically indicated    RADIOLOGY: Not clinically indicated. LABS:  Labs Reviewed   URINALYSIS WITH MICROSCOPIC - Abnormal; Notable for the following components:       Result Value    Turbidity UA SLIGHTLY CLOUDY (*)     Leukocyte Esterase, Urine SMALL (*)     All other components within normal limits     All other labs were within normal range or not returned as of this dictation. EMERGENCY DEPARTMENT COURSE and DIFFERENTIAL DIAGNOSIS/MDM:   Vitals:    Vitals:    02/07/23 1617   BP: 131/67   Pulse: 75   Resp: 16   Temp: 98.2 °F (36.8 °C)   SpO2: 97%   Weight: 132 lb (59.9 kg)   Height: 5' 1\" (1.549 m)     Discussed with the patient that we will treat her UTI. MEDICATIONS GIVEN IN THE ED:  Medications - No data to display    CLINICAL DECISION MAKING:  The patient presented alert with a nontoxic appearance and was seen in conjunction with Dr. Aysha Gomez. DDx include UTI, dysuria. I will order labs including UA    Test considered, but not ordered: NONE    The patient was involved in his/her plan of care through shared decision making. The testing that was ordered was discussed with the patient. Any medications that may have been ordered were discussed with the patient.      I have reviewed the patient's previous medical records using the electronic health record that we have available that were pertinent to today's visit. Labs and imaging were reviewed. Results showed UTI    Evaluation and treatment course in the ED, and plan of care upon discharge was discussed in length with the patient. Patient had no further questions prior to being discharged and was instructed to return to the ED for new or worsening symptoms. Care was provided during an unprecedented national emergency due to the novel coronavirus, Covid-19. MIPS  none    CONSULTS:  None    PROCEDURES:  none    FINAL IMPRESSION      1.  Acute cystitis without hematuria        Problem List  Patient Active Problem List   Diagnosis Code    HTN (hypertension) I10    COPD (chronic obstructive pulmonary disease) J44.9    Hyperlipidemia E78.5    Near syncope R55    Anxiety F41.9    Tobacco abuse Z72.0    S/P LEEP Z98.890    Gastroesophageal reflux disease without esophagitis K21.9    Trigger finger of right thumb M65.311    Trigger finger, right middle finger M65.331    Occipital headache R51.9    Pre-diabetes R73.03    Chest pain R07.9    Positive FIT (fecal immunochemical test) R19.5    Pancreatitis, unspecified pancreatitis type K85.90    Trigger finger, right ring finger M65.341    Post-op pain G89.18    Trigger ring finger of right hand M65.341     DISPOSITION/PLAN   DISPOSITION Decision To Discharge 02/07/2023 05:31:08 PM    PATIENT REFERRED TO:   Cathy Gaines MD  [address] [place] [address]09 [phone]    Schedule an appointment as soon as possible for a visit         DISCHARGE MEDICATIONS:     New Prescriptions    SULFAMETHOXAZOLE-TRIMETHOPRIM (BACTRIM DS) 800-160 MG PER TABLET    Take 1 tablet by mouth 2 times daily for 7 days       (Please note that portions of this note were completed with a voice recognition program.  Efforts were made to edit the dictations but occasionally words are mis-transcribed.)    GUNNER Beth, GUNNER  02/07/23 1736

## 2023-02-10 DIAGNOSIS — K21.9 GASTROESOPHAGEAL REFLUX DISEASE WITHOUT ESOPHAGITIS: ICD-10-CM

## 2023-02-10 DIAGNOSIS — I10 ESSENTIAL HYPERTENSION: Chronic | ICD-10-CM

## 2023-02-10 NOTE — TELEPHONE ENCOUNTER
Multiple meds pended    Pt has a future appt     Health Maintenance   Topic Date Due    COVID-19 Vaccine (1) Never done    Breast cancer screen  10/22/2021    Flu vaccine (1) 08/01/2022    Shingles vaccine (1 of 2) 04/27/2023 (Originally 8/25/2017)    A1C test (Diabetic or Prediabetic)  04/28/2023    Depression Monitoring  07/19/2023    DTaP/Tdap/Td vaccine (2 - Td or Tdap) 10/20/2024    Lipids  07/27/2026    Colorectal Cancer Screen  02/26/2029    Pneumococcal 0-64 years Vaccine  Completed    Hepatitis C screen  Completed    HIV screen  Completed    Hepatitis A vaccine  Aged Out    Hib vaccine  Aged Out    Meningococcal (ACWY) vaccine  Aged Out             (applicable per patient's age: Cancer Screenings, Depression Screening, Fall Risk Screening, Immunizations)    Hemoglobin A1C (%)   Date Value   04/28/2022 6.0   07/27/2021 6.2 (H)   11/27/2019 6.0     LDL Cholesterol (mg/dL)   Date Value   07/27/2021 96     AST (U/L)   Date Value   05/06/2022 48 (H)     ALT (U/L)   Date Value   05/06/2022 25     BUN (mg/dL)   Date Value   05/06/2022 23 (H)      (goal A1C is < 7)   (goal LDL is <100) need 30-50% reduction from baseline     BP Readings from Last 3 Encounters:   02/07/23 131/67   08/01/22 (!) 168/89   05/17/22 134/82    (goal /80)      All Future Testing planned in [facility]:  Lab Frequency Next Occurrence   OBINNA DIGITAL SCREEN W OR WO CAD BILATERAL Once 07/28/2022   Hepatic Function Panel Once 06/17/2022   POCT activated clotting time PRN        Next Visit Date:  Future Appointments   Date Time Provider Naheed Patel   3/9/2023  2:20 PM Jack Easley MD [id number]            Patient Active Problem List:     HTN (hypertension)     COPD (chronic obstructive pulmonary disease)     Hyperlipidemia     Near syncope     Anxiety     Tobacco abuse     S/P LEEP     Gastroesophageal reflux disease without esophagitis     Trigger finger of right thumb     Trigger finger, right middle finger     Occipital headache     Pre-diabetes     Chest pain     Positive FIT (fecal immunochemical test)     Pancreatitis, unspecified pancreatitis type     Trigger finger, right ring finger     Post-op pain     Trigger ring finger of right hand

## 2023-02-13 RX ORDER — ALBUTEROL SULFATE 90 UG/1
2 AEROSOL, METERED RESPIRATORY (INHALATION) EVERY 6 HOURS PRN
Qty: 8 G | Refills: 5 | Status: SHIPPED | OUTPATIENT
Start: 2023-02-13

## 2023-02-13 RX ORDER — PANTOPRAZOLE SODIUM 20 MG/1
TABLET, DELAYED RELEASE ORAL
Qty: 60 TABLET | Refills: 1 | Status: SHIPPED | OUTPATIENT
Start: 2023-02-13

## 2023-02-13 RX ORDER — LISINOPRIL AND HYDROCHLOROTHIAZIDE 12.5; 1 MG/1; MG/1
1 TABLET ORAL DAILY
Qty: 30 TABLET | Refills: 3 | Status: SHIPPED | OUTPATIENT
Start: 2023-02-13

## 2023-02-14 ENCOUNTER — HOSPITAL ENCOUNTER (EMERGENCY)
Age: 58
Discharge: HOME | End: 2023-02-15
Payer: COMMERCIAL

## 2023-02-14 VITALS
SYSTOLIC BLOOD PRESSURE: 108 MMHG | WEIGHT: 130 LBS | TEMPERATURE: 97.8 F | BODY MASS INDEX: 24.55 KG/M2 | RESPIRATION RATE: 18 BRPM | OXYGEN SATURATION: 96 % | DIASTOLIC BLOOD PRESSURE: 56 MMHG | HEIGHT: 61 IN | HEART RATE: 68 BPM

## 2023-02-14 DIAGNOSIS — R19.7 DIARRHEA, UNSPECIFIED TYPE: ICD-10-CM

## 2023-02-14 DIAGNOSIS — G43.009 MIGRAINE WITHOUT AURA AND WITHOUT STATUS MIGRAINOSUS, NOT INTRACTABLE: Primary | ICD-10-CM

## 2023-02-14 DIAGNOSIS — E86.0 DEHYDRATION: ICD-10-CM

## 2023-02-14 PROCEDURE — 96361 HYDRATE IV INFUSION ADD-ON: CPT

## 2023-02-14 PROCEDURE — 99285 EMERGENCY DEPT VISIT HI MDM: CPT

## 2023-02-14 PROCEDURE — 96374 THER/PROPH/DIAG INJ IV PUSH: CPT

## 2023-02-14 PROCEDURE — 96372 THER/PROPH/DIAG INJ SC/IM: CPT

## 2023-02-14 ASSESSMENT — PAIN DESCRIPTION - LOCATION: LOCATION: HEAD

## 2023-02-14 ASSESSMENT — PAIN - FUNCTIONAL ASSESSMENT: PAIN_FUNCTIONAL_ASSESSMENT: 0-10

## 2023-02-14 ASSESSMENT — PAIN DESCRIPTION - DESCRIPTORS: DESCRIPTORS: ACHING

## 2023-02-14 ASSESSMENT — PAIN DESCRIPTION - PAIN TYPE: TYPE: ACUTE PAIN

## 2023-02-14 ASSESSMENT — PAIN DESCRIPTION - FREQUENCY: FREQUENCY: CONTINUOUS

## 2023-02-14 ASSESSMENT — PAIN SCALES - GENERAL: PAINLEVEL_OUTOF10: 8

## 2023-02-14 NOTE — Clinical Note
Bibiana Blanchard was seen and treated in our emergency department on 2/14/2023. She may return to work on 02/15/2023.  ? If you have any questions or concerns, please don't hesitate to call.       Ferris Severin, MD

## 2023-02-15 ENCOUNTER — APPOINTMENT (OUTPATIENT)
Dept: GENERAL RADIOLOGY | Age: 58
End: 2023-02-15
Payer: COMMERCIAL

## 2023-02-15 LAB
ABSOLUTE EOS #: 0.11 K/UL (ref 0–0.44)
ABSOLUTE IMMATURE GRANULOCYTE: 0.03 K/UL (ref 0–0.3)
ABSOLUTE LYMPH #: 3.51 K/UL (ref 1.1–3.7)
ABSOLUTE MONO #: 0.71 K/UL (ref 0.1–1.2)
AMORPHOUS: ABNORMAL
ANION GAP SERPL CALCULATED.3IONS-SCNC: 11 MMOL/L (ref 9–17)
ANION GAP SERPL CALCULATED.3IONS-SCNC: 8 MMOL/L (ref 9–17)
BACTERIA: ABNORMAL
BASOPHILS # BLD: 1 % (ref 0–2)
BASOPHILS ABSOLUTE: 0.05 K/UL (ref 0–0.2)
BILIRUBIN URINE: NEGATIVE
BUN SERPL-MCNC: 32 MG/DL (ref 6–20)
BUN SERPL-MCNC: 35 MG/DL (ref 6–20)
BUN/CREAT BLD: 19 (ref 9–20)
BUN/CREAT BLD: 21 (ref 9–20)
CALCIUM SERPL-MCNC: 7.6 MG/DL (ref 8.6–10.4)
CALCIUM SERPL-MCNC: 9.2 MG/DL (ref 8.6–10.4)
CASTS UA: ABNORMAL /LPF
CASTS UA: ABNORMAL /LPF
CHLORIDE SERPL-SCNC: 100 MMOL/L (ref 98–107)
CHLORIDE SERPL-SCNC: 106 MMOL/L (ref 98–107)
CO2 SERPL-SCNC: 22 MMOL/L (ref 20–31)
CO2 SERPL-SCNC: 24 MMOL/L (ref 20–31)
COLOR: YELLOW
CREAT SERPL-MCNC: 1.56 MG/DL (ref 0.5–0.9)
CREAT SERPL-MCNC: 1.85 MG/DL (ref 0.5–0.9)
EKG ATRIAL RATE: 58 BPM
EKG P AXIS: 67 DEGREES
EKG P-R INTERVAL: 120 MS
EKG Q-T INTERVAL: 430 MS
EKG QRS DURATION: 80 MS
EKG QTC CALCULATION (BAZETT): 422 MS
EKG R AXIS: 79 DEGREES
EKG T AXIS: 56 DEGREES
EKG VENTRICULAR RATE: 58 BPM
EOSINOPHILS RELATIVE PERCENT: 1 % (ref 1–4)
EPITHELIAL CELLS UA: ABNORMAL /HPF (ref 0–5)
FLUAV AG SPEC QL: NEGATIVE
FLUBV AG SPEC QL: NEGATIVE
GFR SERPL CREATININE-BSD FRML MDRD: 32 ML/MIN/1.73M2
GFR SERPL CREATININE-BSD FRML MDRD: 39 ML/MIN/1.73M2
GLUCOSE SERPL-MCNC: 100 MG/DL (ref 70–99)
GLUCOSE SERPL-MCNC: 75 MG/DL (ref 70–99)
GLUCOSE UR STRIP.AUTO-MCNC: NEGATIVE MG/DL
HCT VFR BLD AUTO: 40.7 % (ref 36.3–47.1)
HGB BLD-MCNC: 13.1 G/DL (ref 11.9–15.1)
IMMATURE GRANULOCYTES: 0 %
KETONES UR STRIP.AUTO-MCNC: NEGATIVE MG/DL
LEUKOCYTE ESTERASE UR QL STRIP.AUTO: NEGATIVE
LYMPHOCYTES # BLD: 39 % (ref 24–43)
MCH RBC QN AUTO: 27 PG (ref 25.2–33.5)
MCHC RBC AUTO-ENTMCNC: 32.2 G/DL (ref 28.4–34.8)
MCV RBC AUTO: 83.7 FL (ref 82.6–102.9)
MONOCYTES # BLD: 8 % (ref 3–12)
NITRITE UR QL STRIP.AUTO: NEGATIVE
NRBC AUTOMATED: 0 PER 100 WBC
PDW BLD-RTO: 13.5 % (ref 11.8–14.4)
PLATELET # BLD AUTO: 276 K/UL (ref 138–453)
PMV BLD AUTO: 9.8 FL (ref 8.1–13.5)
POTASSIUM SERPL-SCNC: 4 MMOL/L (ref 3.7–5.3)
POTASSIUM SERPL-SCNC: 4.4 MMOL/L (ref 3.7–5.3)
PROT UR STRIP.AUTO-MCNC: 6 MG/DL (ref 5–8)
PROT UR STRIP.AUTO-MCNC: NEGATIVE MG/DL
RBC # BLD: 4.86 M/UL (ref 3.95–5.11)
RBC CLUMPS #/AREA URNS AUTO: ABNORMAL /HPF (ref 0–2)
SARS-COV-2 RDRP RESP QL NAA+PROBE: NOT DETECTED
SEG NEUTROPHILS: 51 % (ref 36–65)
SEGMENTED NEUTROPHILS ABSOLUTE COUNT: 4.65 K/UL (ref 1.5–8.1)
SODIUM SERPL-SCNC: 135 MMOL/L (ref 135–144)
SODIUM SERPL-SCNC: 136 MMOL/L (ref 135–144)
SPECIFIC GRAVITY UA: 1.02 (ref 1–1.03)
SPECIMEN DESCRIPTION: NORMAL
TROPONIN I SERPL DL<=0.01 NG/ML-MCNC: <6 NG/L (ref 0–14)
TURBIDITY: ABNORMAL
URINE HGB: NEGATIVE
UROBILINOGEN, URINE: NORMAL
WBC # BLD AUTO: 9.1 K/UL (ref 3.5–11.3)
WBC UA: ABNORMAL /HPF (ref 0–5)

## 2023-02-15 PROCEDURE — 96372 THER/PROPH/DIAG INJ SC/IM: CPT

## 2023-02-15 PROCEDURE — 93010 ELECTROCARDIOGRAM REPORT: CPT | Performed by: INTERNAL MEDICINE

## 2023-02-15 PROCEDURE — 87635 SARS-COV-2 COVID-19 AMP PRB: CPT

## 2023-02-15 PROCEDURE — 2580000003 HC RX 258

## 2023-02-15 PROCEDURE — 87804 INFLUENZA ASSAY W/OPTIC: CPT

## 2023-02-15 PROCEDURE — 71045 X-RAY EXAM CHEST 1 VIEW: CPT

## 2023-02-15 PROCEDURE — 85025 COMPLETE CBC W/AUTO DIFF WBC: CPT

## 2023-02-15 PROCEDURE — 6360000002 HC RX W HCPCS

## 2023-02-15 PROCEDURE — 81001 URINALYSIS AUTO W/SCOPE: CPT

## 2023-02-15 PROCEDURE — 80048 BASIC METABOLIC PNL TOTAL CA: CPT

## 2023-02-15 PROCEDURE — 93005 ELECTROCARDIOGRAM TRACING: CPT

## 2023-02-15 PROCEDURE — 84484 ASSAY OF TROPONIN QUANT: CPT

## 2023-02-15 RX ORDER — ONDANSETRON 2 MG/ML
4 INJECTION INTRAMUSCULAR; INTRAVENOUS ONCE
Status: COMPLETED | OUTPATIENT
Start: 2023-02-15 | End: 2023-02-15

## 2023-02-15 RX ORDER — KETOROLAC TROMETHAMINE 30 MG/ML
30 INJECTION, SOLUTION INTRAMUSCULAR; INTRAVENOUS ONCE
Status: COMPLETED | OUTPATIENT
Start: 2023-02-15 | End: 2023-02-15

## 2023-02-15 RX ORDER — 0.9 % SODIUM CHLORIDE 0.9 %
1000 INTRAVENOUS SOLUTION INTRAVENOUS ONCE
Status: COMPLETED | OUTPATIENT
Start: 2023-02-15 | End: 2023-02-15

## 2023-02-15 RX ADMIN — KETOROLAC TROMETHAMINE 30 MG: 30 INJECTION, SOLUTION INTRAMUSCULAR; INTRAVENOUS at 00:25

## 2023-02-15 RX ADMIN — ONDANSETRON 4 MG: 2 INJECTION INTRAMUSCULAR; INTRAVENOUS at 00:24

## 2023-02-15 RX ADMIN — SODIUM CHLORIDE 1000 ML: 9 INJECTION, SOLUTION INTRAVENOUS at 01:22

## 2023-02-15 ASSESSMENT — ENCOUNTER SYMPTOMS
SORE THROAT: 0
SINUS PAIN: 0
CHEST TIGHTNESS: 0
VOMITING: 0
FACIAL SWELLING: 0
DIARRHEA: 0
SHORTNESS OF BREATH: 0
ABDOMINAL PAIN: 1
COUGH: 0
NAUSEA: 1
SINUS PRESSURE: 0
RHINORRHEA: 0
CONSTIPATION: 0

## 2023-02-15 ASSESSMENT — PAIN SCALES - GENERAL
PAINLEVEL_OUTOF10: 3
PAINLEVEL_OUTOF10: 2
PAINLEVEL_OUTOF10: 8

## 2023-02-15 ASSESSMENT — PAIN DESCRIPTION - DESCRIPTORS: DESCRIPTORS: ACHING

## 2023-02-15 ASSESSMENT — PAIN DESCRIPTION - LOCATION
LOCATION: HEAD
LOCATION: HEAD

## 2023-02-15 NOTE — ED PROVIDER NOTES
[facility] ED  eMERGENCY dEPARTMENT eNCOUnter      Pt Name: Mary Richard  MRN: [id number]  [place] 1965  Date of evaluation: 2/14/2023  Provider: IRENE Helm [id number]       Chief Complaint   Patient presents with    Headache    Nausea    Generalized Body Aches         HISTORY OF PRESENT ILLNESS  (Location/Symptom, Timing/Onset, Context/Setting, Quality, Duration, Modifying Factors, Severity.)   Mary Richard is a 56 y.o. female who presents to the emergency department via private auto with complaint of right lower quadrant pain and nausea, generalized body aches and chills. Patient reports diffuse headache without vision changes. She states she was at work when she began feeling ill, broke out into a sweat. Patient denies any cough or shortness of breath, chest pain or palpitations. Patient reports history of total hysterectomy and cholecystectomy. Patient rates her pain an 8 out of 10, states she took Tylenol approximately 5 hours ago. Nursing Notes were reviewed. ALLERGIES     Adhesive tape    CURRENT MEDICATIONS       Previous Medications    ALBUTEROL SULFATE HFA (VENTOLIN HFA) 108 (90 BASE) MCG/ACT INHALER    Inhale 2 puffs into the lungs every 6 hours as needed for Wheezing    ASPIRIN 81 MG EC TABLET    Take 81 mg by mouth daily    CYCLOBENZAPRINE (FLEXERIL) 10 MG TABLET    Take 1 tablet by mouth nightly as needed for Muscle spasms    FLUOXETINE (PROZAC) 20 MG CAPSULE    TAKE ONE CAPSULE BY MOUTH TWICE A DAY    FLUOXETINE (PROZAC) 40 MG CAPSULE    Take 1 capsule by mouth 2 times daily    LISINOPRIL-HYDROCHLOROTHIAZIDE (PRINZIDE;ZESTORETIC) 10-12.5 MG PER TABLET    Take 1 tablet by mouth daily    METOPROLOL TARTRATE (LOPRESSOR) 50 MG TABLET    TAKE ONE TABLET BY MOUTH TWICE A DAY    NAPROXEN (NAPROSYN) 500 MG TABLET    Take 1 tablet by mouth in the morning and 1 tablet in the evening. Take with meals.     ONDANSETRON (ZOFRAN ODT) 4 MG DISINTEGRATING TABLET    Take 1 tablet by mouth every 8 hours as needed for Nausea    PANTOPRAZOLE (PROTONIX) 20 MG TABLET    TAKE TWO TABLETS BY MOUTH DAILY    TIOTROPIUM (SPIRIVA HANDIHALER) 18 MCG INHALATION CAPSULE    Inhale 1 capsule into the lungs daily       PAST MEDICAL HISTORY         Diagnosis Date    Anxiety     CAD (coronary artery disease) 2013    Cath 13: Minimal CAD.  No stents    Cardiac arrest ([facility] [address]     cardiac arrest at age 25 months/s/p fall from bed with unconscious episode    COPD (chronic obstructive pulmonary disease) (HCC)     GERD (gastroesophageal reflux disease)     Headaches due to old head injury     s/p fall down steps with unconsciousness    History of blood transfusion     Hypertension     managed by Dr. Pratik Smith PCP    Snores     Trigger finger     right thumb and right middle finger    Wears dentures     FULL UPPER    Wears glasses        SURGICAL HISTORY           Procedure Laterality Date    CARDIAC CATHETERIZATION      minimal cad, no stents  Dr. Hunter Beltran      X's 3    CHOLECYSTECTOMY, LAPAROSCOPIC N/A 2019    *ADD-ON JACQUI Chisholm LAPAROSCOPIC ROBOTIC performed by Tavares Gifford IV, DO at [place] [address]  2019    with polypectomy     FINGER TRIGGER RELEASE Right 2021    FINGER TRIGGER RELEASE 4TH, 5TH DIGITS    FINGER TRIGGER RELEASE Right 2/3/2021    FINGER TRIGGER RELEASE 4TH, 5TH DIGITS (SUPINE) 3080 TABLE, HAND TABLE performed by Lee Ann Easton DO at [address] [place] [address] Right 2017     TRIGGER RELEASE THUMB,  MIDDLE FINGER performed by Michelle Brewer DO at [facility] (CERVIX REMOVED)      TONSILLECTOMY           FAMILY HISTORY           Problem Relation Age of Onset    High Blood Pressure Mother     Depression Mother     High Blood Pressure Father     Cancer Father      Family Status   Relation Name Status    Mother      Father         SOCIAL HISTORY      reports that she has been smoking cigarettes. She has a 15.00 pack-year smoking history. She has never used smokeless tobacco. She reports current alcohol use. She reports that she does not use drugs. REVIEW OF SYSTEMS    (2-9 systems for level 4, 10 or more for level 5)   Review of Systems   Constitutional:  Positive for chills, diaphoresis and fever. Negative for activity change, appetite change and fatigue. HENT:  Negative for congestion, ear discharge, ear pain, facial swelling, postnasal drip, rhinorrhea, sinus pressure, sinus pain and sore throat. Respiratory:  Negative for cough, chest tightness and shortness of breath. Cardiovascular:  Negative for chest pain, palpitations and leg swelling. Gastrointestinal:  Positive for abdominal pain and nausea. Negative for constipation, diarrhea and vomiting. Genitourinary:  Negative for difficulty urinating, dysuria, flank pain, frequency, hematuria, urgency, vaginal discharge and vaginal pain. Musculoskeletal:  Negative for myalgias and neck pain. Skin:  Negative for pallor and rash. Neurological:  Positive for light-headedness and headaches. Negative for dizziness, seizures, syncope, facial asymmetry, speech difficulty, weakness and numbness. Psychiatric/Behavioral:  Negative for behavioral problems and sleep disturbance. The patient is not nervous/anxious. Except as noted above the remainder of the review of systems was reviewed and negative. PHYSICAL EXAM    (up to 7 for level 4, 8 or more for level 5)     ED Triage Vitals [23 2347]   BP Temp Temp Source Heart Rate Resp SpO2 Height Weight   (!) 108/56 97.8 °F (36.6 °C) Skin 68 18 96 % 5' 1\" (1.549 m) 130 lb (59 kg)     Physical Exam  Vitals and nursing note reviewed. Constitutional:       General: She is not in acute distress. Appearance: Normal appearance. She is normal weight. She is not ill-appearing, toxic-appearing or diaphoretic. HENT:      Head: Normocephalic and atraumatic. Right Ear: Tympanic membrane, ear canal and external ear normal. There is no impacted cerumen. Left Ear: Tympanic membrane, ear canal and external ear normal. There is no impacted cerumen. Nose: Congestion present. No rhinorrhea. Mouth/Throat:      Mouth: Mucous membranes are moist.      Pharynx: Oropharynx is clear. No oropharyngeal exudate or posterior oropharyngeal erythema. Comments: Surgically absent tonsils  Eyes:      General: No scleral icterus. Right eye: No discharge. Left eye: No discharge. Extraocular Movements: Extraocular movements intact. Pupils: Pupils are equal, round, and reactive to light. Cardiovascular:      Rate and Rhythm: Normal rate and regular rhythm. Pulses: Normal pulses. Heart sounds: Normal heart sounds. No murmur heard. Pulmonary:      Effort: Pulmonary effort is normal. No respiratory distress. Breath sounds: Normal breath sounds. No stridor. No wheezing, rhonchi or rales. Chest:      Chest wall: No tenderness. Abdominal:      General: Abdomen is flat. Bowel sounds are normal. There is no distension. Palpations: Abdomen is soft. Tenderness: There is abdominal tenderness (RLQ). There is guarding. There is no right CVA tenderness, left CVA tenderness or rebound. Musculoskeletal:         General: No deformity or signs of injury. Normal range of motion. Cervical back: Normal range of motion and neck supple. No tenderness. Right lower leg: No edema. Left lower leg: No edema. Lymphadenopathy:      Cervical: No cervical adenopathy. Skin:     General: Skin is warm and dry. Capillary Refill: Capillary refill takes less than 2 seconds. Coloration: Skin is not jaundiced or pale. Findings: No rash. Neurological:      General: No focal deficit present. Mental Status: She is alert and oriented to person, place, and time. Cranial Nerves: No cranial nerve deficit. Sensory: No sensory deficit. Motor: No weakness. Gait: Gait normal.   Psychiatric:         Mood and Affect: Mood normal.         Behavior: Behavior normal.         Thought Content: Thought content normal.       DIAGNOSTIC RESULTS     EKG: All EKG's are interpreted by the Emergency Department Physician who either signs or Co-signs this chart in the absence of a cardiologist.  EKG was interpreted by Dr. Ayaka Bee after completion. RADIOLOGY:   Non-plain film images such as CT, Ultrasound and MRI are read by the radiologist. Plain radiographic images are visualized and preliminarily interpreted by the emergency physician with the below findings:    Interpretation per the Radiologist below, if available at the time of this note:    XR CHEST PORTABLE    Result Date: 2/15/2023  EXAMINATION: [address] 2/15/2023 12:13 am COMPARISON: 06/10/2021 HISTORY: ORDERING SYSTEM PROVIDED HISTORY: nausea, abdominal pain TECHNOLOGIST PROVIDED HISTORY: nausea, abdominal pain Reason for Exam: nausea, abdominal pain Additional signs and symptoms: nausea, abdominal pain FINDINGS: Chest radiograph: The cardiomediastinal silhouette and hilar contours are normal. Nodular density projecting over left lower lobe may be related to nipple shadow. However finding was not present on prior examination. This area measures approximately 36 mm. The lungs are clear with no focal consolidation, pleural effusion or pneumothorax. The overlying soft tissue and osseous structures do not demonstrate acute abnormality. No acute intrathoracic pathology. Nodular density projecting over left lower lobe may be related to nipple shadow. However finding was not present on prior examination. This area measures approximately 36 mm. If there is need for further evaluation either chest x-ray with nipple markers or chest CT can be performed.           LABS:  Labs Reviewed   BASIC METABOLIC PANEL - Abnormal; Notable for the following components:       Result Value    Glucose 100 (*)     BUN 35 (*)     Creatinine 1.85 (*)     Est, Glom Filt Rate 32 (*)     All other components within normal limits   COVID-19, RAPID   RAPID INFLUENZA A/B ANTIGENS   TROPONIN   CBC WITH AUTO DIFFERENTIAL   URINALYSIS WITH REFLEX TO CULTURE       All other labs were within normal range or not returned as of this dictation. EMERGENCY DEPARTMENT COURSE and DIFFERENTIAL DIAGNOSIS/MDM:   Vitals:    Vitals:    02/14/23 2347   BP: (!) 108/56   Pulse: 68   Resp: 18   Temp: 97.8 °F (36.6 °C)   TempSrc: Skin   SpO2: 96%   Weight: 130 lb (59 kg)   Height: 5' 1\" (1.549 m)         MEDICATIONS GIVEN IN THE ED:  Medications   0.9 % sodium chloride bolus (has no administration in time range)   ondansetron (ZOFRAN) injection 4 mg (4 mg IntraVENous Given 2/15/23 0024)   ketorolac (TORADOL) injection 30 mg (30 mg IntraMUSCular Given 2/15/23 0025)       CLINICAL DECISION MAKING:  The patient presented alert with a nontoxic appearance and was seen in conjunction with Dr. Author Zamora. -Patient presents to ED with complaint described above. Right lower quadrant pain with nausea. Patient denies history of any heart problems, does not see a cardiologist.  Patient reports gradual onset of a headache behind both of her eyes, throbbing and diffuse without vision changes or photophobia. Patient denies history of migraines. Patient does not endorse CVA tenderness, her vital signs are within normal limits. Patient expresses concern for COVID and influenza. DDx include viral URI, covid, influenza , migraine, ACS    I will order labs including CBC, BMP, troponin. EKG, covid and influenza swabs, urinalysis, complete CXR and monitor closely. I ordered zofran and toradol for nausea, headache and body aches.     ED Course as of 02/15/23 0104   Wed Feb 15, 2023   0103 I discussed with the patient her elevated creatinine and that it is likely due to dehydration. I ordered IV fluids and will plan to reevaluate her creatinine prior to discharge. I reported off to Dr. Raymond Awan and her care was resumed by him [AJ]      ED Course User Index  [AJ] IRENE Lord CNP       Care resumed by Dr. Raymond Awan. FINAL IMPRESSION    No diagnosis found. Problem List  Patient Active Problem List   Diagnosis Code    HTN (hypertension) I10    COPD (chronic obstructive pulmonary disease) J44.9    Hyperlipidemia E78.5    Near syncope R55    Anxiety F41.9    Tobacco abuse Z72.0    S/P LEEP Z98.890    Gastroesophageal reflux disease without esophagitis K21.9    Trigger finger of right thumb M65.311    Trigger finger, right middle finger M65.331    Occipital headache R51.9    Pre-diabetes R73.03    Chest pain R07.9    Positive FIT (fecal immunochemical test) R19.5    Pancreatitis, unspecified pancreatitis type K85.90    Trigger finger, right ring finger M65.341    Post-op pain G89.18    Trigger ring finger of right hand M65.341         DISPOSITION/PLAN   DISPOSITION  - pending. Care resumed by Dr. Eli Navas:   No follow-up provider specified.     DISCHARGE MEDICATIONS:     New Prescriptions    No medications on file           (Please note that portions of this note were completed with a voice recognition program.  Efforts were made to edit the dictations but occasionally words are mis-transcribed.)    IRENE Lord CNP, APRN - CNP  02/15/23 0105

## 2023-02-15 NOTE — ED NOTES
Pt resting in bed w/ no s/s of distress.  Patient denies any needs at this time and has call light within reach, will continue to monitor     [facility], RN  02/15/23 4720

## 2023-02-15 NOTE — ED PROVIDER NOTES
eMERGENCY dEPARTMENT eNCOUnter   Independent Attestation     Pt Name: Amarilis Magallon  MRN: [id number]  [place] 1965  Date of evaluation: 2/15/23     Amarilis Magallon is a 56 y.o. female with CC: Headache, Nausea, and Generalized Body Aches      This visit was performed by both a physician and an APC. I performed all aspects of the MDM as documented. Based on the medical record the care appears appropriate. I was personally available for consultation in the Emergency Department.     The care is provided during an unprecedented national emergency due to the novel coronavirus, Cong Gusman MD  Attending Emergency Physician                  Mary Gunderson MD  02/15/23 0622

## 2023-03-09 ENCOUNTER — TELEMEDICINE (OUTPATIENT)
Dept: INTERNAL MEDICINE | Age: 58
End: 2023-03-09

## 2023-03-09 DIAGNOSIS — M79.601 PARESTHESIA AND PAIN OF BOTH UPPER EXTREMITIES: Primary | ICD-10-CM

## 2023-03-09 DIAGNOSIS — G43.809 OTHER MIGRAINE WITHOUT STATUS MIGRAINOSUS, NOT INTRACTABLE: ICD-10-CM

## 2023-03-09 DIAGNOSIS — M79.602 PARESTHESIA AND PAIN OF BOTH UPPER EXTREMITIES: Primary | ICD-10-CM

## 2023-03-09 DIAGNOSIS — R20.2 PARESTHESIA AND PAIN OF BOTH UPPER EXTREMITIES: Primary | ICD-10-CM

## 2023-03-09 RX ORDER — BUTALBITAL, ACETAMINOPHEN AND CAFFEINE 50; 325; 40 MG/1; MG/1; MG/1
1 TABLET ORAL EVERY 4 HOURS PRN
Qty: 30 TABLET | Refills: 3 | Status: SHIPPED | OUTPATIENT
Start: 2023-03-09

## 2023-03-09 SDOH — ECONOMIC STABILITY: FOOD INSECURITY: WITHIN THE PAST 12 MONTHS, YOU WORRIED THAT YOUR FOOD WOULD RUN OUT BEFORE YOU GOT MONEY TO BUY MORE.: NEVER TRUE

## 2023-03-09 SDOH — ECONOMIC STABILITY: FOOD INSECURITY: WITHIN THE PAST 12 MONTHS, THE FOOD YOU BOUGHT JUST DIDN'T LAST AND YOU DIDN'T HAVE MONEY TO GET MORE.: NEVER TRUE

## 2023-03-09 SDOH — ECONOMIC STABILITY: HOUSING INSECURITY
IN THE LAST 12 MONTHS, WAS THERE A TIME WHEN YOU DID NOT HAVE A STEADY PLACE TO SLEEP OR SLEPT IN A SHELTER (INCLUDING NOW)?: NO

## 2023-03-09 SDOH — ECONOMIC STABILITY: INCOME INSECURITY: HOW HARD IS IT FOR YOU TO PAY FOR THE VERY BASICS LIKE FOOD, HOUSING, MEDICAL CARE, AND HEATING?: NOT HARD AT ALL

## 2023-03-09 ASSESSMENT — PATIENT HEALTH QUESTIONNAIRE - PHQ9
2. FEELING DOWN, DEPRESSED OR HOPELESS: 0
6. FEELING BAD ABOUT YOURSELF - OR THAT YOU ARE A FAILURE OR HAVE LET YOURSELF OR YOUR FAMILY DOWN: 0
9. THOUGHTS THAT YOU WOULD BE BETTER OFF DEAD, OR OF HURTING YOURSELF: 0
SUM OF ALL RESPONSES TO PHQ QUESTIONS 1-9: 0
1. LITTLE INTEREST OR PLEASURE IN DOING THINGS: 0
4. FEELING TIRED OR HAVING LITTLE ENERGY: 0
SUM OF ALL RESPONSES TO PHQ QUESTIONS 1-9: 0
7. TROUBLE CONCENTRATING ON THINGS, SUCH AS READING THE NEWSPAPER OR WATCHING TELEVISION: 0
SUM OF ALL RESPONSES TO PHQ QUESTIONS 1-9: 0
5. POOR APPETITE OR OVEREATING: 0
SUM OF ALL RESPONSES TO PHQ9 QUESTIONS 1 & 2: 0
10. IF YOU CHECKED OFF ANY PROBLEMS, HOW DIFFICULT HAVE THESE PROBLEMS MADE IT FOR YOU TO DO YOUR WORK, TAKE CARE OF THINGS AT HOME, OR GET ALONG WITH OTHER PEOPLE: 0
8. MOVING OR SPEAKING SO SLOWLY THAT OTHER PEOPLE COULD HAVE NOTICED. OR THE OPPOSITE, BEING SO FIGETY OR RESTLESS THAT YOU HAVE BEEN MOVING AROUND A LOT MORE THAN USUAL: 0
SUM OF ALL RESPONSES TO PHQ QUESTIONS 1-9: 0
3. TROUBLE FALLING OR STAYING ASLEEP: 0

## 2023-03-09 NOTE — PROGRESS NOTES
Mildred Aschoff is a 56 y.o. female evaluated via telephone on 3/9/2023 for Hypertension and COPD  . Documentation:  Headache:  Patient complains of a few weeks of history of headache. They last days. Reports she was told she has migraines and use to get shot in ER. Headache is described as throbbing, and is usually not preceded by an aura. Associated symptoms include: paresthesias- in arms and hands and weakness- arms and hands . She denies visual change, change in speech, and tinnitus. Abortive treatment has included  tylenol and ibuprofen . Left shoulder is stiff and painful as the day progresses. Diagnosis Orders   1. Paresthesia and pain of both upper extremities  XR SHOULDER LEFT (MIN 2 VIEWS)    XR CERVICAL SPINE FLEXION AND EXTENSION      2. Other migraine without status migrainosus, not intractable  butalbital-acetaminophen-caffeine (FIORICET, ESGIC) -40 MG per tablet          Total Time: minutes: 11-20 minutes    Marcia M Danyelle Novoa was evaluated through a synchronous (real-time) audio encounter. Patient identification was verified at the start of the visit. She (or guardian if applicable) is aware that this is a billable service, which includes applicable co-pays. This visit was conducted with the patient's (and/or legal guardian's) verbal consent. She has not had a related appointment within my department in the past 7 days or scheduled within the next 24 hours. The patient was located at Home: [place] [address]. The provider was located at Home ([address]: [place].     Note: not billable if this call serves to triage the patient into an appointment for the relevant concern    Jessica Sr MD

## 2023-03-16 ENCOUNTER — HOSPITAL ENCOUNTER (OUTPATIENT)
Age: 58
Discharge: HOME | End: 2023-03-16
Payer: COMMERCIAL

## 2023-03-16 ENCOUNTER — HOSPITAL ENCOUNTER (OUTPATIENT)
Dept: GENERAL RADIOLOGY | Age: 58
Discharge: HOME | End: 2023-03-18
Payer: COMMERCIAL

## 2023-03-16 ENCOUNTER — HOSPITAL ENCOUNTER (OUTPATIENT)
Age: 58
Discharge: HOME | End: 2023-03-18
Payer: COMMERCIAL

## 2023-03-16 ENCOUNTER — TELEPHONE (OUTPATIENT)
Dept: INTERNAL MEDICINE | Age: 58
End: 2023-03-16

## 2023-03-16 DIAGNOSIS — M79.602 PARESTHESIA AND PAIN OF BOTH UPPER EXTREMITIES: ICD-10-CM

## 2023-03-16 DIAGNOSIS — R74.01 TRANSAMINITIS: ICD-10-CM

## 2023-03-16 DIAGNOSIS — R20.2 PARESTHESIA AND PAIN OF BOTH UPPER EXTREMITIES: ICD-10-CM

## 2023-03-16 DIAGNOSIS — M19.012 ARTHRITIS OF LEFT SHOULDER REGION: Primary | ICD-10-CM

## 2023-03-16 DIAGNOSIS — M79.601 PARESTHESIA AND PAIN OF BOTH UPPER EXTREMITIES: ICD-10-CM

## 2023-03-16 LAB
ALBUMIN SERPL-MCNC: 4.7 G/DL (ref 3.5–5.2)
ALBUMIN/GLOBULIN RATIO: 1.7 (ref 1–2.5)
ALP SERPL-CCNC: 122 U/L (ref 35–104)
ALT SERPL-CCNC: 17 U/L (ref 5–33)
AST SERPL-CCNC: 26 U/L
BILIRUB DIRECT SERPL-MCNC: <0.1 MG/DL
BILIRUB INDIRECT SERPL-MCNC: ABNORMAL MG/DL (ref 0–1)
BILIRUB SERPL-MCNC: 0.4 MG/DL (ref 0.3–1.2)
PROT SERPL-MCNC: 7.4 G/DL (ref 6.4–8.3)

## 2023-03-16 PROCEDURE — 36415 COLL VENOUS BLD VENIPUNCTURE: CPT

## 2023-03-16 PROCEDURE — 73030 X-RAY EXAM OF SHOULDER: CPT

## 2023-03-16 PROCEDURE — 72040 X-RAY EXAM NECK SPINE 2-3 VW: CPT

## 2023-03-16 PROCEDURE — 80076 HEPATIC FUNCTION PANEL: CPT

## 2023-03-17 NOTE — TELEPHONE ENCOUNTER
Pt asking does she need to be seen to get the work note or can only have a work note for appts that interrupt work?  Please advise

## 2023-03-21 NOTE — TELEPHONE ENCOUNTER
Xray shows arthritis. If on going pain lets set her up for PT and even orthopedics if patient agreeable. A note can be provided for appointment but given xray findings can't take off work without further evaluation.

## 2023-04-18 ENCOUNTER — TELEPHONE (OUTPATIENT)
Dept: INTERNAL MEDICINE | Age: 58
End: 2023-04-18

## 2023-07-03 NOTE — TELEPHONE ENCOUNTER
Medication refill for Metoprolol    Last visit: 3/9/23  Last Med refill: 6/30/22  Does patient have enough medication for 72 hours: No:     Next Visit Date:  No future appointments.     Health Maintenance   Topic Date Due    COVID-19 Vaccine (1) Never done    Shingles vaccine (1 of 2) Never done    Breast cancer screen  10/22/2021    A1C test (Diabetic or Prediabetic)  04/28/2023    Flu vaccine (1) 08/01/2023    GFR test (Diabetes, CKD 3-4, OR last GFR 15-59)  02/15/2024    Depression Monitoring  03/09/2024    DTaP/Tdap/Td vaccine (2 - Td or Tdap) 10/20/2024    Lipids  07/27/2026    Colorectal Cancer Screen  02/26/2029    Pneumococcal 0-64 years Vaccine  Completed    Hepatitis C screen  Completed    HIV screen  Completed    Hepatitis A vaccine  Aged Out    Hib vaccine  Aged Out    Meningococcal (ACWY) vaccine  Aged Out    Cervical cancer screen  Discontinued       Hemoglobin A1C (%)   Date Value   04/28/2022 6.0   07/27/2021 6.2 (H)   11/27/2019 6.0             ( goal A1C is < 7)   No results found for: LABMICR  LDL Cholesterol (mg/dL)   Date Value   07/27/2021 96   04/25/2016 123       (goal LDL is <100)   AST (U/L)   Date Value   03/16/2023 26     ALT (U/L)   Date Value   03/16/2023 17     BUN (mg/dL)   Date Value   02/15/2023 32 (H)     BP Readings from Last 3 Encounters:   02/14/23 (!) 108/56   02/07/23 131/67   08/01/22 (!) 168/89          (goal 120/80)    All Future Testing planned in [facility]  Lab Frequency Next Occurrence   POCT activated clotting time PRN                Patient Active Problem List:     HTN (hypertension)     COPD (chronic obstructive pulmonary disease)     Hyperlipidemia     Near syncope     Anxiety     Tobacco abuse     S/P LEEP     Gastroesophageal reflux disease without esophagitis     Trigger finger of right thumb     Trigger finger, right middle finger     Occipital headache     Pre-diabetes     Chest pain     Positive FIT (fecal immunochemical test)     Pancreatitis, unspecified

## 2023-07-04 RX ORDER — METOPROLOL TARTRATE 50 MG/1
TABLET, FILM COATED ORAL
Qty: 180 TABLET | Refills: 8 | Status: SHIPPED | OUTPATIENT
Start: 2023-07-04

## 2023-08-23 ENCOUNTER — TELEPHONE (OUTPATIENT)
Dept: INTERNAL MEDICINE | Age: 58
End: 2023-08-23

## 2023-09-25 PROBLEM — C44.311 BASAL CELL CARCINOMA (BCC) OF SUPRATIP OF NOSE: Status: ACTIVE | Noted: 2023-09-25

## 2023-12-09 ENCOUNTER — HOSPITAL ENCOUNTER (EMERGENCY)
Age: 58
Discharge: HOME | End: 2023-12-09
Attending: STUDENT IN AN ORGANIZED HEALTH CARE EDUCATION/TRAINING PROGRAM
Payer: COMMERCIAL

## 2023-12-09 ENCOUNTER — APPOINTMENT (OUTPATIENT)
Dept: GENERAL RADIOLOGY | Age: 58
End: 2023-12-09
Payer: COMMERCIAL

## 2023-12-09 VITALS
SYSTOLIC BLOOD PRESSURE: 135 MMHG | BODY MASS INDEX: 24.56 KG/M2 | HEIGHT: 61 IN | RESPIRATION RATE: 19 BRPM | WEIGHT: 130.07 LBS | HEART RATE: 68 BPM | DIASTOLIC BLOOD PRESSURE: 73 MMHG | TEMPERATURE: 98.2 F | OXYGEN SATURATION: 98 %

## 2023-12-09 DIAGNOSIS — F41.1 ANXIETY STATE: ICD-10-CM

## 2023-12-09 DIAGNOSIS — R07.9 CHEST PAIN, UNSPECIFIED TYPE: Primary | ICD-10-CM

## 2023-12-09 LAB
ANION GAP SERPL CALCULATED.3IONS-SCNC: 12 MMOL/L (ref 9–17)
BASOPHILS # BLD: 0.08 K/UL (ref 0–0.2)
BASOPHILS NFR BLD: 1 % (ref 0–2)
BNP SERPL-MCNC: 335 PG/ML
BUN SERPL-MCNC: 18 MG/DL (ref 6–20)
BUN/CREAT SERPL: 20 (ref 9–20)
CALCIUM SERPL-MCNC: 9.1 MG/DL (ref 8.6–10.4)
CHLORIDE SERPL-SCNC: 100 MMOL/L (ref 98–107)
CO2 SERPL-SCNC: 26 MMOL/L (ref 20–31)
CREAT SERPL-MCNC: 0.9 MG/DL (ref 0.5–0.9)
EKG ATRIAL RATE: 68 BPM
EKG P AXIS: 73 DEGREES
EKG P-R INTERVAL: 116 MS
EKG Q-T INTERVAL: 416 MS
EKG QRS DURATION: 82 MS
EKG QTC CALCULATION (BAZETT): 442 MS
EKG R AXIS: 81 DEGREES
EKG T AXIS: 60 DEGREES
EKG VENTRICULAR RATE: 68 BPM
EOSINOPHIL # BLD: 0.17 K/UL (ref 0–0.44)
EOSINOPHILS RELATIVE PERCENT: 2 % (ref 1–4)
ERYTHROCYTE [DISTWIDTH] IN BLOOD BY AUTOMATED COUNT: 13.9 % (ref 11.8–14.4)
GFR SERPL CREATININE-BSD FRML MDRD: >60 ML/MIN/1.73M2
GLUCOSE SERPL-MCNC: 105 MG/DL (ref 70–99)
HCT VFR BLD AUTO: 40.9 % (ref 36.3–47.1)
HGB BLD-MCNC: 13.6 G/DL (ref 11.9–15.1)
IMM GRANULOCYTES # BLD AUTO: 0.04 K/UL (ref 0–0.3)
IMM GRANULOCYTES NFR BLD: 0 %
LYMPHOCYTES NFR BLD: 3.93 K/UL (ref 1.1–3.7)
LYMPHOCYTES RELATIVE PERCENT: 40 % (ref 24–43)
MCH RBC QN AUTO: 27.8 PG (ref 25.2–33.5)
MCHC RBC AUTO-ENTMCNC: 33.3 G/DL (ref 28.4–34.8)
MCV RBC AUTO: 83.6 FL (ref 82.6–102.9)
MONOCYTES NFR BLD: 0.93 K/UL (ref 0.1–1.2)
MONOCYTES NFR BLD: 10 % (ref 3–12)
NEUTROPHILS NFR BLD: 47 % (ref 36–65)
NEUTS SEG NFR BLD: 4.66 K/UL (ref 1.5–8.1)
NRBC BLD-RTO: 0 PER 100 WBC
PLATELET # BLD AUTO: 293 K/UL (ref 138–453)
PMV BLD AUTO: 9.8 FL (ref 8.1–13.5)
POTASSIUM SERPL-SCNC: 3.8 MMOL/L (ref 3.7–5.3)
RBC # BLD AUTO: 4.89 M/UL (ref 3.95–5.11)
SODIUM SERPL-SCNC: 138 MMOL/L (ref 135–144)
TROPONIN I SERPL HS-MCNC: <6 NG/L (ref 0–14)
TROPONIN I SERPL HS-MCNC: <6 NG/L (ref 0–14)
WBC OTHER # BLD: 9.8 K/UL (ref 3.5–11.3)

## 2023-12-09 PROCEDURE — 80048 BASIC METABOLIC PNL TOTAL CA: CPT

## 2023-12-09 PROCEDURE — 71045 X-RAY EXAM CHEST 1 VIEW: CPT

## 2023-12-09 PROCEDURE — 83880 ASSAY OF NATRIURETIC PEPTIDE: CPT

## 2023-12-09 PROCEDURE — 99285 EMERGENCY DEPT VISIT HI MDM: CPT

## 2023-12-09 PROCEDURE — 84484 ASSAY OF TROPONIN QUANT: CPT

## 2023-12-09 PROCEDURE — 85025 COMPLETE CBC W/AUTO DIFF WBC: CPT

## 2023-12-09 PROCEDURE — 93005 ELECTROCARDIOGRAM TRACING: CPT | Performed by: STUDENT IN AN ORGANIZED HEALTH CARE EDUCATION/TRAINING PROGRAM

## 2023-12-09 RX ORDER — LORAZEPAM 0.5 MG/1
0.5 TABLET ORAL EVERY 8 HOURS PRN
Qty: 10 TABLET | Refills: 0 | Status: SHIPPED | OUTPATIENT
Start: 2023-12-09 | End: 2023-12-14

## 2023-12-09 RX ORDER — FLUOXETINE HYDROCHLORIDE 20 MG/1
20 CAPSULE ORAL 2 TIMES DAILY
Qty: 60 CAPSULE | Refills: 0 | Status: SHIPPED | OUTPATIENT
Start: 2023-12-09

## 2023-12-09 NOTE — ED NOTES
Patient presents to ED with complaints of chest pain and shortness of breath that happened this evening while at work. Patient states that she does have a hx of anxiety and not sure if this is contributing to her pain. Also reports that she has been out of her prozac for the last 5 days.       Cris Cabrera RN  12/09/23 9398

## 2023-12-11 LAB
EKG ATRIAL RATE: 68 BPM
EKG P AXIS: 73 DEGREES
EKG P-R INTERVAL: 116 MS
EKG Q-T INTERVAL: 416 MS
EKG QRS DURATION: 82 MS
EKG QTC CALCULATION (BAZETT): 442 MS
EKG R AXIS: 81 DEGREES
EKG T AXIS: 60 DEGREES
EKG VENTRICULAR RATE: 68 BPM

## 2023-12-11 PROCEDURE — 93010 ELECTROCARDIOGRAM REPORT: CPT | Performed by: INTERNAL MEDICINE

## 2023-12-12 NOTE — ED PROVIDER NOTES
[facility]      Pt Name: Soo Hall  MRN: [id number]  [address] [facility] 1965  Date of evaluation: 12/12/23    CHIEF COMPLAINT       Chief Complaint   Patient presents with    Chest Pain    Shortness of Breath       HISTORY OF PRESENT ILLNESS   Marcia Barber is a 66 y.o. female who presents with chest pain, shortness of breath. States she recently ran out of her Prozac about a week ago. Has been feeling anxious since this occurred. Does endorse previous coronary artery disease no stents but wanted to make sure there was nothing life-threatening. States symptoms are minimal currently. PASTMEDICAL HISTORY     Past Medical History:   Diagnosis Date    Anxiety     CAD (coronary artery disease) 04/29/2013    Cath 04/29/13: Minimal CAD.  No stents    Cancer ([address])     basal cell    Cardiac arrest ([address])     cardiac arrest at age 25 months/s/p fall from bed with unconscious episode    COPD (chronic obstructive pulmonary disease) (HCC)     GERD (gastroesophageal reflux disease)     Headaches due to old head injury 2010    s/p fall down steps with unconsciousness    History of blood transfusion     Hypertension     managed by Dr. Misael Velasquez PCP    Snores     Trigger finger     right thumb and right middle finger    Wears dentures     FULL UPPER    Wears glasses      Past Problem List  Patient Active Problem List   Diagnosis Code    HTN (hypertension) I10    COPD (chronic obstructive pulmonary disease) J44.9    Hyperlipidemia E78.5    Near syncope R55    Anxiety F41.9    Tobacco abuse Z72.0    S/P LEEP Z98.890    Gastroesophageal reflux disease without esophagitis K21.9    Trigger finger of right thumb M65.311    Trigger finger, right middle finger M65.331    Occipital headache R51.9    Pre-diabetes R73.03    Chest pain R07.9    Positive FIT (fecal immunochemical test) R19.5    Pancreatitis, unspecified pancreatitis type K85.90    Trigger finger, right ring finger M65.341    Post-op pain G89.18

## 2024-02-19 NOTE — DISCHARGE INSTRUCTIONS
Activity  You have had anesthesia today  Do not drive, operate heavy equipment, consume alcoholic beverages, or make any important decisions  for 24 hours   If you are taking pain medication: Do not drive or consume alcohol.  Take your time changing positions today. You may feel light headed or dizzy if you move too quickly.   Continue your home medications as ordered by your physician.  Diet   You can eat your normal diet when you feel well. You should start off with bland foods like chicken soup, toast, or yogurt. Then advance as tolerated.  Drink plenty of fluids (unless your doctor tells you not to). Your urine should be very lightly colored without a strong odor.       You may have some pain at the surgery site after the procedure.     You should avoid aspirin products and over the counter products  for 3 days.     Keep areas clean and dry.     No strenuous activity for 24  HOURS     No swimming, no tub baths,no hot tubs    Eat lightly at first, advance diet as tolerated. Drink plenty of fluids.    Keep it covered with a sterile bandage until follow up appointment     Steri-strips  will fall off on their own in about a week.    You may shower 24 hours after the procedure.    Pat the wound dry after you have washed it with a mild soap.    Do not submerge the wound in water until it is well-healed.      Take pain medication as directed, if necessary per instructions.     Complete ALL antibiotics as directed for entire duration of therapy.     Stitches will be left in the skin until your follow up appointment.     Call Your Doctor if any of the following occurs:     Signs of infection, including fever and chills   Redness, swelling, increasing pain, excessive bleeding, or discharge from the incision site   Pain that you cannot control with the medicines you have been given   Any new symptoms

## 2024-02-26 ENCOUNTER — ANESTHESIA EVENT (OUTPATIENT)
Dept: OPERATING ROOM | Age: 59
End: 2024-02-26
Payer: COMMERCIAL

## 2024-02-27 ENCOUNTER — ANESTHESIA (OUTPATIENT)
Dept: OPERATING ROOM | Age: 59
End: 2024-02-27
Payer: COMMERCIAL

## 2024-02-27 ENCOUNTER — HOSPITAL ENCOUNTER (OUTPATIENT)
Age: 59
Setting detail: OUTPATIENT SURGERY
Discharge: HOME | End: 2024-02-27
Attending: PLASTIC SURGERY | Admitting: PLASTIC SURGERY
Payer: COMMERCIAL

## 2024-02-27 VITALS
SYSTOLIC BLOOD PRESSURE: 109 MMHG | TEMPERATURE: 98.9 F | HEART RATE: 78 BPM | DIASTOLIC BLOOD PRESSURE: 50 MMHG | HEIGHT: 61 IN | BODY MASS INDEX: 24.92 KG/M2 | RESPIRATION RATE: 19 BRPM | WEIGHT: 132 LBS | OXYGEN SATURATION: 95 %

## 2024-02-27 DIAGNOSIS — G89.18 PAIN FOLLOWING SURGERY OR PROCEDURE: Primary | ICD-10-CM

## 2024-02-27 PROCEDURE — 2580000003 HC RX 258: Performed by: ANESTHESIOLOGY

## 2024-02-27 PROCEDURE — 3700000000 HC ANESTHESIA ATTENDED CARE: Performed by: PLASTIC SURGERY

## 2024-02-27 PROCEDURE — 3600000012 HC SURGERY LEVEL 2 ADDTL 15MIN: Performed by: PLASTIC SURGERY

## 2024-02-27 PROCEDURE — 6370000000 HC RX 637 (ALT 250 FOR IP)

## 2024-02-27 PROCEDURE — 2500000003 HC RX 250 WO HCPCS: Performed by: PLASTIC SURGERY

## 2024-02-27 PROCEDURE — 7100000011 HC PHASE II RECOVERY - ADDTL 15 MIN: Performed by: PLASTIC SURGERY

## 2024-02-27 PROCEDURE — 7100000001 HC PACU RECOVERY - ADDTL 15 MIN: Performed by: PLASTIC SURGERY

## 2024-02-27 PROCEDURE — 2709999900 HC NON-CHARGEABLE SUPPLY: Performed by: PLASTIC SURGERY

## 2024-02-27 PROCEDURE — 3600000002 HC SURGERY LEVEL 2 BASE: Performed by: PLASTIC SURGERY

## 2024-02-27 PROCEDURE — 3700000001 HC ADD 15 MINUTES (ANESTHESIA): Performed by: PLASTIC SURGERY

## 2024-02-27 PROCEDURE — 7100000010 HC PHASE II RECOVERY - FIRST 15 MIN: Performed by: PLASTIC SURGERY

## 2024-02-27 PROCEDURE — 6360000002 HC RX W HCPCS

## 2024-02-27 PROCEDURE — 2500000003 HC RX 250 WO HCPCS

## 2024-02-27 PROCEDURE — 7100000000 HC PACU RECOVERY - FIRST 15 MIN: Performed by: PLASTIC SURGERY

## 2024-02-27 RX ORDER — CEPHALEXIN 500 MG/1
500 CAPSULE ORAL 3 TIMES DAILY
Qty: 21 CAPSULE | Refills: 0 | Status: SHIPPED | OUTPATIENT
Start: 2024-02-27 | End: 2024-03-05

## 2024-02-27 RX ORDER — MIDAZOLAM HYDROCHLORIDE 1 MG/ML
INJECTION INTRAMUSCULAR; INTRAVENOUS PRN
Status: DISCONTINUED | OUTPATIENT
Start: 2024-02-27 | End: 2024-02-27 | Stop reason: SDUPTHER

## 2024-02-27 RX ORDER — GLYCOPYRROLATE 0.2 MG/ML
0.4 INJECTION INTRAMUSCULAR; INTRAVENOUS ONCE
Status: DISCONTINUED | OUTPATIENT
Start: 2024-02-27 | End: 2024-02-27 | Stop reason: HOSPADM

## 2024-02-27 RX ORDER — IPRATROPIUM BROMIDE AND ALBUTEROL SULFATE 2.5; .5 MG/3ML; MG/3ML
SOLUTION RESPIRATORY (INHALATION)
Status: COMPLETED
Start: 2024-02-27 | End: 2024-02-27

## 2024-02-27 RX ORDER — CEFAZOLIN 2 G/1
INJECTION, POWDER, FOR SOLUTION INTRAMUSCULAR; INTRAVENOUS
Status: COMPLETED
Start: 2024-02-27 | End: 2024-02-27

## 2024-02-27 RX ORDER — MEPERIDINE HYDROCHLORIDE 50 MG/ML
12.5 INJECTION INTRAMUSCULAR; INTRAVENOUS; SUBCUTANEOUS EVERY 5 MIN PRN
Status: DISCONTINUED | OUTPATIENT
Start: 2024-02-27 | End: 2024-02-27 | Stop reason: HOSPADM

## 2024-02-27 RX ORDER — SODIUM CHLORIDE 9 MG/ML
INJECTION, SOLUTION INTRAVENOUS PRN
Status: DISCONTINUED | OUTPATIENT
Start: 2024-02-27 | End: 2024-02-27 | Stop reason: HOSPADM

## 2024-02-27 RX ORDER — SODIUM CHLORIDE, SODIUM LACTATE, POTASSIUM CHLORIDE, CALCIUM CHLORIDE 600; 310; 30; 20 MG/100ML; MG/100ML; MG/100ML; MG/100ML
INJECTION, SOLUTION INTRAVENOUS CONTINUOUS
Status: DISCONTINUED | OUTPATIENT
Start: 2024-02-27 | End: 2024-02-27 | Stop reason: HOSPADM

## 2024-02-27 RX ORDER — HYDROCODONE BITARTRATE AND ACETAMINOPHEN 5; 325 MG/1; MG/1
1 TABLET ORAL EVERY 6 HOURS PRN
Qty: 28 TABLET | Refills: 0 | Status: SHIPPED | OUTPATIENT
Start: 2024-02-27 | End: 2024-03-05

## 2024-02-27 RX ORDER — MIDAZOLAM HYDROCHLORIDE 2 MG/2ML
2 INJECTION, SOLUTION INTRAMUSCULAR; INTRAVENOUS
Status: DISCONTINUED | OUTPATIENT
Start: 2024-02-27 | End: 2024-02-27 | Stop reason: HOSPADM

## 2024-02-27 RX ORDER — IPRATROPIUM BROMIDE AND ALBUTEROL SULFATE 2.5; .5 MG/3ML; MG/3ML
1 SOLUTION RESPIRATORY (INHALATION)
Status: DISCONTINUED | OUTPATIENT
Start: 2024-02-27 | End: 2024-02-27 | Stop reason: HOSPADM

## 2024-02-27 RX ORDER — DEXAMETHASONE SODIUM PHOSPHATE 10 MG/ML
INJECTION, SOLUTION INTRAMUSCULAR; INTRAVENOUS PRN
Status: DISCONTINUED | OUTPATIENT
Start: 2024-02-27 | End: 2024-02-27 | Stop reason: SDUPTHER

## 2024-02-27 RX ORDER — PROPOFOL 10 MG/ML
INJECTION, EMULSION INTRAVENOUS
Status: DISCONTINUED
Start: 2024-02-27 | End: 2024-02-27 | Stop reason: HOSPADM

## 2024-02-27 RX ORDER — CEFAZOLIN SODIUM 1 G/3ML
INJECTION, POWDER, FOR SOLUTION INTRAMUSCULAR; INTRAVENOUS PRN
Status: DISCONTINUED | OUTPATIENT
Start: 2024-02-27 | End: 2024-02-27 | Stop reason: SDUPTHER

## 2024-02-27 RX ORDER — PROMETHAZINE HYDROCHLORIDE 25 MG/ML
6.25 INJECTION, SOLUTION INTRAMUSCULAR; INTRAVENOUS EVERY 5 MIN PRN
Status: DISCONTINUED | OUTPATIENT
Start: 2024-02-27 | End: 2024-02-27 | Stop reason: HOSPADM

## 2024-02-27 RX ORDER — PROPOFOL 10 MG/ML
INJECTION, EMULSION INTRAVENOUS PRN
Status: DISCONTINUED | OUTPATIENT
Start: 2024-02-27 | End: 2024-02-27 | Stop reason: SDUPTHER

## 2024-02-27 RX ORDER — EPHEDRINE SULFATE/0.9% NACL/PF 50 MG/5 ML
SYRINGE (ML) INTRAVENOUS PRN
Status: DISCONTINUED | OUTPATIENT
Start: 2024-02-27 | End: 2024-02-27 | Stop reason: SDUPTHER

## 2024-02-27 RX ORDER — MORPHINE SULFATE 2 MG/ML
2 INJECTION, SOLUTION INTRAMUSCULAR; INTRAVENOUS EVERY 5 MIN PRN
Status: DISCONTINUED | OUTPATIENT
Start: 2024-02-27 | End: 2024-02-27 | Stop reason: HOSPADM

## 2024-02-27 RX ORDER — ONDANSETRON 2 MG/ML
4 INJECTION INTRAMUSCULAR; INTRAVENOUS
Status: DISCONTINUED | OUTPATIENT
Start: 2024-02-27 | End: 2024-02-27 | Stop reason: HOSPADM

## 2024-02-27 RX ORDER — SODIUM CHLORIDE 0.9 % (FLUSH) 0.9 %
5-40 SYRINGE (ML) INJECTION EVERY 12 HOURS SCHEDULED
Status: DISCONTINUED | OUTPATIENT
Start: 2024-02-27 | End: 2024-02-27 | Stop reason: HOSPADM

## 2024-02-27 RX ORDER — DIPHENHYDRAMINE HYDROCHLORIDE 50 MG/ML
12.5 INJECTION INTRAMUSCULAR; INTRAVENOUS
Status: DISCONTINUED | OUTPATIENT
Start: 2024-02-27 | End: 2024-02-27 | Stop reason: HOSPADM

## 2024-02-27 RX ORDER — SODIUM CHLORIDE 0.9 % (FLUSH) 0.9 %
5-40 SYRINGE (ML) INJECTION PRN
Status: DISCONTINUED | OUTPATIENT
Start: 2024-02-27 | End: 2024-02-27 | Stop reason: HOSPADM

## 2024-02-27 RX ORDER — LIDOCAINE HYDROCHLORIDE 10 MG/ML
1 INJECTION, SOLUTION EPIDURAL; INFILTRATION; INTRACAUDAL; PERINEURAL
Status: DISCONTINUED | OUTPATIENT
Start: 2024-02-27 | End: 2024-02-27 | Stop reason: HOSPADM

## 2024-02-27 RX ORDER — OXYCODONE HYDROCHLORIDE AND ACETAMINOPHEN 5; 325 MG/1; MG/1
1 TABLET ORAL
Status: DISCONTINUED | OUTPATIENT
Start: 2024-02-27 | End: 2024-02-27 | Stop reason: HOSPADM

## 2024-02-27 RX ORDER — LIDOCAINE HYDROCHLORIDE 10 MG/ML
INJECTION, SOLUTION INFILTRATION; PERINEURAL PRN
Status: DISCONTINUED | OUTPATIENT
Start: 2024-02-27 | End: 2024-02-27 | Stop reason: SDUPTHER

## 2024-02-27 RX ORDER — BUPIVACAINE HYDROCHLORIDE AND EPINEPHRINE 2.5; 5 MG/ML; UG/ML
INJECTION, SOLUTION EPIDURAL; INFILTRATION; INTRACAUDAL; PERINEURAL PRN
Status: DISCONTINUED | OUTPATIENT
Start: 2024-02-27 | End: 2024-02-27 | Stop reason: ALTCHOICE

## 2024-02-27 RX ORDER — HYDRALAZINE HYDROCHLORIDE 20 MG/ML
10 INJECTION INTRAMUSCULAR; INTRAVENOUS
Status: DISCONTINUED | OUTPATIENT
Start: 2024-02-27 | End: 2024-02-27 | Stop reason: HOSPADM

## 2024-02-27 RX ORDER — ONDANSETRON 2 MG/ML
INJECTION INTRAMUSCULAR; INTRAVENOUS PRN
Status: DISCONTINUED | OUTPATIENT
Start: 2024-02-27 | End: 2024-02-27 | Stop reason: SDUPTHER

## 2024-02-27 RX ORDER — METOCLOPRAMIDE HYDROCHLORIDE 5 MG/ML
10 INJECTION INTRAMUSCULAR; INTRAVENOUS
Status: DISCONTINUED | OUTPATIENT
Start: 2024-02-27 | End: 2024-02-27 | Stop reason: HOSPADM

## 2024-02-27 RX ORDER — IPRATROPIUM BROMIDE AND ALBUTEROL SULFATE 2.5; .5 MG/3ML; MG/3ML
1 SOLUTION RESPIRATORY (INHALATION) ONCE
Status: COMPLETED | OUTPATIENT
Start: 2024-02-27 | End: 2024-02-27

## 2024-02-27 RX ORDER — BALANCED SALT SOLUTION ENRICHED WITH BICARBONATE, DEXTROSE, AND GLUTATHIONE
KIT INTRAOCULAR PRN
Status: DISCONTINUED | OUTPATIENT
Start: 2024-02-27 | End: 2024-02-27 | Stop reason: ALTCHOICE

## 2024-02-27 RX ORDER — PHENYLEPHRINE HCL IN 0.9% NACL 1 MG/10 ML
SYRINGE (ML) INTRAVENOUS PRN
Status: DISCONTINUED | OUTPATIENT
Start: 2024-02-27 | End: 2024-02-27 | Stop reason: SDUPTHER

## 2024-02-27 RX ORDER — FENTANYL CITRATE 50 UG/ML
INJECTION, SOLUTION INTRAMUSCULAR; INTRAVENOUS PRN
Status: DISCONTINUED | OUTPATIENT
Start: 2024-02-27 | End: 2024-02-27 | Stop reason: SDUPTHER

## 2024-02-27 RX ORDER — OXYCODONE HYDROCHLORIDE AND ACETAMINOPHEN 5; 325 MG/1; MG/1
2 TABLET ORAL
Status: DISCONTINUED | OUTPATIENT
Start: 2024-02-27 | End: 2024-02-27 | Stop reason: HOSPADM

## 2024-02-27 RX ORDER — LABETALOL HYDROCHLORIDE 5 MG/ML
10 INJECTION, SOLUTION INTRAVENOUS
Status: DISCONTINUED | OUTPATIENT
Start: 2024-02-27 | End: 2024-02-27 | Stop reason: HOSPADM

## 2024-02-27 RX ADMIN — DEXAMETHASONE SODIUM PHOSPHATE 10 MG: 10 INJECTION INTRAMUSCULAR; INTRAVENOUS at 13:34

## 2024-02-27 RX ADMIN — CEFAZOLIN 2 G: 2 INJECTION, POWDER, FOR SOLUTION INTRAMUSCULAR; INTRAVENOUS at 13:32

## 2024-02-27 RX ADMIN — FENTANYL CITRATE 50 MCG: 50 INJECTION, SOLUTION INTRAMUSCULAR; INTRAVENOUS at 13:45

## 2024-02-27 RX ADMIN — LIDOCAINE HYDROCHLORIDE 40 MG: 10 INJECTION, SOLUTION INFILTRATION; PERINEURAL at 13:26

## 2024-02-27 RX ADMIN — ONDANSETRON 4 MG: 2 INJECTION INTRAMUSCULAR; INTRAVENOUS at 14:16

## 2024-02-27 RX ADMIN — SODIUM CHLORIDE, POTASSIUM CHLORIDE, SODIUM LACTATE AND CALCIUM CHLORIDE: 600; 310; 30; 20 INJECTION, SOLUTION INTRAVENOUS at 13:23

## 2024-02-27 RX ADMIN — Medication 10 MG: at 13:47

## 2024-02-27 RX ADMIN — Medication 10 MG: at 13:53

## 2024-02-27 RX ADMIN — SODIUM CHLORIDE, POTASSIUM CHLORIDE, SODIUM LACTATE AND CALCIUM CHLORIDE: 600; 310; 30; 20 INJECTION, SOLUTION INTRAVENOUS at 14:09

## 2024-02-27 RX ADMIN — Medication 100 MCG: at 14:13

## 2024-02-27 RX ADMIN — Medication 100 MCG: at 13:35

## 2024-02-27 RX ADMIN — FENTANYL CITRATE 50 MCG: 50 INJECTION, SOLUTION INTRAMUSCULAR; INTRAVENOUS at 13:42

## 2024-02-27 RX ADMIN — Medication 100 MCG: at 13:40

## 2024-02-27 RX ADMIN — Medication 10 MG: at 14:01

## 2024-02-27 RX ADMIN — IPRATROPIUM BROMIDE AND ALBUTEROL SULFATE 1 DOSE: 2.5; .5 SOLUTION RESPIRATORY (INHALATION) at 12:36

## 2024-02-27 RX ADMIN — Medication 100 MCG: at 14:29

## 2024-02-27 RX ADMIN — Medication 100 MCG: at 13:38

## 2024-02-27 RX ADMIN — PROPOFOL 150 MG: 10 INJECTION, EMULSION INTRAVENOUS at 13:26

## 2024-02-27 RX ADMIN — Medication 10 MG: at 13:51

## 2024-02-27 RX ADMIN — IPRATROPIUM BROMIDE AND ALBUTEROL SULFATE 1 DOSE: .5; 2.5 SOLUTION RESPIRATORY (INHALATION) at 12:36

## 2024-02-27 RX ADMIN — Medication 10 MG: at 13:49

## 2024-02-27 RX ADMIN — MIDAZOLAM 2 MG: 1 INJECTION INTRAMUSCULAR; INTRAVENOUS at 13:23

## 2024-02-27 ASSESSMENT — LIFESTYLE VARIABLES: SMOKING_STATUS: 1

## 2024-02-27 ASSESSMENT — PAIN - FUNCTIONAL ASSESSMENT: PAIN_FUNCTIONAL_ASSESSMENT: NONE - DENIES PAIN

## 2024-02-27 NOTE — ANESTHESIA PRE PROCEDURE
Department of Anesthesiology  Preprocedure Note       Name:  Marcia Cavazos   Age:  58 y.o.  :  1965                                          MRN:  [id number]         Date:  2024      Surgeon: Surgeon(s):  LEBRON Rivers MD    Procedure: Procedure(s):  NASAL TIP RECONSTRUCTION OF MOHS DEFECT WITH FLAP CLOSURE    Medications prior to admission:   Prior to Admission medications    Medication Sig Start Date End Date Taking? Authorizing Provider   FLUoxetine (PROZAC) 20 MG capsule Take 1 capsule by mouth 2 times daily 23   Case Landry DO   Cholecalciferol (VITAMIN D3) 1.25 MG (10012 UT) CAPS Take by mouth    Shazia Zhang MD   metoprolol tartrate (LOPRESSOR) 50 MG tablet TAKE 1 TABLET BY MOUTH TWICE A DAY 23   Darvin Aguiar MD   butalbital-acetaminophen-caffeine (FIORICET, ESGIC) -40 MG per tablet Take 1 tablet by mouth every 4 hours as needed for Headaches Max Daily Amount: 6 tablets 3/9/23   Darvin Aguiar MD   lisinopril-hydroCHLOROthiazide (PRINZIDE;ZESTORETIC) 10-12.5 MG per tablet Take 1 tablet by mouth daily 23   Darvin Aguiar MD   pantoprazole (PROTONIX) 20 MG tablet TAKE TWO TABLETS BY MOUTH DAILY 23   Darvin Aguiar MD   albuterol sulfate HFA (VENTOLIN HFA) 108 (90 Base) MCG/ACT inhaler Inhale 2 puffs into the lungs every 6 hours as needed for Wheezing 23   Darvin Aguiar MD   naproxen (NAPROSYN) 500 MG tablet Take 1 tablet by mouth in the morning and 1 tablet in the evening. Take with meals. 22   Case Lisa PA-C   cyclobenzaprine (FLEXERIL) 10 MG tablet Take 1 tablet by mouth nightly as needed for Muscle spasms 22   Darvin Aguiar MD   tiotropium (SPIRIVA HANDIHALER) 18 MCG inhalation capsule Inhale 1 capsule into the lungs daily 22   Darvin Aguiar MD   aspirin 81 MG EC tablet Take 1 tablet by mouth daily    ProviderShazia MD       Current medications:    Current Facility-Administered Medications

## 2024-02-27 NOTE — OP NOTE
Operative Note      Patient: Marcia Cavazos  YOB: 1965  MRN: [id number]    Date of Procedure: 2/27/2024    Preop diagnosis: Basal cell carcinoma tip of nose    Post-Op Diagnosis: Same       Procedure(s):  NASAL TIP RECONSTRUCTION OF MOHS DEFECT WITH FLAP CLOSURE  Reconstruction of left supratip deformity following Mohs resection of basal cell carcinoma.  Total defect size measuring approximately 1 x 1 cm to the left supratip of the nose.  Total flap elevation and rotation to close the defect on the left tip of the nose measuring 2 x 6 cm incorporating the dorsum of the nose.    Surgeon(s):  LEBRON Rivers MD    Assistant:   Resident: Jamarcus Huston DO    Anesthesia: General    Estimated Blood Loss (mL): Minimal    Complications: None    Specimens:   * No specimens in log *    Implants:  * No implants in log *      Drains: * No LDAs found *    Findings: Flap pink and viable postoperatively with no distortion to the tip of the nose or the alar rims.        Detailed Description of Procedure:   The patient was brought into the operating room and placed under general anesthesia.  Her face was prepped and draped in sterile fashion.  0.25% Marcaine with epinephrine was injected into the dorsum of the nose as well as the tissue surrounding the Mohs defect on the left supratip.  The wound was sharply debrided and irrigated.  Total defect of the left tip of the nose measured approximately 1 x 1 cm.  A dorsal nasal flap was diagrammed with a surgical marker and incised with a #15 blade.  The flap was meticulously dissected free with tenotomy scissors and hemostasis maintained with Bovie cautery.  The dorsal nasal flap itself measured approximately 2 x 6 cm in diameter.  It was elevated and then rotated to close the defect on the left supratip.  The flap was trimmed to fit and sutured in a multilayered fashion with 4-0 Monocryl in the deep dermal layer and 5-0 nylon on the skin in a running fashion.

## 2024-02-27 NOTE — ANESTHESIA POSTPROCEDURE EVALUATION
Department of Anesthesiology  Postprocedure Note    Patient: Marcia Cavazos  MRN: [id number]  YOB: 1965  Date of evaluation: 2/27/2024    Procedure Summary       Date: 02/27/24 Room / Location: [address]    Anesthesia Start: 1323 Anesthesia Stop: 1445    Procedure: NASAL TIP RECONSTRUCTION OF MOHS DEFECT WITH FLAP CLOSURE (Nose) Diagnosis:       Basal cell carcinoma of nasolabial groove      (Basal cell carcinoma of nasolabial groove [C44.311])    Surgeons: LEBRON Rivers MD Responsible Provider: Luis Walter MD    Anesthesia Type: general ASA Status: 3            Anesthesia Type: No value filed.    Thomas Phase I: Thomas Score: 10    Thomas Phase II:      Anesthesia Post Evaluation    Patient location during evaluation: PACU  Patient participation: complete - patient participated  Level of consciousness: awake and alert  Airway patency: patent  Nausea & Vomiting: no nausea and no vomiting  Cardiovascular status: hemodynamically stable  Respiratory status: room air and spontaneous ventilation  Hydration status: euvolemic  Multimodal analgesia pain management approach  Pain management: adequate    No notable events documented.

## 2024-02-27 NOTE — H&P
RELEASE 4TH, 5TH DIGITS (SUPINE) 3080 TABLE, HAND TABLE performed by Desmond Dickerson DO at [facility] OR    [facility]   1993    ID TENDON SHEATH INCISION Right 05/02/2017      TRIGGER RELEASE THUMB,  MIDDLE FINGER performed by Severiano Macario DO at [facility] OR    [facility] AND [facility] (CERVIX REMOVED)   2000    TONSILLECTOMY                  Allergies   Allergen Reactions    Sulfa Antibiotics      Adhesive Tape Rash    Bactrim [Sulfamethoxazole-Trimethoprim] Nausea Only      Current Facility-Administered Medications          Current Outpatient Medications   Medication Sig Dispense Refill    Cholecalciferol (VITAMIN D3) 1.25 MG (69449 UT) CAPS Take by mouth        metoprolol tartrate (LOPRESSOR) 50 MG tablet TAKE 1 TABLET BY MOUTH TWICE A  tablet 8    butalbital-acetaminophen-caffeine (FIORICET, ESGIC) -40 MG per tablet Take 1 tablet by mouth every 4 hours as needed for Headaches Max Daily Amount: 6 tablets 30 tablet 3    lisinopril-hydroCHLOROthiazide (PRINZIDE;ZESTORETIC) 10-12.5 MG per tablet Take 1 tablet by mouth daily 30 tablet 3    pantoprazole (PROTONIX) 20 MG tablet TAKE TWO TABLETS BY MOUTH DAILY 60 tablet 1    cyclobenzaprine (FLEXERIL) 10 MG tablet Take 1 tablet by mouth nightly as needed for Muscle spasms 30 tablet 0    tiotropium (SPIRIVA HANDIHALER) 18 MCG inhalation capsule Inhale 1 capsule into the lungs daily 90 capsule 3    aspirin 81 MG EC tablet Take 1 tablet by mouth daily        albuterol sulfate HFA (VENTOLIN HFA) 108 (90 Base) MCG/ACT inhaler Inhale 2 puffs into the lungs every 6 hours as needed for Wheezing 8 g 5    FLUoxetine (PROZAC) 20 MG capsule TAKE ONE CAPSULE BY MOUTH TWICE A DAY 60 capsule 5    naproxen (NAPROSYN) 500 MG tablet Take 1 tablet by mouth in the morning and 1 tablet in the evening. Take with meals. 20 tablet 0      No current facility-administered medications for this visit.         Social History               Socioeconomic History    Marital status:        Spouse name:

## 2024-05-18 ENCOUNTER — HOSPITAL ENCOUNTER (EMERGENCY)
Age: 59
Discharge: HOME | End: 2024-05-19
Attending: EMERGENCY MEDICINE
Payer: COMMERCIAL

## 2024-05-18 DIAGNOSIS — R07.9 CHEST PAIN, UNSPECIFIED TYPE: Primary | ICD-10-CM

## 2024-05-18 DIAGNOSIS — S46.911A STRAIN OF RIGHT SHOULDER, INITIAL ENCOUNTER: ICD-10-CM

## 2024-05-18 PROCEDURE — 80048 BASIC METABOLIC PNL TOTAL CA: CPT

## 2024-05-18 PROCEDURE — 84484 ASSAY OF TROPONIN QUANT: CPT

## 2024-05-18 PROCEDURE — 99285 EMERGENCY DEPT VISIT HI MDM: CPT

## 2024-05-18 PROCEDURE — 93005 ELECTROCARDIOGRAM TRACING: CPT | Performed by: STUDENT IN AN ORGANIZED HEALTH CARE EDUCATION/TRAINING PROGRAM

## 2024-05-18 PROCEDURE — 85025 COMPLETE CBC W/AUTO DIFF WBC: CPT

## 2024-05-18 RX ORDER — LIDOCAINE 4 G/G
1 PATCH TOPICAL DAILY
Status: DISCONTINUED | OUTPATIENT
Start: 2024-05-19 | End: 2024-05-19 | Stop reason: HOSPADM

## 2024-05-18 RX ORDER — KETOROLAC TROMETHAMINE 30 MG/ML
30 INJECTION, SOLUTION INTRAMUSCULAR; INTRAVENOUS ONCE
Status: COMPLETED | OUTPATIENT
Start: 2024-05-19 | End: 2024-05-19

## 2024-05-18 RX ORDER — ASPIRIN 81 MG/1
324 TABLET, CHEWABLE ORAL ONCE
Status: COMPLETED | OUTPATIENT
Start: 2024-05-19 | End: 2024-05-19

## 2024-05-18 ASSESSMENT — PAIN SCALES - GENERAL: PAINLEVEL_OUTOF10: 8

## 2024-05-18 ASSESSMENT — PAIN - FUNCTIONAL ASSESSMENT: PAIN_FUNCTIONAL_ASSESSMENT: 0-10

## 2024-05-19 ENCOUNTER — APPOINTMENT (OUTPATIENT)
Dept: GENERAL RADIOLOGY | Age: 59
End: 2024-05-19
Payer: COMMERCIAL

## 2024-05-19 VITALS
HEIGHT: 61 IN | RESPIRATION RATE: 15 BRPM | OXYGEN SATURATION: 95 % | SYSTOLIC BLOOD PRESSURE: 155 MMHG | WEIGHT: 128 LBS | BODY MASS INDEX: 24.17 KG/M2 | TEMPERATURE: 97 F | HEART RATE: 57 BPM | DIASTOLIC BLOOD PRESSURE: 77 MMHG

## 2024-05-19 LAB
ANION GAP SERPL CALCULATED.3IONS-SCNC: 12 MMOL/L (ref 9–16)
BASOPHILS # BLD: 0.06 K/UL (ref 0–0.2)
BASOPHILS NFR BLD: 1 % (ref 0–2)
BUN SERPL-MCNC: 31 MG/DL (ref 6–20)
CALCIUM SERPL-MCNC: 9.8 MG/DL (ref 8.6–10.4)
CHLORIDE SERPL-SCNC: 101 MMOL/L (ref 98–107)
CO2 SERPL-SCNC: 26 MMOL/L (ref 20–31)
CREAT SERPL-MCNC: 1.2 MG/DL (ref 0.5–0.9)
EOSINOPHIL # BLD: 0.1 K/UL (ref 0–0.44)
EOSINOPHILS RELATIVE PERCENT: 1 % (ref 1–4)
ERYTHROCYTE [DISTWIDTH] IN BLOOD BY AUTOMATED COUNT: 13.6 % (ref 11.8–14.4)
GFR, ESTIMATED: 55 ML/MIN/1.73M2
GLUCOSE SERPL-MCNC: 98 MG/DL (ref 74–99)
HCT VFR BLD AUTO: 47 % (ref 36.3–47.1)
HGB BLD-MCNC: 15 G/DL (ref 11.9–15.1)
IMM GRANULOCYTES # BLD AUTO: <0.03 K/UL (ref 0–0.3)
IMM GRANULOCYTES NFR BLD: 0 %
LYMPHOCYTES NFR BLD: 3.02 K/UL (ref 1.1–3.7)
LYMPHOCYTES RELATIVE PERCENT: 36 % (ref 24–43)
MCH RBC QN AUTO: 26.6 PG (ref 25.2–33.5)
MCHC RBC AUTO-ENTMCNC: 31.9 G/DL (ref 28.4–34.8)
MCV RBC AUTO: 83.3 FL (ref 82.6–102.9)
MONOCYTES NFR BLD: 0.59 K/UL (ref 0.1–1.2)
MONOCYTES NFR BLD: 7 % (ref 3–12)
NEUTROPHILS NFR BLD: 55 % (ref 36–65)
NEUTS SEG NFR BLD: 4.72 K/UL (ref 1.5–8.1)
NRBC BLD-RTO: 0 PER 100 WBC
PLATELET # BLD AUTO: 290 K/UL (ref 138–453)
PMV BLD AUTO: 10.6 FL (ref 8.1–13.5)
POTASSIUM SERPL-SCNC: 4.3 MMOL/L (ref 3.7–5.3)
RBC # BLD AUTO: 5.64 M/UL (ref 3.95–5.11)
SODIUM SERPL-SCNC: 139 MMOL/L (ref 136–145)
TROPONIN I SERPL HS-MCNC: <6 NG/L (ref 0–14)
TROPONIN I SERPL HS-MCNC: <6 NG/L (ref 0–14)
WBC OTHER # BLD: 8.5 K/UL (ref 3.5–11.3)

## 2024-05-19 PROCEDURE — 96374 THER/PROPH/DIAG INJ IV PUSH: CPT

## 2024-05-19 PROCEDURE — 84484 ASSAY OF TROPONIN QUANT: CPT

## 2024-05-19 PROCEDURE — 71046 X-RAY EXAM CHEST 2 VIEWS: CPT

## 2024-05-19 PROCEDURE — 6360000002 HC RX W HCPCS: Performed by: STUDENT IN AN ORGANIZED HEALTH CARE EDUCATION/TRAINING PROGRAM

## 2024-05-19 PROCEDURE — 6370000000 HC RX 637 (ALT 250 FOR IP): Performed by: STUDENT IN AN ORGANIZED HEALTH CARE EDUCATION/TRAINING PROGRAM

## 2024-05-19 PROCEDURE — 73030 X-RAY EXAM OF SHOULDER: CPT

## 2024-05-19 RX ORDER — CYCLOBENZAPRINE HCL 10 MG
10 TABLET ORAL 3 TIMES DAILY PRN
Qty: 30 TABLET | Refills: 0 | Status: SHIPPED | OUTPATIENT
Start: 2024-05-19 | End: 2024-05-29

## 2024-05-19 RX ORDER — LIDOCAINE 4 G/G
1 PATCH TOPICAL DAILY
Qty: 30 PATCH | Refills: 0 | Status: SHIPPED | OUTPATIENT
Start: 2024-05-19 | End: 2024-06-18

## 2024-05-19 RX ADMIN — KETOROLAC TROMETHAMINE 30 MG: 30 INJECTION, SOLUTION INTRAMUSCULAR; INTRAVENOUS at 00:00

## 2024-05-19 RX ADMIN — ASPIRIN 81 MG 324 MG: 81 TABLET ORAL at 00:00

## 2024-05-19 ASSESSMENT — HEART SCORE: ECG: NORMAL

## 2024-05-19 NOTE — DISCHARGE INSTRUCTIONS
We evaluated you for your shoulder pain.  We worked you up for chest pain.  Your workup here was unremarkable however we did want to admit you to the hospital to see cardiology for further testing.    You declined.  You want to follow-up with your outpatient doctors.    Please call your primary doctor first thing in the morning to schedule an appointment.    Please return the emerged part for develop any worsening or concerning symptoms.

## 2024-05-19 NOTE — ED NOTES
Pt arrived to Ed with c/o having shoulder pain that radiated into her back and into her neck  Pt states she has no cardiac history   Pt states that she does not have any chest pain ro SOb  Pt states that she still has some pain in her arm   Pt states that she did not take any medication PTA  Pt states that she also has some flutter feelings in her chest at times   Breathing is non labored and no acute distress is noted.   Pt resting on stretcher, call light within reach.white board updated

## 2024-05-19 NOTE — ED PROVIDER NOTES
[facility]   Emergency Department  Emergency Medicine Attending Sign-out   Note started: 11:39 AM EDT    Care of Marcia Cavazos was assumed from previous attending Dr. Castro at 2 AM and is being seen for Shoulder Pain (Right across shoulder blades ) and Dizziness  .  The patient's initial evaluation and plan have been discussed with the prior provider who initially evaluated the patient.     Attestation  I was available and discussed any additional care issues that arose and coordinated the management plans with the resident(s) caring for the patient during my duty period. Any areas of disagreement with resident's documentation of care or procedures are noted on the chart. I was personally present for the key portions of any/all procedures, during my duty period. I have documented in the chart those procedures where I was not present during the key portions.     BRIEF PATIENT SUMMARY/MDM COURSE PER INITIAL PROVIDER:   RECENT VITALS:     Temp: 97 °F (36.1 °C),  Pulse: 57, Respirations: 15, BP: (!) 155/77, SpO2: 95 %    This patient is a 58 y.o. Female with chest pain and shoulder pain.  Cardiac workup and initial attending did recommend admission for cardiac workup.     DIAGNOSTICS/MEDICATIONS:     MEDICATIONS GIVEN:  ED Medication Orders (From admission, onward)      Start Ordered     Status Ordering Provider    05/19/24 0000 05/18/24 7059  aspirin chewable tablet 324 mg  ONCE         Last MAR action: Given - by FAREED OLGUIN on 05/19/24 at 0000 TIMOTHY WILSON    05/19/24 0000 05/18/24 2349  ketorolac (TORADOL) injection 30 mg  ONCE         Last MAR action: Given - by FAREED OLGUIN on 05/19/24 at 0000 TIMOTHY WILSON            LABS    Labs Reviewed   CBC WITH AUTO DIFFERENTIAL - Abnormal; Notable for the following components:       Result Value    RBC 5.64 (*)     All other components within normal limits   BASIC METABOLIC PANEL - Abnormal; Notable for the following components:

## 2024-05-19 NOTE — ED PROVIDER NOTES
[facility] ED  Emergency Department Encounter  Emergency Medicine Resident     Pt Name:Marcia Cavazos  MRN: [id number]  Birthdate 1965  Date of evaluation: 24  PCP:  Kim Villafana APRN - CNP  Note Started: 11:34 PM EDT      CHIEF COMPLAINT       Chief Complaint   Patient presents with    Shoulder Pain     Right across shoulder blades     Dizziness       HISTORY OF PRESENT ILLNESS  (Location/Symptom, Timing/Onset, Context/Setting, Quality, Duration, Modifying Factors, Severity.)      Marcia Cavazos is a 58 y.o. female who presents with right shoulder pain that started this evening when she was at work.  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  Patient states that the pain was radiating across her chest and concerned her.  Patient states she then became very anxious about her symptoms.  EMS was called, evaluated patient and recommended evaluation in the emergency department.  Patient states with the pain she did have some transient shortness of breath, this has gone away.  Felt some heart palpitations.  Did feel lightheaded, never passed out.  States she does have a previous history of a cardiac arrest when she was 6 years old that was traumatic, denies other cardiac history however states she does take aspirin 81 mg daily for previous CAD.  Last had a stress test in 2021.    PAST MEDICAL / SURGICAL / SOCIAL / FAMILY HISTORY      has a past medical history of Anxiety, CAD (coronary artery disease), Cancer (HCC), Cardiac arrest (HCC), COPD (chronic obstructive pulmonary disease) (HCC), GERD (gastroesophageal reflux disease), Headaches due to old head injury, History of blood transfusion, Hypertension, Snores, Trigger finger, Wears dentures, and Wears glasses.       has a past surgical history that includes Tonsillectomy;  section; LEEP (1993); pr tendon sheath incision (Right, 2017); Colonoscopy (2019); Total

## 2024-05-19 NOTE — ED PROVIDER NOTES
[facility]     Emergency Department     Faculty Attestation    I performed a history and physical examination of the patient and discussed management with the resident. I reviewed the resident’s note and agree with the documented findings and plan of care. Any areas of disagreement are noted on the chart. I was personally present for the key portions of any procedures. I have documented in the chart those procedures where I was not present during the key portions. I have reviewed the emergency nurses triage note. I agree with the chief complaint, past medical history, past surgical history, allergies, medications, social and family history as documented unless otherwise noted below. Documentation of the HPI, Physical Exam and Medical Decision Making performed by medical students or scribes is based on my personal performance of the HPI, PE and MDM. For Physician Assistant/ Nurse Practitioner cases/documentation I have personally evaluated this patient and have completed at least one if not all key elements of the E/M (history, physical exam, and MDM). Additional findings are as noted.    Vital signs:   Vitals:    05/18/24 2230   BP: (!) 144/86   Pulse: 63   Resp: 17   Temp: 97 °F (36.1 °C)   SpO2: 97%      58-year-old female here with right shoulder pain that started with lifting but then was accompanied by chest pain that radiates across her chest. On exam she is alert and afebrile. Breath sounds are clear. Cardiac exam with a normal rate, regular rhythm. Abdomen soft and non-tender. Extremities with no edema or calf tenderness.    EKG Interpretation    Interpreted by me    Rhythm: normal sinus   Rate: normal  Axis: normal  Ectopy: none  Conduction: normal  ST Segments: no acute change  T Waves: no acute change  Q Waves: none    Clinical Impression: no acute changes and normal EKG            Mita Castro M.D,  Attending Emergency  Physician            Mita Castro MD  05/19/24 0012

## 2024-05-21 LAB
EKG ATRIAL RATE: 62 BPM
EKG P AXIS: 81 DEGREES
EKG P-R INTERVAL: 116 MS
EKG Q-T INTERVAL: 400 MS
EKG QRS DURATION: 84 MS
EKG QTC CALCULATION (BAZETT): 406 MS
EKG R AXIS: 88 DEGREES
EKG T AXIS: 72 DEGREES
EKG VENTRICULAR RATE: 62 BPM

## 2024-05-21 PROCEDURE — 93010 ELECTROCARDIOGRAM REPORT: CPT | Performed by: INTERNAL MEDICINE

## 2024-08-14 ENCOUNTER — HOSPITAL ENCOUNTER (EMERGENCY)
Age: 59
Discharge: HOME | End: 2024-08-14
Attending: STUDENT IN AN ORGANIZED HEALTH CARE EDUCATION/TRAINING PROGRAM
Payer: COMMERCIAL

## 2024-08-14 ENCOUNTER — APPOINTMENT (OUTPATIENT)
Dept: CT IMAGING | Age: 59
End: 2024-08-14
Payer: COMMERCIAL

## 2024-08-14 VITALS
SYSTOLIC BLOOD PRESSURE: 119 MMHG | HEART RATE: 75 BPM | WEIGHT: 127 LBS | BODY MASS INDEX: 24 KG/M2 | TEMPERATURE: 98.5 F | RESPIRATION RATE: 16 BRPM | DIASTOLIC BLOOD PRESSURE: 74 MMHG | OXYGEN SATURATION: 95 %

## 2024-08-14 DIAGNOSIS — K52.9 COLITIS: ICD-10-CM

## 2024-08-14 DIAGNOSIS — R10.9 ABDOMINAL PAIN, UNSPECIFIED ABDOMINAL LOCATION: Primary | ICD-10-CM

## 2024-08-14 LAB
ALBUMIN SERPL-MCNC: 4 G/DL (ref 3.5–5.2)
ALP SERPL-CCNC: 113 U/L (ref 35–104)
ALT SERPL-CCNC: 15 U/L (ref 5–33)
ANION GAP SERPL CALCULATED.3IONS-SCNC: 11 MMOL/L (ref 9–17)
AST SERPL-CCNC: 23 U/L
BACTERIA URNS QL MICRO: ABNORMAL
BASOPHILS # BLD: 0.05 K/UL (ref 0–0.2)
BASOPHILS NFR BLD: 0 % (ref 0–2)
BILIRUB DIRECT SERPL-MCNC: <0.1 MG/DL
BILIRUB INDIRECT SERPL-MCNC: ABNORMAL MG/DL (ref 0–1)
BILIRUB SERPL-MCNC: 0.2 MG/DL (ref 0.3–1.2)
BILIRUB UR QL STRIP: NEGATIVE
BUN SERPL-MCNC: 19 MG/DL (ref 6–20)
BUN/CREAT SERPL: 24 (ref 9–20)
CALCIUM SERPL-MCNC: 9.3 MG/DL (ref 8.6–10.4)
CHLORIDE SERPL-SCNC: 99 MMOL/L (ref 98–107)
CLARITY UR: CLEAR
CO2 SERPL-SCNC: 27 MMOL/L (ref 20–31)
COLOR UR: YELLOW
CREAT SERPL-MCNC: 0.8 MG/DL (ref 0.5–0.9)
EOSINOPHIL # BLD: 0.16 K/UL (ref 0–0.44)
EOSINOPHILS RELATIVE PERCENT: 1 % (ref 1–4)
EPI CELLS #/AREA URNS HPF: ABNORMAL /HPF (ref 0–5)
ERYTHROCYTE [DISTWIDTH] IN BLOOD BY AUTOMATED COUNT: 14.2 % (ref 11.8–14.4)
GFR, ESTIMATED: 86 ML/MIN/1.73M2
GLUCOSE SERPL-MCNC: 102 MG/DL (ref 70–99)
GLUCOSE UR STRIP-MCNC: NEGATIVE MG/DL
HCT VFR BLD AUTO: 44.7 % (ref 36.3–47.1)
HGB BLD-MCNC: 14.9 G/DL (ref 11.9–15.1)
HGB UR QL STRIP.AUTO: NEGATIVE
IMM GRANULOCYTES # BLD AUTO: 0.03 K/UL (ref 0–0.3)
IMM GRANULOCYTES NFR BLD: 0 %
KETONES UR STRIP-MCNC: NEGATIVE MG/DL
LEUKOCYTE ESTERASE UR QL STRIP: NEGATIVE
LIPASE SERPL-CCNC: 79 U/L (ref 13–60)
LYMPHOCYTES NFR BLD: 2.4 K/UL (ref 1.1–3.7)
LYMPHOCYTES RELATIVE PERCENT: 20 % (ref 24–43)
MCH RBC QN AUTO: 27.4 PG (ref 25.2–33.5)
MCHC RBC AUTO-ENTMCNC: 33.3 G/DL (ref 28.4–34.8)
MCV RBC AUTO: 82.3 FL (ref 82.6–102.9)
MONOCYTES NFR BLD: 0.94 K/UL (ref 0.1–1.2)
MONOCYTES NFR BLD: 8 % (ref 3–12)
MUCOUS THREADS URNS QL MICRO: ABNORMAL
NEUTROPHILS NFR BLD: 71 % (ref 36–65)
NEUTS SEG NFR BLD: 8.46 K/UL (ref 1.5–8.1)
NITRITE UR QL STRIP: NEGATIVE
NRBC BLD-RTO: 0 PER 100 WBC
PH UR STRIP: 6 [PH] (ref 5–8)
PLATELET # BLD AUTO: 284 K/UL (ref 138–453)
PMV BLD AUTO: 10.1 FL (ref 8.1–13.5)
POTASSIUM SERPL-SCNC: 4.4 MMOL/L (ref 3.7–5.3)
PROT SERPL-MCNC: 7.1 G/DL (ref 6.4–8.3)
PROT UR STRIP-MCNC: NEGATIVE MG/DL
RBC # BLD AUTO: 5.43 M/UL (ref 3.95–5.11)
RBC # BLD: ABNORMAL 10*6/UL
RBC #/AREA URNS HPF: ABNORMAL /HPF (ref 0–2)
SODIUM SERPL-SCNC: 137 MMOL/L (ref 135–144)
SP GR UR STRIP: 1.02 (ref 1–1.03)
UROBILINOGEN UR STRIP-ACNC: NORMAL EU/DL (ref 0–1)
WBC #/AREA URNS HPF: ABNORMAL /HPF (ref 0–5)
WBC OTHER # BLD: 12 K/UL (ref 3.5–11.3)

## 2024-08-14 PROCEDURE — 80076 HEPATIC FUNCTION PANEL: CPT

## 2024-08-14 PROCEDURE — 80048 BASIC METABOLIC PNL TOTAL CA: CPT

## 2024-08-14 PROCEDURE — 6370000000 HC RX 637 (ALT 250 FOR IP): Performed by: NURSE PRACTITIONER

## 2024-08-14 PROCEDURE — 6360000004 HC RX CONTRAST MEDICATION: Performed by: NURSE PRACTITIONER

## 2024-08-14 PROCEDURE — 83690 ASSAY OF LIPASE: CPT

## 2024-08-14 PROCEDURE — 85025 COMPLETE CBC W/AUTO DIFF WBC: CPT

## 2024-08-14 PROCEDURE — 99285 EMERGENCY DEPT VISIT HI MDM: CPT

## 2024-08-14 PROCEDURE — 2580000003 HC RX 258: Performed by: NURSE PRACTITIONER

## 2024-08-14 PROCEDURE — 81001 URINALYSIS AUTO W/SCOPE: CPT

## 2024-08-14 PROCEDURE — 96372 THER/PROPH/DIAG INJ SC/IM: CPT

## 2024-08-14 PROCEDURE — 74177 CT ABD & PELVIS W/CONTRAST: CPT

## 2024-08-14 PROCEDURE — 6360000002 HC RX W HCPCS: Performed by: NURSE PRACTITIONER

## 2024-08-14 RX ORDER — ONDANSETRON 2 MG/ML
4 INJECTION INTRAMUSCULAR; INTRAVENOUS ONCE
Status: DISCONTINUED | OUTPATIENT
Start: 2024-08-14 | End: 2024-08-14

## 2024-08-14 RX ORDER — AMOXICILLIN AND CLAVULANATE POTASSIUM 875; 125 MG/1; MG/1
1 TABLET, FILM COATED ORAL 2 TIMES DAILY
Qty: 20 TABLET | Refills: 0 | Status: SHIPPED | OUTPATIENT
Start: 2024-08-14 | End: 2024-08-24

## 2024-08-14 RX ORDER — DICYCLOMINE HYDROCHLORIDE 10 MG/ML
20 INJECTION INTRAMUSCULAR ONCE
Status: COMPLETED | OUTPATIENT
Start: 2024-08-14 | End: 2024-08-14

## 2024-08-14 RX ORDER — FENTANYL CITRATE 0.05 MG/ML
25 INJECTION, SOLUTION INTRAMUSCULAR; INTRAVENOUS ONCE
Status: DISCONTINUED | OUTPATIENT
Start: 2024-08-14 | End: 2024-08-14

## 2024-08-14 RX ORDER — SODIUM CHLORIDE 0.9 % (FLUSH) 0.9 %
10 SYRINGE (ML) INJECTION ONCE
Status: COMPLETED | OUTPATIENT
Start: 2024-08-14 | End: 2024-08-14

## 2024-08-14 RX ORDER — ONDANSETRON 4 MG/1
4 TABLET, ORALLY DISINTEGRATING ORAL EVERY 8 HOURS PRN
Qty: 12 TABLET | Refills: 0 | Status: SHIPPED | OUTPATIENT
Start: 2024-08-14

## 2024-08-14 RX ORDER — AMOXICILLIN AND CLAVULANATE POTASSIUM 875; 125 MG/1; MG/1
1 TABLET, FILM COATED ORAL ONCE
Status: COMPLETED | OUTPATIENT
Start: 2024-08-14 | End: 2024-08-14

## 2024-08-14 RX ORDER — 0.9 % SODIUM CHLORIDE 0.9 %
80 INTRAVENOUS SOLUTION INTRAVENOUS ONCE
Status: COMPLETED | OUTPATIENT
Start: 2024-08-14 | End: 2024-08-14

## 2024-08-14 RX ORDER — METRONIDAZOLE 500 MG/1
500 TABLET ORAL 3 TIMES DAILY
Qty: 30 TABLET | Refills: 0 | Status: SHIPPED | OUTPATIENT
Start: 2024-08-14 | End: 2024-08-24

## 2024-08-14 RX ORDER — METRONIDAZOLE 500 MG/1
500 TABLET ORAL ONCE
Status: COMPLETED | OUTPATIENT
Start: 2024-08-14 | End: 2024-08-14

## 2024-08-14 RX ADMIN — SODIUM CHLORIDE, PRESERVATIVE FREE 10 ML: 5 INJECTION INTRAVENOUS at 17:51

## 2024-08-14 RX ADMIN — IOPAMIDOL 75 ML: 755 INJECTION, SOLUTION INTRAVENOUS at 17:51

## 2024-08-14 RX ADMIN — DICYCLOMINE HYDROCHLORIDE 20 MG: 10 INJECTION, SOLUTION INTRAMUSCULAR at 18:43

## 2024-08-14 RX ADMIN — LIDOCAINE HYDROCHLORIDE: 20 SOLUTION ORAL at 18:36

## 2024-08-14 RX ADMIN — METRONIDAZOLE 500 MG: 500 TABLET ORAL at 19:27

## 2024-08-14 RX ADMIN — AMOXICILLIN AND CLAVULANATE POTASSIUM 1 TABLET: 875; 125 TABLET, FILM COATED ORAL at 19:27

## 2024-08-14 RX ADMIN — SODIUM CHLORIDE 80 ML: 9 INJECTION, SOLUTION INTRAVENOUS at 17:51

## 2024-08-14 ASSESSMENT — ENCOUNTER SYMPTOMS
NAUSEA: 1
VOMITING: 0
COLOR CHANGE: 0
DIARRHEA: 1
BACK PAIN: 0
ABDOMINAL PAIN: 1
SHORTNESS OF BREATH: 0

## 2024-08-14 ASSESSMENT — PAIN DESCRIPTION - FREQUENCY: FREQUENCY: CONTINUOUS

## 2024-08-14 ASSESSMENT — PAIN DESCRIPTION - LOCATION: LOCATION: ABDOMEN

## 2024-08-14 ASSESSMENT — PAIN - FUNCTIONAL ASSESSMENT: PAIN_FUNCTIONAL_ASSESSMENT: 0-10

## 2024-08-14 ASSESSMENT — PAIN SCALES - GENERAL
PAINLEVEL_OUTOF10: 6
PAINLEVEL_OUTOF10: 5

## 2024-08-14 ASSESSMENT — PAIN DESCRIPTION - DESCRIPTORS: DESCRIPTORS: ACHING;BURNING

## 2024-08-14 NOTE — ED NOTES
The following labs labeled with pt sticker and sent to Lab:     [x] Lavender     [] Blue   [x] Green/yellow  [] Drk Green/Grey   [] Pink  [] Red  [] Yellow     [] Blood Cultures     [] COVID-19 swab    [] Rapid   [] Viral Swab  [] Wound Swab    [] Urine Sample  [] Pelvic Cultures

## 2024-08-14 NOTE — ED PROVIDER NOTES
[facility] ED  eMERGENCY dEPARTMENT eNCOUnter      Pt Name: Marcia Cavazos  MRN: [id number]  Birthdate 1965  Date of evaluation: 8/14/2024  Provider: IRENE Garcia CNP    CHIEF COMPLAINT       Chief Complaint   Patient presents with    Abdominal Pain     Onset last Tues, worse with food/drink    Nausea         HISTORY OF PRESENT ILLNESS  (Location/Symptom, Timing/Onset, Context/Setting, Quality, Duration, Modifying Factors, Severity.)   Marcia Cavazos is a 58 y.o. female who presents to the emergency department. C/o upper abd pain, nausea, diarrhea. Onset was early morning yesterday. Denies fever, chills, emesis. She takes Protonix; she took her last dose at 1600 today. Rates her pain 5/10.      Nursing Notes were reviewed.    ALLERGIES     Sulfa antibiotics, Adhesive tape, and Bactrim [sulfamethoxazole-trimethoprim]    CURRENT MEDICATIONS       Previous Medications    ALBUTEROL SULFATE HFA (VENTOLIN HFA) 108 (90 BASE) MCG/ACT INHALER    Inhale 2 puffs into the lungs every 6 hours as needed for Wheezing    ASPIRIN 81 MG EC TABLET    Take 1 tablet by mouth daily    BUTALBITAL-ACETAMINOPHEN-CAFFEINE (FIORICET, ESGIC) -40 MG PER TABLET    Take 1 tablet by mouth every 4 hours as needed for Headaches Max Daily Amount: 6 tablets    CHOLECALCIFEROL (VITAMIN D3) 1.25 MG (71466 UT) CAPS    Take by mouth    FLUOXETINE (PROZAC) 20 MG CAPSULE    Take 1 capsule by mouth 2 times daily    LISINOPRIL-HYDROCHLOROTHIAZIDE (PRINZIDE;ZESTORETIC) 10-12.5 MG PER TABLET    Take 1 tablet by mouth daily    METOPROLOL TARTRATE (LOPRESSOR) 50 MG TABLET    TAKE 1 TABLET BY MOUTH TWICE A DAY    NAPROXEN (NAPROSYN) 500 MG TABLET    Take 1 tablet by mouth in the morning and 1 tablet in the evening. Take with meals.    PANTOPRAZOLE (PROTONIX) 20 MG TABLET    TAKE TWO TABLETS BY MOUTH DAILY    TIOTROPIUM (SPIRIVA HANDIHALER) 18 MCG INHALATION CAPSULE    Inhale 1 capsule into the lungs daily       PAST MEDICAL

## 2024-09-27 RX ORDER — METOPROLOL TARTRATE 50 MG
TABLET ORAL
Qty: 180 TABLET | Refills: 8 | OUTPATIENT
Start: 2024-09-27

## 2025-06-12 ENCOUNTER — HOSPITAL ENCOUNTER (EMERGENCY)
Age: 60
Discharge: HOME | End: 2025-06-13
Attending: EMERGENCY MEDICINE
Payer: COMMERCIAL

## 2025-06-12 ENCOUNTER — APPOINTMENT (OUTPATIENT)
Dept: CT IMAGING | Age: 60
End: 2025-06-12
Payer: COMMERCIAL

## 2025-06-12 VITALS
RESPIRATION RATE: 16 BRPM | DIASTOLIC BLOOD PRESSURE: 84 MMHG | SYSTOLIC BLOOD PRESSURE: 150 MMHG | HEART RATE: 63 BPM | OXYGEN SATURATION: 99 % | TEMPERATURE: 98.2 F

## 2025-06-12 DIAGNOSIS — Z02.6 ENCOUNTER FOR ASSESSMENT OF WORK-RELATED CAUSATION OF INJURY: ICD-10-CM

## 2025-06-12 DIAGNOSIS — M54.2 NECK PAIN: ICD-10-CM

## 2025-06-12 DIAGNOSIS — S09.90XA CLOSED HEAD INJURY, INITIAL ENCOUNTER: Primary | ICD-10-CM

## 2025-06-12 PROCEDURE — 72125 CT NECK SPINE W/O DYE: CPT

## 2025-06-12 PROCEDURE — 6370000000 HC RX 637 (ALT 250 FOR IP)

## 2025-06-12 PROCEDURE — 70450 CT HEAD/BRAIN W/O DYE: CPT

## 2025-06-12 PROCEDURE — 99284 EMERGENCY DEPT VISIT MOD MDM: CPT

## 2025-06-12 RX ORDER — ONDANSETRON 4 MG/1
4 TABLET, ORALLY DISINTEGRATING ORAL ONCE
Status: COMPLETED | OUTPATIENT
Start: 2025-06-12 | End: 2025-06-12

## 2025-06-12 RX ORDER — CYCLOBENZAPRINE HCL 10 MG
10 TABLET ORAL ONCE
Status: COMPLETED | OUTPATIENT
Start: 2025-06-12 | End: 2025-06-12

## 2025-06-12 RX ORDER — 0.9 % SODIUM CHLORIDE 0.9 %
500 INTRAVENOUS SOLUTION INTRAVENOUS ONCE
Status: DISCONTINUED | OUTPATIENT
Start: 2025-06-12 | End: 2025-06-12

## 2025-06-12 RX ORDER — ONDANSETRON 4 MG/1
4 TABLET, ORALLY DISINTEGRATING ORAL EVERY 8 HOURS PRN
Status: DISCONTINUED | OUTPATIENT
Start: 2025-06-12 | End: 2025-06-12

## 2025-06-12 RX ORDER — LIDOCAINE 4 G/G
1 PATCH TOPICAL ONCE
Status: DISCONTINUED | OUTPATIENT
Start: 2025-06-12 | End: 2025-06-13 | Stop reason: HOSPADM

## 2025-06-12 RX ADMIN — ONDANSETRON 4 MG: 4 TABLET, ORALLY DISINTEGRATING ORAL at 21:33

## 2025-06-12 RX ADMIN — CYCLOBENZAPRINE 10 MG: 10 TABLET, FILM COATED ORAL at 21:33

## 2025-06-12 ASSESSMENT — PAIN DESCRIPTION - ORIENTATION: ORIENTATION: POSTERIOR

## 2025-06-12 ASSESSMENT — LIFESTYLE VARIABLES
HOW MANY STANDARD DRINKS CONTAINING ALCOHOL DO YOU HAVE ON A TYPICAL DAY: PATIENT DOES NOT DRINK
HOW OFTEN DO YOU HAVE A DRINK CONTAINING ALCOHOL: NEVER

## 2025-06-12 ASSESSMENT — PAIN SCALES - GENERAL: PAINLEVEL_OUTOF10: 9

## 2025-06-12 ASSESSMENT — ENCOUNTER SYMPTOMS
VOMITING: 0
NAUSEA: 1

## 2025-06-12 ASSESSMENT — PAIN - FUNCTIONAL ASSESSMENT
PAIN_FUNCTIONAL_ASSESSMENT: 0-10
PAIN_FUNCTIONAL_ASSESSMENT: ACTIVITIES ARE NOT PREVENTED

## 2025-06-12 ASSESSMENT — PAIN DESCRIPTION - DESCRIPTORS: DESCRIPTORS: THROBBING

## 2025-06-12 ASSESSMENT — PAIN DESCRIPTION - LOCATION: LOCATION: HEAD;BACK;NECK

## 2025-06-13 PROCEDURE — 6360000002 HC RX W HCPCS

## 2025-06-13 PROCEDURE — 96372 THER/PROPH/DIAG INJ SC/IM: CPT

## 2025-06-13 RX ORDER — KETOROLAC TROMETHAMINE 15 MG/ML
30 INJECTION, SOLUTION INTRAMUSCULAR; INTRAVENOUS ONCE
Status: COMPLETED | OUTPATIENT
Start: 2025-06-13 | End: 2025-06-13

## 2025-06-13 RX ADMIN — KETOROLAC TROMETHAMINE 30 MG: 15 INJECTION, SOLUTION INTRAMUSCULAR; INTRAVENOUS at 02:01

## 2025-06-13 ASSESSMENT — PAIN SCALES - GENERAL: PAINLEVEL_OUTOF10: 7

## 2025-06-13 NOTE — ED NOTES
Pt arrives via triage, pt c/o head/neck injury.   Pt states at work she had equipment fall onto her. Pt denies LOC, denies thinners, denies N/V.   Pt is A+Ox4, call [facility] in reach

## 2025-06-13 NOTE — ED PROVIDER NOTES
[facility] EMERGENCY DEPARTMENT     Emergency Department     Faculty Attestation        I performed a history and physical examination of the patient and discussed management with the resident. I reviewed the resident’s note and agree with the documented findings and plan of care. Any areas of disagreement are noted on the chart. I was personally present for the key portions of any procedures. I have documented in the chart those procedures where I was not present during the key portions. I have reviewed the emergency nurses triage note. I agree with the chief complaint, past medical history, past surgical history, allergies, medications, social and family history as documented unless otherwise noted below.  For Physician Assistant/ Nurse Practitioner cases/documentation I have personally evaluated this patient and have completed at least one if not all key elements of the E/M (history, physical exam, and MDM). Additional findings are as noted.      Vital Signs: BP: (!) 150/84  Pulse: 63  Respirations: 16  Temp: 98.2 °F (36.8 °C) SpO2: 99 %  PCP:  Kim Villafana APRN - CNP  Note Started: 6/12/25, 9:16 PM EDT    Pertinent Comments:         Critical Care  None      (Please note that portions of this note were completed with a voice recognition program. Efforts were made to edit the dictations but occasionally words are mis-transcribed. Whenever words are used in this note in any gender, they shall be construed as though they were used in the gender appropriate to the circumstances; and whenever words are used in this note in the singular or plural form, they shall be construed as though they were used in the form appropriate to the circumstances.)    Alan Maldonado MD [facility]  Attending Emergency Medicine Physician           Alan Maldonado MD  06/12/25 6246

## 2025-06-13 NOTE — DISCHARGE INSTRUCTIONS
You were seen in the emergency department following head trauma.  There is no evidence of a broken bone or bleed. It is possible that you have sustained a concussion.  Avoid repeat head injury, repeated head injury is associated with worse outcomes and a prolonged healing course.  Avoid engaging in any activity where there is a risk for head injury/hitting your head.  If you have a worsening headache, changes in your eyesight, vomiting, or new numbness or weakness return to the emergency department.     Schedule an appointment to be seen by your occupational health department as soon as possible.  If you are unable to be seen by your occupational health department, call our occupational health department at the number below to schedule an appointment to be seen.

## 2025-06-13 NOTE — ED PROVIDER NOTES
[facility] ED  Emergency Department Encounter  Emergency Medicine Resident     Pt Name:Marcia Cavazos  MRN: [id number]  Birthdate 1965  Date of evaluation: 25  PCP:  Kim Villafana APRN - CNP  Note Started: 9:02 PM EDT      CHIEF COMPLAINT       Chief Complaint   Patient presents with    Head Injury     Takes aspirin, no LOC, hit head    Neck Pain    Back Pain       HISTORY OF PRESENT ILLNESS  (Location/Symptom, Timing/Onset, Context/Setting, Quality, Duration, Modifying Factors, Severity.)      Marcia Cavazos is a 59 y.o. female who presents with head trauma to her head.  She states that just prior to arrival she was working on assembling a truck, works at [employer], she states that the bench that she was putting together fell on her head.  Denies loss of consciousness, denies vomiting but does endorse some nausea, denies any weakness or numbness, denies vision changes, denies anticoagulation.  She states that she is on aspirin for history of CAD, denies other antiplatelet.  She is able to fully recount the events that occurred.    She does endorse moderate intensity headache, states that it has not resolved with Tylenol and ibuprofen that she took just prior to arrival.  She does also endorse neck pain.    PAST MEDICAL / SURGICAL / SOCIAL / FAMILY HISTORY      has a past medical history of Anxiety, CAD (coronary artery disease), Cancer (HCC), Cardiac arrest (HCC), COPD (chronic obstructive pulmonary disease) (HCC), GERD (gastroesophageal reflux disease), Headaches due to old head injury, History of blood transfusion, Hypertension, Snores, Trigger finger, Wears dentures, and Wears glasses.     has a past surgical history that includes Tonsillectomy;  section; LEEP (); pr tendon sheath incision (Right, 2017); Colonoscopy (2019); Total abdominal hysterectomy w/ bilateral salpingoophorectomy (); Cholecystectomy, laparoscopic (N/A, 2019); Cardiac  by mouth daily    Provider, MD Shazia     REVIEW OF SYSTEMS       Review of Systems   Eyes:  Negative for visual disturbance.   Gastrointestinal:  Positive for nausea. Negative for vomiting.   Musculoskeletal:  Positive for neck pain. Negative for neck stiffness.   Skin:  Negative for wound.   Neurological:  Positive for headaches. Negative for seizures, syncope, weakness and numbness.   Hematological:  Does not bruise/bleed easily.     PHYSICAL EXAM      INITIAL VITALS:   BP (!) 150/84   Pulse 63   Temp 98.2 °F (36.8 °C)   Resp 16   SpO2 99%     Physical Exam  Vitals reviewed.   Constitutional:       General: She is not in acute distress.     Appearance: She is not toxic-appearing.   HENT:      Head: Normocephalic and atraumatic.      Mouth/Throat:      Mouth: Mucous membranes are moist.   Eyes:      Extraocular Movements: Extraocular movements intact.      Right eye: Normal extraocular motion and no nystagmus.      Left eye: Normal extraocular motion and no nystagmus.      Pupils: Pupils are equal, round, and reactive to light.   Cardiovascular:      Rate and Rhythm: Normal rate and regular rhythm.      Pulses:           Radial pulses are 2+ on the right side and 2+ on the left side.   Pulmonary:      Effort: Pulmonary effort is normal. No respiratory distress.   Abdominal:      Tenderness: There is no abdominal tenderness.   Musculoskeletal:      Cervical back: Normal range of motion. Tenderness present. No rigidity. Pain with movement and spinous process tenderness (TTP over the C-spine extending up to T1 and T2) present. Normal range of motion.   Skin:     Capillary Refill: Capillary refill takes less than 2 seconds.   Neurological:      Mental Status: She is alert and oriented to person, place, and time.      GCS: GCS eye subscore is 4. GCS verbal subscore is 5. GCS motor subscore is 6.       DDX/DIAGNOSTIC RESULTS / EMERGENCY DEPARTMENT COURSE / MDM     Medical Decision Making  This is a 59-year-old

## 2025-08-15 ENCOUNTER — HOSPITAL ENCOUNTER (EMERGENCY)
Age: 60
Discharge: HOME | End: 2025-08-15
Attending: STUDENT IN AN ORGANIZED HEALTH CARE EDUCATION/TRAINING PROGRAM
Payer: COMMERCIAL

## 2025-08-15 VITALS
WEIGHT: 123 LBS | TEMPERATURE: 98.2 F | DIASTOLIC BLOOD PRESSURE: 88 MMHG | SYSTOLIC BLOOD PRESSURE: 154 MMHG | OXYGEN SATURATION: 100 % | BODY MASS INDEX: 23.22 KG/M2 | HEART RATE: 74 BPM | RESPIRATION RATE: 16 BRPM | HEIGHT: 61 IN
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
## 2025-08-15 DIAGNOSIS — L03.90 CELLULITIS, UNSPECIFIED CELLULITIS SITE: Primary | ICD-10-CM

## 2025-08-15 PROCEDURE — 99283 EMERGENCY DEPT VISIT LOW MDM: CPT

## 2025-08-15 PROCEDURE — 6370000000 HC RX 637 (ALT 250 FOR IP): Performed by: NURSE PRACTITIONER

## 2025-08-15 RX ORDER — PREDNISONE 20 MG/1
40 TABLET ORAL ONCE
Status: COMPLETED | OUTPATIENT
Start: 2025-08-15 | End: 2025-08-15

## 2025-08-15 RX ORDER — DOXYCYCLINE 100 MG/1
100 CAPSULE ORAL ONCE
Status: COMPLETED | OUTPATIENT
Start: 2025-08-15 | End: 2025-08-15

## 2025-08-15 RX ORDER — DOXYCYCLINE 100 MG/1
100 CAPSULE ORAL 2 TIMES DAILY
Qty: 14 CAPSULE | Refills: 0 | Status: SHIPPED | OUTPATIENT
Start: 2025-08-15 | End: 2025-08-22

## 2025-08-15 RX ORDER — PREDNISONE 20 MG/1
40 TABLET ORAL DAILY
Qty: 10 TABLET | Refills: 0 | Status: SHIPPED | OUTPATIENT
Start: 2025-08-15 | End: 2025-08-20

## 2025-08-15 RX ORDER — HYDROXYZINE HYDROCHLORIDE 25 MG/1
25 TABLET, FILM COATED ORAL EVERY 8 HOURS PRN
Qty: 12 TABLET | Refills: 0 | Status: SHIPPED | OUTPATIENT
Start: 2025-08-15

## 2025-08-15 RX ADMIN — PREDNISONE 40 MG: 20 TABLET ORAL at 23:23

## 2025-08-15 RX ADMIN — DOXYCYCLINE 100 MG: 100 CAPSULE ORAL at 23:23

## 2025-08-15 ASSESSMENT — ENCOUNTER SYMPTOMS
COLOR CHANGE: 1
SHORTNESS OF BREATH: 0

## 2025-08-15 ASSESSMENT — PAIN DESCRIPTION - LOCATION: LOCATION: ELBOW

## 2025-08-15 ASSESSMENT — PAIN DESCRIPTION - ORIENTATION: ORIENTATION: LEFT

## 2025-08-15 ASSESSMENT — PAIN - FUNCTIONAL ASSESSMENT: PAIN_FUNCTIONAL_ASSESSMENT: 0-10

## 2025-08-15 ASSESSMENT — PAIN DESCRIPTION - DESCRIPTORS: DESCRIPTORS: BURNING;ITCHING

## 2025-08-15 ASSESSMENT — PAIN SCALES - GENERAL: PAINLEVEL_OUTOF10: 2

## (undated) DEVICE — TRAP SURG QUAD PARABOLA SLOT DSGN SFTY SCRN TRAPEASE

## (undated) DEVICE — SCISSOR SURG METZ CRV TIP

## (undated) DEVICE — BANDAGE GZ W2XL75IN ST RAYON POLY CNFRM STRTCH LTWT

## (undated) DEVICE — SUTURE COAT VCRL SZ 4-0 L18IN ABSRB UD L19MM PS-2 1/2 CIR J496G

## (undated) DEVICE — SUTURE MCRYL + SZ 4 0 L18IN ABSRB UD PC 3 L16MM 3 8 CIR PRIM MCP845G

## (undated) DEVICE — PROTECTOR ULN NRV PUR FOAM HK LOOP STRP ANATOMICALLY

## (undated) DEVICE — GLOVE,EXAM,NITRILE,RESTORE,OAT SENSE,L: Brand: MEDLINE

## (undated) DEVICE — SOLUTION ANTIFOG VIS SYS CLEARIFY LAPSCP

## (undated) DEVICE — CANNULA IV 18GA L15IN BLNT FILL LUERLOCK HUB MJCT

## (undated) DEVICE — TUBING, SUCTION, 9/32" X 20', STRAIGHT: Brand: MEDLINE INDUSTRIES, INC.

## (undated) DEVICE — GLOVE SURG SZ 65 THK91MIL LTX FREE SYN POLYISOPRENE

## (undated) DEVICE — INTENDED FOR TISSUE SEPARATION, AND OTHER PROCEDURES THAT REQUIRE A SHARP SURGICAL BLADE TO PUNCTURE OR CUT.: Brand: BARD-PARKER ® CARBON RIB-BACK BLADES

## (undated) DEVICE — NEEDLE INSUF L150MM DIA2MM DISP FOR PNEUMOPERI ENDOPATH

## (undated) DEVICE — TROCAR ENDOSCP L100MM DIA12MM BLDELSS OBT RADLUC STBL SL

## (undated) DEVICE — CHLORAPREP 26ML ORANGE

## (undated) DEVICE — PADDING,UNDERCAST,COTTON, 3X4YD STERILE: Brand: MEDLINE

## (undated) DEVICE — SHEET, ORTHO, SPLIT, STERILE: Brand: MEDLINE

## (undated) DEVICE — SVMMC HND

## (undated) DEVICE — GLOVE SURG SZ 6 THK91MIL LTX FREE SYN POLYISOPRENE ANTI

## (undated) DEVICE — TIP COVER ACCESSORY

## (undated) DEVICE — SOLUTION PREP PAINT POV IOD FOR SKIN MUCOUS MEM

## (undated) DEVICE — FORCEPS BX L240CM WRK CHN 2.8MM STD CAP W/ NDL MIC MESH

## (undated) DEVICE — SNARE ENDOSCP M L240CM LOOP W27MM SHTH DIA2.4MM OVL FLX

## (undated) DEVICE — CORD,CAUTERY,BIPOLAR,STERILE: Brand: MEDLINE

## (undated) DEVICE — BLADELESS OBTURATOR: Brand: WECK VISTA

## (undated) DEVICE — Device: Brand: DISPOSABLE INJECTOR NM

## (undated) DEVICE — GEL US 20GM NONIRRITATING OVERWRAPPED FILE PCH TRNSMIT

## (undated) DEVICE — CANNULA SEAL

## (undated) DEVICE — JELLY,LUBE,STERILE,FLIP TOP,TUBE,2-OZ: Brand: MEDLINE

## (undated) DEVICE — SOLUTION IRRIG 1000ML 0.9% SOD CHL USP POUR PLAS BTL

## (undated) DEVICE — NEEDLE ECHOGENIC 20GA L4IN INSUL W/ 30DEG BVL EXTN SET

## (undated) DEVICE — MHPB HEAD AND NECK  PACK: Brand: MEDLINE INDUSTRIES, INC.

## (undated) DEVICE — ELECTRODE PT RET AD L9FT HI MOIST COND ADH HYDRGEL CORDED

## (undated) DEVICE — GLOVE ORANGE PI 7 1/2   MSG9075

## (undated) DEVICE — BNDG,ELSTC,MATRIX,STRL,2"X5YD,LF,HOOK&LP: Brand: MEDLINE

## (undated) DEVICE — BLADE ES ELASTOMERIC COAT INSUL DURABLE BEND UPTO 90DEG

## (undated) DEVICE — GAUZE,SPONGE,4"X4",16PLY,STRL,LF,10/TRAY: Brand: MEDLINE

## (undated) DEVICE — STANDARD HYPODERMIC NEEDLE,POLYPROPYLENE HUB: Brand: MONOJECT

## (undated) DEVICE — 9165 UNIVERSAL PATIENT PLATE: Brand: 3M™

## (undated) DEVICE — MASTISOL ADHESIVE LIQ 2/3ML

## (undated) DEVICE — GLOVE ORANGE PI 7   MSG9070

## (undated) DEVICE — INSUFFLATION TUBING SET WITH FILTER, FUNNEL CONNECTOR AND LUER LOCK: Brand: JOSNOE MEDICAL INC

## (undated) DEVICE — GLOVE ORANGE PI 8 1/2   MSG9085

## (undated) DEVICE — Device

## (undated) DEVICE — 3M™ STERI-STRIP™ BLEND TONE SKIN CLOSURES, B1551, TAN, 1/4 IN X 3 IN (6 MM X 75 MM), 3 STRIPS/ENVELOPE: Brand: 3M™ STERI-STRIP™

## (undated) DEVICE — TUBING, SUCTION, 1/4" X 12', STRAIGHT: Brand: MEDLINE

## (undated) DEVICE — PADDING CAST W2INXL4YD ST COT COHESIVE HND TEARABLE SPEC

## (undated) DEVICE — TROCAR: Brand: KII FIOS FIRST ENTRY

## (undated) DEVICE — BAG SPEC REM 224ML W4XL6IN DIA10MM 1 HND GYN DISP ENDOPCH

## (undated) DEVICE — SUTURE NONABSORBABLE MONOFILAMENT 3-0 PS-1 18 IN BLK ETHILON 1663H

## (undated) DEVICE — COVER,LIGHT HANDLE,FLX,2/PK: Brand: MEDLINE INDUSTRIES, INC.

## (undated) DEVICE — GLOVE ORANGE PI 8   MSG9080

## (undated) DEVICE — STERILE POLYISOPRENE POWDER-FREE SURGICAL GLOVES WITH EMOLLIENT COATING: Brand: PROTEXIS

## (undated) DEVICE — ARM DRAPE

## (undated) DEVICE — APPLICATOR MEDICATED 26 CC SOLUTION HI LT ORNG CHLORAPREP

## (undated) DEVICE — SUTURE MCRYL SZ 4-0 L18IN ABSRB UD P-3 L13MM 3/8 CIR PRIM Y494G

## (undated) DEVICE — ZIMMER® STERILE DISPOSABLE TOURNIQUET CUFF WITH PROTECTIVE SLEEVE AND PLC, DUAL PORT, SINGLE BLADDER, 18 IN. (46 CM)

## (undated) DEVICE — PREMIUM DRY TRAY LF: Brand: MEDLINE INDUSTRIES, INC.

## (undated) DEVICE — SUTURE ABSORBABLE BRAIDED 4-0 P-3 18 IN UD VICRYL J494G

## (undated) DEVICE — BASIN EMSIS 700ML GRAPHITE PLAS KID SHP GRAD

## (undated) DEVICE — Device: Brand: DEFENDO VALVE AND CONNECTOR KIT

## (undated) DEVICE — STERILE POLYISOPRENE POWDER-FREE SURGICAL GLOVES: Brand: PROTEXIS

## (undated) DEVICE — SUTURE VCRL + SZ 0 L27IN ABSRB VLT L26MM UR-6 5/8 CIR VCP603H

## (undated) DEVICE — PADDING CAST W2INXL4YD COT LO LINTING WYTEX

## (undated) DEVICE — SYRINGE MED 50ML LUERLOCK TIP

## (undated) DEVICE — TOURNIQUET CUF BLD PRESSURE 4X18 IN 2 PRT SINGLE BLDR RED

## (undated) DEVICE — Z DISCONTINUED USE 2423037 APPLICATOR MEDICATED 3 CC CHLORHEXIDINE GLUC 2% CHLORAPREP

## (undated) DEVICE — DEVICE TRCR 12X9X3IN WHT CLSR DISP OMNICLOSE

## (undated) DEVICE — GARMENT,MEDLINE,DVT,INT,CALF,MED, GEN2: Brand: MEDLINE

## (undated) DEVICE — 3 ML SYRINGE LUER-LOCK TIP: Brand: MONOJECT

## (undated) DEVICE — Z DISCONTINUED BY MEDLINE USE 2711682 TRAY SKIN PREP DRY W/ PREM GLV

## (undated) DEVICE — SYRINGE, LUER LOCK, 10ML: Brand: MEDLINE

## (undated) DEVICE — CO2 CANNULA,SUPERSOFT, ADLT,7'O2,7'CO2: Brand: MEDLINE

## (undated) DEVICE — TOWEL,OR,DSP,ST,NATURAL,DLX,4/PK,20PK/CS: Brand: MEDLINE

## (undated) DEVICE — ZIMMER® STERILE DISPOSABLE TOURNIQUET CUFF WITH PROTECTIVE SLEEVE AND PLC, DUAL PORT, SINGLE BLADDER, 12 IN. (30 CM)